# Patient Record
Sex: MALE | Race: WHITE | NOT HISPANIC OR LATINO
[De-identification: names, ages, dates, MRNs, and addresses within clinical notes are randomized per-mention and may not be internally consistent; named-entity substitution may affect disease eponyms.]

---

## 2017-02-07 ENCOUNTER — MEDICATION RENEWAL (OUTPATIENT)
Age: 52
End: 2017-02-07

## 2017-02-23 ENCOUNTER — APPOINTMENT (OUTPATIENT)
Dept: CARDIOLOGY | Facility: CLINIC | Age: 52
End: 2017-02-23

## 2017-02-23 ENCOUNTER — NON-APPOINTMENT (OUTPATIENT)
Age: 52
End: 2017-02-23

## 2017-02-23 VITALS
TEMPERATURE: 97.4 F | OXYGEN SATURATION: 98 % | HEART RATE: 85 BPM | SYSTOLIC BLOOD PRESSURE: 130 MMHG | HEIGHT: 73 IN | BODY MASS INDEX: 31.94 KG/M2 | DIASTOLIC BLOOD PRESSURE: 80 MMHG | WEIGHT: 241 LBS

## 2017-02-24 LAB
ALBUMIN SERPL ELPH-MCNC: 4.2 G/DL
ALP BLD-CCNC: 74 U/L
ALT SERPL-CCNC: 20 U/L
ANION GAP SERPL CALC-SCNC: 17 MMOL/L
AST SERPL-CCNC: 21 U/L
BASOPHILS # BLD AUTO: 0.02 K/UL
BASOPHILS NFR BLD AUTO: 0.3 %
BILIRUB SERPL-MCNC: 0.5 MG/DL
BUN SERPL-MCNC: 15 MG/DL
CALCIUM SERPL-MCNC: 9.3 MG/DL
CHLORIDE SERPL-SCNC: 101 MMOL/L
CHOLEST SERPL-MCNC: 224 MG/DL
CHOLEST/HDLC SERPL: 5.9 RATIO
CO2 SERPL-SCNC: 23 MMOL/L
CREAT SERPL-MCNC: 1.17 MG/DL
EOSINOPHIL # BLD AUTO: 0.24 K/UL
EOSINOPHIL NFR BLD AUTO: 3.4 %
GLUCOSE SERPL-MCNC: 96 MG/DL
HCT VFR BLD CALC: 46.1 %
HDLC SERPL-MCNC: 38 MG/DL
HGB BLD-MCNC: 16.1 G/DL
IMM GRANULOCYTES NFR BLD AUTO: 0.3 %
LDLC SERPL CALC-MCNC: 140 MG/DL
LYMPHOCYTES # BLD AUTO: 2.3 K/UL
LYMPHOCYTES NFR BLD AUTO: 32.9 %
MAN DIFF?: NORMAL
MCHC RBC-ENTMCNC: 29.4 PG
MCHC RBC-ENTMCNC: 34.9 GM/DL
MCV RBC AUTO: 84.1 FL
MONOCYTES # BLD AUTO: 0.39 K/UL
MONOCYTES NFR BLD AUTO: 5.6 %
NEUTROPHILS # BLD AUTO: 4.03 K/UL
NEUTROPHILS NFR BLD AUTO: 57.5 %
NT-PROBNP SERPL-MCNC: 539 PG/ML
PLATELET # BLD AUTO: 239 K/UL
POTASSIUM SERPL-SCNC: 4.3 MMOL/L
PROT SERPL-MCNC: 7.2 G/DL
RBC # BLD: 5.48 M/UL
RBC # FLD: 13.8 %
SODIUM SERPL-SCNC: 141 MMOL/L
TRIGL SERPL-MCNC: 229 MG/DL
TSH SERPL-ACNC: 2.55 UIU/ML
WBC # FLD AUTO: 7 K/UL

## 2017-03-12 ENCOUNTER — RESULT REVIEW (OUTPATIENT)
Age: 52
End: 2017-03-12

## 2017-03-23 ENCOUNTER — OUTPATIENT (OUTPATIENT)
Dept: OUTPATIENT SERVICES | Facility: HOSPITAL | Age: 52
LOS: 1 days | Discharge: HOME | End: 2017-03-23

## 2017-03-24 DIAGNOSIS — F12.21 CANNABIS DEPENDENCE, IN REMISSION: ICD-10-CM

## 2017-04-04 ENCOUNTER — RX RENEWAL (OUTPATIENT)
Age: 52
End: 2017-04-04

## 2017-04-10 ENCOUNTER — MEDICATION RENEWAL (OUTPATIENT)
Age: 52
End: 2017-04-10

## 2017-04-13 ENCOUNTER — NON-APPOINTMENT (OUTPATIENT)
Age: 52
End: 2017-04-13

## 2017-04-13 ENCOUNTER — APPOINTMENT (OUTPATIENT)
Dept: CARDIOLOGY | Facility: CLINIC | Age: 52
End: 2017-04-13

## 2017-04-13 VITALS
HEART RATE: 64 BPM | TEMPERATURE: 97.1 F | WEIGHT: 243 LBS | BODY MASS INDEX: 32.2 KG/M2 | OXYGEN SATURATION: 98 % | DIASTOLIC BLOOD PRESSURE: 74 MMHG | SYSTOLIC BLOOD PRESSURE: 131 MMHG | HEIGHT: 73 IN

## 2017-04-14 LAB
ALBUMIN SERPL ELPH-MCNC: 4.3 G/DL
ALP BLD-CCNC: 70 U/L
ALT SERPL-CCNC: 16 U/L
ANION GAP SERPL CALC-SCNC: 17 MMOL/L
AST SERPL-CCNC: 17 U/L
BASOPHILS # BLD AUTO: 0.02 K/UL
BASOPHILS NFR BLD AUTO: 0.3 %
BILIRUB SERPL-MCNC: 0.5 MG/DL
BUN SERPL-MCNC: 15 MG/DL
CALCIUM SERPL-MCNC: 9.3 MG/DL
CHLORIDE SERPL-SCNC: 100 MMOL/L
CO2 SERPL-SCNC: 22 MMOL/L
CREAT SERPL-MCNC: 0.96 MG/DL
EOSINOPHIL # BLD AUTO: 0.16 K/UL
EOSINOPHIL NFR BLD AUTO: 2.5 %
GLUCOSE SERPL-MCNC: 112 MG/DL
HCT VFR BLD CALC: 45.3 %
HGB BLD-MCNC: 15.1 G/DL
IMM GRANULOCYTES NFR BLD AUTO: 0.2 %
LYMPHOCYTES # BLD AUTO: 1.95 K/UL
LYMPHOCYTES NFR BLD AUTO: 30.8 %
MAN DIFF?: NORMAL
MCHC RBC-ENTMCNC: 28 PG
MCHC RBC-ENTMCNC: 33.3 GM/DL
MCV RBC AUTO: 84 FL
MONOCYTES # BLD AUTO: 0.24 K/UL
MONOCYTES NFR BLD AUTO: 3.8 %
NEUTROPHILS # BLD AUTO: 3.96 K/UL
NEUTROPHILS NFR BLD AUTO: 62.4 %
NT-PROBNP SERPL-MCNC: 53 PG/ML
PLATELET # BLD AUTO: 261 K/UL
POTASSIUM SERPL-SCNC: 4.2 MMOL/L
PROT SERPL-MCNC: 6.7 G/DL
RBC # BLD: 5.39 M/UL
RBC # FLD: 13.9 %
SODIUM SERPL-SCNC: 139 MMOL/L
WBC # FLD AUTO: 6.34 K/UL

## 2017-05-03 ENCOUNTER — MEDICATION RENEWAL (OUTPATIENT)
Age: 52
End: 2017-05-03

## 2017-05-10 ENCOUNTER — APPOINTMENT (OUTPATIENT)
Dept: CARDIOLOGY | Facility: CLINIC | Age: 52
End: 2017-05-10

## 2017-05-10 ENCOUNTER — NON-APPOINTMENT (OUTPATIENT)
Age: 52
End: 2017-05-10

## 2017-05-10 VITALS
DIASTOLIC BLOOD PRESSURE: 78 MMHG | OXYGEN SATURATION: 98 % | BODY MASS INDEX: 31.41 KG/M2 | WEIGHT: 237 LBS | SYSTOLIC BLOOD PRESSURE: 120 MMHG | HEIGHT: 73 IN | HEART RATE: 68 BPM | TEMPERATURE: 97.7 F

## 2017-05-11 ENCOUNTER — NON-APPOINTMENT (OUTPATIENT)
Age: 52
End: 2017-05-11

## 2017-05-16 ENCOUNTER — MEDICATION RENEWAL (OUTPATIENT)
Age: 52
End: 2017-05-16

## 2017-05-24 ENCOUNTER — MEDICATION RENEWAL (OUTPATIENT)
Age: 52
End: 2017-05-24

## 2017-06-16 ENCOUNTER — NON-APPOINTMENT (OUTPATIENT)
Age: 52
End: 2017-06-16

## 2017-06-16 ENCOUNTER — APPOINTMENT (OUTPATIENT)
Dept: ELECTROPHYSIOLOGY | Facility: CLINIC | Age: 52
End: 2017-06-16

## 2017-06-16 VITALS
RESPIRATION RATE: 14 BRPM | DIASTOLIC BLOOD PRESSURE: 84 MMHG | OXYGEN SATURATION: 98 % | SYSTOLIC BLOOD PRESSURE: 128 MMHG | HEART RATE: 68 BPM

## 2017-06-16 RX ORDER — AMOXICILLIN AND CLAVULANATE POTASSIUM 875; 125 MG/1; MG/1
875-125 TABLET, COATED ORAL
Qty: 14 | Refills: 0 | Status: DISCONTINUED | COMMUNITY
Start: 2017-05-10 | End: 2017-06-16

## 2017-06-26 ENCOUNTER — MEDICATION RENEWAL (OUTPATIENT)
Age: 52
End: 2017-06-26

## 2017-08-04 ENCOUNTER — MEDICATION RENEWAL (OUTPATIENT)
Age: 52
End: 2017-08-04

## 2017-08-25 ENCOUNTER — APPOINTMENT (OUTPATIENT)
Dept: ELECTROPHYSIOLOGY | Facility: CLINIC | Age: 52
End: 2017-08-25
Payer: COMMERCIAL

## 2017-08-25 LAB
ALBUMIN SERPL ELPH-MCNC: 4 G/DL
ALP BLD-CCNC: 66 U/L
ALT SERPL-CCNC: 19 U/L
ANION GAP SERPL CALC-SCNC: 15 MMOL/L
AST SERPL-CCNC: 16 U/L
BASOPHILS # BLD AUTO: 0.01 K/UL
BASOPHILS NFR BLD AUTO: 0.2 %
BILIRUB SERPL-MCNC: 0.6 MG/DL
BUN SERPL-MCNC: 14 MG/DL
CALCIUM SERPL-MCNC: 9.4 MG/DL
CHLORIDE SERPL-SCNC: 106 MMOL/L
CO2 SERPL-SCNC: 22 MMOL/L
CREAT SERPL-MCNC: 1.1 MG/DL
EOSINOPHIL # BLD AUTO: 0.13 K/UL
EOSINOPHIL NFR BLD AUTO: 2.1 %
HCT VFR BLD CALC: 42.5 %
HGB BLD-MCNC: 14.1 G/DL
IMM GRANULOCYTES NFR BLD AUTO: 0.3 %
LYMPHOCYTES # BLD AUTO: 1.83 K/UL
LYMPHOCYTES NFR BLD AUTO: 29 %
MAN DIFF?: NORMAL
MCHC RBC-ENTMCNC: 28 PG
MCHC RBC-ENTMCNC: 33.2 GM/DL
MCV RBC AUTO: 84.5 FL
MONOCYTES # BLD AUTO: 0.19 K/UL
MONOCYTES NFR BLD AUTO: 3 %
NEUTROPHILS # BLD AUTO: 4.13 K/UL
NEUTROPHILS NFR BLD AUTO: 65.4 %
PLATELET # BLD AUTO: 234 K/UL
POTASSIUM SERPL-SCNC: 3.9 MMOL/L
PROT SERPL-MCNC: 6.9 G/DL
RBC # BLD: 5.03 M/UL
RBC # FLD: 13.8 %
SODIUM SERPL-SCNC: 143 MMOL/L
WBC # FLD AUTO: 6.31 K/UL

## 2017-08-25 PROCEDURE — 36415 COLL VENOUS BLD VENIPUNCTURE: CPT

## 2017-09-06 ENCOUNTER — TRANSCRIPTION ENCOUNTER (OUTPATIENT)
Age: 52
End: 2017-09-06

## 2017-09-06 ENCOUNTER — INPATIENT (INPATIENT)
Facility: HOSPITAL | Age: 52
LOS: 0 days | Discharge: HOME | DRG: 274 | End: 2017-09-07
Attending: INTERNAL MEDICINE | Admitting: INTERNAL MEDICINE
Payer: COMMERCIAL

## 2017-09-06 VITALS
HEIGHT: 73 IN | RESPIRATION RATE: 16 BRPM | DIASTOLIC BLOOD PRESSURE: 62 MMHG | TEMPERATURE: 98 F | HEART RATE: 59 BPM | WEIGHT: 235.89 LBS | OXYGEN SATURATION: 98 % | SYSTOLIC BLOOD PRESSURE: 130 MMHG

## 2017-09-06 DIAGNOSIS — I48.91 UNSPECIFIED ATRIAL FIBRILLATION: ICD-10-CM

## 2017-09-06 LAB
ALBUMIN SERPL ELPH-MCNC: 4.1 G/DL — SIGNIFICANT CHANGE UP (ref 3.3–5)
ALP SERPL-CCNC: 67 U/L — SIGNIFICANT CHANGE UP (ref 40–120)
ALT FLD-CCNC: 24 U/L RC — SIGNIFICANT CHANGE UP (ref 10–45)
ANION GAP SERPL CALC-SCNC: 13 MMOL/L — SIGNIFICANT CHANGE UP (ref 5–17)
ANION GAP SERPL CALC-SCNC: 16 MMOL/L — SIGNIFICANT CHANGE UP (ref 5–17)
APTT BLD: 29 SEC — SIGNIFICANT CHANGE UP (ref 27.5–37.4)
AST SERPL-CCNC: 35 U/L — SIGNIFICANT CHANGE UP (ref 10–40)
BASOPHILS # BLD AUTO: 0 K/UL — SIGNIFICANT CHANGE UP (ref 0–0.2)
BASOPHILS NFR BLD AUTO: 0.3 % — SIGNIFICANT CHANGE UP (ref 0–2)
BILIRUB SERPL-MCNC: 0.8 MG/DL — SIGNIFICANT CHANGE UP (ref 0.2–1.2)
BLD GP AB SCN SERPL QL: NEGATIVE — SIGNIFICANT CHANGE UP
BUN SERPL-MCNC: 15 MG/DL — SIGNIFICANT CHANGE UP (ref 7–23)
BUN SERPL-MCNC: 17 MG/DL — SIGNIFICANT CHANGE UP (ref 7–23)
CALCIUM SERPL-MCNC: 8.4 MG/DL — SIGNIFICANT CHANGE UP (ref 8.4–10.5)
CALCIUM SERPL-MCNC: 8.7 MG/DL — SIGNIFICANT CHANGE UP (ref 8.4–10.5)
CHLORIDE SERPL-SCNC: 107 MMOL/L — SIGNIFICANT CHANGE UP (ref 96–108)
CHLORIDE SERPL-SCNC: 109 MMOL/L — HIGH (ref 96–108)
CO2 SERPL-SCNC: 20 MMOL/L — LOW (ref 22–31)
CO2 SERPL-SCNC: 21 MMOL/L — LOW (ref 22–31)
CREAT SERPL-MCNC: 0.94 MG/DL — SIGNIFICANT CHANGE UP (ref 0.5–1.3)
CREAT SERPL-MCNC: 1.08 MG/DL — SIGNIFICANT CHANGE UP (ref 0.5–1.3)
EOSINOPHIL # BLD AUTO: 0.1 K/UL — SIGNIFICANT CHANGE UP (ref 0–0.5)
EOSINOPHIL NFR BLD AUTO: 0.7 % — SIGNIFICANT CHANGE UP (ref 0–6)
GLUCOSE SERPL-MCNC: 151 MG/DL — HIGH (ref 70–99)
GLUCOSE SERPL-MCNC: 87 MG/DL — SIGNIFICANT CHANGE UP (ref 70–99)
HCT VFR BLD CALC: 43.6 % — SIGNIFICANT CHANGE UP (ref 39–50)
HCT VFR BLD CALC: 43.8 % — SIGNIFICANT CHANGE UP (ref 39–50)
HGB BLD-MCNC: 14.9 G/DL — SIGNIFICANT CHANGE UP (ref 13–17)
HGB BLD-MCNC: 15 G/DL — SIGNIFICANT CHANGE UP (ref 13–17)
INR BLD: 1.1 RATIO — SIGNIFICANT CHANGE UP (ref 0.88–1.16)
LYMPHOCYTES # BLD AUTO: 17 % — SIGNIFICANT CHANGE UP (ref 13–44)
LYMPHOCYTES # BLD AUTO: 2.1 K/UL — SIGNIFICANT CHANGE UP (ref 1–3.3)
MAGNESIUM SERPL-MCNC: 1.9 MG/DL — SIGNIFICANT CHANGE UP (ref 1.6–2.6)
MCHC RBC-ENTMCNC: 29.7 PG — SIGNIFICANT CHANGE UP (ref 27–34)
MCHC RBC-ENTMCNC: 30.1 PG — SIGNIFICANT CHANGE UP (ref 27–34)
MCHC RBC-ENTMCNC: 34.1 GM/DL — SIGNIFICANT CHANGE UP (ref 32–36)
MCHC RBC-ENTMCNC: 34.2 GM/DL — SIGNIFICANT CHANGE UP (ref 32–36)
MCV RBC AUTO: 86.8 FL — SIGNIFICANT CHANGE UP (ref 80–100)
MCV RBC AUTO: 88.1 FL — SIGNIFICANT CHANGE UP (ref 80–100)
MONOCYTES # BLD AUTO: 0.4 K/UL — SIGNIFICANT CHANGE UP (ref 0–0.9)
MONOCYTES NFR BLD AUTO: 3 % — SIGNIFICANT CHANGE UP (ref 2–14)
NEUTROPHILS # BLD AUTO: 9.5 K/UL — HIGH (ref 1.8–7.4)
NEUTROPHILS NFR BLD AUTO: 78.9 % — HIGH (ref 43–77)
PHOSPHATE SERPL-MCNC: 3.9 MG/DL — SIGNIFICANT CHANGE UP (ref 2.5–4.5)
PLATELET # BLD AUTO: 239 K/UL — SIGNIFICANT CHANGE UP (ref 150–400)
PLATELET # BLD AUTO: 266 K/UL — SIGNIFICANT CHANGE UP (ref 150–400)
POTASSIUM SERPL-MCNC: 4.3 MMOL/L — SIGNIFICANT CHANGE UP (ref 3.5–5.3)
POTASSIUM SERPL-MCNC: 4.3 MMOL/L — SIGNIFICANT CHANGE UP (ref 3.5–5.3)
POTASSIUM SERPL-SCNC: 4.3 MMOL/L — SIGNIFICANT CHANGE UP (ref 3.5–5.3)
POTASSIUM SERPL-SCNC: 4.3 MMOL/L — SIGNIFICANT CHANGE UP (ref 3.5–5.3)
PROT SERPL-MCNC: 7.1 G/DL — SIGNIFICANT CHANGE UP (ref 6–8.3)
PROTHROM AB SERPL-ACNC: 11.9 SEC — SIGNIFICANT CHANGE UP (ref 9.8–12.7)
RBC # BLD: 4.95 M/UL — SIGNIFICANT CHANGE UP (ref 4.2–5.8)
RBC # BLD: 5.05 M/UL — SIGNIFICANT CHANGE UP (ref 4.2–5.8)
RBC # FLD: 12.5 % — SIGNIFICANT CHANGE UP (ref 10.3–14.5)
RBC # FLD: 12.5 % — SIGNIFICANT CHANGE UP (ref 10.3–14.5)
RH IG SCN BLD-IMP: POSITIVE — SIGNIFICANT CHANGE UP
SODIUM SERPL-SCNC: 141 MMOL/L — SIGNIFICANT CHANGE UP (ref 135–145)
SODIUM SERPL-SCNC: 145 MMOL/L — SIGNIFICANT CHANGE UP (ref 135–145)
WBC # BLD: 12.1 K/UL — HIGH (ref 3.8–10.5)
WBC # BLD: 6.7 K/UL — SIGNIFICANT CHANGE UP (ref 3.8–10.5)
WBC # FLD AUTO: 12.1 K/UL — HIGH (ref 3.8–10.5)
WBC # FLD AUTO: 6.7 K/UL — SIGNIFICANT CHANGE UP (ref 3.8–10.5)

## 2017-09-06 PROCEDURE — 93662 INTRACARDIAC ECG (ICE): CPT | Mod: 26

## 2017-09-06 PROCEDURE — 93010 ELECTROCARDIOGRAM REPORT: CPT

## 2017-09-06 PROCEDURE — 93656 COMPRE EP EVAL ABLTJ ATR FIB: CPT

## 2017-09-06 PROCEDURE — 93010 ELECTROCARDIOGRAM REPORT: CPT | Mod: 77

## 2017-09-06 PROCEDURE — 93613 INTRACARDIAC EPHYS 3D MAPG: CPT

## 2017-09-06 RX ORDER — HEPARIN SODIUM 5000 [USP'U]/ML
6000 INJECTION INTRAVENOUS; SUBCUTANEOUS EVERY 6 HOURS
Qty: 0 | Refills: 0 | Status: DISCONTINUED | OUTPATIENT
Start: 2017-09-06 | End: 2017-09-07

## 2017-09-06 RX ORDER — MAGNESIUM SULFATE 500 MG/ML
1 VIAL (ML) INJECTION ONCE
Qty: 0 | Refills: 0 | Status: COMPLETED | OUTPATIENT
Start: 2017-09-06 | End: 2017-09-06

## 2017-09-06 RX ORDER — PANTOPRAZOLE SODIUM 20 MG/1
40 TABLET, DELAYED RELEASE ORAL
Qty: 0 | Refills: 0 | Status: DISCONTINUED | OUTPATIENT
Start: 2017-09-06 | End: 2017-09-07

## 2017-09-06 RX ORDER — ONDANSETRON 8 MG/1
4 TABLET, FILM COATED ORAL ONCE
Qty: 0 | Refills: 0 | Status: COMPLETED | OUTPATIENT
Start: 2017-09-06 | End: 2017-09-06

## 2017-09-06 RX ORDER — SUCRALFATE 1 G
1 TABLET ORAL EVERY 6 HOURS
Qty: 0 | Refills: 0 | Status: DISCONTINUED | OUTPATIENT
Start: 2017-09-06 | End: 2017-09-07

## 2017-09-06 RX ORDER — PROPAFENONE HCL 150 MG
425 TABLET ORAL EVERY 12 HOURS
Qty: 0 | Refills: 0 | Status: DISCONTINUED | OUTPATIENT
Start: 2017-09-06 | End: 2017-09-07

## 2017-09-06 RX ORDER — HEPARIN SODIUM 5000 [USP'U]/ML
INJECTION INTRAVENOUS; SUBCUTANEOUS
Qty: 25000 | Refills: 0 | Status: DISCONTINUED | OUTPATIENT
Start: 2017-09-06 | End: 2017-09-07

## 2017-09-06 RX ADMIN — PANTOPRAZOLE SODIUM 40 MILLIGRAM(S): 20 TABLET, DELAYED RELEASE ORAL at 18:46

## 2017-09-06 RX ADMIN — Medication 100 GRAM(S): at 18:46

## 2017-09-06 RX ADMIN — Medication 1 GRAM(S): at 18:46

## 2017-09-06 RX ADMIN — ONDANSETRON 4 MILLIGRAM(S): 8 TABLET, FILM COATED ORAL at 15:00

## 2017-09-06 RX ADMIN — ONDANSETRON 4 MILLIGRAM(S): 8 TABLET, FILM COATED ORAL at 18:46

## 2017-09-06 RX ADMIN — HEPARIN SODIUM 1000 UNIT(S)/HR: 5000 INJECTION INTRAVENOUS; SUBCUTANEOUS at 20:07

## 2017-09-06 RX ADMIN — Medication 425 MILLIGRAM(S): at 18:45

## 2017-09-06 NOTE — CHART NOTE - NSCHARTNOTEFT_GEN_A_CORE
====================  NEW EVENTS:  ====================  s/p afib ablation    ====================  SUMMARY:  ====================  52 year old male h/o paroxysmal afib/flutter (on ASA only), HLD,  PUD, GERD, anxiety, obesity  who c/o palpitations, dizziness, near syncope, weakness, fatigue presents for Afib ablation    ====================  VITALS:  ====================    ICU Vital Signs Last 24 Hrs  T(C): 36.6 (06 Sep 2017 20:00), Max: 36.6 (06 Sep 2017 07:39)  T(F): 97.8 (06 Sep 2017 20:00), Max: 97.9 (06 Sep 2017 07:39)  HR: 83 (06 Sep 2017 22:00) (59 - 84)  BP: 108/60 (06 Sep 2017 22:00) (106/57 - 130/62)  BP(mean): 73 (06 Sep 2017 22:00) (71 - 84)  ABP: --  ABP(mean): --  RR: 18 (06 Sep 2017 22:00) (14 - 28)  SpO2: 97% (06 Sep 2017 22:00) (95% - 99%)      I&O's Summary    06 Sep 2017 07:01  -  06 Sep 2017 23:17  --------------------------------------------------------  IN: 490 mL / OUT: 2475 mL / NET: -1985 mL      ====================  LABS:  ====================                          14.9   12.1  )-----------( 266      ( 06 Sep 2017 15:23 )             43.6     09-06    145  |  109<H>  |  15  ----------------------------<  151<H>  4.3   |  20<L>  |  1.08    Ca    8.4      06 Sep 2017 15:23  Phos  3.9     09-06  Mg     1.9     09-06    TPro  7.1  /  Alb  4.1  /  TBili  0.8  /  DBili  x   /  AST  35  /  ALT  24  /  AlkPhos  67  09-06    PT/INR - ( 06 Sep 2017 08:07 )   PT: 11.9 sec;   INR: 1.10 ratio         PTT - ( 06 Sep 2017 08:07 )  PTT:29.0 sec        ====================  PLAN:  ====================  - patient on protonix/carfate  - TTE in AM. Will start Xarelto if no effusion\

## 2017-09-06 NOTE — H&P CARDIOLOGY - FAMILY HISTORY
Mother  Still living? Unknown  Family history of diabetes mellitus in mother, Age at diagnosis: Age Unknown     Father  Still living? Unknown  Family history of prostate cancer in father, Age at diagnosis: Age Unknown

## 2017-09-06 NOTE — H&P CARDIOLOGY - HISTORY OF PRESENT ILLNESS
52 year old male h/o paroxysmal afib/flutter (on ASA only), HLD,  PUD, GERD, anxiety, obesity  who c/o palpitations, dizziness, near syncope, weakness, fatigue. He also experiences symptoms with caffeine intake, overeating and lack of sleep. Pt admits to missing doses of propafenone.

## 2017-09-06 NOTE — H&P CARDIOLOGY - PMH
Anxiety    Atrial fibrillation, unspecified type    GERD (gastroesophageal reflux disease)    Peptic ulcer disease

## 2017-09-07 VITALS — DIASTOLIC BLOOD PRESSURE: 52 MMHG | HEART RATE: 76 BPM | SYSTOLIC BLOOD PRESSURE: 118 MMHG

## 2017-09-07 DIAGNOSIS — I48.91 UNSPECIFIED ATRIAL FIBRILLATION: ICD-10-CM

## 2017-09-07 LAB
ANION GAP SERPL CALC-SCNC: 13 MMOL/L — SIGNIFICANT CHANGE UP (ref 5–17)
APTT BLD: 41.7 SEC — HIGH (ref 27.5–37.4)
BASOPHILS # BLD AUTO: 0 K/UL — SIGNIFICANT CHANGE UP (ref 0–0.2)
BASOPHILS NFR BLD AUTO: 0.3 % — SIGNIFICANT CHANGE UP (ref 0–2)
BUN SERPL-MCNC: 14 MG/DL — SIGNIFICANT CHANGE UP (ref 7–23)
CALCIUM SERPL-MCNC: 8.4 MG/DL — SIGNIFICANT CHANGE UP (ref 8.4–10.5)
CHLORIDE SERPL-SCNC: 107 MMOL/L — SIGNIFICANT CHANGE UP (ref 96–108)
CO2 SERPL-SCNC: 20 MMOL/L — LOW (ref 22–31)
CREAT SERPL-MCNC: 1.07 MG/DL — SIGNIFICANT CHANGE UP (ref 0.5–1.3)
EOSINOPHIL # BLD AUTO: 0.1 K/UL — SIGNIFICANT CHANGE UP (ref 0–0.5)
EOSINOPHIL NFR BLD AUTO: 0.9 % — SIGNIFICANT CHANGE UP (ref 0–6)
GLUCOSE SERPL-MCNC: 111 MG/DL — HIGH (ref 70–99)
HCT VFR BLD CALC: 40.6 % — SIGNIFICANT CHANGE UP (ref 39–50)
HGB BLD-MCNC: 13.9 G/DL — SIGNIFICANT CHANGE UP (ref 13–17)
LYMPHOCYTES # BLD AUTO: 19.9 % — SIGNIFICANT CHANGE UP (ref 13–44)
LYMPHOCYTES # BLD AUTO: 2 K/UL — SIGNIFICANT CHANGE UP (ref 1–3.3)
MAGNESIUM SERPL-MCNC: 2.4 MG/DL — SIGNIFICANT CHANGE UP (ref 1.6–2.6)
MCHC RBC-ENTMCNC: 30.1 PG — SIGNIFICANT CHANGE UP (ref 27–34)
MCHC RBC-ENTMCNC: 34.3 GM/DL — SIGNIFICANT CHANGE UP (ref 32–36)
MCV RBC AUTO: 87.7 FL — SIGNIFICANT CHANGE UP (ref 80–100)
MONOCYTES # BLD AUTO: 0.7 K/UL — SIGNIFICANT CHANGE UP (ref 0–0.9)
MONOCYTES NFR BLD AUTO: 7.2 % — SIGNIFICANT CHANGE UP (ref 2–14)
NEUTROPHILS # BLD AUTO: 7.3 K/UL — SIGNIFICANT CHANGE UP (ref 1.8–7.4)
NEUTROPHILS NFR BLD AUTO: 71.6 % — SIGNIFICANT CHANGE UP (ref 43–77)
PHOSPHATE SERPL-MCNC: 4.1 MG/DL — SIGNIFICANT CHANGE UP (ref 2.5–4.5)
PLATELET # BLD AUTO: 235 K/UL — SIGNIFICANT CHANGE UP (ref 150–400)
POTASSIUM SERPL-MCNC: 4.1 MMOL/L — SIGNIFICANT CHANGE UP (ref 3.5–5.3)
POTASSIUM SERPL-SCNC: 4.1 MMOL/L — SIGNIFICANT CHANGE UP (ref 3.5–5.3)
RBC # BLD: 4.63 M/UL — SIGNIFICANT CHANGE UP (ref 4.2–5.8)
RBC # FLD: 12.4 % — SIGNIFICANT CHANGE UP (ref 10.3–14.5)
SODIUM SERPL-SCNC: 140 MMOL/L — SIGNIFICANT CHANGE UP (ref 135–145)
WBC # BLD: 10.2 K/UL — SIGNIFICANT CHANGE UP (ref 3.8–10.5)
WBC # FLD AUTO: 10.2 K/UL — SIGNIFICANT CHANGE UP (ref 3.8–10.5)

## 2017-09-07 PROCEDURE — C1769: CPT

## 2017-09-07 PROCEDURE — 86900 BLOOD TYPING SEROLOGIC ABO: CPT

## 2017-09-07 PROCEDURE — 93613 INTRACARDIAC EPHYS 3D MAPG: CPT

## 2017-09-07 PROCEDURE — C1732: CPT

## 2017-09-07 PROCEDURE — 93306 TTE W/DOPPLER COMPLETE: CPT | Mod: 26

## 2017-09-07 PROCEDURE — 80053 COMPREHEN METABOLIC PANEL: CPT

## 2017-09-07 PROCEDURE — 83735 ASSAY OF MAGNESIUM: CPT

## 2017-09-07 PROCEDURE — 93005 ELECTROCARDIOGRAM TRACING: CPT

## 2017-09-07 PROCEDURE — 86901 BLOOD TYPING SEROLOGIC RH(D): CPT

## 2017-09-07 PROCEDURE — 84100 ASSAY OF PHOSPHORUS: CPT

## 2017-09-07 PROCEDURE — 85730 THROMBOPLASTIN TIME PARTIAL: CPT

## 2017-09-07 PROCEDURE — 93010 ELECTROCARDIOGRAM REPORT: CPT

## 2017-09-07 PROCEDURE — 99233 SBSQ HOSP IP/OBS HIGH 50: CPT

## 2017-09-07 PROCEDURE — 86850 RBC ANTIBODY SCREEN: CPT

## 2017-09-07 PROCEDURE — 93306 TTE W/DOPPLER COMPLETE: CPT

## 2017-09-07 PROCEDURE — 85610 PROTHROMBIN TIME: CPT

## 2017-09-07 PROCEDURE — C1759: CPT

## 2017-09-07 PROCEDURE — 93662 INTRACARDIAC ECG (ICE): CPT

## 2017-09-07 PROCEDURE — C1730: CPT

## 2017-09-07 PROCEDURE — 93656 COMPRE EP EVAL ABLTJ ATR FIB: CPT

## 2017-09-07 PROCEDURE — 80048 BASIC METABOLIC PNL TOTAL CA: CPT

## 2017-09-07 PROCEDURE — 85027 COMPLETE CBC AUTOMATED: CPT

## 2017-09-07 PROCEDURE — C1766: CPT

## 2017-09-07 PROCEDURE — C1894: CPT

## 2017-09-07 RX ORDER — RIVAROXABAN 15 MG-20MG
1 KIT ORAL
Qty: 30 | Refills: 3 | OUTPATIENT
Start: 2017-09-07 | End: 2018-01-04

## 2017-09-07 RX ORDER — SUCRALFATE 1 G
1 TABLET ORAL
Qty: 120 | Refills: 1 | OUTPATIENT
Start: 2017-09-07 | End: 2017-11-05

## 2017-09-07 RX ORDER — RIVAROXABAN 15 MG-20MG
20 KIT ORAL EVERY 24 HOURS
Qty: 0 | Refills: 0 | Status: DISCONTINUED | OUTPATIENT
Start: 2017-09-07 | End: 2017-09-07

## 2017-09-07 RX ORDER — PANTOPRAZOLE SODIUM 20 MG/1
1 TABLET, DELAYED RELEASE ORAL
Qty: 30 | Refills: 1 | OUTPATIENT
Start: 2017-09-07 | End: 2017-11-05

## 2017-09-07 RX ORDER — RIVAROXABAN 15 MG-20MG
1 KIT ORAL
Qty: 0 | Refills: 0 | COMMUNITY
Start: 2017-09-07

## 2017-09-07 RX ADMIN — RIVAROXABAN 20 MILLIGRAM(S): KIT at 09:40

## 2017-09-07 RX ADMIN — PANTOPRAZOLE SODIUM 40 MILLIGRAM(S): 20 TABLET, DELAYED RELEASE ORAL at 05:52

## 2017-09-07 RX ADMIN — HEPARIN SODIUM 1200 UNIT(S)/HR: 5000 INJECTION INTRAVENOUS; SUBCUTANEOUS at 02:33

## 2017-09-07 RX ADMIN — Medication 425 MILLIGRAM(S): at 06:43

## 2017-09-07 RX ADMIN — Medication 1 GRAM(S): at 00:23

## 2017-09-07 RX ADMIN — Medication 1 GRAM(S): at 05:52

## 2017-09-07 NOTE — PROGRESS NOTE ADULT - SUBJECTIVE AND OBJECTIVE BOX
Subjective:    Medications:  sucralfate 1 Gram(s) Oral every 6 hours  pantoprazole    Tablet 40 milliGRAM(s) Oral before breakfast  propafenone  milliGRAM(s) Oral every 12 hours  rivaroxaban 20 milliGRAM(s) Oral every 24 hours      PHYSICAL EXAM:  Vital Signs Last 24 Hrs  T(C): 37.2 (07 Sep 2017 08:00), Max: 37.2 (07 Sep 2017 08:00)  T(F): 98.9 (07 Sep 2017 08:00), Max: 98.9 (07 Sep 2017 08:00)  HR: 76 (07 Sep 2017 10:00) (66 - 84)  BP: 118/52 (07 Sep 2017 10:00) (94/53 - 132/71)  BP(mean): 71 (07 Sep 2017 10:00) (65 - 88)  RR: 15 (07 Sep 2017 08:00) (12 - 28)  SpO2: 97% (07 Sep 2017 08:00) (91% - 99%)  Daily     Daily Weight in k.5 (07 Sep 2017 00:00)  I&O's Detail    06 Sep 2017 07:01  -  07 Sep 2017 07:00  --------------------------------------------------------  IN:    heparin  Infusion.: 130 mL    IV PiggyBack: 100 mL    Oral Fluid: 720 mL  Total IN: 950 mL    OUT:    Indwelling Catheter - Urethral: 3150 mL  Total OUT: 3150 mL    Total NET: -2200 mL      07 Sep 2017 07:01  -  07 Sep 2017 13:40  --------------------------------------------------------  IN:    heparin  Infusion.: 24 mL    Oral Fluid: 200 mL  Total IN: 224 mL    OUT:  Total OUT: 0 mL    Total NET: 224 mL          General: A/ox 3, No acute Distress  Neck: Supple, NO JVD  Cardiac: S1 S2, No M/R/G  Pulmonary: CTAB, Breathing unlabored, No Rhonchi/Rales/Wheezing  Abdomen: Soft, Non -tender, +BS   No evidence no  bleeding in r groin   Extremities: No Rashes, No edema  Neuro: A/o x 3, No focal deficits  Psch: normal mood , normal affect    LABS:  cret                        13.9   10.2  )-----------( 235      ( 07 Sep 2017 02:04 )             40.6     09-07    140  |  107  |  14  ----------------------------<  111<H>  4.1   |  20<L>  |  1.07    Ca    8.4      07 Sep 2017 02:04  Phos  4.1     09-  Mg     2.4     -    TPro  7.1  /  Alb  4.1  /  TBili  0.8  /  DBili  x   /  AST  35  /  ALT  24  /  AlkPhos  67  09-06    PT/INR - ( 06 Sep 2017 08:07 )   PT: 11.9 sec;   INR: 1.10 ratio         PTT - ( 07 Sep 2017 02:04 )  PTT:41.7 sec Subjective: Ambulating without symptoms. Patient without chest pain, shortness of breath or palpitations.    Medications:  sucralfate 1 Gram(s) Oral every 6 hours  pantoprazole    Tablet 40 milliGRAM(s) Oral before breakfast  propafenone  milliGRAM(s) Oral every 12 hours  rivaroxaban 20 milliGRAM(s) Oral every 24 hours      PHYSICAL EXAM:  Vital Signs Last 24 Hrs  T(C): 37.2 (07 Sep 2017 08:00), Max: 37.2 (07 Sep 2017 08:00)  T(F): 98.9 (07 Sep 2017 08:00), Max: 98.9 (07 Sep 2017 08:00)  HR: 76 (07 Sep 2017 10:00) (66 - 84)  BP: 118/52 (07 Sep 2017 10:00) (94/53 - 132/71)  BP(mean): 71 (07 Sep 2017 10:00) (65 - 88)  RR: 15 (07 Sep 2017 08:00) (12 - 28)  SpO2: 97% (07 Sep 2017 08:00) (91% - 99%)  Daily     Daily Weight in k.5 (07 Sep 2017 00:00)  I&O's Detail    06 Sep 2017 07:01  -  07 Sep 2017 07:00  --------------------------------------------------------  IN:    heparin  Infusion.: 130 mL    IV PiggyBack: 100 mL    Oral Fluid: 720 mL  Total IN: 950 mL    OUT:    Indwelling Catheter - Urethral: 3150 mL  Total OUT: 3150 mL    Total NET: -2200 mL      07 Sep 2017 07:01  -  07 Sep 2017 13:40  --------------------------------------------------------  IN:    heparin  Infusion.: 24 mL    Oral Fluid: 200 mL  Total IN: 224 mL    OUT:  Total OUT: 0 mL    Total NET: 224 mL          General: A/ox 3, No acute Distress  Neck: Supple, NO JVD  Cardiac: S1 S2, No M/R/G  Pulmonary: CTAB, Breathing unlabored, No Rhonchi/Rales/Wheezing  Abdomen: Soft, Non -tender, +BS   No evidence no  bleeding in r groin   Extremities: No Rashes, No edema  Neuro: A/o x 3, No focal deficits  Psch: normal mood , normal affect    LABS:  cret                        13.9   10.2  )-----------( 235      ( 07 Sep 2017 02:04 )             40.6     -    140  |  107  |  14  ----------------------------<  111<H>  4.1   |  20<L>  |  1.07    Ca    8.4      07 Sep 2017 02:04  Phos  4.1     -  Mg     2.4     -    TPro  7.1  /  Alb  4.1  /  TBili  0.8  /  DBili  x   /  AST  35  /  ALT  24  /  AlkPhos  67  09-06    PT/INR - ( 06 Sep 2017 08:07 )   PT: 11.9 sec;   INR: 1.10 ratio         PTT - ( 07 Sep 2017 02:04 )  PTT:41.7 sec

## 2017-09-07 NOTE — DISCHARGE NOTE ADULT - CARE PLAN
Principal Discharge DX:	Atrial fibrillation, unspecified type  Goal:	Your heart rate and rhythm will be controlled.  Instructions for follow-up, activity and diet:	You have undergone an atrial fibulation ablation. Please follow up with Dr. Hardin within 2 weeks. Please continue all your medications. Do not lift heavy objects for 1 week. Do not exercise for 2 weeks. Occasional skipped beats or palpations which last a few hours a common after the procedure, but generally resolve after 3 months. If this occurs, do not be alarmed, just let your cardiologist know. Please call the office or go to the ER if you have chest pain, discharge, redness, swelling at the insertion site, dizziness or fainting.

## 2017-09-07 NOTE — PROGRESS NOTE ADULT - ASSESSMENT
52 year old male h/o paroxysmal afib/flutter (on ASA only), HLD,  PUD, GERD, anxiety, obesity  who c/o palpitations, dizziness, near syncope, weakness, fatigue.  Pt referred for afib ablation  9/6 Presenting today feeling well  NAD  denies sob chest pain or groin pain

## 2017-09-07 NOTE — DISCHARGE NOTE ADULT - CARE PROVIDER_API CALL
Get Hardin (MD), Cardiac Electrophysiology; Cardiovascular Disease; Internal Medicine  [id number] [place] [address]  Phone: [phone]  Fax: [phone]

## 2017-09-07 NOTE — PROGRESS NOTE ADULT - SUBJECTIVE AND OBJECTIVE BOX
HPI: no over night events  MEDICATIONS  (STANDING):  sucralfate 1 Gram(s) Oral every 6 hours  pantoprazole    Tablet 40 milliGRAM(s) Oral before breakfast  propafenone  milliGRAM(s) Oral every 12 hours  rivaroxaban 20 milliGRAM(s) Oral every 24 hours    MEDICATIONS  (PRN):    Allergies    No Known Allergies    Intolerances      ROS:  Constitutional: Pt denies fever/chills  Neuro: denies dizziness, HA   CV: denies CP, palpitation  Pulm: denies SOB  GI: denies abd pain/N/V/D  : states voiding today no dysuria  Musculoskeletal:   has ambulated today Skin: denies ulcers, bleeding, rash    Vital Signs Last 24 Hrs  T(C): 37.2 (07 Sep 2017 08:00), Max: 37.2 (07 Sep 2017 08:00)  T(F): 98.9 (07 Sep 2017 08:00), Max: 98.9 (07 Sep 2017 08:00)  HR: 66 (07 Sep 2017 08:00) (66 - 84)  BP: 124/64 (07 Sep 2017 08:00) (106/57 - 132/71)  BP(mean): 80 (07 Sep 2017 08:00) (71 - 88)  RR: 15 (07 Sep 2017 08:00) (12 - 28)  SpO2: 97% (07 Sep 2017 08:00) (91% - 99%)    Physical Exam:  general : well developed, well nourished,  and no acute distress  Neurological: Alert & Oriented x 3,  no focal deficits  Respiratory: CTA B/L, No wheezing/crackles/rhonchi/ dimished throughout   Cardiovascular: (+) S1 & S2, RRR, no murmur  Gastrointestinal: soft, obese   Extremities: No pedal edema, DP +2 LE   Skin:  right groin intact no bleeding, hematoma or ecchymosis    LABS:                        13.9   10.2  )-----------( 235      ( 07 Sep 2017 02:04 )             40.6     09-07    140  |  107  |  14  ----------------------------<  111<H>  4.1   |  20<L>  |  1.07    Ca    8.4      07 Sep 2017 02:04  Phos  4.1     09-07  Mg     2.4     09-07    TPro  7.1  /  Alb  4.1  /  TBili  0.8  /  DBili  x   /  AST  35  /  ALT  24  /  AlkPhos  67  09-06    PT/INR - ( 06 Sep 2017 08:07 )   PT: 11.9 sec;   INR: 1.10 ratio         PTT - ( 07 Sep 2017 02:04 )  PTT:41.7 sec    < from: Transthoracic Echocardiogram (09.07.17 @ 07:46) >    Patient name: LESLEY SWANSON  YOB: 1965   Age: 52 (M)   MR#: [id number]  Study Date: 9/7/2017  [id number]: Abby Owens RDCS  Study quality: Technically fair  Referring Physician: Get Hardin MD  Blood Pressure: 120/54 mmHg  Height: 185 cm  Weight: 107 kg  BSA: 2.3 m2  ------------------------------------------------------------------------  PROCEDURE: Transthoracic echocardiogram with 2-D, M-Mode  and complete spectral and color flow Doppler.  INDICATION:Unspecified atrial fibrillation (I48.91)  ------------------------------------------------------------------------  Dimensions:    Normal Values:  LA:     5.4    2.0 - 4.0 cm  Ao:     3.4    2.0 - 3.8 cm  SEPTUM: 1.1    0.6 - 1.2 cm  PWT:    1.1    0.6 - 1.1 cm  LVIDd:  4.4    3.0 - 5.6 cm  LVIDs:  2.7    1.8 - 4.0 cm  Derived variables:  LVMI: 73 g/m2  RWT: 0.50  Fractional short: 39 %  ------------------------------------------------------------------------  Observations:  Mitral Valve: Normal mitral valve. Mild mitral  regurgitation.  Aortic Valve/Aorta: Normal trileaflet aortic valve. No  aortic valve regurgitation seen.  Normal aortic root (Ao: 3.4 cm at the sinuses of Valsalva).  Left Atrium: Moderately dilated left atrium.  LA volume  index = 42 cc/m2.  Left Ventricle: Endocardium not well visualized; grossly  normal left ventricular systolic function. Increased  relative wall thickness with normal left ventricular mass  index, consistent with concentric left ventricular  remodeling. Normal diastolic function  Right Heart: Normal right atrium. The right ventricle is  not well visualized; grossly normal right ventricular  systolic function. Normal tricuspid valve. Minimal  tricuspid regurgitation. Normal pulmonic valve. Minimal  pulmonic regurgitation.  Pericardium/Pleura: No pericardial effusion seen.  Hemodynamic: Estimated right atrial pressure is 8 mm Hg.  Estimated right ventricular systolic pressure equals 39 mm  Hg, assuming right atrial pressure equals 8 mm Hg,  consistent with borderline pulmonary hypertension.  ------------------------------------------------------------------------  Conclusions:  1. Endocardium not well visualized; grossly normal left  ventricular systolic function.  2. Normal diastolic function  3. The right ventricle is not well visualized; grossly  normal right ventricular systolic function.  4. No pericardial effusion seen.  *** No previous Echo exam.  ------------------------------------------------------------------------  Confirmed on  9/7/2017 - 08:53:02 by Hugh Barrios M.D.  ------------------------------------------------------------------------    < end of copied text >    RADIOLOGY & ADDITIONAL TESTS:  TELE: SR 70's  ekg SR 75 , Qtc stable at 448 ms

## 2017-09-07 NOTE — DISCHARGE NOTE ADULT - PLAN OF CARE
Your heart rate and rhythm will be controlled. You have undergone an atrial fibulation ablation. Please follow up with Dr. Hardin within 2 weeks. Please continue all your medications. Do not lift heavy objects for 1 week. Do not exercise for 2 weeks. Occasional skipped beats or palpations which last a few hours a common after the procedure, but generally resolve after 3 months. If this occurs, do not be alarmed, just let your cardiologist know. Please call the office or go to the ER if you have chest pain, discharge, redness, swelling at the insertion site, dizziness or fainting.

## 2017-09-07 NOTE — DISCHARGE NOTE ADULT - FINDINGS/TREATMENT
tolerated well in NSR  d/c home on xarelto   no Asa for now   PPI protection   d/w Dr Hardin   continue propafenone   reviewed with pt post procedure and followup

## 2017-09-07 NOTE — DISCHARGE NOTE ADULT - MEDICATION SUMMARY - MEDICATIONS TO TAKE
I will START or STAY ON the medications listed below when I get home from the hospital:    propafenone 425 mg oral capsule, extended release  -- 1 cap(s) by mouth every 12 hours  -- Indication: For Atrial fibrillation     rivaroxaban 20 mg oral tablet  -- 1 tab(s) by mouth every 24 hours  -- Indication: For Atrial fibrillation blood thinner     sucralfate 1 g oral tablet  -- 1 tab(s) by mouth every 6 hours  -- Indication: For stomach protection    pantoprazole 40 mg oral delayed release tablet  -- 1 tab(s) by mouth once a day (before a meal)  -- Indication: For Peptic ulcer disease

## 2017-09-07 NOTE — DISCHARGE NOTE ADULT - HOSPITAL COURSE
53 y/o M H/O P A Fib, GERD and anxiety, near syncope presented for a fib ablation 53 y/o M H/O P A Fib, GERD and anxiety, near syncope presented for a fib ablation  s/p Afib ablation tolerated well  no chest pain , sob groin pain VSS   ambulating   Physical Exam:     General: No distress. Comfortable.  HEENT: EOM intact.  Neck: Neck supple. JVP not elevated. No masses  Chest: Clear to auscultation bilaterally  CV: S1S2. No distal pulses  Abdomen: Soft, non-distended, non-tender  Driveline exit site: Clean, dry, and intact  Skin: No rashes or skin breakdown  Neurology: Alert and oriented times three. Sensation intact  Psych: Affect normal

## 2017-09-07 NOTE — DISCHARGE NOTE ADULT - PATIENT PORTAL LINK FT
“You can access the [employer] Patient Portal, offered by [facility], by registering with the following website: [email or web address]”

## 2017-09-07 NOTE — PROGRESS NOTE ADULT - ASSESSMENT
52 year old male h/o  HLD,  PUD, GERD, anxiety, obesity   paroxysmal afib/flutter (on ASA only),with a  c/o palpitations, dizziness, near syncope, weakness, fatigue. now s/p afib ablation 9/6 , echocardiogram post procedure with normal LVF and no pericardial effusion noted.

## 2017-09-07 NOTE — DISCHARGE NOTE ADULT - INSTRUCTIONS
Please eat a mechanically soft diet for 2 weeks. Please eat a heart heathy diet low in fats and sodium

## 2017-09-12 ENCOUNTER — APPOINTMENT (OUTPATIENT)
Dept: ELECTROPHYSIOLOGY | Facility: CLINIC | Age: 52
End: 2017-09-12
Payer: COMMERCIAL

## 2017-09-12 VITALS
BODY MASS INDEX: 30.88 KG/M2 | HEART RATE: 67 BPM | DIASTOLIC BLOOD PRESSURE: 78 MMHG | OXYGEN SATURATION: 97 % | HEIGHT: 73 IN | WEIGHT: 233 LBS | SYSTOLIC BLOOD PRESSURE: 115 MMHG

## 2017-09-12 PROCEDURE — 99213 OFFICE O/P EST LOW 20 MIN: CPT

## 2017-09-12 PROCEDURE — 93000 ELECTROCARDIOGRAM COMPLETE: CPT

## 2017-09-12 RX ORDER — AMOXICILLIN 500 MG/1
500 CAPSULE ORAL
Qty: 27 | Refills: 0 | Status: DISCONTINUED | COMMUNITY
Start: 2017-07-25

## 2017-09-12 RX ORDER — AMOXICILLIN AND CLAVULANATE POTASSIUM 500; 125 MG/1; MG/1
500-125 TABLET, FILM COATED ORAL
Qty: 14 | Refills: 0 | Status: DISCONTINUED | COMMUNITY
Start: 2017-08-20

## 2017-09-12 RX ORDER — METRONIDAZOLE 500 MG/1
500 TABLET ORAL
Qty: 21 | Refills: 0 | Status: DISCONTINUED | COMMUNITY
Start: 2017-08-20

## 2017-09-12 RX ORDER — IBUPROFEN 800 MG/1
800 TABLET, FILM COATED ORAL
Qty: 20 | Refills: 0 | Status: DISCONTINUED | COMMUNITY
Start: 2017-07-25

## 2017-09-17 ENCOUNTER — NON-APPOINTMENT (OUTPATIENT)
Age: 52
End: 2017-09-17

## 2017-09-19 ENCOUNTER — MEDICATION RENEWAL (OUTPATIENT)
Age: 52
End: 2017-09-19

## 2017-10-10 ENCOUNTER — APPOINTMENT (OUTPATIENT)
Dept: ELECTROPHYSIOLOGY | Facility: CLINIC | Age: 52
End: 2017-10-10

## 2017-10-12 NOTE — PATIENT PROFILE ADULT. - MEDICATIONS BROUGHT TO HOSPITAL, PROFILE
Phone Number Called: [phone] (home) [phone] (work)    Message: Patient left msg stating she lost lab slip and would like lab orders to be faxed to [phone]. I faxed orders over and it is complete.    Left Message for patient to call back: yes         no

## 2019-01-26 ENCOUNTER — INPATIENT (INPATIENT)
Facility: HOSPITAL | Age: 54
LOS: 4 days | Discharge: HOME | End: 2019-01-31
Attending: HOSPITALIST | Admitting: HOSPITALIST
Payer: COMMERCIAL

## 2019-01-26 VITALS
HEART RATE: 64 BPM | RESPIRATION RATE: 18 BRPM | DIASTOLIC BLOOD PRESSURE: 73 MMHG | SYSTOLIC BLOOD PRESSURE: 146 MMHG | OXYGEN SATURATION: 100 % | TEMPERATURE: 98 F

## 2019-01-26 DIAGNOSIS — R63.8 OTHER SYMPTOMS AND SIGNS CONCERNING FOOD AND FLUID INTAKE: ICD-10-CM

## 2019-01-26 DIAGNOSIS — R65.10 SYSTEMIC INFLAMMATORY RESPONSE SYNDROME (SIRS) OF NON-INFECTIOUS ORIGIN WITHOUT ACUTE ORGAN DYSFUNCTION: ICD-10-CM

## 2019-01-26 DIAGNOSIS — F10.10 ALCOHOL ABUSE, UNCOMPLICATED: ICD-10-CM

## 2019-01-26 DIAGNOSIS — F10.239 ALCOHOL DEPENDENCE WITH WITHDRAWAL, UNSPECIFIED: ICD-10-CM

## 2019-01-26 DIAGNOSIS — R00.1 BRADYCARDIA, UNSPECIFIED: ICD-10-CM

## 2019-01-26 DIAGNOSIS — I48.91 UNSPECIFIED ATRIAL FIBRILLATION: ICD-10-CM

## 2019-01-26 DIAGNOSIS — Z29.9 ENCOUNTER FOR PROPHYLACTIC MEASURES, UNSPECIFIED: ICD-10-CM

## 2019-01-26 DIAGNOSIS — Z98.890 OTHER SPECIFIED POSTPROCEDURAL STATES: Chronic | ICD-10-CM

## 2019-01-26 DIAGNOSIS — R09.89 OTHER SPECIFIED SYMPTOMS AND SIGNS INVOLVING THE CIRCULATORY AND RESPIRATORY SYSTEMS: ICD-10-CM

## 2019-01-26 DIAGNOSIS — R07.9 CHEST PAIN, UNSPECIFIED: ICD-10-CM

## 2019-01-26 DIAGNOSIS — R11.10 VOMITING, UNSPECIFIED: ICD-10-CM

## 2019-01-26 DIAGNOSIS — F19.10 OTHER PSYCHOACTIVE SUBSTANCE ABUSE, UNCOMPLICATED: ICD-10-CM

## 2019-01-26 LAB
ALBUMIN SERPL ELPH-MCNC: 4.6 G/DL — SIGNIFICANT CHANGE UP (ref 3.3–5)
ALP SERPL-CCNC: 80 U/L — SIGNIFICANT CHANGE UP (ref 40–120)
ALT FLD-CCNC: 25 U/L — SIGNIFICANT CHANGE UP (ref 4–41)
ANION GAP SERPL CALC-SCNC: 15 MMO/L — HIGH (ref 7–14)
APAP SERPL-MCNC: < 15 UG/ML — LOW (ref 15–25)
APPEARANCE UR: CLEAR — SIGNIFICANT CHANGE UP
APTT BLD: 29.9 SEC — SIGNIFICANT CHANGE UP (ref 27.5–36.3)
AST SERPL-CCNC: 19 U/L — SIGNIFICANT CHANGE UP (ref 4–40)
B PERT DNA SPEC QL NAA+PROBE: NOT DETECTED — SIGNIFICANT CHANGE UP
BASE EXCESS BLDV CALC-SCNC: -0.6 MMOL/L — SIGNIFICANT CHANGE UP
BASE EXCESS BLDV CALC-SCNC: -4.1 MMOL/L — SIGNIFICANT CHANGE UP
BASOPHILS # BLD AUTO: 0.03 K/UL — SIGNIFICANT CHANGE UP (ref 0–0.2)
BASOPHILS NFR BLD AUTO: 0.2 % — SIGNIFICANT CHANGE UP (ref 0–2)
BILIRUB SERPL-MCNC: 0.5 MG/DL — SIGNIFICANT CHANGE UP (ref 0.2–1.2)
BILIRUB UR-MCNC: NEGATIVE — SIGNIFICANT CHANGE UP
BLOOD GAS VENOUS - CREATININE: 0.77 MG/DL — SIGNIFICANT CHANGE UP (ref 0.5–1.3)
BLOOD GAS VENOUS - CREATININE: 0.93 MG/DL — SIGNIFICANT CHANGE UP (ref 0.5–1.3)
BLOOD UR QL VISUAL: NEGATIVE — SIGNIFICANT CHANGE UP
BUN SERPL-MCNC: 16 MG/DL — SIGNIFICANT CHANGE UP (ref 7–23)
C PNEUM DNA SPEC QL NAA+PROBE: NOT DETECTED — SIGNIFICANT CHANGE UP
CALCIUM SERPL-MCNC: 9.5 MG/DL — SIGNIFICANT CHANGE UP (ref 8.4–10.5)
CHLORIDE BLDV-SCNC: 112 MMOL/L — HIGH (ref 96–108)
CHLORIDE BLDV-SCNC: 116 MMOL/L — HIGH (ref 96–108)
CHLORIDE SERPL-SCNC: 105 MMOL/L — SIGNIFICANT CHANGE UP (ref 98–107)
CK MB BLD-MCNC: 5.67 NG/ML — SIGNIFICANT CHANGE UP (ref 1–6.6)
CK SERPL-CCNC: 661 U/L — HIGH (ref 30–200)
CO2 SERPL-SCNC: 21 MMOL/L — LOW (ref 22–31)
COLOR SPEC: SIGNIFICANT CHANGE UP
CREAT SERPL-MCNC: 1.06 MG/DL — SIGNIFICANT CHANGE UP (ref 0.5–1.3)
EOSINOPHIL # BLD AUTO: 0.05 K/UL — SIGNIFICANT CHANGE UP (ref 0–0.5)
EOSINOPHIL NFR BLD AUTO: 0.3 % — SIGNIFICANT CHANGE UP (ref 0–6)
ETHANOL BLD-MCNC: < 10 MG/DL — SIGNIFICANT CHANGE UP
FLUAV H1 2009 PAND RNA SPEC QL NAA+PROBE: NOT DETECTED — SIGNIFICANT CHANGE UP
FLUAV H1 RNA SPEC QL NAA+PROBE: NOT DETECTED — SIGNIFICANT CHANGE UP
FLUAV H3 RNA SPEC QL NAA+PROBE: NOT DETECTED — SIGNIFICANT CHANGE UP
FLUAV SUBTYP SPEC NAA+PROBE: NOT DETECTED — SIGNIFICANT CHANGE UP
FLUBV RNA SPEC QL NAA+PROBE: NOT DETECTED — SIGNIFICANT CHANGE UP
GAS PNL BLDV: 137 MMOL/L — SIGNIFICANT CHANGE UP (ref 136–146)
GAS PNL BLDV: 139 MMOL/L — SIGNIFICANT CHANGE UP (ref 136–146)
GLUCOSE BLDV-MCNC: 139 — HIGH (ref 70–99)
GLUCOSE BLDV-MCNC: 159 — HIGH (ref 70–99)
GLUCOSE SERPL-MCNC: 160 MG/DL — HIGH (ref 70–99)
GLUCOSE UR-MCNC: NEGATIVE — SIGNIFICANT CHANGE UP
HADV DNA SPEC QL NAA+PROBE: NOT DETECTED — SIGNIFICANT CHANGE UP
HCO3 BLDV-SCNC: 20 MMOL/L — SIGNIFICANT CHANGE UP (ref 20–27)
HCO3 BLDV-SCNC: 23 MMOL/L — SIGNIFICANT CHANGE UP (ref 20–27)
HCOV PNL SPEC NAA+PROBE: SIGNIFICANT CHANGE UP
HCT VFR BLD CALC: 44.5 % — SIGNIFICANT CHANGE UP (ref 39–50)
HCT VFR BLDV CALC: 43.9 % — SIGNIFICANT CHANGE UP (ref 39–51)
HCT VFR BLDV CALC: 47.7 % — SIGNIFICANT CHANGE UP (ref 39–51)
HGB BLD-MCNC: 15 G/DL — SIGNIFICANT CHANGE UP (ref 13–17)
HGB BLDV-MCNC: 14.3 G/DL — SIGNIFICANT CHANGE UP (ref 13–17)
HGB BLDV-MCNC: 15.6 G/DL — SIGNIFICANT CHANGE UP (ref 13–17)
HMPV RNA SPEC QL NAA+PROBE: NOT DETECTED — SIGNIFICANT CHANGE UP
HPIV1 RNA SPEC QL NAA+PROBE: NOT DETECTED — SIGNIFICANT CHANGE UP
HPIV2 RNA SPEC QL NAA+PROBE: NOT DETECTED — SIGNIFICANT CHANGE UP
HPIV3 RNA SPEC QL NAA+PROBE: NOT DETECTED — SIGNIFICANT CHANGE UP
HPIV4 RNA SPEC QL NAA+PROBE: NOT DETECTED — SIGNIFICANT CHANGE UP
IMM GRANULOCYTES NFR BLD AUTO: 0.5 % — SIGNIFICANT CHANGE UP (ref 0–1.5)
INR BLD: 1.04 — SIGNIFICANT CHANGE UP (ref 0.88–1.17)
KETONES UR-MCNC: SIGNIFICANT CHANGE UP
LACTATE BLDV-MCNC: 4.8 MMOL/L — CRITICAL HIGH (ref 0.5–2)
LACTATE BLDV-MCNC: 5 MMOL/L — CRITICAL HIGH (ref 0.5–2)
LEUKOCYTE ESTERASE UR-ACNC: NEGATIVE — SIGNIFICANT CHANGE UP
LIDOCAIN IGE QN: 30.9 U/L — SIGNIFICANT CHANGE UP (ref 7–60)
LYMPHOCYTES # BLD AUTO: 1.94 K/UL — SIGNIFICANT CHANGE UP (ref 1–3.3)
LYMPHOCYTES # BLD AUTO: 12.8 % — LOW (ref 13–44)
MCHC RBC-ENTMCNC: 28.6 PG — SIGNIFICANT CHANGE UP (ref 27–34)
MCHC RBC-ENTMCNC: 33.7 % — SIGNIFICANT CHANGE UP (ref 32–36)
MCV RBC AUTO: 84.9 FL — SIGNIFICANT CHANGE UP (ref 80–100)
MONOCYTES # BLD AUTO: 0.32 K/UL — SIGNIFICANT CHANGE UP (ref 0–0.9)
MONOCYTES NFR BLD AUTO: 2.1 % — SIGNIFICANT CHANGE UP (ref 2–14)
NEUTROPHILS # BLD AUTO: 12.73 K/UL — HIGH (ref 1.8–7.4)
NEUTROPHILS NFR BLD AUTO: 84.1 % — HIGH (ref 43–77)
NITRITE UR-MCNC: NEGATIVE — SIGNIFICANT CHANGE UP
NRBC # FLD: 0 K/UL — LOW (ref 25–125)
OSMOLALITY SERPL: 302 MOSMO/KG — HIGH (ref 275–295)
PCO2 BLDV: 35 MMHG — LOW (ref 41–51)
PCO2 BLDV: 37 MMHG — LOW (ref 41–51)
PH BLDV: 7.36 PH — SIGNIFICANT CHANGE UP (ref 7.32–7.43)
PH BLDV: 7.44 PH — HIGH (ref 7.32–7.43)
PH UR: 6 — SIGNIFICANT CHANGE UP (ref 5–8)
PLATELET # BLD AUTO: 269 K/UL — SIGNIFICANT CHANGE UP (ref 150–400)
PMV BLD: 10.3 FL — SIGNIFICANT CHANGE UP (ref 7–13)
PO2 BLDV: 34 MMHG — LOW (ref 35–40)
PO2 BLDV: < 24 MMHG — LOW (ref 35–40)
POTASSIUM BLDV-SCNC: 4.1 MMOL/L — SIGNIFICANT CHANGE UP (ref 3.4–4.5)
POTASSIUM BLDV-SCNC: 4.2 MMOL/L — SIGNIFICANT CHANGE UP (ref 3.4–4.5)
POTASSIUM SERPL-MCNC: 4.4 MMOL/L — SIGNIFICANT CHANGE UP (ref 3.5–5.3)
POTASSIUM SERPL-SCNC: 4.4 MMOL/L — SIGNIFICANT CHANGE UP (ref 3.5–5.3)
PROT SERPL-MCNC: 7.4 G/DL — SIGNIFICANT CHANGE UP (ref 6–8.3)
PROT UR-MCNC: 10 — SIGNIFICANT CHANGE UP
PROTHROM AB SERPL-ACNC: 11.6 SEC — SIGNIFICANT CHANGE UP (ref 9.8–13.1)
RBC # BLD: 5.24 M/UL — SIGNIFICANT CHANGE UP (ref 4.2–5.8)
RBC # FLD: 13.4 % — SIGNIFICANT CHANGE UP (ref 10.3–14.5)
RSV RNA SPEC QL NAA+PROBE: NOT DETECTED — SIGNIFICANT CHANGE UP
RV+EV RNA SPEC QL NAA+PROBE: NOT DETECTED — SIGNIFICANT CHANGE UP
SALICYLATES SERPL-MCNC: < 5 MG/DL — LOW (ref 15–30)
SAO2 % BLDV: 29.6 % — LOW (ref 60–85)
SAO2 % BLDV: 59.8 % — LOW (ref 60–85)
SODIUM SERPL-SCNC: 141 MMOL/L — SIGNIFICANT CHANGE UP (ref 135–145)
SP GR SPEC: 1.02 — SIGNIFICANT CHANGE UP (ref 1–1.04)
TROPONIN T, HIGH SENSITIVITY: 18 NG/L — SIGNIFICANT CHANGE UP (ref ?–14)
TROPONIN T, HIGH SENSITIVITY: < 6 NG/L — SIGNIFICANT CHANGE UP (ref ?–14)
UROBILINOGEN FLD QL: NORMAL — SIGNIFICANT CHANGE UP
WBC # BLD: 15.14 K/UL — HIGH (ref 3.8–10.5)
WBC # FLD AUTO: 15.14 K/UL — HIGH (ref 3.8–10.5)

## 2019-01-26 PROCEDURE — 71045 X-RAY EXAM CHEST 1 VIEW: CPT | Mod: 26

## 2019-01-26 PROCEDURE — 76705 ECHO EXAM OF ABDOMEN: CPT | Mod: 26

## 2019-01-26 PROCEDURE — 99223 1ST HOSP IP/OBS HIGH 75: CPT | Mod: GC

## 2019-01-26 RX ORDER — ONDANSETRON 8 MG/1
4 TABLET, FILM COATED ORAL EVERY 6 HOURS
Qty: 0 | Refills: 0 | Status: DISCONTINUED | OUTPATIENT
Start: 2019-01-26 | End: 2019-01-31

## 2019-01-26 RX ORDER — PROPAFENONE HCL 150 MG
1 TABLET ORAL
Qty: 0 | Refills: 0 | COMMUNITY

## 2019-01-26 RX ORDER — METOCLOPRAMIDE HCL 10 MG
10 TABLET ORAL ONCE
Qty: 0 | Refills: 0 | Status: COMPLETED | OUTPATIENT
Start: 2019-01-26 | End: 2019-01-26

## 2019-01-26 RX ORDER — SODIUM CHLORIDE 9 MG/ML
1000 INJECTION INTRAMUSCULAR; INTRAVENOUS; SUBCUTANEOUS
Qty: 0 | Refills: 0 | Status: DISCONTINUED | OUTPATIENT
Start: 2019-01-26 | End: 2019-01-29

## 2019-01-26 RX ORDER — ASPIRIN/CALCIUM CARB/MAGNESIUM 324 MG
162 TABLET ORAL DAILY
Qty: 0 | Refills: 0 | Status: DISCONTINUED | OUTPATIENT
Start: 2019-01-26 | End: 2019-01-26

## 2019-01-26 RX ORDER — ONDANSETRON 8 MG/1
4 TABLET, FILM COATED ORAL ONCE
Qty: 0 | Refills: 0 | Status: COMPLETED | OUTPATIENT
Start: 2019-01-26 | End: 2019-01-26

## 2019-01-26 RX ORDER — SODIUM CHLORIDE 9 MG/ML
1000 INJECTION INTRAMUSCULAR; INTRAVENOUS; SUBCUTANEOUS ONCE
Qty: 0 | Refills: 0 | Status: COMPLETED | OUTPATIENT
Start: 2019-01-26 | End: 2019-01-26

## 2019-01-26 RX ORDER — THIAMINE MONONITRATE (VIT B1) 100 MG
100 TABLET ORAL ONCE
Qty: 0 | Refills: 0 | Status: COMPLETED | OUTPATIENT
Start: 2019-01-26 | End: 2019-01-26

## 2019-01-26 RX ORDER — FAMOTIDINE 10 MG/ML
20 INJECTION INTRAVENOUS ONCE
Qty: 0 | Refills: 0 | Status: COMPLETED | OUTPATIENT
Start: 2019-01-26 | End: 2019-01-26

## 2019-01-26 RX ORDER — ASPIRIN/CALCIUM CARB/MAGNESIUM 324 MG
162 TABLET ORAL ONCE
Qty: 0 | Refills: 0 | Status: COMPLETED | OUTPATIENT
Start: 2019-01-26 | End: 2019-01-26

## 2019-01-26 RX ORDER — FOLIC ACID 0.8 MG
1 TABLET ORAL DAILY
Qty: 0 | Refills: 0 | Status: DISCONTINUED | OUTPATIENT
Start: 2019-01-26 | End: 2019-01-26

## 2019-01-26 RX ORDER — HEPARIN SODIUM 5000 [USP'U]/ML
5000 INJECTION INTRAVENOUS; SUBCUTANEOUS EVERY 8 HOURS
Qty: 0 | Refills: 0 | Status: DISCONTINUED | OUTPATIENT
Start: 2019-01-26 | End: 2019-01-31

## 2019-01-26 RX ADMIN — SODIUM CHLORIDE 1000 MILLILITER(S): 9 INJECTION INTRAMUSCULAR; INTRAVENOUS; SUBCUTANEOUS at 06:11

## 2019-01-26 RX ADMIN — Medication 100 MILLIGRAM(S): at 16:37

## 2019-01-26 RX ADMIN — Medication 1 TABLET(S): at 16:37

## 2019-01-26 RX ADMIN — Medication 10 MILLIGRAM(S): at 06:11

## 2019-01-26 RX ADMIN — SODIUM CHLORIDE 2000 MILLILITER(S): 9 INJECTION INTRAMUSCULAR; INTRAVENOUS; SUBCUTANEOUS at 15:22

## 2019-01-26 RX ADMIN — Medication 2 MILLIGRAM(S): at 22:19

## 2019-01-26 RX ADMIN — Medication 162 MILLIGRAM(S): at 04:30

## 2019-01-26 RX ADMIN — ONDANSETRON 4 MILLIGRAM(S): 8 TABLET, FILM COATED ORAL at 22:19

## 2019-01-26 RX ADMIN — Medication 1 MILLIGRAM(S): at 16:37

## 2019-01-26 RX ADMIN — ONDANSETRON 4 MILLIGRAM(S): 8 TABLET, FILM COATED ORAL at 16:47

## 2019-01-26 RX ADMIN — Medication 30 MILLILITER(S): at 04:30

## 2019-01-26 RX ADMIN — ONDANSETRON 4 MILLIGRAM(S): 8 TABLET, FILM COATED ORAL at 04:30

## 2019-01-26 RX ADMIN — SODIUM CHLORIDE 100 MILLILITER(S): 9 INJECTION INTRAMUSCULAR; INTRAVENOUS; SUBCUTANEOUS at 18:38

## 2019-01-26 RX ADMIN — FAMOTIDINE 20 MILLIGRAM(S): 10 INJECTION INTRAVENOUS at 04:30

## 2019-01-26 RX ADMIN — HEPARIN SODIUM 5000 UNIT(S): 5000 INJECTION INTRAVENOUS; SUBCUTANEOUS at 21:19

## 2019-01-26 RX ADMIN — Medication 10 MILLIGRAM(S): at 19:46

## 2019-01-26 RX ADMIN — SODIUM CHLORIDE 1000 MILLILITER(S): 9 INJECTION INTRAMUSCULAR; INTRAVENOUS; SUBCUTANEOUS at 06:05

## 2019-01-26 NOTE — H&P ADULT - PROBLEM SELECTOR PLAN 8
- On ??    Dispo: pending medical stability.    Mita Gomez MD  PGY-3 | Internal Medicine  [phone] / [phone] - HSQ for now.  Consider full AC.    Dispo: pending medical stability.    Mita Gomez MD  PGY-3 | Internal Medicine  [phone] / [phone] - HSQ.  [id number] 0.    Dispo: pending medical stability.    Mita Gomez MD  PGY-3 | Internal Medicine  [phone] / [phone] - Regular diet. - Smokes marijuana.  Also reports h/o K2 but pt denies and family unaware.  - No osmolar gap.  - Check urine drug screen

## 2019-01-26 NOTE — H&P ADULT - PMH
Alcohol use    Anxiety    Atrial fibrillation, unspecified type    GERD (gastroesophageal reflux disease)    Marijuana use    Peptic ulcer disease Alcohol use    Anxiety    Atrial fibrillation, unspecified type    GERD (gastroesophageal reflux disease)    Hyperlipidemia    Marijuana use    Peptic ulcer disease

## 2019-01-26 NOTE — H&P ADULT - NSHPREVIEWOFSYSTEMS_GEN_ALL_CORE
limited 2/2 pt agitation  denies fever, SOB, cough, chest pain, diarrhea, constipation, dysuria, hematuria, changes in vision, changes in hearing, AVHs Review of Systems:   CONSTITUTIONAL: No fever, weight loss, or fatigue  EYES: No eye pain, visual disturbances, or discharge  ENMT:  No difficulty hearing, tinnitus, vertigo; No sinus or throat pain  NECK: No pain or stiffness  BREASTS: No pain, masses, or nipple discharge  RESPIRATORY: No cough, wheezing, chills or hemoptysis; No shortness of breath  CARDIOVASCULAR: No chest pain, palpitations, dizziness, or leg swelling  GASTROINTESTINAL: +abdominal pain. +nausea, +vomiting, no hematemesis; No diarrhea or constipation. No melena or hematochezia.  GENITOURINARY: No dysuria, frequency, hematuria, or incontinence  NEUROLOGICAL: No headaches, memory loss, loss of strength, numbness, or tremors  SKIN: No itching, burning, rashes, or lesions   LYMPH NODES: No enlarged glands  ENDOCRINE: No heat or cold intolerance; No hair loss  MUSCULOSKELETAL: No joint pain or swelling; No muscle, back, or extremity pain  PSYCHIATRIC: No depression, anxiety, mood swings, or difficulty sleeping  HEME/LYMPH: No easy bruising, or bleeding gums  ALLERGY AND IMMUNOLOGIC: No hives or eczema

## 2019-01-26 NOTE — H&P ADULT - PROBLEM SELECTOR PLAN 3
- CIWA initiated, symptom-triggered with PRNs.  - Continue to monitor.  - A-fib with RVR likely in the setting of EtOH withdrawal. - With RVR. - With RVR.  - Resolved with IVF, now intermittently bradycardic.  - Continue to monitor on tele.  - Would avoid BB / CCB in the setting of bradycardia.  - Would need to consider AC and amiodarone if RVR returns and is persistent. - With RVR.  RVR resolved with IVF, now intermittently bradycardic.  - Continue to monitor on tele.  - Would avoid BB / CCB if bradycardia persists.  - Would need to consider AC and amiodarone if RVR returns and is persistent.  - [id number] 0, defer AC at this time. - Likely 2/2 EtOH withdrawal.  Also consider atypical presentation of MI.  - Pt has not vomited since arriving to ED.  - Continue to monitor.  - Ondansetron PRN. - Likely 2/2 EtOH withdrawal.  Also consider atypical presentation of MI.  Lipase WNL, likely not pancreatitis.  Consider gallstones.  Timing not in line with viral gastroenteritis.  - Pt has not vomited since arriving to ED.  - Continue to monitor.  - Ondansetron PRN.  - Check RUQ U/S. - Unclear etiology.  Consider cyclic vomiting syndrome.  Poss EtOH withdrawal, although history is not clear.  Also consider atypical presentation of MI.  Lipase WNL, likely not pancreatitis.  Consider gallstones.  Also consider viral gastroenteritis.  - Pt has not vomited since arriving to ED.  - Continue to monitor.  - Ondansetron PRN.  - Check RUQ U/S. - Unclear etiology.  Consider cyclic vomiting syndrome.  Poss EtOH withdrawal, although history is not clear.  Also consider atypical presentation of MI.  Lipase WNL, likely not pancreatitis.  Consider gallstones.  Also consider viral gastroenteritis.  - Pt has not vomited since arriving to ED.  - Continue to monitor.  - Ondansetron PRN.  - Check RUQ U/S.  - Check abd x-ray. Leukocytosis and tachycardia noted. Patient without fever. He presents with retching. Leukocytosis likely reactive in setting of retching vs possible gastritis. Suspect tachycardia 2/2 stress of vomiting vs volume depletion as a result of vomiting.    No indication for abx at this time.    -IV NS at 100cc/hr x20 hours  -antiemetics as above

## 2019-01-26 NOTE — H&P ADULT - NSHPPHYSICALEXAM_GEN_ALL_CORE
T(C): 36.7 (01-26-19 @ 13:37), Max: 36.9 (01-26-19 @ 08:08)  HR: 156 (01-26-19 @ 13:37) (64 - 156)  BP: 90/54 (01-26-19 @ 15:21) (90/54 - 151/65)  RR: 18 (01-26-19 @ 13:37) (17 - 27)  SpO2: 99% (01-26-19 @ 13:37) (99% - 100%)    Gen: awake, alert, agitated  HENT: neck soft / supple; MM dry  Lymph: no LAD noted in neck  Eye: PERRL, sclerae anicteric  CV: tachycardic, irregular rhythm  Pulm: CTAB posteriorly but limited 2/2 pt effort, no w/r/r auscultated  Abd: +BS, soft, NT, ND  Skin: warm, dry  Ext: no LE edema  Neuro: answering questions mostly appropriately, following commands appropriately, oriented to person/place/year/month/day of week  Psych: agitated mood / normal affect T(C): 36.7 (01-26-19 @ 13:37), Max: 36.9 (01-26-19 @ 08:08)  HR: 156 (01-26-19 @ 13:37) (64 - 156)  BP: 90/54 (01-26-19 @ 15:21) (90/54 - 151/65)  RR: 18 (01-26-19 @ 13:37) (17 - 27)  SpO2: 99% (01-26-19 @ 13:37) (99% - 100%)    Gen: overweight man in hospital, retching mucus and clear fluid  HENT: neck soft / supple; MM dry  Lymph: no LAD noted in neck  Eye: PERRL, sclerae anicteric  CV: tachycardic, regular rhythm  Pulm: CTAB posteriorly but limited 2/2 pt effort, no w/r/r auscultated  Abd: +BS, soft, NT, ND  Skin: warm, dry  Ext: no LE edema  Neuro: answering questions mostly appropriately, following commands appropriately, oriented to person/place/year/month/day of week  Psych: agitated mood / normal affect

## 2019-01-26 NOTE — ED ADULT NURSE REASSESSMENT NOTE - NS ED NURSE REASSESS COMMENT FT1
Received pt from [facility], pt admitted to tele for chest pain, polysubstance abuse. pt reports feeling much better. chest pain is improving. Denies any dizziness, nausea, vomiting, shortness of breath, palpitations, diarrhea, fever, constipation, or chills. pt with elevating lactate. on droplet precautions pending rvp results. call bell in reach, side rails up, bed in locked position, md evaluation in progress, pt on telemetry-NSR @ 72 noted, pending bed assignment, will continue to monitor.

## 2019-01-26 NOTE — H&P ADULT - NSHPSOCIALHISTORY_GEN_ALL_CORE
former smoker (ages [age], 0.5 ppd)  reports EtOH use as noted above  +marijuana use (last used 1/25/19), denies K2 use  lives with wife and three children  works in medical billing former smoker (ages [age], 0.5 ppd)  reports EtOH use as noted above  +marijuana use (last used 1/25/19), denies K2 use  lives with wife and three children  works in medical billing  originally from [facility]

## 2019-01-26 NOTE — H&P ADULT - PROBLEM SELECTOR PROBLEM 5
Substance abuse Alcohol abuse Alcohol withdrawal R/O Alcohol withdrawal Atrial fibrillation, unspecified type

## 2019-01-26 NOTE — H&P ADULT - FAMILY HISTORY
Mother  Still living? Unknown  Family history of diabetes mellitus in mother, Age at diagnosis: Age Unknown  Family history of hypertension in mother, Age at diagnosis: Age Unknown     Father  Still living? Unknown  Family history of prostate cancer in father, Age at diagnosis: Age Unknown

## 2019-01-26 NOTE — H&P ADULT - PROBLEM SELECTOR PLAN 7
- On ??    Dispo: pending medical stability.    Mita Gomez MD  PGY-3 | Internal Medicine  [phone] / [phone] - NPO for now pending medical stability. - Regular diet. - No osmolar gap.  - Check UDS. - Smokes marijuana.  Also report h/o K2 but pt denies and family unaware.  - No osmolar gap.  - Check UDS. - Plan as noted above.  - SW consult.

## 2019-01-26 NOTE — H&P ADULT - PROBLEM SELECTOR PLAN 10
- HSQ.  [id number] 0.    Dispo: pending medical stability.    Mita Gomez MD  PGY-3 | Internal Medicine  [phone] / [phone]

## 2019-01-26 NOTE — ED PROVIDER NOTE - OBJECTIVE STATEMENT
53M h/o MI, alcohol abuse, Marijuana and K2 use, presents with acute onset CP today while lying down approximately 1hr prior to ED arrival.  +SOB and diaphoresis associated.  Still with some pain in the ED but states improved compared to before.  denies fever/chills, cough, n/v/d.

## 2019-01-26 NOTE — H&P ADULT - NSHPLABSRESULTS_GEN_ALL_CORE
.  Labs reviewed personally.                          15.0   15.14 )-----------( 269      ( 2019 04:58 )             44.5     Hgb Trend: 15.0<--      141  |  105  |  16  ----------------------------<  160<H>  4.4   |  21<L>  |  1.06    Ca    9.5      2019 04:58    TPro  7.4  /  Alb  4.6  /  TBili  0.5  /  DBili  x   /  AST  19  /  ALT  25  /  AlkPhos  80      Creatinine Trend: 1.06<--  PT/INR - ( 2019 04:58 )   PT: 11.6 SEC;   INR: 1.04          PTT - ( 2019 04:58 )  PTT:29.9 SEC  Urinalysis Basic - ( 2019 07:50 )    Color: LIGHT YELLOW / Appearance: CLEAR / S.024 / pH: 6.0  Gluc: NEGATIVE / Ketone: SMALL  / Bili: NEGATIVE / Urobili: NORMAL   Blood: NEGATIVE / Protein: 10 / Nitrite: NEGATIVE   Leuk Esterase: NEGATIVE / RBC: x / WBC x   Sq Epi: x / Non Sq Epi: x / Bacteria: x        Imaging reviewed personally.  CXR clear lung.      EKG reviewed personally.  Initial EKG NSR, HR 64, QTc 445, no YAMILETH/D noted.  Later EKG A-fib with RVR, , QTc 423, no significant YAMILETH/D noted. .  Labs reviewed personally.                          15.0   15.14 )-----------( 269      ( 2019 04:58 )             44.5     Hgb Trend: 15.0<--      141  |  105  |  16  ----------------------------<  160<H>  4.4   |  21<L>  |  1.06    Ca    9.5      2019 04:58    TPro  7.4  /  Alb  4.6  /  TBili  0.5  /  DBili  x   /  AST  19  /  ALT  25  /  AlkPhos  80      Creatinine Trend: 1.06<--  PT/INR - ( 2019 04:58 )   PT: 11.6 SEC;   INR: 1.04          PTT - ( 2019 04:58 )  PTT:29.9 SEC  Urinalysis Basic - ( 2019 07:50 )    Color: LIGHT YELLOW / Appearance: CLEAR / S.024 / pH: 6.0  Gluc: NEGATIVE / Ketone: SMALL  / Bili: NEGATIVE / Urobili: NORMAL   Blood: NEGATIVE / Protein: 10 / Nitrite: NEGATIVE   Leuk Esterase: NEGATIVE / RBC: x / WBC x   Sq Epi: x / Non Sq Epi: x / Bacteria: x        Imaging reviewed personally.  CXR clear lungs.      EKG reviewed personally.  Initial EKG NSR, HR 64, QTc 445, no YAMILETH/D noted.  Later EKG A-fib with RVR, , QTc 423, no significant YAMILETH/D noted. Labs reviewed personally.                          15.0   15.14 )-----------( 269      ( 2019 04:58 )             44.5     Hgb Trend: 15.0<--      141  |  105  |  16  ----------------------------<  160<H>  4.4   |  21<L>  |  1.06    Ca    9.5      2019 04:58    TPro  7.4  /  Alb  4.6  /  TBili  0.5  /  DBili  x   /  AST  19  /  ALT  25  /  AlkPhos  80      Creatinine Trend: 1.06<--  PT/INR - ( 2019 04:58 )   PT: 11.6 SEC;   INR: 1.04          PTT - ( 2019 04:58 )  PTT:29.9 SEC  Urinalysis Basic - ( 2019 07:50 )    Color: LIGHT YELLOW / Appearance: CLEAR / S.024 / pH: 6.0  Gluc: NEGATIVE / Ketone: SMALL  / Bili: NEGATIVE / Urobili: NORMAL   Blood: NEGATIVE / Protein: 10 / Nitrite: NEGATIVE   Leuk Esterase: NEGATIVE / RBC: x / WBC x   Sq Epi: x / Non Sq Epi: x / Bacteria: x        Imaging reviewed personally.  CXR clear lungs.      EKG reviewed personally.  Initial EKG NSR, HR 64, QTc 445, no YAMILETH/D noted.  Later EKG A-fib with RVR, , QTc 423, no significant YAMILETH/D noted.

## 2019-01-26 NOTE — ED ADULT NURSE NOTE - NSIMPLEMENTINTERV_GEN_ALL_ED
Implemented All Fall Risk Interventions:  [place] to call system. Call bell, personal items and telephone within reach. Instruct patient to call for assistance. Room bathroom lighting operational. Non-slip footwear when patient is off stretcher. Physically safe environment: no spills, clutter or unnecessary equipment. Stretcher in lowest position, wheels locked, appropriate side rails in place. Provide visual cue, wrist band, yellow gown, etc. Monitor gait and stability. Monitor for mental status changes and reorient to person, place, and time. Review medications for side effects contributing to fall risk. Reinforce activity limits and safety measures with patient and family.

## 2019-01-26 NOTE — H&P ADULT - PROBLEM SELECTOR PROBLEM 3
Alcohol abuse Alcohol withdrawal Atrial fibrillation, unspecified type Vomiting SIRS (systemic inflammatory response syndrome)

## 2019-01-26 NOTE — PATIENT PROFILE ADULT - LIVES WITH
adult child(gopal) spouse/adult child(gopal)/(children x3), private home, 2 steps-to-enter/exit home, (+) [id number] in the home (both with Unilateral Handrail)

## 2019-01-26 NOTE — CHART NOTE - NSCHARTNOTEFT_GEN_A_CORE
Called to see 53 year old male who presents with junctional rhythm in 40's and nausea. Pt has been in the junctional rhythm 40's up to 60's most of the day as seen in tele history. Pt only complains of nausea at this time. Denies chest pain, no SOB, no dizziness, no headache. Pt did have episode where BP dropped slightly and recovered. Pt states he smokes a lot of marijuana and believes he is withdrawing. Pt last had marijuana on Friday. Complains of tremors and appears agitated with nausea and one episode of vomiting. No abdominal pain.   Vital Signs Last 24 Hrs  T(C): 36.7 (26 Jan 2019 21:48), Max: 36.9 (26 Jan 2019 08:08)  T(F): 98 (26 Jan 2019 21:48), Max: 98.4 (26 Jan 2019 08:08)  HR: 80 (26 Jan 2019 21:48) (62 - 156)  BP: 138/62 (26 Jan 2019 21:48) (90/54 - 151/65)  BP(mean): --  RR: 19 (26 Jan 2019 21:48) (17 - 27)  SpO2: 98% (26 Jan 2019 21:48) (97% - 100%)    PE:  AOx3; denies hallucinations, complains of feeling agitated  EENT: pupils PERRLA  Lungs: clear to auscultation bilaterally  Cardiac: S1 S2; junctional on tele  Abdomen: Soft non tender, bowel sounds normoactive; no diarrhea  Neuro: CN intact, sensation present and equal bilaterally    A/P: Nausea/ Vomiting, Junctional, Agitated  Give Zofran 4mg IVP once  Give Ativan 2mg po for agitation  EP consult in am     Will continue to monitor

## 2019-01-26 NOTE — H&P ADULT - PROBLEM SELECTOR PLAN 4
- Plan as noted above.  - SW consult. - CIWA initiated, symptom-triggered with PRNs.  - Continue to monitor.  - A-fib with RVR likely in the setting of EtOH withdrawal. - With RVR.  RVR resolved with IVF, now intermittently bradycardic.  - Continue to monitor on tele.  - Would avoid BB / CCB if bradycardia persists.  - Would need to consider AC and amiodarone if RVR returns and is persistent.  - [id number] 0, defer AC at this time. - With RVR.  RVR resolved with IVF, now intermittently bradycardic in sinus.  - Continue to monitor on tele.  - Would avoid BB / CCB if bradycardia persists.  - Would need to consider amiodarone if RVR returns and is persistent.  Would also consult EP if A-fib with RVR recurs.  - [id number] 0, defer AC at this time. - Pt noted to be tachycardic in A-fib, then bradycardic as low as 40 briefly, now currently with HR 70s in sinus rhythm.  - Unclear etiology.  Consider tachybrady syndrome.  - Continue to trend Sho.  - Monitor on tele.  - EP consult if arrhythmia recurs

## 2019-01-26 NOTE — H&P ADULT - PROBLEM SELECTOR PLAN 1
- Unclear etiology.  - Evaluate for ACS.  - Monitor on tele.  - Troponin negative x1.  - S/p aspirin load. - Unclear etiology.  - Evaluate for ACS.  - Monitor on tele.  - Troponin negative x1.  Continue to trend Sho.  - S/p aspirin load. - Unclear etiology.  However, EKG without significant findings and troponin negative x1.  Evaluate for ACS, but low suspicion at this time.  Chest pain is now resolved.  Suspect EtOH withdrawal despite pt's reported EtOH history.  - Monitor on tele.  - Troponin negative x1.  Continue to trend Sho.  - S/p aspirin load.  - If Sho begin to uptrend / EKG changes noted, will consult Cardiology. - Unclear etiology.  However, EKG without significant findings and troponin negative x1.  Evaluate for ACS, but low suspicion at this time.  Chest pain is now resolved.  - Monitor on tele.  - Troponin negative x1.  Continue to trend Sho.  - S/p aspirin load.  - If Sho begin to uptrend / EKG changes noted, will consult Cardiology. -Pt presents with vomiting since this morning. Marijuana use yesterday. Abdomen is soft, non-distended; last BM earlier today. DDx includes hyperemesis related to marijuana use, cyclic vomiting syndrome.  Poss EtOH withdrawal, although history is inconsistent with this. Also consider atypical presentation of MI.  Lipase WNL, likely not pancreatitis.  Consider gallstones.  Also consider viral gastroenteritis.  - Pt seen retching in ED  - f/u abdominal ultrasound  - f/u abdominal x-ray  - Ondansetron PRN.  - Trial of Reglan 10mg IV x1 dose  - R/O ACS

## 2019-01-26 NOTE — H&P ADULT - PROBLEM SELECTOR PROBLEM 7
Need for prophylactic measure Nutrition, metabolism, and development symptoms Substance abuse R/O Alcohol abuse

## 2019-01-26 NOTE — H&P ADULT - PROBLEM SELECTOR PLAN 9
- HSQ.  [id number] 0.    Dispo: pending medical stability.    Mita Gomez MD  PGY-3 | Internal Medicine  [phone] / [phone] - Regular diet.

## 2019-01-26 NOTE — CHART NOTE - NSCHARTNOTEFT_GEN_A_CORE
R3 MEDICINE UPDATE NOTE    Patient signed out to Tele PA Joey at 1658.  Tele PA / team to assume care.    Mita Gomez MD  PGY-3 | Internal Medicine  [phone] / [phone]

## 2019-01-26 NOTE — H&P ADULT - PROBLEM SELECTOR PLAN 2
- With RVR. - Pt noted to be tachycardic in A-fib, then bradycardic as low as 40 briefly, now currently with HR 70s.  - Unclear etiology.  Consider tachybrady syndrome, consider ACS.  - Continue to trend Sho. - Pt noted to be tachycardic in A-fib, then bradycardic as low as 40 briefly, now currently with HR 70s.  - Unclear etiology.  Consider tachybrady syndrome, consider ACS.  - Continue to trend Sho.  - Monitor on tele. - Pt noted to be tachycardic in A-fib, then bradycardic as low as 40 briefly, now currently with HR 70s in sinus rhythm.  - Unclear etiology.  Consider tachybrady syndrome, consider ACS.  - Continue to trend Sho.  - Monitor on tele. ED charting notes chest pain but patient denies ever having chest pain. ECG without ST/T wave changes and troponin negative x1.  Evaluate for ACS, but low suspicion at this time.    - Monitor on tele.  - Troponin negative x1.  Continue to trend Sho.  - S/p aspirin load.  - If Sho begin to uptrend / EKG changes noted, will consult Cardiology.

## 2019-01-26 NOTE — ED ADULT TRIAGE NOTE - CHIEF COMPLAINT QUOTE
Pt. w/ Hx. Afib,  MI , alcohol abuse , Drug abuse arrives c/o chest pain as per family . As per family pt. woke up w/ N/V , diaphoretic , c/o chest pain . Pt. actively nauseous , throwing up in triage  not answering any questions. Family member states they are not aware of any drug intake today. EMS states pt. had 3x shots earlier today. EKG in progress.

## 2019-01-26 NOTE — H&P ADULT - PROBLEM SELECTOR PROBLEM 6
Nutrition, metabolism, and development symptoms Substance abuse Alcohol abuse R/O Alcohol abuse R/O Alcohol withdrawal

## 2019-01-26 NOTE — H&P ADULT - PROBLEM SELECTOR PLAN 6
- NPO for now pending medical stability. - No osmolar gap.  - Check UDS. - Plan as noted above.  - SW consult. - Pt's history is not c/w EtOH withdrawal, low suspicion at this time.  - Will discontinue CIWA and restart if pt becomes tachycardic or diaphoretic.

## 2019-01-26 NOTE — H&P ADULT - ASSESSMENT
53M h/o A-fib s/p ablation (09/2017), EtOH abuse, marijuana use, K2 use adm with chest pain of unclear etiology, evaluating for MI, also with Afib with RVR and EtOH withdrawal. 53M h/o A-fib s/p ablation (09/2017), EtOH abuse, marijuana use, K2 use adm with chest pain of unclear etiology, evaluating for ACS, also with Afib with RVR and EtOH withdrawal. 53M h/o A-fib s/p ablation (09/2017), poss EtOH abuse, marijuana use, poss K2 use adm with chest pain of unclear etiology, evaluating for ACS, also with Afib with RVR and EtOH withdrawal. 53M h/o A-fib s/p ablation (09/2017), poss EtOH abuse, marijuana use, poss K2 use adm with chest pain of unclear etiology, evaluating for ACS, also with Afib with RVR, N/V poss 2/2 cyclic vomiting syndrome vs viral gastroenteritis vs EtOH withdrawal. 53M h/o A-fib s/p ablation (09/2017), marijuana use, HLD, anxiety poss EtOH abuse, poss K2 use adm with chest pain of unclear etiology, evaluating for ACS, also with Afib with RVR, N/V poss 2/2 cyclic vomiting syndrome vs viral gastroenteritis vs EtOH withdrawal. 53M h/o A-fib s/p ablation (09/2017), marijuana use, HLD, anxiety, poss EtOH abuse adm with chest pain of unclear etiology that has resolved, evaluating for ACS, also with Afib with RVR, N/V poss 2/2 cyclic vomiting syndrome vs viral gastroenteritis vs EtOH withdrawal.

## 2019-01-26 NOTE — H&P ADULT - HISTORY OF PRESENT ILLNESS
53M h/o A-fib s/p ablation, EtOH abuse, marijuana use, K2 use who presents with chest pain.    The patient reports ___    In the ED, Tmax 98.4F, Tc 98.0F, -151/48-72, SpO2 99% RA.  The patient received aspirin 324mg, NS 1L x2, maalox x1, famotidine x1, metoclopramide 10mg IV x1, ondansetron 4mg IV x1.  Pt then had episode of A-fib with RVR. 53M h/o A-fib s/p ablation (09/2017), EtOH abuse, marijuana use, K2 use who presents with chest pain.    The patient reports ___    In the ED, Tmax 98.4F, Tc 98.0F, -151/48-72, SpO2 99% RA.  The patient received aspirin 324mg, NS 1L x2, maalox x1, famotidine x1, metoclopramide 10mg IV x1, ondansetron 4mg IV x1.  Pt then had episode of A-fib with RVR. 53M h/o A-fib s/p ablation (09/2017), EtOH abuse, marijuana use, K2 use who presents with chest pain.    The patient reports ___    In the ED, Tmax 98.4F, Tc 98.0F, -151/48-72, SpO2 99% RA.  The patient received aspirin 324mg, NS 1L x2, maalox x1, famotidine x1, metoclopramide 10mg IV x1, ondansetron 4mg IV x1.  Pt then had episode of A-fib with RVR but was too hypotensive for rate-control agents and was given NS 1L x1.  Lactate 5.0.  CXR unremarkable.  EtOH serum negative.  UA unremarkable.  No osmolar gap. 53M h/o A-fib s/p ablation (09/2017), EtOH abuse, marijuana use, K2 use who presents with chest pain.    Patient very agitated, unable to provide good history.  Attempted to call pt's son Ehsan kimbrough without response.    Patient reports that he started to have abdominal pain yesterday at night.  He describes the pain as diffuse, no radiation, associated with vomiting.  He states he vomited x1 and that it was purple and yellow.  He does not know if there was food in his emesis.  He is unsure if he ate anything purple.  He denies fever, dysuria, hematuria, diarrhea, constipation, chest pain, SOB.  He is unable to recall if he had chest pain.  He reports tremulousness.    In the ED, Tmax 98.4F, Tc 98.0F, -151/48-72, SpO2 99% RA.  The patient received aspirin 324mg, NS 1L x2, maalox x1, famotidine x1, metoclopramide 10mg IV x1, ondansetron 4mg IV x1.  Pt then had episode of A-fib with RVR but was too hypotensive for rate-control agents and was given NS 1L x1.  Lactate 5.0.  CXR unremarkable.  EtOH serum negative.  UA unremarkable.  No osmolar gap.    Patient denies a history of EtOH abuse and states he only drinks once a month.  He states he drank on Friday night and had two glasses of whiskey.  He denies a history of withdrawals, hospitalization for EtOH, intubation, seizures previously.  Unable to corroborate pt's history with family, as there was no answer. 53M h/o A-fib s/p ablation (09/2017), EtOH abuse, marijuana use, K2 use who presents with chest pain.    Patient very agitated, unable to provide good history.  Attempted to call pt's son Ehsan kimbrough without response.    Patient reports that he started to have abdominal pain yesterday at night.  He describes the pain as diffuse, no radiation, associated with vomiting.  He states he vomited x1 and that it was purple and yellow.  He does not know if there was food in his emesis.  He is unsure if he ate anything purple.  He denies fever, dysuria, hematuria, diarrhea, constipation, chest pain, SOB.  He is unable to recall if he had chest pain.  He reports tremulousness.    In the ED, Tmax 98.4F, Tc 98.0F, -151/48-72, SpO2 99% RA.  The patient received aspirin 324mg, NS 1L x2, maalox x1, famotidine x1, metoclopramide 10mg IV x1, ondansetron 4mg IV x1.  Pt then had episode of A-fib with RVR but was too hypotensive for rate-control agents and was given NS 1L x1.  Lactate 5.0.  CXR unremarkable.  EtOH serum negative.  UA unremarkable.  No osmolar gap.    Patient denies a history of EtOH abuse and states he only drinks once a month.  He states he drank on Friday night and had two glasses of whiskey.  He denies a history of withdrawals, hospitalization for EtOH, intubation, seizures previously.  Unable to corroborate pt's history with family, as there was no answer.    Pt's son, Primo, presented to the hospital.  He states that the patient drinks once per week, typically during Sabbath dinner.  He drinks whiskey typically, around 2 drinks per session.  He states that the patient started vomiting around 2am.  They finished dinner at 6:30pm.  He vomited at least 4 times.  He does not believe there was blood in the emesis.  Prior to this, he was in his usual state of health.  He is unsure if the patient was having chest pain.  He states that the patient has not been confused at all. 53M h/o A-fib s/p ablation (09/2017), poss EtOH abuse, marijuana use, poss K2 use who presents with chest pain.    Patient very agitated, unable to provide good history.  Attempted to call pt's son Ehsan kimbrough without response.    Patient reports that he started to have abdominal pain yesterday at night.  He describes the pain as diffuse, no radiation, associated with vomiting.  He states he vomited x1 and that it was purple and yellow.  He does not know if there was food in his emesis.  He is unsure if he ate anything purple.  He denies fever, dysuria, hematuria, diarrhea, constipation, chest pain, SOB.  He is unable to recall if he had chest pain.  He reports tremulousness.    In the ED, Tmax 98.4F, Tc 98.0F, -151/48-72, SpO2 99% RA.  The patient received aspirin 324mg, NS 1L x2, maalox x1, famotidine x1, metoclopramide 10mg IV x1, ondansetron 4mg IV x1.  Pt then had episode of A-fib with RVR but was too hypotensive for rate-control agents and was given NS 1L x1.  Lactate 5.0.  CXR unremarkable.  EtOH serum negative.  UA unremarkable.  No osmolar gap.    Patient denies a history of EtOH abuse and states he only drinks once a month.  He states he drank on Friday night and had two glasses of whiskey.  He denies a history of withdrawals, hospitalization for EtOH, intubation, seizures previously.  Unable to corroborate pt's history with family, as there was no answer.    Pt's son, Primo, presented to the hospital.  He states that the patient drinks once per week, typically during Sabbath dinner.  He drinks whiskey typically, around 2 drinks per session.  He states that the patient started vomiting around 2am.  They finished dinner at 6:30pm.  He vomited at least 4 times.  He does not believe there was blood in the emesis.  Prior to this, he was in his usual state of health.  He is unsure if the patient was having chest pain.  He states that the patient has not been confused at all. 53M h/o A-fib s/p ablation (09/2017), marijuana use, HLD, anxiety poss EtOH abuse, poss K2 use who presents with chest pain.    Patient very agitated, unable to provide good history.  Attempted to call pt's son Ehsan kimbrough without response.    Patient reports that he started to have abdominal pain yesterday at night.  He describes the pain as diffuse, no radiation, associated with vomiting.  He states he vomited x1 and that it was purple and yellow.  He does not know if there was food in his emesis.  He is unsure if he ate anything purple.  He denies fever, dysuria, hematuria, diarrhea, constipation, chest pain, SOB.  He is unable to recall if he had chest pain.  He reports tremulousness.    In the ED, Tmax 98.4F, Tc 98.0F, -151/48-72, SpO2 99% RA.  The patient received aspirin 324mg, NS 1L x2, maalox x1, famotidine x1, metoclopramide 10mg IV x1, ondansetron 4mg IV x1.  Pt then had episode of A-fib with RVR but was too hypotensive for rate-control agents and was given NS 1L x1.  Lactate 5.0.  CXR unremarkable.  EtOH serum negative.  UA unremarkable.  No osmolar gap.    Patient denies a history of EtOH abuse and states he only drinks once a month.  He states he drank on Friday night and had two glasses of whiskey.  He denies a history of withdrawals, hospitalization for EtOH, intubation, seizures previously.  Unable to corroborate pt's history with family, as there was no answer.    Pt's son, Primo, presented to the hospital.  He states that the patient drinks once per week, typically during Sabbath dinner.  He drinks whiskey typically, around 2 drinks per session.  He states that the patient started vomiting around 2am.  They finished dinner at 6:30pm.  He vomited at least 4 times.  He does not believe there was blood in the emesis.  Prior to this, he was in his usual state of health.  He is unsure if the patient was having chest pain.  He states that the patient has not been confused at all. 53M h/o A-fib s/p ablation (09/2017), marijuana use, HLD, anxiety, poss EtOH abuse who presents with chest pain.    Patient very agitated, unable to provide good history.  Attempted to call pt's son Ehsan kimbrough without response.    Patient reports that he started to have abdominal pain yesterday at night.  He describes the pain as diffuse, no radiation, associated with vomiting.  He states he vomited x1 and that it was purple and yellow.  He does not know if there was food in his emesis.  He is unsure if he ate anything purple.  He denies fever, dysuria, hematuria, diarrhea, constipation, chest pain, SOB.  He is unable to recall if he had chest pain.  He reports tremulousness.    In the ED, Tmax 98.4F, Tc 98.0F, -151/48-72, SpO2 99% RA.  The patient received aspirin 324mg, NS 1L x2, maalox x1, famotidine x1, metoclopramide 10mg IV x1, ondansetron 4mg IV x1.  Pt then had episode of A-fib with RVR but was too hypotensive for rate-control agents and was given NS 1L x1.  Lactate 5.0.  CXR unremarkable.  EtOH serum negative.  UA unremarkable.  No osmolar gap.    Patient denies a history of EtOH abuse and states he only drinks once a month.  He states he drank on Friday night and had two glasses of whiskey.  He denies a history of withdrawals, hospitalization for EtOH, intubation, seizures previously.  Unable to corroborate pt's history with family, as there was no answer.    Pt's son, Primo, presented to the hospital.  He states that the patient drinks once per week, typically during Sabbath dinner.  He drinks whiskey typically, around 2 drinks per session.  He states that the patient started vomiting around 2am.  They finished dinner at 6:30pm.  He vomited at least 4 times.  He does not believe there was blood in the emesis.  Prior to this, he was in his usual state of health.  He is unsure if the patient was having chest pain.  He states that the patient has not been confused at all. 53M h/o A-fib s/p ablation (09/2017), marijuana use, HLD, anxiety, poss EtOH abuse who presents with c/o vomiting.    Patient very agitated, unable to provide good history.  Attempted to call pt's son Ehsan kimbrough without response.    Patient reports that he started to have abdominal pain yesterday at night.  He describes the pain as diffuse, no radiation, associated with vomiting.  He states he vomited x1 and that it was purple and yellow.  He does not know if there was food in his emesis.  He is unsure if he ate anything purple.  He denies fever, dysuria, hematuria, diarrhea, constipation, chest pain, SOB.  He is unable to recall if he had chest pain.  He reports tremulousness.    ED provider reported chest pain but patient reports no chest pain.    Patient denies a history of EtOH abuse and states he only drinks once a month.  He states he drank on Friday night and had two glasses of whiskey.  He denies a history of withdrawals, hospitalization for EtOH, intubation, seizures previously.  Unable to corroborate pt's history with family, as there was no answer.    Pt's son, Primo, presented to the hospital.  He states that the patient drinks once per week, typically during Sabbath dinner.  He drinks whiskey typically, around 2 drinks per session.  He states that the patient started vomiting around 2am.  They finished dinner at 6:30pm.  He vomited at least 4 times.  He does not believe there was blood in the emesis.  Prior to this, he was in his usual state of health.  He is unsure if the patient was having chest pain.  He states that the patient has not been confused at all.    In the ED, Tmax 98.4F, Tc 98.0F, -151/48-72, SpO2 99% RA.  The patient received aspirin 324mg, NS 1L x2, maalox x1, famotidine x1, metoclopramide 10mg IV x1, ondansetron 4mg IV x1.  Pt then had episode of A-fib with RVR but was too hypotensive for rate-control agents and was given NS 1L x1.  Lactate 5.0.  CXR unremarkable.  EtOH serum negative.  UA unremarkable.  No osmolar gap.

## 2019-01-26 NOTE — ED PROVIDER NOTE - CARE PLAN
Principal Discharge DX:	Chest pain  Secondary Diagnosis:	Vomiting  Secondary Diagnosis:	Substance abuse

## 2019-01-27 DIAGNOSIS — E87.6 HYPOKALEMIA: ICD-10-CM

## 2019-01-27 DIAGNOSIS — E83.42 HYPOMAGNESEMIA: ICD-10-CM

## 2019-01-27 LAB
ALBUMIN SERPL ELPH-MCNC: 3.9 G/DL — SIGNIFICANT CHANGE UP (ref 3.3–5)
ALP SERPL-CCNC: 64 U/L — SIGNIFICANT CHANGE UP (ref 40–120)
ALT FLD-CCNC: 30 U/L — SIGNIFICANT CHANGE UP (ref 4–41)
AMPHET UR-MCNC: NEGATIVE — SIGNIFICANT CHANGE UP
ANION GAP SERPL CALC-SCNC: 11 MMO/L — SIGNIFICANT CHANGE UP (ref 7–14)
ANION GAP SERPL CALC-SCNC: 15 MMO/L — HIGH (ref 7–14)
AST SERPL-CCNC: 51 U/L — HIGH (ref 4–40)
BARBITURATES UR SCN-MCNC: NEGATIVE — SIGNIFICANT CHANGE UP
BASE EXCESS BLDA CALC-SCNC: -1.2 MMOL/L — SIGNIFICANT CHANGE UP
BASOPHILS # BLD AUTO: 0.02 K/UL — SIGNIFICANT CHANGE UP (ref 0–0.2)
BASOPHILS NFR BLD AUTO: 0.1 % — SIGNIFICANT CHANGE UP (ref 0–2)
BENZODIAZ UR-MCNC: NEGATIVE — SIGNIFICANT CHANGE UP
BILIRUB SERPL-MCNC: 0.8 MG/DL — SIGNIFICANT CHANGE UP (ref 0.2–1.2)
BUN SERPL-MCNC: 15 MG/DL — SIGNIFICANT CHANGE UP (ref 7–23)
BUN SERPL-MCNC: 17 MG/DL — SIGNIFICANT CHANGE UP (ref 7–23)
CALCIUM SERPL-MCNC: 6.6 MG/DL — LOW (ref 8.4–10.5)
CALCIUM SERPL-MCNC: 8.1 MG/DL — LOW (ref 8.4–10.5)
CANNABINOIDS UR-MCNC: POSITIVE — SIGNIFICANT CHANGE UP
CHLORIDE BLDA-SCNC: 113 MMOL/L — HIGH (ref 96–108)
CHLORIDE SERPL-SCNC: 107 MMOL/L — SIGNIFICANT CHANGE UP (ref 98–107)
CHLORIDE SERPL-SCNC: 116 MMOL/L — HIGH (ref 98–107)
CO2 SERPL-SCNC: 17 MMOL/L — LOW (ref 22–31)
CO2 SERPL-SCNC: 19 MMOL/L — LOW (ref 22–31)
COCAINE METAB.OTHER UR-MCNC: NEGATIVE — SIGNIFICANT CHANGE UP
CREAT SERPL-MCNC: 0.78 MG/DL — SIGNIFICANT CHANGE UP (ref 0.5–1.3)
CREAT SERPL-MCNC: 0.92 MG/DL — SIGNIFICANT CHANGE UP (ref 0.5–1.3)
EOSINOPHIL # BLD AUTO: 0.02 K/UL — SIGNIFICANT CHANGE UP (ref 0–0.5)
EOSINOPHIL NFR BLD AUTO: 0.1 % — SIGNIFICANT CHANGE UP (ref 0–6)
GLUCOSE BLDA-MCNC: 110 MG/DL — HIGH (ref 70–99)
GLUCOSE SERPL-MCNC: 107 MG/DL — HIGH (ref 70–99)
GLUCOSE SERPL-MCNC: 96 MG/DL — SIGNIFICANT CHANGE UP (ref 70–99)
HCO3 BLDA-SCNC: 24 MMOL/L — SIGNIFICANT CHANGE UP (ref 22–26)
HCT VFR BLD CALC: 36.6 % — LOW (ref 39–50)
HCT VFR BLD CALC: 40.3 % — SIGNIFICANT CHANGE UP (ref 39–50)
HCT VFR BLDA CALC: 39.8 % — SIGNIFICANT CHANGE UP (ref 39–51)
HGB BLD-MCNC: 12 G/DL — LOW (ref 13–17)
HGB BLD-MCNC: 13.1 G/DL — SIGNIFICANT CHANGE UP (ref 13–17)
HGB BLDA-MCNC: 12.9 G/DL — LOW (ref 13–17)
IMM GRANULOCYTES NFR BLD AUTO: 0.6 % — SIGNIFICANT CHANGE UP (ref 0–1.5)
LACTATE BLDA-SCNC: 1.4 MMOL/L — SIGNIFICANT CHANGE UP (ref 0.5–2)
LACTATE SERPL-SCNC: 1.3 MMOL/L — SIGNIFICANT CHANGE UP (ref 0.5–2)
LYMPHOCYTES # BLD AUTO: 13 % — SIGNIFICANT CHANGE UP (ref 13–44)
LYMPHOCYTES # BLD AUTO: 2.05 K/UL — SIGNIFICANT CHANGE UP (ref 1–3.3)
MAGNESIUM SERPL-MCNC: 1.5 MG/DL — LOW (ref 1.6–2.6)
MAGNESIUM SERPL-MCNC: 2.2 MG/DL — SIGNIFICANT CHANGE UP (ref 1.6–2.6)
MCHC RBC-ENTMCNC: 28.1 PG — SIGNIFICANT CHANGE UP (ref 27–34)
MCHC RBC-ENTMCNC: 28.7 PG — SIGNIFICANT CHANGE UP (ref 27–34)
MCHC RBC-ENTMCNC: 32.5 % — SIGNIFICANT CHANGE UP (ref 32–36)
MCHC RBC-ENTMCNC: 32.8 % — SIGNIFICANT CHANGE UP (ref 32–36)
MCV RBC AUTO: 86.5 FL — SIGNIFICANT CHANGE UP (ref 80–100)
MCV RBC AUTO: 87.6 FL — SIGNIFICANT CHANGE UP (ref 80–100)
METHADONE UR-MCNC: NEGATIVE — SIGNIFICANT CHANGE UP
MONOCYTES # BLD AUTO: 0.76 K/UL — SIGNIFICANT CHANGE UP (ref 0–0.9)
MONOCYTES NFR BLD AUTO: 4.8 % — SIGNIFICANT CHANGE UP (ref 2–14)
NEUTROPHILS # BLD AUTO: 12.83 K/UL — HIGH (ref 1.8–7.4)
NEUTROPHILS NFR BLD AUTO: 81.4 % — HIGH (ref 43–77)
NRBC # FLD: 0 K/UL — LOW (ref 25–125)
NRBC # FLD: 0 K/UL — LOW (ref 25–125)
OPIATES UR-MCNC: NEGATIVE — SIGNIFICANT CHANGE UP
OXYCODONE UR-MCNC: NEGATIVE — SIGNIFICANT CHANGE UP
PCO2 BLDA: 30 MMHG — LOW (ref 35–48)
PCP UR-MCNC: NEGATIVE — SIGNIFICANT CHANGE UP
PH BLDA: 7.48 PH — HIGH (ref 7.35–7.45)
PHOSPHATE SERPL-MCNC: 1.9 MG/DL — LOW (ref 2.5–4.5)
PHOSPHATE SERPL-MCNC: 2.1 MG/DL — LOW (ref 2.5–4.5)
PLATELET # BLD AUTO: 202 K/UL — SIGNIFICANT CHANGE UP (ref 150–400)
PLATELET # BLD AUTO: 219 K/UL — SIGNIFICANT CHANGE UP (ref 150–400)
PMV BLD: 10.5 FL — SIGNIFICANT CHANGE UP (ref 7–13)
PMV BLD: 10.9 FL — SIGNIFICANT CHANGE UP (ref 7–13)
PO2 BLDA: 72 MMHG — LOW (ref 83–108)
POTASSIUM BLDA-SCNC: 3.3 MMOL/L — LOW (ref 3.4–4.5)
POTASSIUM SERPL-MCNC: 3 MMOL/L — LOW (ref 3.5–5.3)
POTASSIUM SERPL-MCNC: 3.6 MMOL/L — SIGNIFICANT CHANGE UP (ref 3.5–5.3)
POTASSIUM SERPL-SCNC: 3 MMOL/L — LOW (ref 3.5–5.3)
POTASSIUM SERPL-SCNC: 3.6 MMOL/L — SIGNIFICANT CHANGE UP (ref 3.5–5.3)
PROT SERPL-MCNC: 6.3 G/DL — SIGNIFICANT CHANGE UP (ref 6–8.3)
RBC # BLD: 4.18 M/UL — LOW (ref 4.2–5.8)
RBC # BLD: 4.66 M/UL — SIGNIFICANT CHANGE UP (ref 4.2–5.8)
RBC # FLD: 13.6 % — SIGNIFICANT CHANGE UP (ref 10.3–14.5)
RBC # FLD: 13.6 % — SIGNIFICANT CHANGE UP (ref 10.3–14.5)
SAO2 % BLDA: 95.7 % — SIGNIFICANT CHANGE UP (ref 95–99)
SODIUM BLDA-SCNC: 134 MMOL/L — LOW (ref 136–146)
SODIUM SERPL-SCNC: 141 MMOL/L — SIGNIFICANT CHANGE UP (ref 135–145)
SODIUM SERPL-SCNC: 144 MMOL/L — SIGNIFICANT CHANGE UP (ref 135–145)
SPECIMEN SOURCE: SIGNIFICANT CHANGE UP
WBC # BLD: 13.28 K/UL — HIGH (ref 3.8–10.5)
WBC # BLD: 15.78 K/UL — HIGH (ref 3.8–10.5)
WBC # FLD AUTO: 13.28 K/UL — HIGH (ref 3.8–10.5)
WBC # FLD AUTO: 15.78 K/UL — HIGH (ref 3.8–10.5)

## 2019-01-27 PROCEDURE — 99223 1ST HOSP IP/OBS HIGH 75: CPT | Mod: GC

## 2019-01-27 PROCEDURE — 99222 1ST HOSP IP/OBS MODERATE 55: CPT

## 2019-01-27 PROCEDURE — 36600 WITHDRAWAL OF ARTERIAL BLOOD: CPT

## 2019-01-27 PROCEDURE — 99233 SBSQ HOSP IP/OBS HIGH 50: CPT

## 2019-01-27 RX ORDER — SODIUM CHLORIDE 9 MG/ML
500 INJECTION INTRAMUSCULAR; INTRAVENOUS; SUBCUTANEOUS ONCE
Qty: 0 | Refills: 0 | Status: COMPLETED | OUTPATIENT
Start: 2019-01-27 | End: 2019-01-27

## 2019-01-27 RX ORDER — SODIUM CHLORIDE 9 MG/ML
1000 INJECTION INTRAMUSCULAR; INTRAVENOUS; SUBCUTANEOUS
Qty: 0 | Refills: 0 | Status: COMPLETED | OUTPATIENT
Start: 2019-01-27 | End: 2019-01-28

## 2019-01-27 RX ORDER — SODIUM,POTASSIUM PHOSPHATES 278-250MG
1 POWDER IN PACKET (EA) ORAL EVERY 4 HOURS
Qty: 0 | Refills: 0 | Status: COMPLETED | OUTPATIENT
Start: 2019-01-27 | End: 2019-01-27

## 2019-01-27 RX ORDER — MAGNESIUM SULFATE 500 MG/ML
1 VIAL (ML) INJECTION ONCE
Qty: 0 | Refills: 0 | Status: COMPLETED | OUTPATIENT
Start: 2019-01-27 | End: 2019-01-27

## 2019-01-27 RX ORDER — POTASSIUM CHLORIDE 20 MEQ
40 PACKET (EA) ORAL EVERY 4 HOURS
Qty: 0 | Refills: 0 | Status: DISCONTINUED | OUTPATIENT
Start: 2019-01-27 | End: 2019-01-27

## 2019-01-27 RX ADMIN — Medication 2 MILLIGRAM(S): at 18:36

## 2019-01-27 RX ADMIN — Medication 1 TABLET(S): at 17:17

## 2019-01-27 RX ADMIN — SODIUM CHLORIDE 100 MILLILITER(S): 9 INJECTION INTRAMUSCULAR; INTRAVENOUS; SUBCUTANEOUS at 23:58

## 2019-01-27 RX ADMIN — Medication 1 MILLIGRAM(S): at 11:08

## 2019-01-27 RX ADMIN — Medication 2 MILLIGRAM(S): at 17:36

## 2019-01-27 RX ADMIN — SODIUM CHLORIDE 1000 MILLILITER(S): 9 INJECTION INTRAMUSCULAR; INTRAVENOUS; SUBCUTANEOUS at 16:23

## 2019-01-27 RX ADMIN — ONDANSETRON 4 MILLIGRAM(S): 8 TABLET, FILM COATED ORAL at 20:30

## 2019-01-27 RX ADMIN — Medication 1 TABLET(S): at 10:30

## 2019-01-27 RX ADMIN — HEPARIN SODIUM 5000 UNIT(S): 5000 INJECTION INTRAVENOUS; SUBCUTANEOUS at 22:41

## 2019-01-27 RX ADMIN — SODIUM CHLORIDE 1000 MILLILITER(S): 9 INJECTION INTRAMUSCULAR; INTRAVENOUS; SUBCUTANEOUS at 20:15

## 2019-01-27 RX ADMIN — HEPARIN SODIUM 5000 UNIT(S): 5000 INJECTION INTRAVENOUS; SUBCUTANEOUS at 05:51

## 2019-01-27 RX ADMIN — Medication 2 MILLIGRAM(S): at 22:41

## 2019-01-27 RX ADMIN — Medication 1 TABLET(S): at 14:34

## 2019-01-27 RX ADMIN — Medication 1 MILLIGRAM(S): at 15:54

## 2019-01-27 RX ADMIN — Medication 100 GRAM(S): at 10:30

## 2019-01-27 RX ADMIN — ONDANSETRON 4 MILLIGRAM(S): 8 TABLET, FILM COATED ORAL at 10:29

## 2019-01-27 RX ADMIN — HEPARIN SODIUM 5000 UNIT(S): 5000 INJECTION INTRAVENOUS; SUBCUTANEOUS at 14:34

## 2019-01-27 RX ADMIN — Medication 2 MILLIGRAM(S): at 20:52

## 2019-01-27 NOTE — CONSULT NOTE ADULT - ASSESSMENT
Patient is a 53 year old male with PMH of A-fib s/p ablation (09/2017), marijuana use, HLD, anxiety, poss EtOH abuse who presents with c/o vomiting. Patient very agitated, unable to provide good history. Patient admits to K2 drug use, and recent marijuana use. He has not felt well since smoking marijuana.  Cardiology called today for heart rate of 170 in the setting of patient reporting he is seeing things in the room, he "feels anxious." When aksing the patient if he is withdrawing from drugs he responded "yes."    RECOMMENDATIONS: Patient is with a heart rate of 170. Blood pressure is maintaining at 114/55, respirations are labored, 24-28 with good pulse ox of 96%. Normal LV function as per ECHO in 2017, very mild CAD in 1 vessel on cath in 2106. Would recommend treating with calcium channel blocker to help control heart rate. Would recommend benzo for withdrawal and possible MICU consultation. Patient appears to be having active withdrawal. An ABG with lactate was repeated. INitiat lactate elevated to 5.5. Plan discussed with Dr. Blackwood. EP will continue to follow. Patient is a 53 year old male with PMH of A-fib s/p ablation (09/2017), marijuana use, HLD, anxiety, poss EtOH abuse who presents with c/o vomiting. Patient very agitated, unable to provide good history. Patient admits to K2 drug use, and recent marijuana use. He has not felt well since smoking marijuana.  Cardiology called today for heart rate of 170 in the setting of patient reporting he is seeing things in the room, he "feels anxious." When asking the patient if he is withdrawing from drugs he responded "yes."    RECOMMENDATIONS: Patient with Atrial fibrillation with RVR, HR as high as 170, alternating with episodes of bradycardia, tachycardia and sinus rhythm on heart monitor.  Blood pressure is maintaining at 114/55, respirations are labored, 24-28 with good pulse ox of 96%. He had  Normal LV function as per ECHO in 2017, very mild CAD  (30%) in 1 vessel on cath in 2016. Would recommend treating with calcium channel blocker to help control heart rate. Start with cardizem 30mg po q6 hours. Can give IV cardizem if rate is not controlled. Would recommend benzo for withdrawal and possible MICU consultation. CIWA protocol has been initiated. Patient appears to be having active withdrawal. At this time, his CHADSVASC score is 0. Would not  recommend anticoagulation in this setting. If patient remains in afib once he is stable, can consider anticoagulation for DCCV, or further study, but at this time the heart rate may be driven by the underlying withdrawal. Plan discussed with Dr. Blackwood. EP will continue to follow.

## 2019-01-27 NOTE — CONSULT NOTE ADULT - ATTENDING COMMENTS
54 yo with h/o Afib s/p ablation 2017, polysubstance abuse presenting with sxs of EtOH withdrawal, c/o vomiting, admitting to use of K2 . Last drink according to pt 48 hours ago.  Noted in ED to be in A fib with RVR.  Pt seen by cardiology who suggested rate driven by withdrawal.  Pt received IVF, cardizem, ativan total 6mg today; most recent CIWA 7  Pt seen and examined  Agree with above PE, assessment and recommendations   in afib (on reexamination at 10 pm - pt in NSR at 74)  BP 98/54    Polysubstance abuse -K2/marijuana  EtOH withdrawal responding to ativan  a fib with RVR    vomiting - resolved 54 yo with h/o Afib s/p ablation 2017, polysubstance abuse presenting with sxs of EtOH withdrawal, c/o vomiting, admitting to use of K2 . Last drink according to pt 48 hours ago.  Noted in ED to be in A fib with RVR.  Pt seen by cardiology who suggested rate driven by withdrawal.  Pt received IVF, cardizem, ativan total 6mg today; most recent CIWA 7  Pt seen and examined  Agree with above PE, assessment and recommendations   in afib (on reexamination at 10 pm - pt in NSR at 74)  BP 98/54    - Polysubstance abuse -K2/marijuana  - EtOH withdrawal responding to ativan, suggest CIWA protocol as above  - a fib with RVR  treatment with cardizem as per cardiology, treatment of withdrawal, A/C not indicated at this time; s/p conversion to NSR  - vomiting - resolved    Pt lethargic but protecting airway; vomiting resolved; VSS now in NSR  Does not require ICU admission at this time but please reconsult if condition should change 52 yo with h/o Afib s/p ablation 2017, polysubstance abuse presenting with sxs of EtOH withdrawal, c/o vomiting, admitting to use of K2 . Last drink according to pt 48 hours ago.  Noted in ED to be in A fib with RVR.  Pt seen by cardiology who suggested rate driven by withdrawal.  Pt received IVF, cardizem, ativan total 6mg today; most recent CIWA 7  Pt seen and examined  Agree with above PE, assessment and recommendations   in afib (on reexamination at 10 pm - pt in NSR at 74)  BP 98/54    - Polysubstance abuse -K2/marijuana  - EtOH withdrawal responding to ativan, suggest CIWA protocol as above  - a fib with RVR  treatment with cardizem as per cardiology, treatment of withdrawal, A/C not indicated at this time; s/p conversion to NSR  - vomiting - resolved    Pt lethargic s/p ativan but protecting airway; vomiting resolved; VSS now in NSR  Does not require ICU admission at this time but please reconsult if condition should change

## 2019-01-27 NOTE — CONSULT NOTE ADULT - SUBJECTIVE AND OBJECTIVE BOX
CHIEF COMPLAINT: Polysubstance abuse     HPI: 53M h/o A-fib s/p ablation (2017), marijuana use, HLD, anxiety, poss EtOH abuse who presents with c/o vomiting. Pt unable to give coherent story however does endorse history of Etoh and marijuana abuse, that is corroborated by family members who are at bedside. He states he drank on Friday night and had two glasses of Cognac.  He denies a history of withdrawals, hospitalization for EtOH, intubation, seizures previously.     Pt was treated w GI cocktail in ED and was found to have Afib w RVR that was fluid responsive. Labs at that time significant for: Lactate 5.0.  CXR unremarkable.  EtOH serum negative.  UA unremarkable.  No osmolar gap. He was started on CIWA protocol that was discontinued shortly after. While on the medicine floors, Afib to 170'S- EP consulted [facility] for rate control. Pt was started back on symptom triggered CIWA for agitation, requiring a total of 6 mg IV Ativan today. MICU consulted for CIWA 21- most recent CIWA 7.    Pt seen and examined at bedside. Lethargic however arousable. Pt is able to follow simple commands and he is protecting his airway.  Denies HA, dizziness, NV, chest pain. States he is hungry.     SOCIAL HISTORY:  ETOH abuse  Marijuana     Allergies: No Known Allergies      REVIEW OF SYSTEMS:  [X ] All other systems negative    OBJECTIVE:  ICU Vital Signs Last 24 Hrs  T(C): 37.1 (2019 18:26), Max: 37.2 (2019 05:00)  T(F): 98.8 (2019 18:26), Max: 98.9 (2019 05:00)  HR: 115 (2019 18:26) (52 - 145)  RR: 18 (2019 18:26) (18 - 20)  SpO2: 98% (2019 18:26) (97% - 99%)        CAPILLARY BLOOD GLUCOSE:  POCT Blood Glucose.: 108 mg/dL (2019 17:07)      PHYSICAL EXAM:  General:  Middle aged male lying flat, resting comfortably, NAD  HEENT: MMM, PERRLA. No tongue fasciculation   Lymph Nodes: No lymphadenopathy  Neck: No JVD  Respiratory: Good inspiratory effort. Mildly tachypneic. No wheeze or stridor. Clear lung sounds b/l  Cardiovascular: Tachycardic. Irregular rate and rhythm   Abdomen: Soft, nondistended, nontender. No hepatomegaly  Extremities: No deformity   Skin: no rash   Neurological: AAO x 2-3. Able to follow commands. No tremors on exam.   Psychiatry: No hallucinations    HOSPITAL MEDICATIONS:  MEDICATIONS  (STANDING):  heparin  Injectable 5000 Unit(s) SubCutaneous every 8 hours  multivitamin 1 Tablet(s) Oral daily  sodium chloride 0.9%. 1000 milliLiter(s) (100 mL/Hr) IV Continuous <Continuous>    MEDICATIONS  (PRN):  LORazepam     Tablet 2 milliGRAM(s) Oral once PRN Agitation  LORazepam     Tablet 2 milliGRAM(s) Oral every 2 hours PRN Symptom-triggered 2 point increase in CIWA-Ar  LORazepam   Injectable 2 milliGRAM(s) IV Push every 1 hour PRN Symptom-triggered: each CIWA -Ar score 8 or GREATER  ondansetron Injectable 4 milliGRAM(s) IV Push every 6 hours PRN Nausea and/or Vomiting      LABS:                        13.1   15.78 )-----------( 219      ( 2019 17:30 )             40.3         141  |  107  |  17  ----------------------------<  107<H>  3.6   |  19<L>  |  0.92    Ca    8.1<L>      2019 17:30  Phos  2.1       Mg     2.2         TPro  6.3  /  Alb  3.9  /  TBili  0.8  /  DBili  x   /  AST  51<H>  /  ALT  30  /  AlkPhos  64      PT/INR - ( 2019 04:58 )   PT: 11.6 SEC;   INR: 1.04          PTT - ( 2019 04:58 )  PTT:29.9 SEC  Urinalysis Basic - ( 2019 07:50 )    Color: LIGHT YELLOW / Appearance: CLEAR / S.024 / pH: 6.0  Gluc: NEGATIVE / Ketone: SMALL  / Bili: NEGATIVE / Urobili: NORMAL   Blood: NEGATIVE / Protein: 10 / Nitrite: NEGATIVE   Leuk Esterase: NEGATIVE / RBC: x / WBC x   Sq Epi: x / Non Sq Epi: x / Bacteria: x      Arterial Blood Gas:   @ 17:30  7.48/30/72/24/95.7/-1.2  ABG lactate: 1.4    Venous Blood Gas:   @ 06:40  7.36/37/34/20/59.8  VBG Lactate: 5.0  Venous Blood Gas:   @ 05:03  7.44/35/< 24/23/29.6  VBG Lactate: 4.8      MICROBIOLOGY: NGTD    RADIOLOGY:  [X ] Reviewed and interpreted by me    EKG:

## 2019-01-27 NOTE — PROGRESS NOTE ADULT - PROBLEM SELECTOR PLAN 2
ED charting notes chest pain but patient denies ever having chest pain. ECG without ST/T wave changes and troponin negative x1.  Evaluate for ACS, but low suspicion at this time.    - Monitor on tele.  - Troponin negative x1.  Continue to trend Sho.  - S/p aspirin load.  - If Sho begin to uptrend / EKG changes noted, will consult Cardiology.

## 2019-01-27 NOTE — CHART NOTE - NSCHARTNOTEFT_GEN_A_CORE
Pt seen and evaluated at bedside. Pt admits to feeling weak and having some auditory hallucinations. Pt placed on CIWA earlier for suspicion of withdrawal as discussed with Dr. Mijares. Cardizem given for rapid atrial fibrillation. Pt appears lethargic. HR 140s atrial fibrillation. Otherwise vitals stable. CIWA score 21. EP NP at bedside requesting labs, ABG and MICU consult. Will call.

## 2019-01-27 NOTE — PROGRESS NOTE ADULT - SUBJECTIVE AND OBJECTIVE BOX
53M h/o A-fib s/p ablation (09/2017), marijuana use, HLD, anxiety, poss EtOH abuse who presents with c/o vomiting.    patient seen and examine at bed side  still vomiting and have heaves     MEDICATIONS  (STANDING):  heparin  Injectable 5000 Unit(s) SubCutaneous every 8 hours  multivitamin 1 Tablet(s) Oral daily  potassium acid phosphate/sodium acid phosphate tablet (K-PHOS No. 2) 1 Tablet(s) Oral every 4 hours  sodium chloride 0.9%. 1000 milliLiter(s) (100 mL/Hr) IV Continuous <Continuous>    MEDICATIONS  (PRN):  LORazepam     Tablet 2 milliGRAM(s) Oral once PRN Agitation  ondansetron Injectable 4 milliGRAM(s) IV Push every 6 hours PRN Nausea and/or Vomiting      Vital Signs Last 24 Hrs  T(C): 36.9 (27 Jan 2019 13:26), Max: 37.2 (27 Jan 2019 05:00)  T(F): 98.5 (27 Jan 2019 13:26), Max: 98.9 (27 Jan 2019 05:00)  HR: 61 (27 Jan 2019 13:26) (52 - 80)  BP: 121/66 (27 Jan 2019 13:26) (90/54 - 146/60)  BP(mean): --  RR: 18 (27 Jan 2019 13:26) (17 - 20)  SpO2: 98% (27 Jan 2019 13:26) (97% - 99%)      Constitutional: not in distress  Neck: supple  Respiratory: b/l air entry present   Cardiovascular:S1 s2 ++  Gastrointestinal: soft , NT , BS++  Extremities: no edema  Neurological: AAox 3                           12.0   13.28 )-----------( 202      ( 27 Jan 2019 06:24 )             36.6       01-27    144  |  116<H>  |  15  ----------------------------<  96  3.0<L>   |  17<L>  |  0.78    Ca    6.6<L>      27 Jan 2019 06:24  Phos  1.9     01-27  Mg     1.5     01-27    TPro  7.4  /  Alb  4.6  /  TBili  0.5  /  DBili  x   /  AST  19  /  ALT  25  /  AlkPhos  80  01-26

## 2019-01-27 NOTE — PROGRESS NOTE ADULT - PROBLEM SELECTOR PLAN 8
- Pt's history is not c/w EtOH withdrawal, low suspicion at this time.  - Will discontinue CIWA and restart if pt becomes tachycardic or diaphoretic.

## 2019-01-27 NOTE — PROGRESS NOTE ADULT - PROBLEM SELECTOR PLAN 7
- With RVR.  RVR resolved with IVF, now intermittently bradycardic in sinus.  - Continue to monitor on tele.  - Would avoid BB / CCB if bradycardia persists.  - Would need to consider amiodarone if RVR returns and is persistent.  Would also consult EP if A-fib with RVR recurs.  - [id number], defer AC at this time.

## 2019-01-27 NOTE — PROGRESS NOTE ADULT - PROBLEM SELECTOR PLAN 9
- Smokes marijuana.  Also reports h/o K2 but pt denies and family unaware.  - No osmolar gap.  - Check urine drug screen

## 2019-01-27 NOTE — PROGRESS NOTE ADULT - PROBLEM SELECTOR PLAN 5
Leukocytosis and tachycardia noted. Patient without fever. He presents with retching. Leukocytosis likely reactive in setting of retching vs possible gastritis. Suspect tachycardia 2/2 stress of vomiting vs volume depletion as a result of vomiting.    No indication for abx at this time.    -IV NS at 100cc/hr x20 hours  -antiemetics as above

## 2019-01-27 NOTE — CONSULT NOTE ADULT - SUBJECTIVE AND OBJECTIVE BOX
Date of Admission:  1/27/19  CHIEF COMPLAINT:  weakness, agitated, shakiness  HISTORY OF PRESENT ILLNESS:  Patient is a 53 year old male with PMH of A-fib s/p ablation (09/2017), marijuana use, HLD, anxiety, poss EtOH abuse who presents with c/o vomiting. Patient very agitated, unable to provide good history.     Patient reports that he started to have abdominal pain yesterday at night.  He describes the pain as diffuse, no radiation, associated with vomiting.  He states he vomited x1 and that it was purple and yellow.  He does not know if there was food in his emesis.  He is unsure if he ate anything purple.  He denies fever, dysuria, hematuria, diarrhea, constipation, chest pain, SOB.  He is unable to recall if he had chest pain.  He reports tremulousness.    ED provider reported chest pain but patient reports no chest pain.    Patient denies a history of EtOH abuse and states he only drinks once a month.  He states he drank on Friday night and had two glasses of whiskey.  He denies a history of withdrawals, hospitalization for EtOH, intubation, seizures previously.  Unable to corroborate pt's history with family, as there was no answer.    Pt's son, Primo, presented to the hospital.  He states that the patient drinks once per week, typically during Sabbath dinner.  He drinks whiskey typically, around 2 drinks per session.  He states that the patient started vomiting around 2am.  They finished dinner at 6:30pm.  He vomited at least 4 times.  He does not believe there was blood in the emesis.  Prior to this, he was in his usual state of health.  He is unsure if the patient was having chest pain.  He states that the patient has not been confused at all.    In the ED, Tmax 98.4F, Tc 98.0F, -151/48-72, SpO2 99% RA.  The patient received aspirin 324mg, NS 1L x2, maalox x1, famotidine x1, metoclopramide 10mg IV x1, ondansetron 4mg IV x1.  Pt then had episode of A-fib with RVR but was too hypotensive for rate-control agents and was given NS 1L x1.  Lactate 5.0.  CXR unremarkable.  EtOH serum negative.  UA unremarkable.  No osmolar gap.    Allergies    No Known Allergies    Intolerances    	    MEDICATIONS:  heparin  Injectable 5000 Unit(s) SubCutaneous every 8 hours        LORazepam     Tablet 2 milliGRAM(s) Oral once PRN  LORazepam     Tablet 2 milliGRAM(s) Oral every 2 hours PRN  LORazepam   Injectable 2 milliGRAM(s) IV Push every 1 hour PRN  ondansetron Injectable 4 milliGRAM(s) IV Push every 6 hours PRN        multivitamin 1 Tablet(s) Oral daily  sodium chloride 0.9%. 1000 milliLiter(s) IV Continuous <Continuous>      PAST MEDICAL & SURGICAL HISTORY:  Hyperlipidemia  Marijuana use  Alcohol use  Peptic ulcer disease  Anxiety  Atrial fibrillation, unspecified type  GERD (gastroesophageal reflux disease)  H/O prior ablation treatment: 10/2017, for A-fib      FAMILY HISTORY:  Family history of hypertension in mother (Mother)  Family history of prostate cancer in father (Father)  Family history of diabetes mellitus in mother (Mother)      SOCIAL HISTORY:    [ ] Non-smoker  [ ] Smoker  [ ] Alcohol      REVIEW OF SYSTEMS:  See HPI. Otherwise, 10 point ROS done and otherwise negative.      T(C): 36.7 (01-27-19 @ 17:32), Max: 37.2 (01-27-19 @ 05:00)  HR: 145 (01-27-19 @ 17:32) (52 - 145)  BP: 114/55 (01-27-19 @ 17:32) (95/52 - 146/60)  RR: 18 (01-27-19 @ 17:32) (18 - 20)  SpO2: 98% (01-27-19 @ 17:32) (97% - 99%)  Wt(kg): --  I&O's Summary      Physical Exam:  General: NAD  Cardiovascular: Normal S1 S2, No JVD, No murmurs, No edema  Respiratory: Lungs clear to auscultation	  Gastrointestinal:  Soft, Non-tender, + BS	  Skin: warm and dry, No rashes, No ecchymoses, No cyanosis	  Extremities:  No clubbing, cyanosis or edema  Vascular: Peripheral pulses palpable 2+ bilaterally    LABS:	   	    CBC Full  -  ( 27 Jan 2019 06:24 )  WBC Count : 13.28 K/uL  Hemoglobin : 12.0 g/dL  Hematocrit : 36.6 %  Platelet Count - Automated : 202 K/uL  Mean Cell Volume : 87.6 fL  Mean Cell Hemoglobin : 28.7 pg  Mean Cell Hemoglobin Concentration : 32.8 %  Auto Neutrophil # : x  Auto Lymphocyte # : x  Auto Monocyte # : x  Auto Eosinophil # : x  Auto Basophil # : x  Auto Neutrophil % : x  Auto Lymphocyte % : x  Auto Monocyte % : x  Auto Eosinophil % : x  Auto Basophil % : x    01-27    144  |  116<H>  |  15  ----------------------------<  96  3.0<L>   |  17<L>  |  0.78  01-26    141  |  105  |  16  ----------------------------<  160<H>  4.4   |  21<L>  |  1.06    Ca    6.6<L>      27 Jan 2019 06:24  Ca    9.5      26 Jan 2019 04:58  Phos  1.9     01-27  Mg     1.5     01-27    TPro  7.4  /  Alb  4.6  /  TBili  0.5  /  DBili  x   /  AST  19  /  ALT  25  /  AlkPhos  80  01-26      proBNP:   Lipid Profile:   HgA1c:   TSH:       CARDIAC MARKERS:      CKMB: 5.67 ng/mL (01-26 @ 18:11)        TELEMETRY: 	    ECG:  	  RADIOLOGY:  OTHER: 	    PREVIOUS DIAGNOSTIC TESTING:    [ ] Echocardiogram:  [ ]  Catheterization:  [ ] Stress Test:  	  	  ASSESSMENT/PLAN: 	    Denia Leung NP [id number] Date of Admission:  1/27/19  CHIEF COMPLAINT:  weakness, agitated, shakiness  HISTORY OF PRESENT ILLNESS:  Patient is a 53 year old male with PMH of A-fib s/p ablation (09/2017), marijuana use, HLD, anxiety, poss EtOH abuse who presents with c/o vomiting. Patient very agitated, unable to provide good history. Patient admits to K2 drug use, and recent marijuana use. He has not felt well since smoking marijuana.  Cardiology called today for heart rate of 170 in the setting of patient reporting he is seeing things in the room, he "feels anxious." When aksing the patient if he is withdrawing from drugs he responded "yes."   Allergies    No Known Allergies    Intolerances    	    MEDICATIONS:  heparin  Injectable 5000 Unit(s) SubCutaneous every 8 hours        LORazepam     Tablet 2 milliGRAM(s) Oral once PRN  LORazepam     Tablet 2 milliGRAM(s) Oral every 2 hours PRN  LORazepam   Injectable 2 milliGRAM(s) IV Push every 1 hour PRN  ondansetron Injectable 4 milliGRAM(s) IV Push every 6 hours PRN        multivitamin 1 Tablet(s) Oral daily  sodium chloride 0.9%. 1000 milliLiter(s) IV Continuous <Continuous>      PAST MEDICAL & SURGICAL HISTORY:  Hyperlipidemia  Marijuana use  Alcohol use  Peptic ulcer disease  Anxiety  Atrial fibrillation, unspecified type  GERD (gastroesophageal reflux disease)  H/O prior ablation treatment: 10/2017, for A-fib      FAMILY HISTORY:  Family history of hypertension in mother (Mother)  Family history of prostate cancer in father (Father)  Family history of diabetes mellitus in mother (Mother)    REVIEW OF SYSTEMS:    CONSTITUTIONAL: +weakness,+ shakes   EYES/ENT: No visual changes;  No vertigo or throat pain   NECK: No pain or stiffness  RESPIRATORY: +sob  CARDIOVASCULAR: No chest pain or palpitations  GASTROINTESTINAL: +nausea, +vomitting  GENITOURINARY: No dysuria, frequency or hematuria  NEUROLOGICAL: No numbness or weakness  SKIN: No itching, rashes      SOCIAL HISTORY:    +ETOH +marijuana, admits to cocaine and heroine in the past    T(C): 36.7 (01-27-19 @ 17:32), Max: 37.2 (01-27-19 @ 05:00)  HR: 145 (01-27-19 @ 17:32) (52 - 145)  BP: 114/55 (01-27-19 @ 17:32) (95/52 - 146/60)  RR: 18 (01-27-19 @ 17:32) (18 - 20)  SpO2: 98% (01-27-19 @ 17:32) (97% - 99%)  Wt(kg): --  I&O's Summary      Physical Exam:  General: NAD  Cardiovascular:irregular rate and rhythm no JVD, No murmurs, No edema  Respiratory: Lungs clear to auscultation	  Gastrointestinal:  Soft, Non-tender, + BS	  Skin: warm and dry, No rashes, No ecchymoses, No cyanosis	  Extremities:  No clubbing, cyanosis or edema  Vascular: Peripheral pulses palpable 2+ bilaterally    LABS:	   	    CBC Full  -  ( 27 Jan 2019 06:24 )  WBC Count : 13.28 K/uL  Hemoglobin : 12.0 g/dL  Hematocrit : 36.6 %  Platelet Count - Automated : 202 K/uL  Mean Cell Volume : 87.6 fL  Mean Cell Hemoglobin : 28.7 pg  Mean Cell Hemoglobin Concentration : 32.8 %  Auto Neutrophil # : x  Auto Lymphocyte # : x  Auto Monocyte # : x  Auto Eosinophil # : x  Auto Basophil # : x  Auto Neutrophil % : x  Auto Lymphocyte % : x  Auto Monocyte % : x  Auto Eosinophil % : x  Auto Basophil % : x    01-27    144  |  116<H>  |  15  ----------------------------<  96  3.0<L>   |  17<L>  |  0.78  01-26    141  |  105  |  16  ----------------------------<  160<H>  4.4   |  21<L>  |  1.06    Ca    6.6<L>      27 Jan 2019 06:24  Ca    9.5      26 Jan 2019 04:58  Phos  1.9     01-27  Mg     1.5     01-27    TPro  7.4  /  Alb  4.6  /  TBili  0.5  /  DBili  x   /  AST  19  /  ALT  25  /  AlkPhos  80  01-26          TELEMETRY: 	  atrial fibrillation/sinus tachycardia with episodes of bradycardia on tele  ECG:  	sinus rhythm    Transthoracic Echocardiogram (09.07.17 @ 07:46) >  onclusions:  1. Endocardium not well visualized; grossly normal left  ventricular systolic function.  2. Normal diastolic function  3. The right ventricle is not well visualized; grossly  normal right ventricular systolic function.  4. No pericardial effusion seen.  *** No previous Echo exam.      < from: Cardiac Cath Lab - Adult (12.08.16 @ 14:50) >  VENTRICLES: Global left ventricular function was hypercontractile. EF  estimated was 70 %.  CORONARY VESSELS: The coronary circulation is co-dominant.  LM:   --  LM: Normal.  LAD:   --  LAD: Normal.  CX:   --  Proximal circumflex: There was a 30 % stenosis.  RCA:   --  RCA: Normal.  COMPLICATIONS: There were no complications.  DIAGNOSTIC RECOMMENDATIONS: The patient should continue with the present  medications.    < end of copied text >

## 2019-01-27 NOTE — PROGRESS NOTE ADULT - PROBLEM SELECTOR PLAN 6
- Pt noted to be tachycardic in A-fib, then bradycardic as low as 40 briefly, now currently with HR 70s in sinus rhythm.  - Unclear etiology could be vasovagal    Consider tachybrady syndrome.  - Continue to trend Sho.  - Monitor on tele.  - EP consult if arrhythmia recurs

## 2019-01-27 NOTE — CONSULT NOTE ADULT - ASSESSMENT
53M h/o A-fib s/p ablation (09/2017), marijuana use, HLD, anxiety, poss EtOH abuse who presents with c/o vomiting, admitted to medicine for possible ETOH withdraw vs cyclic vomiting syndrome vs gastroenteritis. MICU consulted for elevated CIWA.     Etoh Withdrawal   No red flag hx of previous withdrawal, seizures, hospitalizations or intubations   Able to follow commands and protect airway. Does not appear to be in acute withdrawal on exam however.  Would recc CIWA protocol w Ativan taper if pt not responding to symptom triggered protocol  Cont continuous pulse oxy- monitor for respiratory depression     Afib w RVR  Rate currently uncontrolled, Cont CCB if pressures reassuring   Care as per EP  Cont telemetry monitoring     Polysubstance abuse, concern for cyclic vomiting syndrome   -Currently not retching- cont supportive tx     Viral gastroenteritis  Supportive care  IVF as tolerated   Recc NPO given high aspiration risk     Currently not requiring MICU level care. Please reconsult as needed.\    Nhan Hall MD-[id number]  Department of Internal Medicine  Pager [phone]

## 2019-01-27 NOTE — CONSULT NOTE ADULT - SUBJECTIVE AND OBJECTIVE BOX
Date of Admission:  1/26/19  CHIEF COMPLAINT:  agitated, weak, not feeling right after smoking marijuana. Admits to using K2  HISTORY OF PRESENT ILLNESS:    53M h/o A-fib s/p ablation (09/2017), marijuana use, HLD, anxiety, poss EtOH abuse who presents with c/o vomiting.    Patient very agitated, unable to provide good history.  Attempted to call pt's son Ehsan kimbrough without response.    Patient reports that he started to have abdominal pain yesterday at night.  He describes the pain as diffuse, no radiation, associated with vomiting.  He states he vomited x1 and that it was purple and yellow.  He does not know if there was food in his emesis.  He is unsure if he ate anything purple.  He denies fever, dysuria, hematuria, diarrhea, constipation, chest pain, SOB.  He is unable to recall if he had chest pain.  He reports tremulousness.    ED provider reported chest pain but patient reports no chest pain.    Patient denies a history of EtOH abuse and states he only drinks once a month.  He states he drank on Friday night and had two glasses of whiskey.  He denies a history of withdrawals, hospitalization for EtOH, intubation, seizures previously.  Unable to corroborate pt's history with family, as there was no answer.    Pt's son, Primo, presented to the hospital.  He states that the patient drinks once per week, typically during Sabbath dinner.  He drinks whiskey typically, around 2 drinks per session.  He states that the patient started vomiting around 2am.  They finished dinner at 6:30pm.  He vomited at least 4 times.  He does not believe there was blood in the emesis.  Prior to this, he was in his usual state of health.  He is unsure if the patient was having chest pain.  He states that the patient has not been confused at all.    In the ED, Tmax 98.4F, Tc 98.0F, -151/48-72, SpO2 99% RA.  The patient received aspirin 324mg, NS 1L x2, maalox x1, famotidine x1, metoclopramide 10mg IV x1, ondansetron 4mg IV x1.  Pt then had episode of A-fib with RVR but was too hypotensive for rate-control agents and was given NS 1L x1.  Lactate 5.0.  CXR unremarkable.  EtOH serum negative.  UA unremarkable.  No osmolar gap.  Allergies    No Known Allergies    Intolerances    	    MEDICATIONS:  heparin  Injectable 5000 Unit(s) SubCutaneous every 8 hours        LORazepam     Tablet 2 milliGRAM(s) Oral once PRN  LORazepam     Tablet 2 milliGRAM(s) Oral every 2 hours PRN  LORazepam   Injectable 2 milliGRAM(s) IV Push every 1 hour PRN  ondansetron Injectable 4 milliGRAM(s) IV Push every 6 hours PRN        multivitamin 1 Tablet(s) Oral daily  sodium chloride 0.9%. 1000 milliLiter(s) IV Continuous <Continuous>      PAST MEDICAL & SURGICAL HISTORY:  Hyperlipidemia  Marijuana use  Alcohol use  Peptic ulcer disease  Anxiety  Atrial fibrillation, unspecified type  GERD (gastroesophageal reflux disease)  H/O prior ablation treatment: 10/2017, for A-fib      FAMILY HISTORY:  Family history of hypertension in mother (Mother)  Family history of prostate cancer in father (Father)  Family history of diabetes mellitus in mother (Mother)      SOCIAL HISTORY:    [ ] Non-smoker  [ ] Smoker  [ ] Alcohol      REVIEW OF SYSTEMS:  See HPI. Otherwise, 10 point ROS done and otherwise negative.      T(C): 36.7 (01-27-19 @ 17:32), Max: 37.2 (01-27-19 @ 05:00)  HR: 145 (01-27-19 @ 17:32) (52 - 145)  BP: 114/55 (01-27-19 @ 17:32) (95/52 - 146/60)  RR: 18 (01-27-19 @ 17:32) (18 - 20)  SpO2: 98% (01-27-19 @ 17:32) (97% - 99%)  Wt(kg): --  I&O's Summary      Physical Exam:  General: NAD  Cardiovascular: Normal S1 S2, No JVD, No murmurs, No edema  Respiratory: Lungs clear to auscultation	  Gastrointestinal:  Soft, Non-tender, + BS	  Skin: warm and dry, No rashes, No ecchymoses, No cyanosis	  Extremities:  No clubbing, cyanosis or edema  Vascular: Peripheral pulses palpable 2+ bilaterally    LABS:	   	    CBC Full  -  ( 27 Jan 2019 06:24 )  WBC Count : 13.28 K/uL  Hemoglobin : 12.0 g/dL  Hematocrit : 36.6 %  Platelet Count - Automated : 202 K/uL  Mean Cell Volume : 87.6 fL  Mean Cell Hemoglobin : 28.7 pg  Mean Cell Hemoglobin Concentration : 32.8 %  Auto Neutrophil # : x  Auto Lymphocyte # : x  Auto Monocyte # : x  Auto Eosinophil # : x  Auto Basophil # : x  Auto Neutrophil % : x  Auto Lymphocyte % : x  Auto Monocyte % : x  Auto Eosinophil % : x  Auto Basophil % : x    01-27    144  |  116<H>  |  15  ----------------------------<  96  3.0<L>   |  17<L>  |  0.78  01-26    141  |  105  |  16  ----------------------------<  160<H>  4.4   |  21<L>  |  1.06    Ca    6.6<L>      27 Jan 2019 06:24  Ca    9.5      26 Jan 2019 04:58  Phos  1.9     01-27  Mg     1.5     01-27    TPro  7.4  /  Alb  4.6  /  TBili  0.5  /  DBili  x   /  AST  19  /  ALT  25  /  AlkPhos  80  01-26      proBNP:   Lipid Profile:   HgA1c:   TSH:       CARDIAC MARKERS:      CKMB: 5.67 ng/mL (01-26 @ 18:11)        TELEMETRY: 	    ECG:  	  RADIOLOGY:  OTHER: 	    PREVIOUS DIAGNOSTIC TESTING:    [ ] Echocardiogram:  [ ]  Catheterization:  [ ] Stress Test:  	  	  ASSESSMENT/PLAN: 	    Denia Leung NP [id number]

## 2019-01-27 NOTE — PROGRESS NOTE ADULT - PROBLEM SELECTOR PLAN 1
-Pt presents with vomiting since this morning. Marijuana use yesterday.   could be viral gastroenteritis.  npo  iv fluids  zofran as needed   - f/u abdominal ultrasound  - f/u abdominal x-ray  - Ondansetron PRN.  - Trial of Reglan 10mg IV x1 dose  - R/O ACS

## 2019-01-27 NOTE — PROGRESS NOTE ADULT - ASSESSMENT
53M h/o A-fib s/p ablation (09/2017), marijuana use, HLD, anxiety, poss EtOH abuse adm with chest pain of unclear etiology that has resolved, evaluating for ACS, also with Afib with RVR, N/V poss 2/2 cyclic vomiting syndrome vs viral gastroenteritis vs EtOH withdrawal.

## 2019-01-28 DIAGNOSIS — F12.10 CANNABIS ABUSE, UNCOMPLICATED: ICD-10-CM

## 2019-01-28 DIAGNOSIS — G93.40 ENCEPHALOPATHY, UNSPECIFIED: ICD-10-CM

## 2019-01-28 DIAGNOSIS — F05 DELIRIUM DUE TO KNOWN PHYSIOLOGICAL CONDITION: ICD-10-CM

## 2019-01-28 DIAGNOSIS — M62.82 RHABDOMYOLYSIS: ICD-10-CM

## 2019-01-28 LAB
ANION GAP SERPL CALC-SCNC: 14 MMO/L — SIGNIFICANT CHANGE UP (ref 7–14)
ANION GAP SERPL CALC-SCNC: 9 MMO/L — SIGNIFICANT CHANGE UP (ref 7–14)
BACTERIA UR CULT: SIGNIFICANT CHANGE UP
BASE EXCESS BLDV CALC-SCNC: 0.1 MMOL/L — SIGNIFICANT CHANGE UP
BLD GP AB SCN SERPL QL: NEGATIVE — SIGNIFICANT CHANGE UP
BUN SERPL-MCNC: 17 MG/DL — SIGNIFICANT CHANGE UP (ref 7–23)
BUN SERPL-MCNC: 19 MG/DL — SIGNIFICANT CHANGE UP (ref 7–23)
CALCIUM SERPL-MCNC: 8 MG/DL — LOW (ref 8.4–10.5)
CALCIUM SERPL-MCNC: 8.5 MG/DL — SIGNIFICANT CHANGE UP (ref 8.4–10.5)
CHLORIDE SERPL-SCNC: 109 MMOL/L — HIGH (ref 98–107)
CHLORIDE SERPL-SCNC: 109 MMOL/L — HIGH (ref 98–107)
CK SERPL-CCNC: 1265 U/L — HIGH (ref 30–200)
CO2 SERPL-SCNC: 21 MMOL/L — LOW (ref 22–31)
CO2 SERPL-SCNC: 23 MMOL/L — SIGNIFICANT CHANGE UP (ref 22–31)
CREAT SERPL-MCNC: 0.9 MG/DL — SIGNIFICANT CHANGE UP (ref 0.5–1.3)
CREAT SERPL-MCNC: 0.96 MG/DL — SIGNIFICANT CHANGE UP (ref 0.5–1.3)
GAS PNL BLDV: 140 MMOL/L — SIGNIFICANT CHANGE UP (ref 136–146)
GLUCOSE BLDV-MCNC: 101 — HIGH (ref 70–99)
GLUCOSE SERPL-MCNC: 107 MG/DL — HIGH (ref 70–99)
GLUCOSE SERPL-MCNC: 108 MG/DL — HIGH (ref 70–99)
HCO3 BLDV-SCNC: 24 MMOL/L — SIGNIFICANT CHANGE UP (ref 20–27)
HCT VFR BLD CALC: 38.8 % — LOW (ref 39–50)
HCT VFR BLD CALC: 39.9 % — SIGNIFICANT CHANGE UP (ref 39–50)
HCT VFR BLDV CALC: 41.1 % — SIGNIFICANT CHANGE UP (ref 39–51)
HGB BLD-MCNC: 12.7 G/DL — LOW (ref 13–17)
HGB BLD-MCNC: 13.1 G/DL — SIGNIFICANT CHANGE UP (ref 13–17)
HGB BLDV-MCNC: 13.4 G/DL — SIGNIFICANT CHANGE UP (ref 13–17)
LACTATE SERPL-SCNC: 1.2 MMOL/L — SIGNIFICANT CHANGE UP (ref 0.5–2)
MAGNESIUM SERPL-MCNC: 2.2 MG/DL — SIGNIFICANT CHANGE UP (ref 1.6–2.6)
MAGNESIUM SERPL-MCNC: 2.2 MG/DL — SIGNIFICANT CHANGE UP (ref 1.6–2.6)
MCHC RBC-ENTMCNC: 28.2 PG — SIGNIFICANT CHANGE UP (ref 27–34)
MCHC RBC-ENTMCNC: 28.3 PG — SIGNIFICANT CHANGE UP (ref 27–34)
MCHC RBC-ENTMCNC: 32.7 % — SIGNIFICANT CHANGE UP (ref 32–36)
MCHC RBC-ENTMCNC: 32.8 % — SIGNIFICANT CHANGE UP (ref 32–36)
MCV RBC AUTO: 85.8 FL — SIGNIFICANT CHANGE UP (ref 80–100)
MCV RBC AUTO: 86.6 FL — SIGNIFICANT CHANGE UP (ref 80–100)
NRBC # FLD: 0 K/UL — LOW (ref 25–125)
NRBC # FLD: 0 K/UL — LOW (ref 25–125)
PCO2 BLDV: 37 MMHG — LOW (ref 41–51)
PH BLDV: 7.42 PH — SIGNIFICANT CHANGE UP (ref 7.32–7.43)
PLATELET # BLD AUTO: 210 K/UL — SIGNIFICANT CHANGE UP (ref 150–400)
PLATELET # BLD AUTO: 217 K/UL — SIGNIFICANT CHANGE UP (ref 150–400)
PMV BLD: 10.4 FL — SIGNIFICANT CHANGE UP (ref 7–13)
PMV BLD: 10.6 FL — SIGNIFICANT CHANGE UP (ref 7–13)
PO2 BLDV: 42 MMHG — HIGH (ref 35–40)
POTASSIUM BLDV-SCNC: 3.5 MMOL/L — SIGNIFICANT CHANGE UP (ref 3.4–4.5)
POTASSIUM SERPL-MCNC: 3.5 MMOL/L — SIGNIFICANT CHANGE UP (ref 3.5–5.3)
POTASSIUM SERPL-MCNC: 3.7 MMOL/L — SIGNIFICANT CHANGE UP (ref 3.5–5.3)
POTASSIUM SERPL-SCNC: 3.5 MMOL/L — SIGNIFICANT CHANGE UP (ref 3.5–5.3)
POTASSIUM SERPL-SCNC: 3.7 MMOL/L — SIGNIFICANT CHANGE UP (ref 3.5–5.3)
RBC # BLD: 4.48 M/UL — SIGNIFICANT CHANGE UP (ref 4.2–5.8)
RBC # BLD: 4.65 M/UL — SIGNIFICANT CHANGE UP (ref 4.2–5.8)
RBC # FLD: 13.3 % — SIGNIFICANT CHANGE UP (ref 10.3–14.5)
RBC # FLD: 13.5 % — SIGNIFICANT CHANGE UP (ref 10.3–14.5)
RH IG SCN BLD-IMP: POSITIVE — SIGNIFICANT CHANGE UP
SAO2 % BLDV: 74 % — SIGNIFICANT CHANGE UP (ref 60–85)
SODIUM SERPL-SCNC: 141 MMOL/L — SIGNIFICANT CHANGE UP (ref 135–145)
SODIUM SERPL-SCNC: 144 MMOL/L — SIGNIFICANT CHANGE UP (ref 135–145)
WBC # BLD: 11.18 K/UL — HIGH (ref 3.8–10.5)
WBC # BLD: 13.67 K/UL — HIGH (ref 3.8–10.5)
WBC # FLD AUTO: 11.18 K/UL — HIGH (ref 3.8–10.5)
WBC # FLD AUTO: 13.67 K/UL — HIGH (ref 3.8–10.5)

## 2019-01-28 PROCEDURE — 93306 TTE W/DOPPLER COMPLETE: CPT | Mod: 26

## 2019-01-28 PROCEDURE — 99232 SBSQ HOSP IP/OBS MODERATE 35: CPT

## 2019-01-28 PROCEDURE — 90792 PSYCH DIAG EVAL W/MED SRVCS: CPT

## 2019-01-28 PROCEDURE — 99233 SBSQ HOSP IP/OBS HIGH 50: CPT

## 2019-01-28 RX ORDER — SODIUM CHLORIDE 9 MG/ML
1000 INJECTION, SOLUTION INTRAVENOUS
Qty: 0 | Refills: 0 | Status: DISCONTINUED | OUTPATIENT
Start: 2019-01-28 | End: 2019-01-28

## 2019-01-28 RX ORDER — METOCLOPRAMIDE HCL 10 MG
10 TABLET ORAL ONCE
Qty: 0 | Refills: 0 | Status: COMPLETED | OUTPATIENT
Start: 2019-01-28 | End: 2019-01-28

## 2019-01-28 RX ORDER — SODIUM CHLORIDE 9 MG/ML
1000 INJECTION INTRAMUSCULAR; INTRAVENOUS; SUBCUTANEOUS
Qty: 0 | Refills: 0 | Status: DISCONTINUED | OUTPATIENT
Start: 2019-01-28 | End: 2019-01-29

## 2019-01-28 RX ORDER — DILTIAZEM HCL 120 MG
4 CAPSULE, EXT RELEASE 24 HR ORAL
Qty: 125 | Refills: 0 | Status: DISCONTINUED | OUTPATIENT
Start: 2019-01-28 | End: 2019-01-29

## 2019-01-28 RX ORDER — DILTIAZEM HCL 120 MG
7.5 CAPSULE, EXT RELEASE 24 HR ORAL
Qty: 125 | Refills: 0 | Status: DISCONTINUED | OUTPATIENT
Start: 2019-01-28 | End: 2019-01-28

## 2019-01-28 RX ORDER — DILTIAZEM HCL 120 MG
5 CAPSULE, EXT RELEASE 24 HR ORAL
Qty: 125 | Refills: 0 | Status: DISCONTINUED | OUTPATIENT
Start: 2019-01-28 | End: 2019-01-28

## 2019-01-28 RX ADMIN — SODIUM CHLORIDE 100 MILLILITER(S): 9 INJECTION INTRAMUSCULAR; INTRAVENOUS; SUBCUTANEOUS at 09:51

## 2019-01-28 RX ADMIN — Medication 4 MG/HR: at 20:07

## 2019-01-28 RX ADMIN — Medication 1 MILLIGRAM(S): at 17:45

## 2019-01-28 RX ADMIN — Medication 1 MILLIGRAM(S): at 13:10

## 2019-01-28 RX ADMIN — Medication 10 MILLIGRAM(S): at 00:26

## 2019-01-28 RX ADMIN — Medication 5 MG/HR: at 11:56

## 2019-01-28 RX ADMIN — Medication 4 MG/HR: at 22:23

## 2019-01-28 RX ADMIN — Medication 7.5 MG/HR: at 13:06

## 2019-01-28 RX ADMIN — SODIUM CHLORIDE 100 MILLILITER(S): 9 INJECTION INTRAMUSCULAR; INTRAVENOUS; SUBCUTANEOUS at 05:19

## 2019-01-28 RX ADMIN — Medication 2 MILLIGRAM(S): at 09:47

## 2019-01-28 RX ADMIN — Medication 7.5 MG/HR: at 13:51

## 2019-01-28 RX ADMIN — ONDANSETRON 4 MILLIGRAM(S): 8 TABLET, FILM COATED ORAL at 02:31

## 2019-01-28 RX ADMIN — SODIUM CHLORIDE 100 MILLILITER(S): 9 INJECTION INTRAMUSCULAR; INTRAVENOUS; SUBCUTANEOUS at 22:23

## 2019-01-28 RX ADMIN — HEPARIN SODIUM 5000 UNIT(S): 5000 INJECTION INTRAVENOUS; SUBCUTANEOUS at 13:08

## 2019-01-28 RX ADMIN — SODIUM CHLORIDE 100 MILLILITER(S): 9 INJECTION INTRAMUSCULAR; INTRAVENOUS; SUBCUTANEOUS at 13:51

## 2019-01-28 RX ADMIN — HEPARIN SODIUM 5000 UNIT(S): 5000 INJECTION INTRAVENOUS; SUBCUTANEOUS at 22:22

## 2019-01-28 RX ADMIN — Medication 2 MILLIGRAM(S): at 05:19

## 2019-01-28 RX ADMIN — HEPARIN SODIUM 5000 UNIT(S): 5000 INJECTION INTRAVENOUS; SUBCUTANEOUS at 05:19

## 2019-01-28 RX ADMIN — Medication 1 MILLIGRAM(S): at 22:22

## 2019-01-28 RX ADMIN — Medication 2 MILLIGRAM(S): at 02:05

## 2019-01-28 NOTE — PROGRESS NOTE ADULT - SUBJECTIVE AND OBJECTIVE BOX
Patient is a 53y old  Male who presents with a chief complaint of chest pain, N/V (28 Jan 2019 14:22)      SUBJECTIVE / OVERNIGHT EVENTS: pt seen and examined at 1:45pm, overnight/early am events noted ( tele with afib rate 65/mt now controlled, prior to that pt had rapid afib and was given cardizem push and increased the dose of drip, pt is lethargic/sedated when woken up states that he is thirsty, no other issues reported.    MEDICATIONS  (STANDING):  diltiazem    Tablet 30 milliGRAM(s) Oral every 6 hours  diltiazem Infusion 7.5 mG/Hr (7.5 mL/Hr) IV Continuous <Continuous>  heparin  Injectable 5000 Unit(s) SubCutaneous every 8 hours  LORazepam   Injectable 1 milliGRAM(s) IV Push every 4 hours  LORazepam   Injectable 2  IV Push   multivitamin 1 Tablet(s) Oral daily  sodium chloride 0.9%. 1000 milliLiter(s) (100 mL/Hr) IV Continuous <Continuous>  sodium chloride 0.9%. 1000 milliLiter(s) (100 mL/Hr) IV Continuous <Continuous>    MEDICATIONS  (PRN):  LORazepam     Tablet 2 milliGRAM(s) Oral every 2 hours PRN Symptom-triggered 2 point increase in CIWA-Ar  LORazepam   Injectable 2 milliGRAM(s) IV Push every 1 hour PRN Symptom-triggered: each CIWA -Ar score 8 or GREATER  ondansetron Injectable 4 milliGRAM(s) IV Push every 6 hours PRN Nausea and/or Vomiting      Vital Signs Last 24 Hrs  T(C): 37.2 (28 Jan 2019 17:40), Max: 37.4 (28 Jan 2019 06:40)  T(F): 99 (28 Jan 2019 17:40), Max: 99.4 (28 Jan 2019 06:40)  HR: 53 (28 Jan 2019 17:40) (53 - 165)  BP: 129/62 (28 Jan 2019 17:40) (90/50 - 133/74)  BP(mean): --  RR: 16 (28 Jan 2019 17:40) (16 - 24)  SpO2: 92% (28 Jan 2019 17:40) (92% - 99%)  CAPILLARY BLOOD GLUCOSE      POCT Blood Glucose.: 235 mg/dL (28 Jan 2019 17:36)  POCT Blood Glucose.: 90 mg/dL (28 Jan 2019 12:42)  POCT Blood Glucose.: 119 mg/dL (28 Jan 2019 06:00)  POCT Blood Glucose.: 107 mg/dL (27 Jan 2019 21:50)    I&O's Summary    27 Jan 2019 07:01  -  28 Jan 2019 07:00  --------------------------------------------------------  IN: 0 mL / OUT: 250 mL / NET: -250 mL        PHYSICAL EXAM:  GENERAL: NAD, well-developed  CHEST/LUNG: Clear to auscultation bilaterally; No wheeze  HEART: Irregularly irregular  ABDOMEN: Soft, Nontender, Nondistended  EXTREMITIES: no LE edema, no hand tremors  PSYCH: Sedated  NEUROLOGY: lethargic but arousable oriented x3, reports that he is thirsty  SKIN: No rashes or lesions    LABS:                        12.7   11.18 )-----------( 217      ( 28 Jan 2019 06:06 )             38.8     01-28    144  |  109<H>  |  19  ----------------------------<  108<H>  3.5   |  21<L>  |  0.96    Ca    8.0<L>      28 Jan 2019 06:05  Phos  2.1     01-27  Mg     2.2     01-28    TPro  6.3  /  Alb  3.9  /  TBili  0.8  /  DBili  x   /  AST  51<H>  /  ALT  30  /  AlkPhos  64  01-27      CARDIAC MARKERS ( 28 Jan 2019 06:05 )  x     / x     / 1265 u/L / x     / x      CARDIAC MARKERS ( 26 Jan 2019 18:11 )  x     / x     / 661 u/L / 5.67 ng/mL / x              RADIOLOGY & ADDITIONAL TESTS:    Imaging Personally Reviewed:    Consultant(s) Notes Reviewed:      Care Discussed with Consultants/Other Providers:

## 2019-01-28 NOTE — PROGRESS NOTE ADULT - ASSESSMENT
53M h/o A-fib s/p ablation (09/2017), marijuana use, HLD, anxiety, poss EtOH abuse adm with chest pain of unclear etiology that has resolved, evaluating for ACS, also with Afib with RVR, N/V poss 2/2 cyclic vomiting syndrome vs viral gastroenteritis vs EtOH withdrawal vs ?K2 use.

## 2019-01-28 NOTE — BEHAVIORAL HEALTH ASSESSMENT NOTE - OTHER
somewhat superficially cooperative no UE tremor, no nystagmus, no tongue fasciculation tangential talks about wanting water does not appear to be grossly responding to internal stimuli

## 2019-01-28 NOTE — PROGRESS NOTE ADULT - PROBLEM SELECTOR PLAN 6
Leukocytosis and tachycardia noted. Patient without fever. He presents with retching. Leukocytosis likely reactive in setting of retching vs possible gastritis. Suspect tachycardia 2/2 stress of vomiting vs volume depletion as a result of vomiting.  No indication for abx at this time.  cont ivf  -antiemetics as above - With RVR.  cont cardizem drip, cont to monitor  - Continue to monitor on tele.  - Would avoid BB / CCB if bradycardia persists.  - f/u EP recs  - [id number], defer AC at this time.

## 2019-01-28 NOTE — PROGRESS NOTE ADULT - PROBLEM SELECTOR PLAN 9
- Smokes marijuana.  Also reports h/o K2 but pt denies and family unaware.  - No osmolar gap. - HSQ.  [id number] 0.

## 2019-01-28 NOTE — BEHAVIORAL HEALTH ASSESSMENT NOTE - NSBHCHARTREVIEWIMAGING_PSY_A_CORE FT
CXR 1/26 clear lungs  US Abd: IMPRESSION:     Mild hepatic steatosis.    Unremarkable ultrasound evaluation of the gallbladder.

## 2019-01-28 NOTE — BEHAVIORAL HEALTH ASSESSMENT NOTE - CASE SUMMARY
53 year old male, domiciled, , employed with no PPH significant for gambling addiction (in remission per wife) and MJ use disorder and anxiety as well as PMH significant for A-fib s/p ablation (9/2017), HLD, initially presented with c/o vomiting.  Hospital course includes being initially starting on CIWA protocol, only to be stopped shortly thereafter and found to be in Afib. MICU and EP Cards called. Patient restarted on CIWA protocol and also on rate control medication for AFib.  Psych CL consulted today to help with alcohol withdrawal. Pt appeared oversedated and did not have any symptoms of alcohol withdrawal.  His elevated heart rate is likely secondary cardiac problems (rapid a fib) and did improve with the Cardizem infusion.  I agree with the dementia work up.

## 2019-01-28 NOTE — BEHAVIORAL HEALTH ASSESSMENT NOTE - HPI (INCLUDE ILLNESS QUALITY, SEVERITY, DURATION, TIMING, CONTEXT, MODIFYING FACTORS, ASSOCIATED SIGNS AND SYMPTOMS)
53 year old male, domiciled, , employed with no PPH significant for gambling addiction (in remission per wife) and MJ use disorder and anxiety as well as PMH significant for A-fib s/p ablation (9/2017), HLD, initially presented with c/o vomiting.  Hospital course includes being initially starting on CIWA protocol, only to be stopped shortly thereafter and found to be in Afib. MICU and EP Cards called. Patient restarted on CIWA protocol and also on rate control medication for AFib.  Psych CL consulted today to help with alcohol withdrawal.   At time of consultation, patient almost completed his 2mg ativan taper.    He was found to be lethargic, though did awake to voice. His big concern is that he is thirsty. He denies alcohol abuse. Admits to MJ use- states that he smokes about 2 blunts a day. Denies K2 use. Otherwise, no major psychiatric complaints- denies SI/HI. Denies AH/VH. Of note- he shows poor attention.

## 2019-01-28 NOTE — PROGRESS NOTE ADULT - PROBLEM SELECTOR PLAN 1
unclear if due to ativan/alcohol use, psych consulted, decreased dose of ativan, will get head ct, monitor mental status closely, cont tele.

## 2019-01-28 NOTE — BEHAVIORAL HEALTH ASSESSMENT NOTE - DESCRIPTION (FIRST USE, LAST USE, QUANTITY, FREQUENCY, DURATION)
per wife- 2-3 shots on weekend; per wife, she does not suspect dependence, she denies history of withdrawal 2 blunts a day?

## 2019-01-28 NOTE — BEHAVIORAL HEALTH ASSESSMENT NOTE - RISK ASSESSMENT
Patient currently is delirious. He does abuse MJ. Wife does not provide any history suggesting concern for imminent harm to self or others. Will continue to assess.

## 2019-01-28 NOTE — PROGRESS NOTE ADULT - PROBLEM SELECTOR PLAN 7
- With RVR.  cont cardizem drip, cont to monitor  - Continue to monitor on tele.  - Would avoid BB / CCB if bradycardia persists.  - f/u EP recs  - [id number], defer AC at this time. - Pt's history is not c/w EtOH withdrawal, low suspicion at this time.  - Psych consulted, decreased dose of ativan, discussed with pt's wife unknown if he used K2, cont ciwa

## 2019-01-28 NOTE — BEHAVIORAL HEALTH ASSESSMENT NOTE - NSBHCHARTREVIEWLAB_PSY_A_CORE FT
12.7   11.18 )-----------( 217      ( 28 Jan 2019 06:06 )             38.8     01-28    144  |  109<H>  |  19  ----------------------------<  108<H>  3.5   |  21<L>  |  0.96    Ca    8.0<L>      28 Jan 2019 06:05  Phos  2.1     01-27  Mg     2.2     01-28    TPro  6.3  /  Alb  3.9  /  TBili  0.8  /  DBili  x   /  AST  51<H>  /  ALT  30  /  AlkPhos  64  01-27    Ethanol, Whole Blood: < 10:   LEVELS OF IMPAIRMENT:  FLUSHING, SLOWING OF REFLEXES  IMPAIRED VISUAL ACUITY:             DEPRESSION OF CNS:                 > 100  FATALITIES REPORTED:               > 400  <10 mg/dL = Negative mg/dL (01.26.19 @ 04:58)    Toxicology Screen, Drugs of Abuse, Urine (01.27.19 @ 02:23)    Phencyclidine Level, Urine: NEGATIVE    Amphetamine, Urine: NEGATIVE    Barbiturates Screen, Urine: NEGATIVE    Benzodiazepine, Urine: NEGATIVE    Cannabinoids, Urine: POSITIVE    Cocaine Metabolite, Urine: NEGATIVE    Methadone, Urine: NEGATIVE    Opiate, Urine: NEGATIVE    Oxycodone, Urine: NEGATIVE:

## 2019-01-28 NOTE — BEHAVIORAL HEALTH ASSESSMENT NOTE - NSBHCHARTREVIEWVS_PSY_A_CORE FT
Vital Signs Last 24 Hrs  T(C): 36.3 (28 Jan 2019 13:40), Max: 37.4 (28 Jan 2019 06:40)  T(F): 97.3 (28 Jan 2019 13:40), Max: 99.4 (28 Jan 2019 06:40)  HR: 75 (28 Jan 2019 13:40) (62 - 165)  BP: 119/62 (28 Jan 2019 13:40) (90/50 - 133/74)  BP(mean): --  RR: 17 (28 Jan 2019 13:40) (16 - 24)  SpO2: 92% (28 Jan 2019 13:40) (92% - 99%)

## 2019-01-28 NOTE — PROGRESS NOTE ADULT - PROBLEM SELECTOR PLAN 5
due to vomiting and dehydration   replaced  monitor labs Leukocytosis and tachycardia noted. Patient without fever. He presents with retching. Leukocytosis likely reactive in setting of retching vs possible gastritis. Suspect tachycardia 2/2 stress of vomiting vs volume depletion as a result of vomiting.  No indication for abx at this time.  cont ivf  -antiemetics as above

## 2019-01-28 NOTE — PROGRESS NOTE ADULT - ASSESSMENT
Patient is a 53 year old male with PMH of A-fib s/p ablation (09/2017), marijuana use, HLD, anxiety, poss EtOH abuse who presents with c/o vomiting.  Patient admitted to K2 drug use, and recent marijuana use. He has not felt well since smoking marijuana.  Patient is with Atrial fibrillation with RVR, HR as high as 170, alternating with episodes of bradycardia, tachycardia and sinus rhythm on heart monitor.  Lethargic as he is on CIWA protocol and is receiving Ativan. His VR 130s-140s on PO Cardizem and is started on IV Cardizem drip   CHADSVASC score is 0. Would not  recommend anticoagulation in this setting. If patient remains in afib once he is stable, can consider anticoagulation for DCCV,   - Continue IV Cardizem drip and increase PO dose as BP tolerates  - Monitor on telemetry  - Continue other management as per primary team  - Will d/w Dr. Hardin

## 2019-01-28 NOTE — PROGRESS NOTE ADULT - PROBLEM SELECTOR PLAN 4
ED charting notes chest pain but patient denies ever having chest pain. ECG without ST/T wave changes and troponin negative x1.  Evaluate for ACS, but low suspicion at this time.    - Monitor on tele.  - Troponin negative x1.  Continue to trend Sho.  - S/p aspirin load.  - If Sho begin to uptrend / EKG changes noted, will consult Cardiology. ED charting notes chest pain but patient denies ever having chest pain. ECG without ST/T wave changes and troponin negative x1.  Evaluate for ACS, but low suspicion at this time.  trop on 26th was 18, not repeated after, pt with no chest pain, will monitor if pt develops chest pain will check trop otherwise will rpt in am  - Monitor on tele.  - Troponin negative x1.   - S/p aspirin load.  - If Sho begin to uptrend  will rpt in am will consult Cardiology.

## 2019-01-28 NOTE — PROGRESS NOTE ADULT - SUBJECTIVE AND OBJECTIVE BOX
Patient is seen and examined. Lethargic, arousable. Denies chest pain, SOB, palpitations or dizziness    PAST MEDICAL & SURGICAL HISTORY:  Hyperlipidemia  Marijuana use  Alcohol use  Peptic ulcer disease  Anxiety  Atrial fibrillation, unspecified type  Marijuana abuse  GERD (gastroesophageal reflux disease)  H/O prior ablation treatment: 10/2017, for A-fib  No significant past surgical history      MEDICATIONS  (STANDING):  diltiazem    Tablet 30 milliGRAM(s) Oral every 6 hours  diltiazem Infusion 7.5 mG/Hr (7.5 mL/Hr) IV Continuous <Continuous>  heparin  Injectable 5000 Unit(s) SubCutaneous every 8 hours  LORazepam   Injectable 1 milliGRAM(s) IV Push every 4 hours  LORazepam   Injectable 2  IV Push   multivitamin 1 Tablet(s) Oral daily  sodium chloride 0.9%. 1000 milliLiter(s) (100 mL/Hr) IV Continuous <Continuous>  sodium chloride 0.9%. 1000 milliLiter(s) (100 mL/Hr) IV Continuous <Continuous>    MEDICATIONS  (PRN):  LORazepam     Tablet 2 milliGRAM(s) Oral every 2 hours PRN Symptom-triggered 2 point increase in CIWA-Ar  LORazepam   Injectable 2 milliGRAM(s) IV Push every 1 hour PRN Symptom-triggered: each CIWA -Ar score 8 or GREATER  ondansetron Injectable 4 milliGRAM(s) IV Push every 6 hours PRN Nausea and/or Vomiting      Vital Signs Last 24 Hrs  T(C): 36.3 (28 Jan 2019 13:40), Max: 37.4 (28 Jan 2019 06:40)  T(F): 97.3 (28 Jan 2019 13:40), Max: 99.4 (28 Jan 2019 06:40)  HR: 75 (28 Jan 2019 13:40) (62 - 165)  BP: 119/62 (28 Jan 2019 13:40) (90/50 - 133/74)  BP(mean): --  RR: 17 (28 Jan 2019 13:40) (16 - 24)  SpO2: 92% (28 Jan 2019 13:40) (92% - 99%)    INTERPRETATION OF TELEMETRY: Sinus rhythm with HR 60s converted to atrial fibrillation with VR 130s-140s    LABS:                        12.7   11.18 )-----------( 217      ( 28 Jan 2019 06:06 )             38.8     01-28    144  |  109<H>  |  19  ----------------------------<  108<H>  3.5   |  21<L>  |  0.96    Ca    8.0<L>      28 Jan 2019 06:05  Phos  2.1     01-27  Mg     2.2     01-28    TPro  6.3  /  Alb  3.9  /  TBili  0.8  /  DBili  x   /  AST  51<H>  /  ALT  30  /  AlkPhos  64  01-27    CARDIAC MARKERS ( 28 Jan 2019 06:05 )  x     / x     / 1265 u/L / x     / x      CARDIAC MARKERS ( 26 Jan 2019 18:11 )  x     / x     / 661 u/L / 5.67 ng/mL / x        I&O's Summary    27 Jan 2019 07:01  -  28 Jan 2019 07:00  --------------------------------------------------------  IN: 0 mL / OUT: 250 mL / NET: -250 mL      PHYSICAL EXAM:    GENERAL: In no apparent distress, lethargic, arousable  HEAD:  Atraumatic, Normocephalic  HEART: Irregular rate and rhythm; No murmurs, rubs, or gallops.  PULMONARY: Clear to auscultation and percussion.  No rales, wheezing, or rhonchi bilaterally.  ABDOMEN: Soft, Nontender, Nondistended; Bowel sounds present  EXTREMITIES:  2+ Peripheral Pulses, No clubbing, cyanosis, or edema

## 2019-01-28 NOTE — BEHAVIORAL HEALTH ASSESSMENT NOTE - SUMMARY
53 year old male, domiciled, , employed with no PPH significant for gambling addiction (in remission per wife) and MJ use disorder and anxiety as well as PMH significant for A-fib s/p ablation (9/2017), HLD, initially presented with c/o vomiting.  Hospital course includes being initially starting on CIWA protocol, only to be stopped shortly thereafter and found to be in Afib. MICU and EP Cards called. Patient restarted on CIWA protocol and also on rate control medication for AFib.  Psych CL consulted today to help with alcohol withdrawal.     Patient appears delirious- does not necessarily appear to be in alcohol withdrawal. Only symptom would be afib-RVR, however he is not tremulous and seems more sedated/hypoactive than a hyperactive withdrawal. Would look for other sources of delirium.  It is possible that he is suffering from a K2 intoxication? (ativan could theoretically help with some of the agitation with a K2 intoxication)  - would decrease standing ativan taper to 1mg q8h and observe if his mental status/ over-sedation improves      - continue CIWA protocol and CIWA symptom triggered ativan PRN with close monitoring of vital signs  - continue to monitor CK      - defer to primary team regarding how aggressive to be with fluids  - treat Afib per primary team  [] check TSH, FT4, B12, Folate, RPR (delirium workup)  - consider head CT

## 2019-01-28 NOTE — PROGRESS NOTE ADULT - PROBLEM SELECTOR PLAN 8
- Pt's history is not c/w EtOH withdrawal, low suspicion at this time.  - Psych consulted, decreased dose of ativan, discussed with pt's wife unknown if he used K2, cont ciwa - Smokes marijuana.  Also reports h/o K2 but pt denies and family unaware.  - No osmolar gap.

## 2019-01-29 LAB
ANION GAP SERPL CALC-SCNC: 14 MMO/L — SIGNIFICANT CHANGE UP (ref 7–14)
BUN SERPL-MCNC: 20 MG/DL — SIGNIFICANT CHANGE UP (ref 7–23)
CALCIUM SERPL-MCNC: 8.5 MG/DL — SIGNIFICANT CHANGE UP (ref 8.4–10.5)
CHLORIDE SERPL-SCNC: 109 MMOL/L — HIGH (ref 98–107)
CK SERPL-CCNC: 388 U/L — HIGH (ref 30–200)
CO2 SERPL-SCNC: 21 MMOL/L — LOW (ref 22–31)
CREAT SERPL-MCNC: 0.88 MG/DL — SIGNIFICANT CHANGE UP (ref 0.5–1.3)
FOLATE SERPL-MCNC: 15.4 NG/ML — SIGNIFICANT CHANGE UP (ref 4.7–20)
GLUCOSE SERPL-MCNC: 82 MG/DL — SIGNIFICANT CHANGE UP (ref 70–99)
HCT VFR BLD CALC: 41.3 % — SIGNIFICANT CHANGE UP (ref 39–50)
HGB BLD-MCNC: 13.5 G/DL — SIGNIFICANT CHANGE UP (ref 13–17)
MCHC RBC-ENTMCNC: 28.2 PG — SIGNIFICANT CHANGE UP (ref 27–34)
MCHC RBC-ENTMCNC: 32.7 % — SIGNIFICANT CHANGE UP (ref 32–36)
MCV RBC AUTO: 86.4 FL — SIGNIFICANT CHANGE UP (ref 80–100)
NRBC # FLD: 0 K/UL — LOW (ref 25–125)
PLATELET # BLD AUTO: 225 K/UL — SIGNIFICANT CHANGE UP (ref 150–400)
PMV BLD: 10.5 FL — SIGNIFICANT CHANGE UP (ref 7–13)
POTASSIUM SERPL-MCNC: 3.7 MMOL/L — SIGNIFICANT CHANGE UP (ref 3.5–5.3)
POTASSIUM SERPL-SCNC: 3.7 MMOL/L — SIGNIFICANT CHANGE UP (ref 3.5–5.3)
RBC # BLD: 4.78 M/UL — SIGNIFICANT CHANGE UP (ref 4.2–5.8)
RBC # FLD: 13.2 % — SIGNIFICANT CHANGE UP (ref 10.3–14.5)
SODIUM SERPL-SCNC: 144 MMOL/L — SIGNIFICANT CHANGE UP (ref 135–145)
T PALLIDUM AB TITR SER: NEGATIVE — SIGNIFICANT CHANGE UP
T4 FREE SERPL-MCNC: 1.25 NG/DL — SIGNIFICANT CHANGE UP (ref 0.9–1.8)
TROPONIN T, HIGH SENSITIVITY: 10 NG/L — SIGNIFICANT CHANGE UP (ref ?–14)
TSH SERPL-MCNC: 0.92 UIU/ML — SIGNIFICANT CHANGE UP (ref 0.27–4.2)
VIT B12 SERPL-MCNC: 350 PG/ML — SIGNIFICANT CHANGE UP (ref 200–900)
WBC # BLD: 9.66 K/UL — SIGNIFICANT CHANGE UP (ref 3.8–10.5)
WBC # FLD AUTO: 9.66 K/UL — SIGNIFICANT CHANGE UP (ref 3.8–10.5)

## 2019-01-29 PROCEDURE — 70450 CT HEAD/BRAIN W/O DYE: CPT | Mod: 26

## 2019-01-29 PROCEDURE — 99233 SBSQ HOSP IP/OBS HIGH 50: CPT

## 2019-01-29 PROCEDURE — 93010 ELECTROCARDIOGRAM REPORT: CPT

## 2019-01-29 PROCEDURE — 99232 SBSQ HOSP IP/OBS MODERATE 35: CPT

## 2019-01-29 RX ORDER — SODIUM CHLORIDE 9 MG/ML
1000 INJECTION, SOLUTION INTRAVENOUS
Qty: 0 | Refills: 0 | Status: DISCONTINUED | OUTPATIENT
Start: 2019-01-29 | End: 2019-01-30

## 2019-01-29 RX ADMIN — HEPARIN SODIUM 5000 UNIT(S): 5000 INJECTION INTRAVENOUS; SUBCUTANEOUS at 13:41

## 2019-01-29 RX ADMIN — SODIUM CHLORIDE 75 MILLILITER(S): 9 INJECTION, SOLUTION INTRAVENOUS at 21:28

## 2019-01-29 RX ADMIN — SODIUM CHLORIDE 100 MILLILITER(S): 9 INJECTION INTRAMUSCULAR; INTRAVENOUS; SUBCUTANEOUS at 13:14

## 2019-01-29 RX ADMIN — Medication 1 MILLIGRAM(S): at 02:32

## 2019-01-29 RX ADMIN — HEPARIN SODIUM 5000 UNIT(S): 5000 INJECTION INTRAVENOUS; SUBCUTANEOUS at 21:27

## 2019-01-29 RX ADMIN — Medication 1 MILLIGRAM(S): at 10:13

## 2019-01-29 RX ADMIN — SODIUM CHLORIDE 100 MILLILITER(S): 9 INJECTION INTRAMUSCULAR; INTRAVENOUS; SUBCUTANEOUS at 06:21

## 2019-01-29 RX ADMIN — Medication 1 MILLIGRAM(S): at 06:21

## 2019-01-29 RX ADMIN — Medication 4 MG/HR: at 06:20

## 2019-01-29 RX ADMIN — HEPARIN SODIUM 5000 UNIT(S): 5000 INJECTION INTRAVENOUS; SUBCUTANEOUS at 06:21

## 2019-01-29 RX ADMIN — SODIUM CHLORIDE 75 MILLILITER(S): 9 INJECTION, SOLUTION INTRAVENOUS at 17:39

## 2019-01-29 RX ADMIN — Medication 4 MG/HR: at 10:14

## 2019-01-29 RX ADMIN — SODIUM CHLORIDE 100 MILLILITER(S): 9 INJECTION INTRAMUSCULAR; INTRAVENOUS; SUBCUTANEOUS at 10:14

## 2019-01-29 NOTE — PROGRESS NOTE BEHAVIORAL HEALTH - ORIENTATION OTHER
knows it is january, initially thought 1919 then 2019 knows it is January, initially thought 1919 then 2019

## 2019-01-29 NOTE — PROGRESS NOTE ADULT - PROBLEM SELECTOR PLAN 7
- Pt's history is not c/w EtOH withdrawal, low suspicion at this time.  - Psych consulted, decreased dose of ativan, per wife pt has a h/o anxiety and was on Buspar, Discussed with psych attending Dr. Grajeda

## 2019-01-29 NOTE — PROGRESS NOTE BEHAVIORAL HEALTH - NSBHFUPINTERVALHXFT_PSY_A_CORE
Chart reviewed. Patient has not received PRNs. Received Echo yesterday. Going for CT this AM. Ativan taper as of this AM was 1mg q4h.  This AM he is slightly more alert. He states that he was feeling nauseated before coming here. He knows he is in a hospital. He denies alcohol abuse. He admits to MJ use but denies K2 use. He knows it is January 2019. He knows that 1.50 minus 3 quarters is 75.  No tremor. No asterixis.

## 2019-01-29 NOTE — PROGRESS NOTE ADULT - SUBJECTIVE AND OBJECTIVE BOX
Patient is a 53y old  Male who presents with a chief complaint of chest pain, N/V (29 Jan 2019 13:06)      SUBJECTIVE / OVERNIGHT EVENTS: Pt seen and examined at 12:35pm, overnight events noted ( Pt self converted to sinus) now tele with sinus in the 60s to 70s, pt is alert, awake reports that he feels tired and is thirsty, no other issues reported.        MEDICATIONS  (STANDING):  dextrose 5% + sodium chloride 0.45%. 1000 milliLiter(s) (75 mL/Hr) IV Continuous <Continuous>  diltiazem    Tablet 30 milliGRAM(s) Oral every 6 hours  heparin  Injectable 5000 Unit(s) SubCutaneous every 8 hours  LORazepam     Tablet 0.5 milliGRAM(s) Oral two times a day  multivitamin 1 Tablet(s) Oral daily    MEDICATIONS  (PRN):  LORazepam   Injectable 1 milliGRAM(s) IV Push every 1 hour PRN Symptom-triggered: each CIWA -Ar score 8 or GREATER or Agitation  ondansetron Injectable 4 milliGRAM(s) IV Push every 6 hours PRN Nausea and/or Vomiting      Vital Signs Last 24 Hrs  T(C): 36.7 (29 Jan 2019 14:16), Max: 37.2 (28 Jan 2019 17:40)  T(F): 98.1 (29 Jan 2019 14:16), Max: 99 (28 Jan 2019 17:40)  HR: 63 (29 Jan 2019 14:16) (50 - 67)  BP: 141/80 (29 Jan 2019 14:16) (112/70 - 147/85)  BP(mean): --  RR: 17 (29 Jan 2019 14:16) (16 - 18)  SpO2: 97% (29 Jan 2019 14:16) (92% - 97%)  CAPILLARY BLOOD GLUCOSE      POCT Blood Glucose.: 91 mg/dL (29 Jan 2019 12:32)  POCT Blood Glucose.: 91 mg/dL (29 Jan 2019 06:18)  POCT Blood Glucose.: 112 mg/dL (29 Jan 2019 01:00)  POCT Blood Glucose.: 235 mg/dL (28 Jan 2019 17:36)    I&O's Summary      PHYSICAL EXAM:  GENERAL: NAD, well-developed  CHEST/LUNG: Clear to auscultation bilaterally; No wheeze  HEART: Regular s1,s2  ABDOMEN: Soft, Nontender, Nondistended  EXTREMITIES: no LE edema  PSYCH: calm  NEUROLOGY: alert, awake o x3  SKIN: No rashes or lesions      LABS:                        13.5   9.66  )-----------( 225      ( 29 Jan 2019 06:10 )             41.3     01-29    144  |  109<H>  |  20  ----------------------------<  82  3.7   |  21<L>  |  0.88    Ca    8.5      29 Jan 2019 06:10  Phos  2.1     01-27  Mg     2.2     01-28    TPro  6.3  /  Alb  3.9  /  TBili  0.8  /  DBili  x   /  AST  51<H>  /  ALT  30  /  AlkPhos  64  01-27      CARDIAC MARKERS ( 29 Jan 2019 06:10 )  x     / x     / 388 u/L / x     / x      CARDIAC MARKERS ( 28 Jan 2019 06:05 )  x     / x     / 1265 u/L / x     / x              RADIOLOGY & ADDITIONAL TESTS:    Imaging Personally Reviewed:    Consultant(s) Notes Reviewed:      Care Discussed with Consultants/Other Providers: Patient is a 53y old  Male who presents with a chief complaint of chest pain, N/V (29 Jan 2019 13:06)      SUBJECTIVE / OVERNIGHT EVENTS: Pt seen and examined at 12:35pm, no overnight events ( Pt self converted to sinus yesterday evening) now tele with sinus in the 60s to 70s, pt is alert, awake reports that he feels tired and is thirsty, no other issues reported.        MEDICATIONS  (STANDING):  dextrose 5% + sodium chloride 0.45%. 1000 milliLiter(s) (75 mL/Hr) IV Continuous <Continuous>  diltiazem    Tablet 30 milliGRAM(s) Oral every 6 hours  heparin  Injectable 5000 Unit(s) SubCutaneous every 8 hours  LORazepam     Tablet 0.5 milliGRAM(s) Oral two times a day  multivitamin 1 Tablet(s) Oral daily    MEDICATIONS  (PRN):  LORazepam   Injectable 1 milliGRAM(s) IV Push every 1 hour PRN Symptom-triggered: each CIWA -Ar score 8 or GREATER or Agitation  ondansetron Injectable 4 milliGRAM(s) IV Push every 6 hours PRN Nausea and/or Vomiting      Vital Signs Last 24 Hrs  T(C): 36.7 (29 Jan 2019 14:16), Max: 37.2 (28 Jan 2019 17:40)  T(F): 98.1 (29 Jan 2019 14:16), Max: 99 (28 Jan 2019 17:40)  HR: 63 (29 Jan 2019 14:16) (50 - 67)  BP: 141/80 (29 Jan 2019 14:16) (112/70 - 147/85)  BP(mean): --  RR: 17 (29 Jan 2019 14:16) (16 - 18)  SpO2: 97% (29 Jan 2019 14:16) (92% - 97%)  CAPILLARY BLOOD GLUCOSE      POCT Blood Glucose.: 91 mg/dL (29 Jan 2019 12:32)  POCT Blood Glucose.: 91 mg/dL (29 Jan 2019 06:18)  POCT Blood Glucose.: 112 mg/dL (29 Jan 2019 01:00)  POCT Blood Glucose.: 235 mg/dL (28 Jan 2019 17:36)    I&O's Summary      PHYSICAL EXAM:  GENERAL: NAD, well-developed  CHEST/LUNG: Clear to auscultation bilaterally; No wheeze  HEART: Regular s1,s2  ABDOMEN: Soft, Nontender, Nondistended  EXTREMITIES: no LE edema  PSYCH: calm  NEUROLOGY: alert, awake o x3  SKIN: No rashes or lesions      LABS:                        13.5   9.66  )-----------( 225      ( 29 Jan 2019 06:10 )             41.3     01-29    144  |  109<H>  |  20  ----------------------------<  82  3.7   |  21<L>  |  0.88    Ca    8.5      29 Jan 2019 06:10  Phos  2.1     01-27  Mg     2.2     01-28    TPro  6.3  /  Alb  3.9  /  TBili  0.8  /  DBili  x   /  AST  51<H>  /  ALT  30  /  AlkPhos  64  01-27      CARDIAC MARKERS ( 29 Jan 2019 06:10 )  x     / x     / 388 u/L / x     / x      CARDIAC MARKERS ( 28 Jan 2019 06:05 )  x     / x     / 1265 u/L / x     / x              RADIOLOGY & ADDITIONAL TESTS:    Imaging Personally Reviewed:    Consultant(s) Notes Reviewed:      Care Discussed with Consultants/Other Providers:

## 2019-01-29 NOTE — PROGRESS NOTE ADULT - PROBLEM SELECTOR PLAN 1
Likely due to ativan/alcohol use, psych consult appreciated decreased dose of ativan, head ct neg, mental status improving, cont to monitor closely

## 2019-01-29 NOTE — PROGRESS NOTE ADULT - PROBLEM SELECTOR PLAN 5
Leukocytosis and tachycardia noted. Patient without fever. He presents with retching. Leukocytosis likely reactive in setting of retching vs possible gastritis. Suspect tachycardia 2/2 stress of vomiting vs volume depletion as a result of vomiting.  No indication for abx at this time.  cont ivf  -antiemetics as above

## 2019-01-29 NOTE — PROGRESS NOTE BEHAVIORAL HEALTH - CASE SUMMARY
53 year old male, domiciled, , employed with no PPH significant for gambling addiction (in remission per wife) and MJ use disorder and anxiety as well as PMH significant for A-fib s/p ablation (9/2017), HLD, initially presented with c/o vomiting.  Hospital course includes being initially starting on CIWA protocol, only to be stopped shortly thereafter and found to be in Afib. MICU and EP Cards called. Patient restarted on CIWA protocol and also on rate control medication for AFib.  Psych CL consulted today to help with alcohol withdrawal. Pt appeared oversedated and did not have any symptoms of alcohol withdrawal.  His elevated heart rate is likely secondary cardiac problems (rapid a fib) and did improve with the Cardizem infusion.  I agree with the dementia work up. Pt failed his speech and swallow test likely secondary to sedation.  His Ativan was decreased to 0.5mg BID and may be discontinued tomorrow.

## 2019-01-29 NOTE — PROGRESS NOTE ADULT - PROBLEM SELECTOR PLAN 4
ED charting notes chest pain but patient denies ever having chest pain. ECG without ST/T wave changes and troponin negative x1.  Evaluate for ACS, but low suspicion at this time.  trop on 26th was 18, rpt today 10, pt with no chest pain  - Monitor on tele.  - S/p aspirin load.  - Trop trending down

## 2019-01-29 NOTE — PROGRESS NOTE ADULT - PROBLEM SELECTOR PLAN 6
- With RVR.  pt self converted to sinus, d/c cardizem drip cont po cardizem  f/u EP recs  - Continue to monitor on tele. check ekg  - Would avoid BB / CCB if bradycardia persists.  - f/u EP recs  - [id number] 0, defer AC at this time.

## 2019-01-29 NOTE — PROGRESS NOTE ADULT - PROBLEM SELECTOR PLAN 2
ck trending down, since pt is npo, failed bedside swallow, will get official swallow eval in am if unable to get today, and start on d51/2 ns for now

## 2019-01-29 NOTE — PROGRESS NOTE ADULT - ASSESSMENT
Patient is a 53 year old male with PMH of A-fib s/p ablation (09/2017), marijuana use, HLD, anxiety, poss EtOH abuse who presents with c/o vomiting.  Patient admitted to K2 drug use, and recent marijuana use. He has not felt well since smoking marijuana.  Patient is with Atrial fibrillation with RVR, HR as high as 170, alternating with episodes of bradycardia, tachycardia and sinus rhythm on heart monitor.  Lethargic as he is on CIWA protocol and is receiving Ativan. His VR 130s-140s on PO Cardizem and is started on IV Cardizem drip   CHADSVASC score is 0. Would not  recommend anticoagulation in this setting. Patient self converted from PAF to SR overnight on Diltiazem gtt.    - D/cd  IV Cardizem drip, continue oral  Cardizem for rate control, change to CD upon discharge  - Continue other management as per primary team  -Continue telemetry monitoring  -Will follow up

## 2019-01-29 NOTE — PROGRESS NOTE ADULT - SUBJECTIVE AND OBJECTIVE BOX
Patient is a 53y old  Male who presents with a chief complaint of chest pain, N/V (28 Jan 2019 18:06)  c/o thirst, NPO for swallowing test today.  Denies CP/SOB or palpitations.      PAST MEDICAL & SURGICAL HISTORY:  Hyperlipidemia  Marijuana use  Alcohol use  Peptic ulcer disease  Anxiety  Atrial fibrillation, unspecified type  Marijuana abuse  GERD (gastroesophageal reflux disease)  H/O prior ablation treatment: 10/2017, for A-fib  No significant past surgical history      MEDICATIONS  (STANDING):  diltiazem    Tablet 30 milliGRAM(s) Oral every 6 hours  heparin  Injectable 5000 Unit(s) SubCutaneous every 8 hours  LORazepam   Injectable 0.5 milliGRAM(s) IV Push every 4 hours  LORazepam   Injectable 2  IV Push   multivitamin 1 Tablet(s) Oral daily  sodium chloride 0.9%. 1000 milliLiter(s) (100 mL/Hr) IV Continuous <Continuous>  sodium chloride 0.9%. 1000 milliLiter(s) (100 mL/Hr) IV Continuous <Continuous>    MEDICATIONS  (PRN):  LORazepam     Tablet 2 milliGRAM(s) Oral every 2 hours PRN Symptom-triggered 2 point increase in CIWA-Ar  LORazepam   Injectable 2 milliGRAM(s) IV Push every 1 hour PRN Symptom-triggered: each CIWA -Ar score 8 or GREATER  ondansetron Injectable 4 milliGRAM(s) IV Push every 6 hours PRN Nausea and/or Vomiting            Vital Signs Last 24 Hrs  T(C): 37.1 (29 Jan 2019 10:12), Max: 37.3 (28 Jan 2019 15:40)  T(F): 98.7 (29 Jan 2019 10:12), Max: 99.1 (28 Jan 2019 15:40)  HR: 60 (29 Jan 2019 10:12) (50 - 105)  BP: 147/85 (29 Jan 2019 10:12) (112/70 - 147/85)  BP(mean): --  RR: 17 (29 Jan 2019 10:12) (16 - 18)  SpO2: 95% (29 Jan 2019 10:12) (92% - 96%)            INTERPRETATION OF TELEMETRY:  PAF-->self converted to SR 60 last night    ECG:        LABS:                        13.5   9.66  )-----------( 225      ( 29 Jan 2019 06:10 )             41.3     01-29    144  |  109<H>  |  20  ----------------------------<  82  3.7   |  21<L>  |  0.88    Ca    8.5      29 Jan 2019 06:10  Phos  2.1     01-27  Mg     2.2     01-28    TPro  6.3  /  Alb  3.9  /  TBili  0.8  /  DBili  x   /  AST  51<H>  /  ALT  30  /  AlkPhos  64  01-27    CARDIAC MARKERS ( 29 Jan 2019 06:10 )  x     / x     / 388 u/L / x     / x      CARDIAC MARKERS ( 28 Jan 2019 06:05 )  x     / x     / 1265 u/L / x     / x                BNP  RADIOLOGY & ADDITIONAL STUDIES:      PHYSICAL EXAM:    GENERAL: In no apparent distress, well nourished, and hydrated.  NECK: Supple and normal thyroid.  No JVD or carotid bruit.  Carotid pulse is 2+ bilaterally.  HEART: Regular rate and rhythm; No murmurs, rubs, or gallops.  PULMONARY: Clear to auscultation and perfusion.  No rales, wheezing, or rhonchi bilaterally.  ABDOMEN: Soft, Nontender, Nondistended; Bowel sounds present  EXTREMITIES:  2+ Peripheral Pulses, No clubbing, cyanosis, or edema  NEUROLOGICAL: Grossly nonfocal

## 2019-01-29 NOTE — PROGRESS NOTE BEHAVIORAL HEALTH - SUMMARY
53 year old male, domiciled, , employed with no PPH significant for gambling addiction (in remission per wife) and MJ use disorder and anxiety as well as PMH significant for A-fib s/p ablation (9/2017), HLD, initially presented with c/o vomiting.  Hospital course includes being initially starting on CIWA protocol, only to be stopped shortly thereafter and found to be in Afib. MICU and EP Cards called. Patient restarted on CIWA protocol and also on rate control medication for AFib.  Psych CL consulted today to help with alcohol withdrawal. However pt does not have a history of drinking much alcohol as verified by his family and he has not demonstrated any symptoms of withdrawal.  PT is more alert after having his Ativan dose reduced.  However remains sedated and failed his speech and swallow test.  Plan is to further decrease and stop Ativan and discontinue.     - treat Afib per primary team  [] check TSH, FT4, B12, Folate, RPR (delirium workup)  - consider head CT

## 2019-01-29 NOTE — PROGRESS NOTE BEHAVIORAL HEALTH - NSBHCHARTREVIEWVS_PSY_A_CORE FT
ICU Vital Signs Last 24 Hrs  T(C): 36.7 (29 Jan 2019 05:24), Max: 37.3 (28 Jan 2019 15:40)  T(F): 98.1 (29 Jan 2019 05:24), Max: 99.1 (28 Jan 2019 15:40)  HR: 67 (29 Jan 2019 05:24) (50 - 146)  BP: 112/70 (29 Jan 2019 05:24) (112/70 - 129/95)  BP(mean): --  ABP: --  ABP(mean): --  RR: 18 (29 Jan 2019 05:24) (16 - 18)  SpO2: 95% (29 Jan 2019 05:24) (92% - 96%) Vital Signs Last 24 Hrs  T(C): 36.7 (29 Jan 2019 14:16), Max: 37.2 (28 Jan 2019 17:40)  T(F): 98.1 (29 Jan 2019 14:16), Max: 99 (28 Jan 2019 17:40)  HR: 63 (29 Jan 2019 14:16) (50 - 67)  BP: 141/80 (29 Jan 2019 14:16) (112/70 - 147/85)  BP(mean): --  RR: 17 (29 Jan 2019 14:16) (16 - 18)  SpO2: 97% (29 Jan 2019 14:16) (92% - 97%)

## 2019-01-29 NOTE — CHART NOTE - NSCHARTNOTEFT_GEN_A_CORE
called by RN, patient failed bedside dysphagia screen by RN.  Speech and swallow ordered, was previously unable to be performed on 1/28/2019 due to lethargic mental status but now is improved per Dr. Fernández.  Attempted to call speech and swallow to expedite test but unable reach team. Will continue to attempt to reach out for further evaluation. NPO until able to pass dysphagia screening.

## 2019-01-30 ENCOUNTER — TRANSCRIPTION ENCOUNTER (OUTPATIENT)
Age: 54
End: 2019-01-30

## 2019-01-30 LAB
ANION GAP SERPL CALC-SCNC: 14 MMO/L — SIGNIFICANT CHANGE UP (ref 7–14)
BUN SERPL-MCNC: 17 MG/DL — SIGNIFICANT CHANGE UP (ref 7–23)
CALCIUM SERPL-MCNC: 8.3 MG/DL — LOW (ref 8.4–10.5)
CHLORIDE SERPL-SCNC: 107 MMOL/L — SIGNIFICANT CHANGE UP (ref 98–107)
CO2 SERPL-SCNC: 21 MMOL/L — LOW (ref 22–31)
CREAT SERPL-MCNC: 0.88 MG/DL — SIGNIFICANT CHANGE UP (ref 0.5–1.3)
GLUCOSE SERPL-MCNC: 85 MG/DL — SIGNIFICANT CHANGE UP (ref 70–99)
HCT VFR BLD CALC: 41 % — SIGNIFICANT CHANGE UP (ref 39–50)
HGB BLD-MCNC: 14 G/DL — SIGNIFICANT CHANGE UP (ref 13–17)
MAGNESIUM SERPL-MCNC: 2.1 MG/DL — SIGNIFICANT CHANGE UP (ref 1.6–2.6)
MCHC RBC-ENTMCNC: 28.6 PG — SIGNIFICANT CHANGE UP (ref 27–34)
MCHC RBC-ENTMCNC: 34.1 % — SIGNIFICANT CHANGE UP (ref 32–36)
MCV RBC AUTO: 83.8 FL — SIGNIFICANT CHANGE UP (ref 80–100)
NRBC # FLD: 0 K/UL — LOW (ref 25–125)
PLATELET # BLD AUTO: 221 K/UL — SIGNIFICANT CHANGE UP (ref 150–400)
PMV BLD: 10.4 FL — SIGNIFICANT CHANGE UP (ref 7–13)
POTASSIUM SERPL-MCNC: 3.5 MMOL/L — SIGNIFICANT CHANGE UP (ref 3.5–5.3)
POTASSIUM SERPL-SCNC: 3.5 MMOL/L — SIGNIFICANT CHANGE UP (ref 3.5–5.3)
RBC # BLD: 4.89 M/UL — SIGNIFICANT CHANGE UP (ref 4.2–5.8)
RBC # FLD: 12.9 % — SIGNIFICANT CHANGE UP (ref 10.3–14.5)
SODIUM SERPL-SCNC: 142 MMOL/L — SIGNIFICANT CHANGE UP (ref 135–145)
WBC # BLD: 9.74 K/UL — SIGNIFICANT CHANGE UP (ref 3.8–10.5)
WBC # FLD AUTO: 9.74 K/UL — SIGNIFICANT CHANGE UP (ref 3.8–10.5)

## 2019-01-30 PROCEDURE — 99232 SBSQ HOSP IP/OBS MODERATE 35: CPT

## 2019-01-30 PROCEDURE — 99233 SBSQ HOSP IP/OBS HIGH 50: CPT

## 2019-01-30 RX ADMIN — HEPARIN SODIUM 5000 UNIT(S): 5000 INJECTION INTRAVENOUS; SUBCUTANEOUS at 21:25

## 2019-01-30 RX ADMIN — SODIUM CHLORIDE 75 MILLILITER(S): 9 INJECTION, SOLUTION INTRAVENOUS at 06:32

## 2019-01-30 RX ADMIN — HEPARIN SODIUM 5000 UNIT(S): 5000 INJECTION INTRAVENOUS; SUBCUTANEOUS at 13:23

## 2019-01-30 RX ADMIN — HEPARIN SODIUM 5000 UNIT(S): 5000 INJECTION INTRAVENOUS; SUBCUTANEOUS at 06:32

## 2019-01-30 RX ADMIN — Medication 1 TABLET(S): at 13:23

## 2019-01-30 NOTE — DISCHARGE NOTE ADULT - PATIENT PORTAL LINK FT
You can access the [facility] Patient Portal, offered by [facility], by registering with the following website: [email or web address]

## 2019-01-30 NOTE — PROGRESS NOTE ADULT - PROBLEM SELECTOR PLAN 1
Likely due to ativan/alcohol use, psych consult appreciated completed ativan, head ct neg, mental status improved. cont to monitor closely.  D/c planning for tomorrow Likely due to ativan/alcohol use, psych consult appreciated, d/c ativan, head ct neg, mental status improved. cont to monitor closely.  D/c planning for tomorrow

## 2019-01-30 NOTE — PROGRESS NOTE ADULT - PROBLEM SELECTOR PLAN 7
- Pt's history is not c/w EtOH withdrawal, low suspicion at this time.  [facility]   d/c tomorrow - Pt's history is not c/w EtOH withdrawal, low suspicion at this time.  - Psych consulted, d/c ativan, left message for wife   d/c tomorrow

## 2019-01-30 NOTE — PROGRESS NOTE BEHAVIORAL HEALTH - NSBHFUPINTERVALHXFT_PSY_A_CORE
Patient has not received PRNs. This AM he is alert and oriented. Speaking clearly. Pt is hungry and would like to eat, had just finished eating applesauce, states he had no issues with swallowing. Pt states he is ready to go home. Pt recalls having a meal which had potentially "some animal brain", states it tasted "good", but then recalls getting nauseous and vomiting and this is why he was brought to the hospital. He denies alcohol abuse. He admits to MJ use but denies K2 use. No tremor.     Pt denies SI/HI or depression, but states he is having marital conflicts which has been ongoing for many years. Pt states "my wife says she does not love me anymore". Pt states they have been to therapy in the past and he is interested in being involved in marital therapy again. Pt states he currently attends gambling anonymous and has not had issues gambling in 8 years. Pt states MJ use is limited to 1 joint a day, in his car (not in the home) as his wife "does not agree with it". Pt is not interested in discontinuing MJ use as it calms him and helps him be more agreeable with his wife. Pt states it also increases his appetite and helps him with sleep. Patient has not received PRNs. This AM he is alert and oriented. Speaking clearly. Pt is hungry and would like to eat, had just finished eating applesauce, states he had no issues with swallowing. Pt states he is ready to go home. Pt recalls having a meal which had potentially "some animal brain", states it tasted "good", but then recalls getting nauseous and vomiting and this is why he was brought to the hospital. He denies alcohol abuse. He admits to MJ use but denies K2 use. No tremor.   Pt denies SI/HI or depression, but states he is having marital conflicts which has been ongoing for many years. Pt states "my wife says she does not love me anymore". Pt states they have been to therapy in the past and he is interested in being involved in marital therapy again. Pt states he currently attends gambling anonymous and has not had issues gambling in 8 years. Pt states marijuana use is limited to 1 joint a day, in his car (not in the home) as his wife "does not agree with it". Pt is not interested in discontinuing marijuana use as it calms him and helps him be more agreeable with his wife. Pt states it also increases his appetite and helps him with sleep.

## 2019-01-30 NOTE — PROGRESS NOTE ADULT - PROBLEM SELECTOR PLAN 4
ED charting notes chest pain but patient denies ever having chest pain. ECG without ST/T wave changes and troponin negative x1.  Evaluate for ACS, but low suspicion at this time.  trop on 26th was 18, rpt on 1/29 was 10, pt with no chest pain  - Monitor on tele.  - S/p aspirin load.  - Trop trending down

## 2019-01-30 NOTE — PROGRESS NOTE ADULT - SUBJECTIVE AND OBJECTIVE BOX
Patient is seen and examined. Denies chest pain, SOB, palpitations or dizziness    PAST MEDICAL & SURGICAL HISTORY:  Hyperlipidemia  Marijuana use  Alcohol use  Peptic ulcer disease  Anxiety  Atrial fibrillation, unspecified type  Marijuana abuse  GERD (gastroesophageal reflux disease)  H/O prior ablation treatment: 10/2017, for A-fib  No significant past surgical history      MEDICATIONS  (STANDING):  dextrose 5% + sodium chloride 0.45%. 1000 milliLiter(s) (75 mL/Hr) IV Continuous <Continuous>  diltiazem    Tablet 30 milliGRAM(s) Oral every 6 hours  heparin  Injectable 5000 Unit(s) SubCutaneous every 8 hours  multivitamin 1 Tablet(s) Oral daily    MEDICATIONS  (PRN):  ondansetron Injectable 4 milliGRAM(s) IV Push every 6 hours PRN Nausea and/or Vomiting      Vital Signs Last 24 Hrs  T(C): 36.6 (30 Jan 2019 08:02), Max: 37 (29 Jan 2019 18:25)  T(F): 97.8 (30 Jan 2019 08:02), Max: 98.6 (29 Jan 2019 18:25)  HR: 51 (30 Jan 2019 08:02) (51 - 64)  BP: 144/60 (30 Jan 2019 08:02) (128/58 - 145/73)  BP(mean): --  RR: 17 (30 Jan 2019 08:02) (17 - 18)  SpO2: 97% (30 Jan 2019 08:02) (97% - 99%)      INTERPRETATION OF TELEMETRY: inus rhythm with HR 50s-60s, 30s-40s during sleep, 2.4 sec. pause      LABS:                        14.0   9.74  )-----------( 221      ( 30 Jan 2019 07:30 )             41.0     01-30    142  |  107  |  17  ----------------------------<  85  3.5   |  21<L>  |  0.88    Ca    8.3<L>      30 Jan 2019 07:30  Mg     2.1     01-30      CARDIAC MARKERS ( 29 Jan 2019 06:10 )  x     / x     / 388 u/L / x     / x              PHYSICAL EXAM:    GENERAL: In no apparent distress, well nourished, and hydrated.  HEAD:  Atraumatic, Normocephalic  HEART: Regular rate and rhythm; No murmurs, rubs, or gallops.  PULMONARY: Clear to auscultation and percussion.  No rales, wheezing, or rhonchi bilaterally.  ABDOMEN: Soft, Nontender, Nondistended; Bowel sounds present  EXTREMITIES:  2+ Peripheral Pulses, No clubbing, cyanosis, or edema

## 2019-01-30 NOTE — DISCHARGE NOTE ADULT - PLAN OF CARE
To restore or maintain a normal heart rate and rhythm, to prevent blood clots, and decrease the risks of stroke CVA/TIA. Please take your medications as prescribed.  Continue to take your blood thinner as prescribed and follow with your physician to monitor your levels.  Low fat diet, reduce caffeine intake, and exercise at least 30 minutes daily. resolved Follow up with Your PMD or Cardiologist in 1-2 week REsolved Please take your medications as prescribed.  Low fat diet, reduce caffeine intake, and exercise at least 30 minutes daily.  Follow up with your cardiologist as scheduled tomorrow Dr. Gianni Ansari. Follow up with Your cardiologist as scheduled Dr Gianni Ansari tomorrow abstain fronm substance abuse Outpt marital therapy as requested by therapy.  Pt to continue attending gambling anonymous meetings.  Abstain from substance abuse and look into outpatient rehab programs

## 2019-01-30 NOTE — SWALLOW BEDSIDE ASSESSMENT ADULT - ASR SWALLOW ASPIRATION MONITOR
cough/oral hygiene/change of breathing pattern/position upright (90Y)/fever/throat clearing/gurgly voice/pneumonia/upper respiratory infection

## 2019-01-30 NOTE — CHART NOTE - NSCHARTNOTEFT_GEN_A_CORE
Spoke to EP Regarding the pause of 2.46 noted this am, as per their recommendation, No need to adjust meds, Continue with Current Cardizem dose.

## 2019-01-30 NOTE — SWALLOW BEDSIDE ASSESSMENT ADULT - COMMENTS
Pt was alert and cooperative for a clinical assessment of swallow function this am. Per charting, pt is a 54 y/o male with PMHx significant for A-fib s/p ablation (09/2017), marijuana use, HLD, anxiety, poss EtOH abuse adm with chest pain of unclear etiology that has resolved, evaluating for ACS, also with Afib with RVR, N/V poss 2/2 cyclic vomiting syndrome vs viral gastroenteritis vs EtOH withdrawal. Recent CXR revealed "clear lungs".

## 2019-01-30 NOTE — PROGRESS NOTE ADULT - PROBLEM SELECTOR PLAN 2
ck trending down, since pt is npo, failed bedside swallow, will get official swallow eval in am if unable to get today, and start on d51/2 ns for now ck trended down, d/c ivf

## 2019-01-30 NOTE — PHYSICAL THERAPY INITIAL EVALUATION ADULT - LIVES WITH, PROFILE
(x3), private home, 2 steps-to-enter/exit home, (+) [id number] in the home (both with Unilateral Handrail)/children/spouse

## 2019-01-30 NOTE — DISCHARGE NOTE ADULT - OTHER SIGNIFICANT FINDINGS
EKG: NSR at 64 bpm  EKG (repeat): Atrial fibrillation at 123 bpm with RVR, QTc 423  CE x2: Trop < 6-->18,   WBC: 15.14  Glucose: 160  Lactate: 5.0  UA: Negative  Urine tox: Cannabinoids  Serum tox: Negative  RVP: Negative    1/26 CXR: Clear lungs.  1/26 US abdomen: Mild hepatic steatosis. Unremarkable ultrasound evaluation of the gallbladder.  Urine cultures negative to date  01/28 Echo- 65-70 1. Normal mitral valve. Mild-moderate mitral regurgitation.  Eccentric Jet.  Severely dilated left atrium.  LA volume index = 49 cc/m2.  Increased relative wall thickness with normal left ventricular mass index, consistent with concentric left  ventricular remodeling.  Hyperdynamic left ventricle.Endocardial visualization enhanced with intravenous injection of echo contrast (Definity).  Normal right ventricular size and function.  Estimated right ventricular systolic pressure equals 42 mm Hg, assuming right atrial pressure equals 10 mm Hg, consistent with mild pulmonary hypertension.  Normal tricuspid valve.  Mild tricuspid regurgitation.   CT Head No CT evidence of acute intracranial hemorrhage, brain edema, or mass effect.

## 2019-01-30 NOTE — PHYSICAL THERAPY INITIAL EVALUATION ADULT - PERTINENT HX OF CURRENT PROBLEM, REHAB EVAL
Pt. is a 52 y/o male admitted to [facility] on 01/26/19 with a dx of chest pain, nausea, and vomiting.  PT consult request in order to evaluate.  H/O marijuana use and possible ETOH abuse.  (-) head CT.  (-) CXR.

## 2019-01-30 NOTE — PROGRESS NOTE ADULT - ASSESSMENT
Patient is a 53 year old male with PMH of A-fib s/p ablation (09/2017), marijuana use, HLD, anxiety, poss ETOH abuse who presents with c/o vomiting.  Patient admitted to K2 drug use, and recent marijuana use. He has not felt well since smoking marijuana.  Patient was with Atrial fibrillation with RVR, HR as high as 170, alternating with episodes of bradycardia, tachycardia and sinus rhythm on heart monitor and was on IV Cardizem drip which is D/C'd now. ALert and awake today, answering appropriately.  -  CHADSVASC score is 0. Would not  recommend anticoagulation in this setting. Patient self converted from PAF to SR on Diltiazem gtt.    -  continue oral  Cardizem for rate control, change to Cardizem CD upon discharge  - Continue other management as per primary team  -Continue telemetry monitoring  -Will follow up

## 2019-01-30 NOTE — PROGRESS NOTE ADULT - SUBJECTIVE AND OBJECTIVE BOX
Patient is a 53y old  Male who presents with a chief complaint of chest pain, N/V (30 Jan 2019 14:40)      SUBJECTIVE / OVERNIGHT EVENTS: Pt seen and examined at 1:30pm, no overnight events, tele with sinus manjinder early am at 40, and pause of 2.45 sec, pt with no sob, chest pain or any other complaints. No other new issues reported.          MEDICATIONS  (STANDING):  diltiazem    Tablet 30 milliGRAM(s) Oral every 6 hours  heparin  Injectable 5000 Unit(s) SubCutaneous every 8 hours  multivitamin 1 Tablet(s) Oral daily    MEDICATIONS  (PRN):  ondansetron Injectable 4 milliGRAM(s) IV Push every 6 hours PRN Nausea and/or Vomiting      Vital Signs Last 24 Hrs  T(C): 37 (30 Jan 2019 15:08), Max: 37 (29 Jan 2019 18:25)  T(F): 98.6 (30 Jan 2019 15:08), Max: 98.6 (29 Jan 2019 18:25)  HR: 64 (30 Jan 2019 15:33) (51 - 64)  BP: 131/84 (30 Jan 2019 15:08) (128/58 - 145/73)  BP(mean): --  RR: 18 (30 Jan 2019 15:08) (17 - 18)  SpO2: 99% (30 Jan 2019 15:08) (97% - 99%)  CAPILLARY BLOOD GLUCOSE      POCT Blood Glucose.: 118 mg/dL (30 Jan 2019 12:39)  POCT Blood Glucose.: 122 mg/dL (30 Jan 2019 07:24)  POCT Blood Glucose.: 93 mg/dL (29 Jan 2019 21:32)  POCT Blood Glucose.: 81 mg/dL (29 Jan 2019 17:39)    I&O's Summary      PHYSICAL EXAM:  GENERAL: NAD, well-developed  CHEST/LUNG: Clear to auscultation bilaterally; No wheeze  HEART: Regular s1,s2  ABDOMEN: Soft, Nontender, Nondistended  EXTREMITIES: no LE edema  PSYCH: calm  NEUROLOGY: alert, awake o x3  SKIN: No rashes or lesions        LABS:                        14.0   9.74  )-----------( 221      ( 30 Jan 2019 07:30 )             41.0     01-30    142  |  107  |  17  ----------------------------<  85  3.5   |  21<L>  |  0.88    Ca    8.3<L>      30 Jan 2019 07:30  Mg     2.1     01-30        CARDIAC MARKERS ( 29 Jan 2019 06:10 )  x     / x     / 388 u/L / x     / x              RADIOLOGY & ADDITIONAL TESTS:    Imaging Personally Reviewed:    Consultant(s) Notes Reviewed:      Care Discussed with Consultants/Other Providers:

## 2019-01-30 NOTE — DISCHARGE NOTE ADULT - CARE PLAN
Principal Discharge DX:	Atrial fibrillation, unspecified type  Goal:	To restore or maintain a normal heart rate and rhythm, to prevent blood clots, and decrease the risks of stroke CVA/TIA.  Assessment and plan of treatment:	Please take your medications as prescribed.  Continue to take your blood thinner as prescribed and follow with your physician to monitor your levels.  Low fat diet, reduce caffeine intake, and exercise at least 30 minutes daily.  Secondary Diagnosis:	Chest pain  Goal:	resolved  Assessment and plan of treatment:	Follow up with Your PMD or Cardiologist in 1-2 week  Secondary Diagnosis:	Substance abuse  Secondary Diagnosis:	Hyperemesis  Goal:	REsolved  Secondary Diagnosis:	Encephalopathy  Goal:	resolved Principal Discharge DX:	Atrial fibrillation, unspecified type  Goal:	To restore or maintain a normal heart rate and rhythm, to prevent blood clots, and decrease the risks of stroke CVA/TIA.  Assessment and plan of treatment:	Please take your medications as prescribed.  Low fat diet, reduce caffeine intake, and exercise at least 30 minutes daily.  Follow up with your cardiologist as scheduled tomorrow Dr. Gianni Ansari.  Secondary Diagnosis:	Chest pain  Goal:	resolved  Assessment and plan of treatment:	Follow up with Your cardiologist as scheduled Dr Gianni Ansari tomorrow  Secondary Diagnosis:	Substance abuse  Goal:	abstain fronm substance abuse  Assessment and plan of treatment:	Outpt marital therapy as requested by therapy.  Pt to continue attending gambling anonymous meetings.  Abstain from substance abuse and look into outpatient rehab programs  Secondary Diagnosis:	Hyperemesis  Goal:	REsolved  Secondary Diagnosis:	Encephalopathy  Goal:	resolved

## 2019-01-30 NOTE — PROGRESS NOTE BEHAVIORAL HEALTH - NSBHCHARTREVIEWVS_PSY_A_CORE FT
Vital Signs Last 24 Hrs  T(C): 36.2 (30 Jan 2019 12:11), Max: 37 (29 Jan 2019 18:25)  T(F): 97.2 (30 Jan 2019 12:11), Max: 98.6 (29 Jan 2019 18:25)  HR: 57 (30 Jan 2019 12:11) (51 - 64)  BP: 141/80 (30 Jan 2019 12:11) (128/58 - 145/73)  BP(mean): --  RR: 17 (30 Jan 2019 12:11) (17 - 18)  SpO2: 99% (30 Jan 2019 12:11) (97% - 99%)

## 2019-01-30 NOTE — PHYSICAL THERAPY INITIAL EVALUATION ADULT - DISCHARGE DISPOSITION, PT EVAL
Pt. will benefit from skilled PT while inpatient at [facility] for 1-2 more sessions/home/no skilled PT needs

## 2019-01-30 NOTE — SWALLOW BEDSIDE ASSESSMENT ADULT - SWALLOW EVAL: RECOMMENDED FEEDING/EATING TECHNIQUES
allow for swallow between intakes/small sips/bites/position upright (90 degrees)/maintain upright posture during/after eating for 30 mins/alternate food with liquid

## 2019-01-30 NOTE — PROGRESS NOTE BEHAVIORAL HEALTH - SUMMARY
53 year old male, domiciled, , employed with no PPH significant for gambling addiction (in remission per wife) and MJ use disorder and anxiety as well as PMH significant for A-fib s/p ablation (9/2017), HLD, initially presented with c/o vomiting.    Hospital course includes being initially starting on CIWA protocol, only to be stopped shortly thereafter and found to be in Afib. MICU and EP Cards called. Patient restarted on CIWA protocol and also on rate control medication for AFib.    Psych CL consulted to help with alcohol withdrawal. However pt does not have a history of drinking much alcohol as verified by his family and he has not demonstrated any symptoms of withdrawal. PT is more alert after having his Ativan d/c. Pt interested in attending outpt marital therapy with his wife for marital conflicts.     Plan:  D/C Ativan  Recommend outpt marital therapy as requested by pt  No PRNs 53 year old male, domiciled, , employed with no PPH significant for gambling addiction (in remission per wife) and MJ use disorder and anxiety as well as PMH significant for A-fib s/p ablation (9/2017), HLD, initially presented with c/o vomiting.    Hospital course includes being initially starting on CIWA protocol, only to be stopped shortly thereafter and found to be in Afib. MICU and EP Cards called. Patient restarted on CIWA protocol and also on rate control medication for AFib.  Psych CL consulted to help with alcohol withdrawal. However pt does not have a history of drinking much alcohol as verified by his wife an RN and he has not demonstrated any symptoms of withdrawal. Pt became very lethargic on a low dose Ativan taper and even failed a speech and swallow test.  He had a very low tolerance to Ativan which would confirm that he is not a regular alcohol drinker. Pt is more alert after having his Ativan d/c. Pt interested in attending outpt marital therapy with his wife for marital conflicts.     Plan:  D/C Ativan  Recommend outpt marital therapy as requested by pt  No PRNs

## 2019-01-30 NOTE — DISCHARGE NOTE ADULT - CARE PROVIDER_API CALL
Gianni Ansari (MD)  Cardiovascular Disease; Internal Medicine  [address], [facility] [facility] [address]  [place] [address]  Phone: [phone]  Fax: [phone]

## 2019-01-30 NOTE — SWALLOW BEDSIDE ASSESSMENT ADULT - SWALLOW EVAL: DIAGNOSIS
pt presents with functional oral-pharyngeal management given solid, puree, honey thick liquid, nectar thick liquid and thin liquid textures marked by adequate bolus collection, transfer and transport with subsequently timely and complete pharyngeal swallow trigger and hyolaryngeal excursion. no overt s/s of penetration/aspiration viewed.

## 2019-01-30 NOTE — PROGRESS NOTE ADULT - PROBLEM SELECTOR PLAN 6
- With RVR.  pt self converted to sinus, d/c cardizem drip cont po cardizem  f/u EP recs  - Continue to monitor on tele.   - Would avoid BB / CCB if bradycardia persists.  - f/u EP recs  - [id number], defer AC at this time.

## 2019-01-30 NOTE — DISCHARGE NOTE ADULT - MEDICATION SUMMARY - MEDICATIONS TO TAKE
I will START or STAY ON the medications listed below when I get home from the hospital:    Cardizem  mg/24 hours oral capsule, extended release  -- 1 cap(s) by mouth once a day   -- It is very important that you take or use this exactly as directed.  Do not skip doses or discontinue unless directed by your doctor.  Some non-prescription drugs may aggravate your condition.  Read all labels carefully.  If a warning appears, check with your doctor before taking.  Swallow whole.  Do not crush.    -- Indication: For Atrial fibrillation, unspecified type    Multiple Vitamins oral tablet  -- 1 tab(s) by mouth once a day  -- Indication: For Supplement

## 2019-01-30 NOTE — PROGRESS NOTE ADULT - PROBLEM SELECTOR PLAN 5
Leukocytosis and tachycardia noted. Patient without fever. He presents with retching. Leukocytosis likely reactive in setting of retching vs possible gastritis. Suspect tachycardia 2/2 stress of vomiting vs volume depletion as a result of vomiting.  No indication for abx at this time. improved  d/c ivf  -antiemetics as above

## 2019-01-30 NOTE — PHYSICAL THERAPY INITIAL EVALUATION ADULT - CRITERIA FOR SKILLED THERAPEUTIC INTERVENTIONS
Home --> no skilled PT needs for discharge and possibly a Straight cane/therapy frequency/impairments found/predicted duration of therapy intervention/anticipated discharge recommendation

## 2019-01-30 NOTE — DISCHARGE NOTE ADULT - HOSPITAL COURSE
53M h/o A-fib s/p ablation (09/2017), marijuana use, HLD, anxiety, poss EtOH abuse adm with chest pain of unclear etiology that has resolved, evaluating for ACS, also with Afib with RVR, N/V poss 2/2 cyclic vomiting syndrome vs viral gastroenteritis vs EtOH withdrawal vs ?K2 use.     Problem/Plan - 1:  ·  Problem: Encephalopathy.  Plan: Likely due to ativan/alcohol use, psych consult appreciated decreased dose of ativan, head ct neg, mental status improving, cont to monitor closely.      Problem/Plan - 2:  ·  Problem: Rhabdomyolysis.  Plan: ck trending down, since pt is npo, failed bedside swallow, will get official swallow eval in am if unable to get today, and start on d51/2 ns for now.      Problem/Plan - 3:  ·  Problem: Vomiting.  Plan: -Pt with no vomiting,    - abdominal ultrasound with hepatic steatosis  -- Ondansetron PRN.      Problem/Plan - 4:  ·  Problem: Chest pain.  Plan: ED charting notes chest pain but patient denies ever having chest pain. ECG without ST/T wave changes and troponin negative x1.  Evaluate for ACS, but low suspicion at this time.  trop on 26th was 18, rpt today 10, pt with no chest pain  - Monitor on tele.  - S/p aspirin load.  - Trop trending down.      Problem/Plan - 5:  ·  Problem: SIRS (systemic inflammatory response syndrome).  Plan: Leukocytosis and tachycardia noted. Patient without fever. He presents with retching. Leukocytosis likely reactive in setting of retching vs possible gastritis. Suspect tachycardia 2/2 stress of vomiting vs volume depletion as a result of vomiting.  No indication for abx at this time.  cont ivf, antiemetics as above.      Problem/Plan - 6:  Problem: Atrial fibrillation, unspecified type. Plan: - With RVR.  pt self converted to sinus, d/c cardizem drip cont po cardizem  f/u EP recs  - Continue to monitor on tele. - f/u EP recs  - [id number] 0, defer AC at this time. 53M h/o A-fib s/p ablation (09/2017), marijuana use, HLD, anxiety, poss EtOH abuse adm with chest pain of unclear etiology that has resolved, evaluating for ACS, also with Afib with RVR, N/V poss 2/2 cyclic vomiting syndrome vs viral gastroenteritis vs EtOH withdrawal vs ?K2 use.     Problem/Plan - 1:  ·  Problem: Encephalopathy.  Plan: Likely due to ativan/alcohol use, psych consult appreciated decreased dose of ativan, head ct neg, mental status back to baseline.     Problem/Plan - 2:  ·  Problem: Rhabdomyolysis.  Plan: ck trending down.  s/p fluids.     Problem/Plan - 3:  ·  Problem: Vomiting.  Plan: -Resolved   - abdominal ultrasound with hepatic steatosis       Problem/Plan - 4:  ·  Problem: Chest pain.  Plan: ED charting notes chest pain but patient denies ever having chest pain. ECG without ST/T wave changes and troponin negative x1.  Evaluate for ACS, but low suspicion at this time.  trop on 26th was 18, rpt today 10, pt with no chest pain  - S/p aspirin load.  - Trop trending down.      Problem/Plan - 5:  ·  Problem: SIRS (systemic inflammatory response syndrome).  Plan: Leukocytosis and tachycardia noted. Patient without fever. He presents with retching. Leukocytosis likely reactive in setting of retching vs possible gastritis. Suspect tachycardia 2/2 stress of vomiting vs volume depletion as a result of vomiting.  No indication for abx at this time.       Problem/Plan - 6:  Problem: Atrial fibrillation, unspecified type. Plan: - With RVR.  pt self converted to sinus, d/c cardizem drip cont po cardizem  f/u EP recs  - Cardizem 30mg PO q 8 today then change to CD on DC as per EP.  No contraindications to DC  - [id number] 0, defer AC at this time.    D/W attending and EP: OK to DC patient after afternoon cardizem dose.  Pt. will follow up with cardiologist Dr. Gianni Ansari tomorrow. 53M h/o A-fib s/p ablation (09/2017), marijuana use, HLD, anxiety, poss EtOH abuse adm with chest pain of unclear etiology that has resolved, evaluating for ACS, also with Afib with RVR, N/V poss 2/2 cyclic vomiting syndrome vs viral gastroenteritis vs EtOH withdrawal       Encephalopathy.  Plan: Likely due to ativan/alcohol use, psych consult appreciated decreased dose of ativan, head ct neg, mental status back to baseline.     Rhabdomyolysis.  Plan: ck trending down.  s/p fluids.   Vomiting.  Plan: -Resolved   - abdominal ultrasound with hepatic steatosis        ·Chest pain.  low suspicion at this time.  trop on 26th was 18, rpt today 10, pt with no chest pain  - S/p aspirin load.  - Trop trending down.        SIRS (systemic inflammatory response syndrome).  resolved     Atrial fibrillation, unspecified type. Plan: - With RVR.  pt self converted to sinus, d/c cardizem drip cont po cardizem  Per EP  - Cardizem 30mg PO q 8 today then change to CD on DC as per EP.  No contraindications to DC  - [id number] 0, defer AC at this time.    D/W attending and EP: OK to DC patient after afternoon cardizem dose.  Pt. will follow up with cardiologist Dr. Gianni Ansari tomorrow.  Stable for d/c home.

## 2019-01-31 VITALS — HEART RATE: 85 BPM

## 2019-01-31 LAB
ANION GAP SERPL CALC-SCNC: 12 MMO/L — SIGNIFICANT CHANGE UP (ref 7–14)
BASOPHILS # BLD AUTO: 0.04 K/UL — SIGNIFICANT CHANGE UP (ref 0–0.2)
BASOPHILS NFR BLD AUTO: 0.4 % — SIGNIFICANT CHANGE UP (ref 0–2)
BUN SERPL-MCNC: 12 MG/DL — SIGNIFICANT CHANGE UP (ref 7–23)
CALCIUM SERPL-MCNC: 8.6 MG/DL — SIGNIFICANT CHANGE UP (ref 8.4–10.5)
CHLORIDE SERPL-SCNC: 106 MMOL/L — SIGNIFICANT CHANGE UP (ref 98–107)
CO2 SERPL-SCNC: 21 MMOL/L — LOW (ref 22–31)
CREAT SERPL-MCNC: 0.9 MG/DL — SIGNIFICANT CHANGE UP (ref 0.5–1.3)
EOSINOPHIL # BLD AUTO: 0.19 K/UL — SIGNIFICANT CHANGE UP (ref 0–0.5)
EOSINOPHIL NFR BLD AUTO: 2 % — SIGNIFICANT CHANGE UP (ref 0–6)
GLUCOSE SERPL-MCNC: 89 MG/DL — SIGNIFICANT CHANGE UP (ref 70–99)
HBA1C BLD-MCNC: 5.1 % — SIGNIFICANT CHANGE UP (ref 4–5.6)
HCT VFR BLD CALC: 42.8 % — SIGNIFICANT CHANGE UP (ref 39–50)
HGB BLD-MCNC: 14.9 G/DL — SIGNIFICANT CHANGE UP (ref 13–17)
IMM GRANULOCYTES NFR BLD AUTO: 0.7 % — SIGNIFICANT CHANGE UP (ref 0–1.5)
LYMPHOCYTES # BLD AUTO: 2.21 K/UL — SIGNIFICANT CHANGE UP (ref 1–3.3)
LYMPHOCYTES # BLD AUTO: 23.5 % — SIGNIFICANT CHANGE UP (ref 13–44)
MAGNESIUM SERPL-MCNC: 2 MG/DL — SIGNIFICANT CHANGE UP (ref 1.6–2.6)
MCHC RBC-ENTMCNC: 28.4 PG — SIGNIFICANT CHANGE UP (ref 27–34)
MCHC RBC-ENTMCNC: 34.8 % — SIGNIFICANT CHANGE UP (ref 32–36)
MCV RBC AUTO: 81.7 FL — SIGNIFICANT CHANGE UP (ref 80–100)
MONOCYTES # BLD AUTO: 0.54 K/UL — SIGNIFICANT CHANGE UP (ref 0–0.9)
MONOCYTES NFR BLD AUTO: 5.7 % — SIGNIFICANT CHANGE UP (ref 2–14)
NEUTROPHILS # BLD AUTO: 6.36 K/UL — SIGNIFICANT CHANGE UP (ref 1.8–7.4)
NEUTROPHILS NFR BLD AUTO: 67.7 % — SIGNIFICANT CHANGE UP (ref 43–77)
NRBC # FLD: 0 K/UL — LOW (ref 25–125)
PLATELET # BLD AUTO: 233 K/UL — SIGNIFICANT CHANGE UP (ref 150–400)
PMV BLD: 10.4 FL — SIGNIFICANT CHANGE UP (ref 7–13)
POTASSIUM SERPL-MCNC: 3.6 MMOL/L — SIGNIFICANT CHANGE UP (ref 3.5–5.3)
POTASSIUM SERPL-SCNC: 3.6 MMOL/L — SIGNIFICANT CHANGE UP (ref 3.5–5.3)
RBC # BLD: 5.24 M/UL — SIGNIFICANT CHANGE UP (ref 4.2–5.8)
RBC # FLD: 13.2 % — SIGNIFICANT CHANGE UP (ref 10.3–14.5)
SODIUM SERPL-SCNC: 139 MMOL/L — SIGNIFICANT CHANGE UP (ref 135–145)
WBC # BLD: 9.41 K/UL — SIGNIFICANT CHANGE UP (ref 3.8–10.5)
WBC # FLD AUTO: 9.41 K/UL — SIGNIFICANT CHANGE UP (ref 3.8–10.5)

## 2019-01-31 PROCEDURE — 99232 SBSQ HOSP IP/OBS MODERATE 35: CPT

## 2019-01-31 PROCEDURE — 99239 HOSP IP/OBS DSCHRG MGMT >30: CPT

## 2019-01-31 RX ADMIN — HEPARIN SODIUM 5000 UNIT(S): 5000 INJECTION INTRAVENOUS; SUBCUTANEOUS at 13:23

## 2019-01-31 RX ADMIN — HEPARIN SODIUM 5000 UNIT(S): 5000 INJECTION INTRAVENOUS; SUBCUTANEOUS at 05:32

## 2019-01-31 RX ADMIN — Medication 1 TABLET(S): at 12:22

## 2019-01-31 NOTE — PROGRESS NOTE ADULT - PROBLEM SELECTOR PLAN 5
- Pt's history is not c/w EtOH withdrawal, low suspicion at this time.  - Psych consulted, pt to f/u as outpt

## 2019-01-31 NOTE — PROGRESS NOTE BEHAVIORAL HEALTH - NSBHCHARTREVIEWVS_PSY_A_CORE FT
Vital Signs Last 24 Hrs  T(C): 36.6 (31 Jan 2019 08:50), Max: 37.1 (30 Jan 2019 17:11)  T(F): 97.9 (31 Jan 2019 08:50), Max: 98.7 (30 Jan 2019 17:11)  HR: 61 (31 Jan 2019 08:50) (51 - 132)  BP: 156/84 (31 Jan 2019 12:21) (120/72 - 156/84)  BP(mean): --  RR: 18 (31 Jan 2019 08:50) (16 - 18)  SpO2: 98% (31 Jan 2019 08:50) (97% - 100%)

## 2019-01-31 NOTE — PROGRESS NOTE BEHAVIORAL HEALTH - NSBHCHARTREVIEWINVESTIGATE_PSY_A_CORE FT
< from: 12 Lead ECG (01.26.19 @ 03:39) >      Ventricular Rate 64 BPM  Atrial Rate 64 BPM  P-R Interval 158 ms  QRS Duration 78 ms  Q-T Interval 432 ms  QTC Calculation(Bezet) 445 ms  P Axis 45 degrees  R Axis 51 degrees  T Axis 22 degrees    Diagnosis Line Normal sinus rhythm  Nonspecific ST and T wave abnormality  Abnormal ECG    < end of copied text >
< from: 12 Lead ECG (01.26.19 @ 03:39) >      Ventricular Rate 64 BPM  Atrial Rate 64 BPM  P-R Interval 158 ms  QRS Duration 78 ms  Q-T Interval 432 ms  QTC Calculation(Bezet) 445 ms  P Axis 45 degrees  R Axis 51 degrees  T Axis 22 degrees    Diagnosis Line Normal sinus rhythm  Nonspecific ST and T wave abnormality  Abnormal ECG    < end of copied text >

## 2019-01-31 NOTE — PROGRESS NOTE BEHAVIORAL HEALTH - NSBHCONSULTPRIMARYDISCUSSYES_PSY_A_CORE FT
d/c Ativan, pt did not demonstrate symptoms of withdrawal and as per family and pt, pt only uses MJ and is attending gambling anonymous
d/c Ativan, pt did not demonstrate symptoms of withdrawal and as per family and pt, pt only uses MJ and is attending gambling anonymous

## 2019-01-31 NOTE — PROGRESS NOTE ADULT - PROBLEM SELECTOR PLAN 1
Likely due to ativan/alcohol use, psych consult appreciated, d/c ativan, head ct neg, mental status improved.   D/c home today, d/c planning time spent in coordination 55 mts ( discussion with EP, PA, pt and pt's wife, SW, preparing d/c summary and plan)

## 2019-01-31 NOTE — PROGRESS NOTE ADULT - SUBJECTIVE AND OBJECTIVE BOX
Patient is seen and examined. Denies any chest pain, SOB, palpitations or dizziness.     PAST MEDICAL & SURGICAL HISTORY:  Hyperlipidemia  Marijuana use  Alcohol use  Peptic ulcer disease  Anxiety  Atrial fibrillation, unspecified type  Marijuana abuse  GERD (gastroesophageal reflux disease)  H/O prior ablation treatment: 10/2017, for A-fib  No significant past surgical history      MEDICATIONS  (STANDING):  diltiazem    Tablet 30 milliGRAM(s) Oral every 6 hours  heparin  Injectable 5000 Unit(s) SubCutaneous every 8 hours  multivitamin 1 Tablet(s) Oral daily    MEDICATIONS  (PRN):  ondansetron Injectable 4 milliGRAM(s) IV Push every 6 hours PRN Nausea and/or Vomiting      Vital Signs Last 24 Hrs  T(C): 36.6 (31 Jan 2019 08:50), Max: 37.1 (30 Jan 2019 17:11)  T(F): 97.9 (31 Jan 2019 08:50), Max: 98.7 (30 Jan 2019 17:11)  HR: 61 (31 Jan 2019 08:50) (51 - 132)  BP: 132/80 (31 Jan 2019 08:50) (120/72 - 147/91)  BP(mean): --  RR: 18 (31 Jan 2019 08:50) (16 - 18)  SpO2: 98% (31 Jan 2019 08:50) (97% - 100%)    INTERPRETATION OF TELEMETRY: Sinus rhythm with HR 50s-60s, short runs of PAT    LABS:                        14.9   9.41  )-----------( 233      ( 31 Jan 2019 07:09 )             42.8     01-31    139  |  106  |  12  ----------------------------<  89  3.6   |  21<L>  |  0.90    Ca    8.6      31 Jan 2019 07:09  Mg     2.0     01-31        PHYSICAL EXAM:    GENERAL: In no apparent distress, well nourished, and hydrated.  HEAD:  Atraumatic, Normocephalic  HEART: Regular rate and rhythm; No murmurs, rubs, or gallops.  PULMONARY: Clear to auscultation and percussion.  No rales, wheezing, or rhonchi bilaterally.  ABDOMEN: Soft, Nontender, Nondistended; Bowel sounds present  EXTREMITIES:  2+ Peripheral Pulses, No clubbing, cyanosis, or edema

## 2019-01-31 NOTE — PROGRESS NOTE ADULT - ASSESSMENT
53M h/o A-fib s/p ablation (09/2017), marijuana use, HLD, anxiety, poss EtOH abuse adm with chest pain of unclear etiology that has resolved, evaluating for ACS, also with Afib with RVR, N/V poss secondary to alcohol use improved.

## 2019-01-31 NOTE — PROGRESS NOTE BEHAVIORAL HEALTH - NSBHCHARTREVIEWIMAGING_PSY_A_CORE FT
CONCLUSIONS:  1. Normal mitral valve. Mild-moderate mitral regurgitation.  Eccentric Jet.  2. Severely dilated left atrium.  LA volume index = 49  cc/m2.  3. Increased relative wall thickness with normal left  ventricular mass index, consistent with concentric left  ventricular remodeling.  4. Hyperdynamic left ventricle.Endocardial visualization  enhanced with intravenous injection of echo contrast  (Definity).  5. Normal right ventricular size and function.  6. Estimated right ventricular systolic pressure equals 42  mm Hg, assuming right atrial pressure equals 10 mm Hg,  consistent with mild pulmonary hypertension.  7. Normal tricuspid valve.  Mild tricuspid regurgitation.  *** No previous Echo exam.
CONCLUSIONS:  1. Normal mitral valve. Mild-moderate mitral regurgitation.  Eccentric Jet.  2. Severely dilated left atrium.  LA volume index = 49  cc/m2.  3. Increased relative wall thickness with normal left  ventricular mass index, consistent with concentric left  ventricular remodeling.  4. Hyperdynamic left ventricle.Endocardial visualization  enhanced with intravenous injection of echo contrast  (Definity).  5. Normal right ventricular size and function.  6. Estimated right ventricular systolic pressure equals 42  mm Hg, assuming right atrial pressure equals 10 mm Hg,  consistent with mild pulmonary hypertension.  7. Normal tricuspid valve.  Mild tricuspid regurgitation.  *** No previous Echo exam.    < from: CT Head No Cont (01.29.19 @ 09:36) >  INTERPRETATION:  CLINICAL INFORMATION: Patient with alcohol use   presenting with lethargy.    TECHNIQUE: Noncontrast axial CT images were acquired through the head.   Two-dimensional sagittal and coronal reformats were generated.  COMPARISON STUDY: CT of the head 6/4/2016  FINDINGS:   There is no CT evidence of acute intracranial hemorrhage, extra-axial   collection, mass effect, or midline shift. The basal cisterns are patent.   No evidence of central herniation.     Very small area of extra axial low-attenuation is seen involving the   anterior aspect of the left middle cranial fossa. This finding measures   approximately 2 x 0.7 cm and does demonstrate scalloping adjacent bone.   This is compatible with a small arachnoid cyst and was present (and   unchanged) on prior study.    Mild cerebral volume loss with proportional prominence of the sulci and   ventricles.    The visualized paranasal sinuses are clear. The mastoidair cells and   middle ear cavities are clear.    The soft tissues of the scalp are unremarkable. The calvarium is intact.    IMPRESSION:   No CT evidence of acute intracranial hemorrhage, brain edema, or mass   effect.     < end of copied text >
CONCLUSIONS:  1. Normal mitral valve. Mild-moderate mitral regurgitation.  Eccentric Jet.  2. Severely dilated left atrium.  LA volume index = 49  cc/m2.  3. Increased relative wall thickness with normal left  ventricular mass index, consistent with concentric left  ventricular remodeling.  4. Hyperdynamic left ventricle.Endocardial visualization  enhanced with intravenous injection of echo contrast  (Definity).  5. Normal right ventricular size and function.  6. Estimated right ventricular systolic pressure equals 42  mm Hg, assuming right atrial pressure equals 10 mm Hg,  consistent with mild pulmonary hypertension.  7. Normal tricuspid valve.  Mild tricuspid regurgitation.  *** No previous Echo exam.    < from: CT Head No Cont (01.29.19 @ 09:36) >  INTERPRETATION:  CLINICAL INFORMATION: Patient with alcohol use   presenting with lethargy.    TECHNIQUE: Noncontrast axial CT images were acquired through the head.   Two-dimensional sagittal and coronal reformats were generated.  COMPARISON STUDY: CT of the head 6/4/2016  FINDINGS:   There is no CT evidence of acute intracranial hemorrhage, extra-axial   collection, mass effect, or midline shift. The basal cisterns are patent.   No evidence of central herniation.     Very small area of extra axial low-attenuation is seen involving the   anterior aspect of the left middle cranial fossa. This finding measures   approximately 2 x 0.7 cm and does demonstrate scalloping adjacent bone.   This is compatible with a small arachnoid cyst and was present (and   unchanged) on prior study.    Mild cerebral volume loss with proportional prominence of the sulci and   ventricles.    The visualized paranasal sinuses are clear. The mastoidair cells and   middle ear cavities are clear.    The soft tissues of the scalp are unremarkable. The calvarium is intact.    IMPRESSION:   No CT evidence of acute intracranial hemorrhage, brain edema, or mass   effect.     < end of copied text >

## 2019-01-31 NOTE — PROGRESS NOTE ADULT - PROVIDER SPECIALTY LIST ADULT
Electrophysiology
Electrophysiology
Hospitalist
Electrophysiology
Electrophysiology
Hospitalist

## 2019-01-31 NOTE — PROGRESS NOTE BEHAVIORAL HEALTH - NSBHCONSULTFOLLOWAFTERCARE_PSY_A_CORE FT
Outpt marital therapy as requested by therapy.  Pt to continue attending gambling anonymous meetings.
Outpt marital therapy as requested by therapy.  Pt to continue attending gambling anonymous meetings.

## 2019-01-31 NOTE — PROGRESS NOTE BEHAVIORAL HEALTH - NSBHCONSULTFOLLOWDETAILS_PSY_A_CORE FT
Psych CL consulted to help with alcohol withdrawal. However pt does not have a history of drinking much alcohol as verified by his wife, an RN and he has not demonstrated any symptoms of withdrawal. Pt does have a history of smoking marijuana but is not aware that he ever used K2.  Pt became very lethargic on a low dose Ativan taper and even failed a speech and swallow test.  He had a very low tolerance to Ativan which would confirm that he is not a regular alcohol drinker. Pt is more alert after having his Ativan d/c.
Psych CL consulted to help with alcohol withdrawal. However pt does not have a history of drinking much alcohol as verified by his wife, an RN and he has not demonstrated any symptoms of withdrawal. Pt does have a history of smoking marijuana but is not aware that he ever used K2.  Pt became very lethargic on a low dose Ativan taper and even failed a speech and swallow test.  He had a very low tolerance to Ativan which would confirm that he is not a regular alcohol drinker. Pt is more alert after having his Ativan d/c.

## 2019-01-31 NOTE — PROGRESS NOTE ADULT - ASSESSMENT
Patient is a 53 year old male with PMH of A-fib s/p ablation (09/2017), marijuana use, HLD, anxiety, poss ETOH abuse who presents with c/o vomiting.  Patient admitted to K2 drug use, and recent marijuana use. He has not felt well since smoking marijuana.  Patient was with Atrial fibrillation with RVR, HR as high as 170, alternating with episodes of bradycardia, tachycardia and sinus rhythm on heart monitor and was on IV Cardizem drip which is D/C'd now. ALert and awake today, answering appropriately. Had episodes of short runs of PAT while Cardizem was held. Patient with no symptomatic bradycardia  -  CHADSVASC score is 0. Would not  recommend anticoagulation in this setting. Patient self converted from PAF to SR on Diltiazem gtt.    -  continue oral  Cardizem for rate control, decrease dose to Cardizem 30mg TID and then change to Cardizem CD upon discharge  - Continue other management as per primary team  -Continue telemetry monitoring while in patient  - Patient to follow up with his outpatient cardiologist/PMD Patient is a 53 year old male with PMH of A-fib s/p ablation (09/2017), marijuana use, HLD, anxiety, poss ETOH abuse who presents with c/o vomiting.  Patient admitted to K2 drug use, and recent marijuana use. He has not felt well since smoking marijuana.  Patient was with Atrial fibrillation with RVR, HR as high as 170, alternating with episodes of bradycardia, tachycardia and sinus rhythm on heart monitor and was on IV Cardizem drip which is D/C'd now. ALert and awake today, answering appropriately. Had episodes of short runs of PAT while Cardizem was held. Patient with no symptomatic bradycardia  -  CHADSVASC score is 0. Would not  recommend anticoagulation in this setting. Patient self converted from PAF to SR on Diltiazem gtt.    -  continue oral  Cardizem for rate control, decrease dose to Cardizem 30mg 4x/day and then change to Cardizem CD upon discharge  - Discussed with Dr. Hardin  - Continue other management as per primary team  -Continue telemetry monitoring while in patient  - Patient to follow up with his outpatient cardiologist/PMD

## 2019-01-31 NOTE — PROGRESS NOTE BEHAVIORAL HEALTH - NSBHFUPINTERVALHXFT_PSY_A_CORE
Patient says that he is feeling better but he is concerned about his a-fib and this hospitalization. His mother was at his bedside and supportive.  Pt is planning to have couples therapy once he is discharged home

## 2019-01-31 NOTE — PROGRESS NOTE ADULT - PROBLEM SELECTOR PLAN 3
-Pt presents with vomiting since this morning. Marijuana use yesterday.   could be viral gastroenteritis, no vomiting reported today, will cont to keep him npo given lethargy, swallow eval in am  iv fluids  zofran as needed   - abdominal ultrasound with hepatic steatosis  - f/u abdominal x-ray  - Ondansetron PRN.
-Pt with no vomiting,    swallow eval done pt now on regular diet  d/c iv fluids  zofran as needed   - abdominal ultrasound with hepatic steatosis  - f/u abdominal x-ray- refused no abdominal pain  - Ondansetron PRN.
-Pt with no vomiting,    swallow eval in am  iv fluids  zofran as needed   - abdominal ultrasound with hepatic steatosis  - f/u abdominal x-ray- refused no abdominal pain  - Ondansetron PRN.
due to vomiting and dehydration   replaced  monitor labs
Improved

## 2019-01-31 NOTE — PROVIDER CONTACT NOTE (OTHER) - ASSESSMENT
Patient asymptomatic. All other VSS. PO Cardizem was held X3 due to HR sustaining sinus manjinder in 50's.

## 2019-01-31 NOTE — PROGRESS NOTE ADULT - PROBLEM SELECTOR PLAN 4
- With RVR.  pt self converted to sinus, cont po cardizem  - Continue to monitor on tele.   - Would avoid BB / CCB if bradycardia persists.  - Per EP cleared for d/c on po cardizem  - [id number] 0, defer AC at this time.

## 2019-01-31 NOTE — PROGRESS NOTE BEHAVIORAL HEALTH - NSBHCHARTREVIEWLAB_PSY_A_CORE FT
13.5   9.66  )-----------( 225      ( 29 Jan 2019 06:10 )             41.3     01-29    144  |  109<H>  |  20  ----------------------------<  82  3.7   |  21<L>  |  0.88    Ca    8.5      29 Jan 2019 06:10  Phos  2.1     01-27  Mg     2.2     01-28    TPro  6.3  /  Alb  3.9  /  TBili  0.8  /  DBili  x   /  AST  51<H>  /  ALT  30  /  AlkPhos  64  01-27    Thyroid Stimulating Hormone, Serum in AM (01.29.19 @ 06:10)    Thyroid Stimulating Hormone, Serum: 0.92 uIU/mL    Vitamin B12, Serum in AM (01.29.19 @ 06:10)    Vitamin B12, Serum: 350 pg/mL    Folate, Serum in AM (01.29.19 @ 06:10)    Folate, Serum: 15.4:   PLEASE NOTE NEW REFERENCE RANGE ng/mL
14.0   9.74  )-----------( 221      ( 30 Jan 2019 07:30 )             41.0   01-30    142  |  107  |  17  ----------------------------<  85  3.5   |  21<L>  |  0.88    Ca    8.3<L>      30 Jan 2019 07:30  Mg     2.1     01-30      Thyroid Stimulating Hormone, Serum in AM (01.29.19 @ 06:10)    Thyroid Stimulating Hormone, Serum: 0.92 uIU/mL    Vitamin B12, Serum in AM (01.29.19 @ 06:10)    Vitamin B12, Serum: 350 pg/mL    Folate, Serum in AM (01.29.19 @ 06:10)    Folate, Serum: 15.4:   PLEASE NOTE NEW REFERENCE RANGE ng/mL
14.9   9.41  )-----------( 233      ( 31 Jan 2019 07:09 )             42.8   01-31    139  |  106  |  12  ----------------------------<  89  3.6   |  21<L>  |  0.90    Ca    8.6      31 Jan 2019 07:09  Mg     2.0     01-31

## 2019-01-31 NOTE — PROGRESS NOTE BEHAVIORAL HEALTH - RISK ASSESSMENT
Pt is not an acute risk to himself or others.

## 2019-01-31 NOTE — PROGRESS NOTE ADULT - REASON FOR ADMISSION
chest pain, N/V

## 2019-01-31 NOTE — PROGRESS NOTE BEHAVIORAL HEALTH - SUMMARY
53 year old male, domiciled, , employed with no PPH significant for gambling addiction (in remission per wife) and MJ use disorder and anxiety as well as PMH significant for A-fib s/p ablation (9/2017), HLD, initially presented with c/o vomiting.    Hospital course includes being initially starting on CIWA protocol, only to be stopped shortly thereafter and found to be in Afib. MICU and EP Cards called. Patient restarted on CIWA protocol and also on rate control medication for AFib.  Psych CL consulted to help with alcohol withdrawal. However pt does not have a history of drinking much alcohol as verified by his wife an RN and he has not demonstrated any symptoms of withdrawal. Pt became very lethargic on a low dose Ativan taper and even failed a speech and swallow test.  He had a very low tolerance to Ativan which would confirm that he is not a regular alcohol drinker. Pt is more alert after having his Ativan d/c. Pt interested in attending outpt marital therapy with his wife for marital conflicts.     Plan:  D/C Ativan  Recommend outpt marital therapy as requested by pt  No PRNs

## 2019-02-01 ENCOUNTER — APPOINTMENT (OUTPATIENT)
Dept: CARDIOLOGY | Facility: CLINIC | Age: 54
End: 2019-02-01

## 2019-02-01 ENCOUNTER — INBOUND DOCUMENT (OUTPATIENT)
Age: 54
End: 2019-02-01

## 2019-02-01 PROBLEM — F12.90 CANNABIS USE, UNSPECIFIED, UNCOMPLICATED: Chronic | Status: ACTIVE | Noted: 2019-01-26

## 2019-02-01 RX ORDER — DILTIAZEM HCL 120 MG
1 CAPSULE, EXT RELEASE 24 HR ORAL
Qty: 30 | Refills: 0
Start: 2019-02-01 | End: 2019-03-02

## 2019-05-14 NOTE — ED ADULT TRIAGE NOTE - BP NONINVASIVE DIASTOLIC (MM HG)
TRANSFER - OUT REPORT: 
 
Verbal report given to Uri Persaud RN on Debo Nazario being transferred to [facility]  for ordered procedure Report consisted of patient's Situation, Background, Assessment and  
Recommendations(SBAR). Information from the following report(s) Kardex, Intake/Output, MAR and Recent Results, NSR on cardiac monitor was reviewed with the receiving nurse. Opportunity for questions and clarification was provided. Patient transported with: 
 [id number]

## 2019-07-24 ENCOUNTER — APPOINTMENT (OUTPATIENT)
Dept: CARDIOLOGY | Facility: CLINIC | Age: 54
End: 2019-07-24
Payer: COMMERCIAL

## 2019-07-24 ENCOUNTER — NON-APPOINTMENT (OUTPATIENT)
Age: 54
End: 2019-07-24

## 2019-07-24 VITALS
SYSTOLIC BLOOD PRESSURE: 110 MMHG | RESPIRATION RATE: 14 BRPM | HEART RATE: 82 BPM | BODY MASS INDEX: 29.03 KG/M2 | DIASTOLIC BLOOD PRESSURE: 82 MMHG | WEIGHT: 219 LBS | HEIGHT: 73 IN | OXYGEN SATURATION: 97 %

## 2019-07-24 DIAGNOSIS — R73.09 OTHER ABNORMAL GLUCOSE: ICD-10-CM

## 2019-07-24 DIAGNOSIS — F41.1 GENERALIZED ANXIETY DISORDER: ICD-10-CM

## 2019-07-24 PROCEDURE — 36415 COLL VENOUS BLD VENIPUNCTURE: CPT

## 2019-07-24 PROCEDURE — 93000 ELECTROCARDIOGRAM COMPLETE: CPT

## 2019-07-24 PROCEDURE — 99215 OFFICE O/P EST HI 40 MIN: CPT

## 2019-07-24 NOTE — REVIEW OF SYSTEMS
[Palpitations] : palpitations [Cough] : cough [see HPI] : see HPI [Negative] : Heme/Lymph [Feeling Fatigued] : not feeling fatigued [Recent Weight Gain (___ Lbs)] : no recent weight gain [Recent Weight Loss (___ Lbs)] : no recent weight loss [Shortness Of Breath] : no shortness of breath [Dyspnea on exertion] : dyspnea during exertion [Chest  Pressure] : chest pressure [Chest Pain] : no chest pain [Wheezing] : no wheezing [Lower Ext Edema] : no extremity edema [Leg Claudication] : no intermittent leg claudication [Abdominal Pain] : no abdominal pain [Coughing Up Blood] : no hemoptysis [Vomiting] : vomiting [Nausea] : nausea [Heartburn] : no heartburn [Dysphagia] : no dysphagia [Change in Appetite] : no change in appetite [Dizziness] : no dizziness [Confusion] : no confusion was observed [Depression] : no depression [Anxiety] : no anxiety [Under Stress] : not under stress [Suicidal] : not suicidal

## 2019-07-24 NOTE — REASON FOR VISIT
[FreeTextEntry1] : 54-year-old man with atrial fibrillation and abnormal stress test, s/p atrial flutter ablation 2017, now back in rapid AF

## 2019-07-24 NOTE — HISTORY OF PRESENT ILLNESS
[FreeTextEntry1] : August 30, 2016. Patient is a 51-year-old man, who was finally diagnosed with rapid atrial fibrillation about one month ago. On July 24 related to drinking and smoking synthetic weed, etc. he was hospitalized at [facility]. He had severe palpitations and "his whole system went into shock". He had some vomitting and nauseousness, diaphoresis, and an uncomfortable feeling, but no chest pain. He was in rapid atrial fibrillation. He did make positive troponin enzymes, but was not recommended to have cardiac catheterization or even a stress test. He was hospitalized at [facility] for 6 days and echocardiogram supposedly showed no scar. He was discharged on cholesterol medications, but then he followed up with a cardiologist in [place] after being hospitalized there for 48 hours with a recurrence of his A. fib. He was placed on sotalol and did well for a while. On 26 August on 120 mg of sotalol, he broke through with an episode of A.fib. The cardiologist wanted to increase Sotalol to 160 mg, but the patient stayed at 120 and then came back to [place] and is here to see me. He has not had a stress test. He has no risk factors for coronary artery disease or a family history of coronary disease or arrhythmias. He claims he's been having symptoms for 20 years, and the episodes have lasted as long as an hour, and it is only with this episode in July, that he was finally diagnosed with atrial fibrillation. He has some shortness of breath going up stairs, but he says he has had this for years, and it has not progressed. Otherwise, with normal activity, and swimming, he does not get chest pain. While he smokes a lot of weed, he does not smoke cigarettes. He thinks he was told of a murmur recently and once as a kid, although I did not hear a murmur on exam. Has not had any syncope recently. He did have a duodenal ulcer about 10 years ago, not related to medications or NSAIDs, and not bleeding. It has not been an issue since. He is currently on sotalol 120 b.i.d., BuSpar, 5 mg b.i.d., and aspirin 81 mg q.d. However, in [facility] Electronic medical record, there are no other notes, but there is a list of medicines to be verified that includes diltiazem, calculus, Eliquis, as well as atorvastatin, and Crestor.\par September 9, 2016. The patient came for stress test and had to stop for shortness of breath at 6 minutes with a low heart rate on carvedilol. It looked like ST s were starting to go down, but did not meet criteria. APCs noted, but no atrial fibrillation. Recommended nuclear stress test and use that to help guide medicine versus statin for his hyperlipidemia.\par November 2, 2016. Patient here in followup. He did not have nuclear stress test. He is here in followup because he is running out of sotalol, but also he had 2 episodes of atrial fibrillation, which were severe and accompanied with chest pain. He admits he has not been taking it twice a day and occasionally will skip. He has not worked on his diet and in fact has gained weight. He does complain of some fatigue and shortness of breath. No exertional chest pain, however; he does not get the same chest pain with exertion that he gets during the atrial fibrillation. His EKG is sinus rhythm with nonspecific ST changes. After a long discussion with the patient and his sister, he promises to be rigid with his sotalol every 12 hours, and he is scheduling a nuclear stress test, and we will review his labs.\par November 18, 2016. Yesterday, the patient came for his nuclear stress test. He was in atrial flutter or fibrillation. His heart rate went as high as 199, and there were definite abnormal ST changes, but no chest pain. Blood pressure response was normal. The nuclear scan showed medium sized, mild defects, apical, inferior, mid to distal inferolateral that were reversible, and there was also transient ischemic dilatation of the LV with a ratio of 1.45. LVEF was 78% with normal wall motion post stress however. Patient returns today to review the findings and for reevaluation. I recommended coronary angiography and the patient and his sister are thinking about it. He is back in sinus rhythm.\par December 8, 2016. Patient had coronary angiography, which only showed a 30% lesion in the proximal circumflex. The other arteries had no obstruction and LVEF was normal at 70%. I had the patient switch over to flecainide 50 mg q.12 h.\par December 30, 2016. The patient is in sinus rhythm, however, he claims he has had a lot of breakthroughs and does not feel well on the flecainide. He could not be more specific and wanted to go back to the sotalol, although he has broken through 120 mg many times. After a long extensive discussion we agreed to try Rythmol  mg q.12 h.\par February 7, 2017. Patient called. He had never started the Rythmol. After much discussion he agreed to start the Rythmol and come in in 2 weeks.\par February 23, 2017. The patient is here in followup and is in rapid atrial fibrillation/flutter. He is tolerating the Rythmol well and thinks that most of the time he is doing okay. He was not aware that he was in atrial fibrillation today, although he did think he was yesterday. After long discussion about ablation, changing medications, increasing the dose, or using home telemetry to determine how often he truly is in atrial fibrillation he elected to go up on the Rythmol first. He is still not eager for invasive procedures.\par April 13, 2017. Patient finally returns as I would not renew his medication. He is in sinus rhythm. He says he had one or two bad days, but otherwise thinks he was in a normal rhythm most of the time. He is not  the most compliant when it comes to sticking to his q.12 h. regimen. For the last 3 days has been taking 325 mg twice a day instead of 425 as he ran out of pills.\par May 10, 2017. Patient is here because of cough and sputum. However, he admitted that after a while he decided once again to stop his Rythmol to "see what would happen" and sure enough on Saturday he could not walk to [employer] because he was out of breath and his heart was racing fast. He went back on the Rythmol and had another episode Sunday, but since then has been back in normal rhythm. He's been coughing with dark sputum for 2 days now, but no fever or chills. His EKG shows sinus rhythm with a normal QRS and QT. He is here with his wife so we had an extensive discussion about considering an ablation and about compliance with medication and he will be calling Dr. Get Hardin of [facility].\par \par July 24, 2019. First visit in over 2 years. He had an ablation with Dr. Hardin in 2017 but never continued the medications, even for just a few months and never followed up with Dr. Hardin. In January of this year was hospitalized at [facility] and it sounds like he again had substance abuse problems, mostly marijuana, but probably Ativan, and possibly other medications. He initially presented with lactic acidosis. At some point he did seem to be in sinus rhythm, but with a very bizarre EKG, and prolonged QT, which I believe, was thought to be from toxins. Eventually, was back in rapid A. Flutter and was discharged on Cardizem CD. His CHADS2-VASC score was 0 so no anticoag. No followup after that and he claims now that he had no insurance, but now that he does he came back. He has been having severe GI symptoms whenever he eats fatty, greasy foods, mostly, vomiting, and chest pain, and rapid heartbeat. Reportedly, he has an appointment with gastroenterology tomorrow. He is on absolutely no medications and is here in rapid atrial flutter with a heart rate of around 140. Seems to be tolerating it well, with no ischemic changes, no heart failure on exam, and blood pressure acceptable. I reviewed his notes from the hospitalization in January and reached out to Dr. Get Hardin for further information. In the meantime, I am placing him on bisoprolol 10 mg for additional rate control while he has his GI workup. Long discussion about compliance and self-destructive behavior

## 2019-07-25 LAB
ALBUMIN SERPL ELPH-MCNC: 4 G/DL
ALP BLD-CCNC: 73 U/L
ALT SERPL-CCNC: 17 U/L
ANION GAP SERPL CALC-SCNC: 12 MMOL/L
AST SERPL-CCNC: 16 U/L
BASOPHILS # BLD AUTO: 0.03 K/UL
BASOPHILS NFR BLD AUTO: 0.3 %
BILIRUB SERPL-MCNC: 0.3 MG/DL
BUN SERPL-MCNC: 19 MG/DL
CALCIUM SERPL-MCNC: 9.2 MG/DL
CHLORIDE SERPL-SCNC: 112 MMOL/L
CHOLEST SERPL-MCNC: 191 MG/DL
CHOLEST/HDLC SERPL: 6 RATIO
CO2 SERPL-SCNC: 24 MMOL/L
CREAT SERPL-MCNC: 1.29 MG/DL
EOSINOPHIL # BLD AUTO: 0.17 K/UL
EOSINOPHIL NFR BLD AUTO: 1.8 %
ESTIMATED AVERAGE GLUCOSE: 100 MG/DL
GLUCOSE SERPL-MCNC: 97 MG/DL
HBA1C MFR BLD HPLC: 5.1 %
HCT VFR BLD CALC: 46.9 %
HDLC SERPL-MCNC: 32 MG/DL
HGB BLD-MCNC: 15.2 G/DL
IMM GRANULOCYTES NFR BLD AUTO: 0.3 %
LDLC SERPL CALC-MCNC: 111 MG/DL
LYMPHOCYTES # BLD AUTO: 2.87 K/UL
LYMPHOCYTES NFR BLD AUTO: 31.1 %
MAN DIFF?: NORMAL
MCHC RBC-ENTMCNC: 28.7 PG
MCHC RBC-ENTMCNC: 32.4 GM/DL
MCV RBC AUTO: 88.7 FL
MONOCYTES # BLD AUTO: 0.41 K/UL
MONOCYTES NFR BLD AUTO: 4.4 %
NEUTROPHILS # BLD AUTO: 5.73 K/UL
NEUTROPHILS NFR BLD AUTO: 62.1 %
NT-PROBNP SERPL-MCNC: 1030 PG/ML
PLATELET # BLD AUTO: 277 K/UL
POTASSIUM SERPL-SCNC: 4.6 MMOL/L
PROT SERPL-MCNC: 6.2 G/DL
RBC # BLD: 5.29 M/UL
RBC # FLD: 13.6 %
SODIUM SERPL-SCNC: 148 MMOL/L
TRIGL SERPL-MCNC: 242 MG/DL
TSH SERPL-ACNC: 1.38 UIU/ML
WBC # FLD AUTO: 9.24 K/UL

## 2019-07-31 ENCOUNTER — APPOINTMENT (OUTPATIENT)
Dept: CARDIOLOGY | Facility: CLINIC | Age: 54
End: 2019-07-31
Payer: COMMERCIAL

## 2019-07-31 ENCOUNTER — NON-APPOINTMENT (OUTPATIENT)
Age: 54
End: 2019-07-31

## 2019-07-31 VITALS
BODY MASS INDEX: 28.76 KG/M2 | OXYGEN SATURATION: 98 % | SYSTOLIC BLOOD PRESSURE: 100 MMHG | DIASTOLIC BLOOD PRESSURE: 63 MMHG | WEIGHT: 217 LBS | TEMPERATURE: 97.9 F | HEIGHT: 73 IN | HEART RATE: 49 BPM

## 2019-07-31 PROCEDURE — 99213 OFFICE O/P EST LOW 20 MIN: CPT

## 2019-07-31 PROCEDURE — 93000 ELECTROCARDIOGRAM COMPLETE: CPT

## 2019-07-31 NOTE — REVIEW OF SYSTEMS
[see HPI] : see HPI [Negative] : Heme/Lymph [Recent Weight Gain (___ Lbs)] : no recent weight gain [Feeling Fatigued] : not feeling fatigued [Recent Weight Loss (___ Lbs)] : no recent weight loss [Shortness Of Breath] : no shortness of breath [Dyspnea on exertion] : not dyspnea during exertion [Chest  Pressure] : no chest pressure [Chest Pain] : no chest pain [Lower Ext Edema] : no extremity edema [Leg Claudication] : no intermittent leg claudication [Palpitations] : no palpitations [Cough] : no cough [Wheezing] : no wheezing [Coughing Up Blood] : no hemoptysis [Abdominal Pain] : no abdominal pain [Nausea] : no nausea [Vomiting] : no vomiting [Heartburn] : no heartburn [Change in Appetite] : no change in appetite [Dysphagia] : no dysphagia [Confusion] : no confusion was observed [Dizziness] : no dizziness [Depression] : no depression [Anxiety] : no anxiety [Under Stress] : not under stress [Suicidal] : not suicidal

## 2019-07-31 NOTE — HISTORY OF PRESENT ILLNESS
[FreeTextEntry1] : August 30, 2016. Patient is a 51-year-old man, who was finally diagnosed with rapid atrial fibrillation about one month ago. On July 24 related to drinking and smoking synthetic weed, etc. he was hospitalized at [facility]. He had severe palpitations and "his whole system went into shock". He had some vomitting and nauseousness, diaphoresis, and an uncomfortable feeling, but no chest pain. He was in rapid atrial fibrillation. He did make positive troponin enzymes, but was not recommended to have cardiac catheterization or even a stress test. He was hospitalized at [facility] for 6 days and echocardiogram supposedly showed no scar. He was discharged on cholesterol medications, but then he followed up with a cardiologist in [place] after being hospitalized there for 48 hours with a recurrence of his A. fib. He was placed on sotalol and did well for a while. On 26 August on 120 mg of sotalol, he broke through with an episode of A.fib. The cardiologist wanted to increase Sotalol to 160 mg, but the patient stayed at 120 and then came back to [place] and is here to see me. He has not had a stress test. He has no risk factors for coronary artery disease or a family history of coronary disease or arrhythmias. He claims he's been having symptoms for 20 years, and the episodes have lasted as long as an hour, and it is only with this episode in July, that he was finally diagnosed with atrial fibrillation. He has some shortness of breath going up stairs, but he says he has had this for years, and it has not progressed. Otherwise, with normal activity, and swimming, he does not get chest pain. While he smokes a lot of weed, he does not smoke cigarettes. He thinks he was told of a murmur recently and once as a kid, although I did not hear a murmur on exam. Has not had any syncope recently. He did have a duodenal ulcer about 10 years ago, not related to medications or NSAIDs, and not bleeding. It has not been an issue since. He is currently on sotalol 120 b.i.d., BuSpar, 5 mg b.i.d., and aspirin 81 mg q.d. However, in [facility] Electronic medical record, there are no other notes, but there is a list of medicines to be verified that includes diltiazem, calculus, Eliquis, as well as atorvastatin, and Crestor.\par September 9, 2016. The patient came for stress test and had to stop for shortness of breath at 6 minutes with a low heart rate on carvedilol. It looked like ST s were starting to go down, but did not meet criteria. APCs noted, but no atrial fibrillation. Recommended nuclear stress test and use that to help guide medicine versus statin for his hyperlipidemia.\par November 2, 2016. Patient here in followup. He did not have nuclear stress test. He is here in followup because he is running out of sotalol, but also he had 2 episodes of atrial fibrillation, which were severe and accompanied with chest pain. He admits he has not been taking it twice a day and occasionally will skip. He has not worked on his diet and in fact has gained weight. He does complain of some fatigue and shortness of breath. No exertional chest pain, however; he does not get the same chest pain with exertion that he gets during the atrial fibrillation. His EKG is sinus rhythm with nonspecific ST changes. After a long discussion with the patient and his sister, he promises to be rigid with his sotalol every 12 hours, and he is scheduling a nuclear stress test, and we will review his labs.\par November 18, 2016. Yesterday, the patient came for his nuclear stress test. He was in atrial flutter or fibrillation. His heart rate went as high as 199, and there were definite abnormal ST changes, but no chest pain. Blood pressure response was normal. The nuclear scan showed medium sized, mild defects, apical, inferior, mid to distal inferolateral that were reversible, and there was also transient ischemic dilatation of the LV with a ratio of 1.45. LVEF was 78% with normal wall motion post stress however. Patient returns today to review the findings and for reevaluation. I recommended coronary angiography and the patient and his sister are thinking about it. He is back in sinus rhythm.\par December 8, 2016. Patient had coronary angiography, which only showed a 30% lesion in the proximal circumflex. The other arteries had no obstruction and LVEF was normal at 70%. I had the patient switch over to flecainide 50 mg q.12 h.\par December 30, 2016. The patient is in sinus rhythm, however, he claims he has had a lot of breakthroughs and does not feel well on the flecainide. He could not be more specific and wanted to go back to the sotalol, although he has broken through 120 mg many times. After a long extensive discussion we agreed to try Rythmol  mg q.12 h.\par February 7, 2017. Patient called. He had never started the Rythmol. After much discussion he agreed to start the Rythmol and come in in 2 weeks.\par February 23, 2017. The patient is here in followup and is in rapid atrial fibrillation/flutter. He is tolerating the Rythmol well and thinks that most of the time he is doing okay. He was not aware that he was in atrial fibrillation today, although he did think he was yesterday. After long discussion about ablation, changing medications, increasing the dose, or using home telemetry to determine how often he truly is in atrial fibrillation he elected to go up on the Rythmol first. He is still not eager for invasive procedures.\par April 13, 2017. Patient finally returns as I would not renew his medication. He is in sinus rhythm. He says he had one or two bad days, but otherwise thinks he was in a normal rhythm most of the time. He is not  the most compliant when it comes to sticking to his q.12 h. regimen. For the last 3 days has been taking 325 mg twice a day instead of 425 as he ran out of pills.\par May 10, 2017. Patient is here because of cough and sputum. However, he admitted that after a while he decided once again to stop his Rythmol to "see what would happen" and sure enough on Saturday he could not walk to [employer] because he was out of breath and his heart was racing fast. He went back on the Rythmol and had another episode Sunday, but since then has been back in normal rhythm. He's been coughing with dark sputum for 2 days now, but no fever or chills. His EKG shows sinus rhythm with a normal QRS and QT. He is here with his wife so we had an extensive discussion about considering an ablation and about compliance with medication and he will be calling Dr. Get Hardin of [facility].\par \par July 24, 2019. First visit in over 2 years. He had an ablation with Dr. Hardin in 2017 but never continued the medications, even for just a few months and never followed up with Dr. Hardin. In January of this year was hospitalized at [facility] and it sounds like he again had substance abuse problems, mostly marijuana, but probably Ativan, and possibly other medications. He initially presented with lactic acidosis. At some point he did seem to be in sinus rhythm, but with a very bizarre EKG, and prolonged QT, which I believe, was thought to be from toxins. Eventually, was back in rapid A. Flutter and was discharged on Cardizem CD. His CHADS2-VASC score was 0 so no anticoag. No followup after that and he claims now that he had no insurance, but now that he does he came back. He has been having severe GI symptoms whenever he eats fatty, greasy foods, mostly, vomiting, and chest pain, and rapid heartbeat. Reportedly, he has an appointment with gastroenterology tomorrow. He is on absolutely no medications and is here in rapid atrial flutter with a heart rate of around 140. Seems to be tolerating it well, with no ischemic changes, no heart failure on exam, and blood pressure acceptable. I reviewed his notes from the hospitalization in January and reached out to Dr. Get Hardin for further information. In the meantime, I am placing him on bisoprolol 10 mg for additional rate control while he has his GI workup. Long discussion about compliance and self-destructive behavior.\par July 31, 2019. Patient returns in followup on bisoprolol. I spoke with Dr. Hardin after his last visit, who said that even though his CHADS2-VASC score was zero, normally he likes to anticoagulate these people after ablation, but because the patient was so erratic in his behavior and there was concern about drugs or drinking, he did not. He would be willing to do another ablation if necessary. Today he is back in sinus rhythm at 51.Labs from last week were within normal limits, except for his lipid profile, which we reviewed today.

## 2019-07-31 NOTE — PHYSICAL EXAM
[General Appearance - Well Developed] : well developed [Normal Appearance] : normal appearance [Well Groomed] : well groomed [General Appearance - Well Nourished] : well nourished [No Deformities] : no deformities [General Appearance - In No Acute Distress] : no acute distress [Normal Conjunctiva] : the conjunctiva exhibited no abnormalities [Eyelids - No Xanthelasma] : the eyelids demonstrated no xanthelasmas [Normal Oral Mucosa] : normal oral mucosa [No Oral Pallor] : no oral pallor [No Oral Cyanosis] : no oral cyanosis [Normal Jugular Venous A Waves Present] : normal jugular venous A waves present [Normal Jugular Venous V Waves Present] : normal jugular venous V waves present [No Jugular Venous Ortega A Waves] : no jugular venous ortega A waves [Exaggerated Use Of Accessory Muscles For Inspiration] : no accessory muscle use [Respiration, Rhythm And Depth] : normal respiratory rhythm and effort [Auscultation Breath Sounds / Voice Sounds] : lungs were clear to auscultation bilaterally [Heart Rate And Rhythm] : heart rate and rhythm were normal [Heart Sounds] : normal S1 and S2 [Murmurs] : no murmurs present [Abdomen Soft] : soft [Abdomen Tenderness] : non-tender [Abdomen Mass (___ Cm)] : no abdominal mass palpated [Abnormal Walk] : normal gait [Gait - Sufficient For Exercise Testing] : the gait was sufficient for exercise testing [Nail Clubbing] : no clubbing of the fingernails [Cyanosis, Localized] : no localized cyanosis [Petechial Hemorrhages (___cm)] : no petechial hemorrhages [Skin Color & Pigmentation] : normal skin color and pigmentation [] : no rash [No Venous Stasis] : no venous stasis [Skin Lesions] : no skin lesions [No Skin Ulcers] : no skin ulcer [No Xanthoma] : no  xanthoma was observed [Oriented To Time, Place, And Person] : oriented to person, place, and time [Affect] : the affect was normal [Mood] : the mood was normal [No Anxiety] : not feeling anxious [FreeTextEntry1] : No rashes. No cyanosis

## 2019-08-07 ENCOUNTER — INPATIENT (INPATIENT)
Facility: HOSPITAL | Age: 54
LOS: 1 days | Discharge: HOME | DRG: 897 | End: 2019-08-09
Attending: INTERNAL MEDICINE | Admitting: STUDENT IN AN ORGANIZED HEALTH CARE EDUCATION/TRAINING PROGRAM
Payer: COMMERCIAL

## 2019-08-07 VITALS
WEIGHT: 229.94 LBS | HEART RATE: 69 BPM | TEMPERATURE: 98 F | HEIGHT: 73 IN | OXYGEN SATURATION: 98 % | DIASTOLIC BLOOD PRESSURE: 95 MMHG | SYSTOLIC BLOOD PRESSURE: 154 MMHG | RESPIRATION RATE: 16 BRPM

## 2019-08-07 DIAGNOSIS — Z29.9 ENCOUNTER FOR PROPHYLACTIC MEASURES, UNSPECIFIED: ICD-10-CM

## 2019-08-07 DIAGNOSIS — F63.0 PATHOLOGICAL GAMBLING: ICD-10-CM

## 2019-08-07 DIAGNOSIS — Z98.890 OTHER SPECIFIED POSTPROCEDURAL STATES: Chronic | ICD-10-CM

## 2019-08-07 DIAGNOSIS — G43.A1 CYCLICAL VOMITING, IN MIGRAINE, INTRACTABLE: ICD-10-CM

## 2019-08-07 DIAGNOSIS — F12.20 CANNABIS DEPENDENCE, UNCOMPLICATED: ICD-10-CM

## 2019-08-07 DIAGNOSIS — F12.90 CANNABIS USE, UNSPECIFIED, UNCOMPLICATED: ICD-10-CM

## 2019-08-07 DIAGNOSIS — I48.91 UNSPECIFIED ATRIAL FIBRILLATION: ICD-10-CM

## 2019-08-07 LAB
ALBUMIN SERPL ELPH-MCNC: 4.5 G/DL — SIGNIFICANT CHANGE UP (ref 3.3–5)
ALP SERPL-CCNC: 77 U/L — SIGNIFICANT CHANGE UP (ref 40–120)
ALT FLD-CCNC: 19 U/L — SIGNIFICANT CHANGE UP (ref 10–45)
ANION GAP SERPL CALC-SCNC: 17 MMOL/L — SIGNIFICANT CHANGE UP (ref 5–17)
APPEARANCE UR: CLEAR — SIGNIFICANT CHANGE UP
AST SERPL-CCNC: 14 U/L — SIGNIFICANT CHANGE UP (ref 10–40)
BACTERIA # UR AUTO: NEGATIVE — SIGNIFICANT CHANGE UP
BASE EXCESS BLDV CALC-SCNC: -0.9 MMOL/L — SIGNIFICANT CHANGE UP (ref -2–2)
BASOPHILS # BLD AUTO: 0 K/UL — SIGNIFICANT CHANGE UP (ref 0–0.2)
BASOPHILS NFR BLD AUTO: 0.1 % — SIGNIFICANT CHANGE UP (ref 0–2)
BILIRUB SERPL-MCNC: 0.4 MG/DL — SIGNIFICANT CHANGE UP (ref 0.2–1.2)
BILIRUB UR-MCNC: NEGATIVE — SIGNIFICANT CHANGE UP
BUN SERPL-MCNC: 17 MG/DL — SIGNIFICANT CHANGE UP (ref 7–23)
CA-I SERPL-SCNC: 1.08 MMOL/L — LOW (ref 1.12–1.3)
CALCIUM SERPL-MCNC: 9.8 MG/DL — SIGNIFICANT CHANGE UP (ref 8.4–10.5)
CHLORIDE BLDV-SCNC: 111 MMOL/L — HIGH (ref 96–108)
CHLORIDE SERPL-SCNC: 104 MMOL/L — SIGNIFICANT CHANGE UP (ref 96–108)
CO2 BLDV-SCNC: 24 MMOL/L — SIGNIFICANT CHANGE UP (ref 22–30)
CO2 SERPL-SCNC: 23 MMOL/L — SIGNIFICANT CHANGE UP (ref 22–31)
COLOR SPEC: SIGNIFICANT CHANGE UP
CREAT SERPL-MCNC: 1.08 MG/DL — SIGNIFICANT CHANGE UP (ref 0.5–1.3)
DIFF PNL FLD: NEGATIVE — SIGNIFICANT CHANGE UP
EOSINOPHIL # BLD AUTO: 0.1 K/UL — SIGNIFICANT CHANGE UP (ref 0–0.5)
EOSINOPHIL NFR BLD AUTO: 0.4 % — SIGNIFICANT CHANGE UP (ref 0–6)
EPI CELLS # UR: 0 /HPF — SIGNIFICANT CHANGE UP
ETHANOL SERPL-MCNC: SIGNIFICANT CHANGE UP MG/DL (ref 0–10)
GAS PNL BLDV: 137 MMOL/L — SIGNIFICANT CHANGE UP (ref 135–145)
GAS PNL BLDV: SIGNIFICANT CHANGE UP
GLUCOSE BLDV-MCNC: 131 MG/DL — HIGH (ref 70–99)
GLUCOSE SERPL-MCNC: 145 MG/DL — HIGH (ref 70–99)
GLUCOSE UR QL: NEGATIVE — SIGNIFICANT CHANGE UP
HCO3 BLDV-SCNC: 23 MMOL/L — SIGNIFICANT CHANGE UP (ref 21–29)
HCT VFR BLD CALC: 47.5 % — SIGNIFICANT CHANGE UP (ref 39–50)
HCT VFR BLDA CALC: 42 % — SIGNIFICANT CHANGE UP (ref 39–50)
HGB BLD CALC-MCNC: 13.8 G/DL — SIGNIFICANT CHANGE UP (ref 13–17)
HGB BLD-MCNC: 15.6 G/DL — SIGNIFICANT CHANGE UP (ref 13–17)
HYALINE CASTS # UR AUTO: 0 /LPF — SIGNIFICANT CHANGE UP (ref 0–2)
KETONES UR-MCNC: SIGNIFICANT CHANGE UP
LACTATE BLDV-MCNC: 3.7 MMOL/L — HIGH (ref 0.7–2)
LEUKOCYTE ESTERASE UR-ACNC: NEGATIVE — SIGNIFICANT CHANGE UP
LYMPHOCYTES # BLD AUTO: 14.5 % — SIGNIFICANT CHANGE UP (ref 13–44)
LYMPHOCYTES # BLD AUTO: 2.2 K/UL — SIGNIFICANT CHANGE UP (ref 1–3.3)
MAGNESIUM SERPL-MCNC: 1.8 MG/DL — SIGNIFICANT CHANGE UP (ref 1.6–2.6)
MCHC RBC-ENTMCNC: 28.5 PG — SIGNIFICANT CHANGE UP (ref 27–34)
MCHC RBC-ENTMCNC: 32.9 GM/DL — SIGNIFICANT CHANGE UP (ref 32–36)
MCV RBC AUTO: 86.7 FL — SIGNIFICANT CHANGE UP (ref 80–100)
MONOCYTES # BLD AUTO: 0.3 K/UL — SIGNIFICANT CHANGE UP (ref 0–0.9)
MONOCYTES NFR BLD AUTO: 2 % — SIGNIFICANT CHANGE UP (ref 2–14)
NEUTROPHILS # BLD AUTO: 12.6 K/UL — HIGH (ref 1.8–7.4)
NEUTROPHILS NFR BLD AUTO: 83 % — HIGH (ref 43–77)
NITRITE UR-MCNC: NEGATIVE — SIGNIFICANT CHANGE UP
OTHER CELLS CSF MANUAL: 13 ML/DL — LOW (ref 18–22)
PCO2 BLDV: 37 MMHG — SIGNIFICANT CHANGE UP (ref 35–50)
PH BLDV: 7.41 — SIGNIFICANT CHANGE UP (ref 7.35–7.45)
PH UR: 5.5 — SIGNIFICANT CHANGE UP (ref 5–8)
PHOSPHATE SERPL-MCNC: 2.9 MG/DL — SIGNIFICANT CHANGE UP (ref 2.5–4.5)
PLATELET # BLD AUTO: 267 K/UL — SIGNIFICANT CHANGE UP (ref 150–400)
PO2 BLDV: 41 MMHG — SIGNIFICANT CHANGE UP (ref 25–45)
POTASSIUM BLDV-SCNC: 3.7 MMOL/L — SIGNIFICANT CHANGE UP (ref 3.5–5.3)
POTASSIUM SERPL-MCNC: 4.1 MMOL/L — SIGNIFICANT CHANGE UP (ref 3.5–5.3)
POTASSIUM SERPL-SCNC: 4.1 MMOL/L — SIGNIFICANT CHANGE UP (ref 3.5–5.3)
PROT SERPL-MCNC: 7.2 G/DL — SIGNIFICANT CHANGE UP (ref 6–8.3)
PROT UR-MCNC: SIGNIFICANT CHANGE UP
RBC # BLD: 5.47 M/UL — SIGNIFICANT CHANGE UP (ref 4.2–5.8)
RBC # FLD: 12.5 % — SIGNIFICANT CHANGE UP (ref 10.3–14.5)
RBC CASTS # UR COMP ASSIST: 1 /HPF — SIGNIFICANT CHANGE UP (ref 0–4)
SAO2 % BLDV: 71 % — SIGNIFICANT CHANGE UP (ref 67–88)
SODIUM SERPL-SCNC: 144 MMOL/L — SIGNIFICANT CHANGE UP (ref 135–145)
SP GR SPEC: 1.02 — SIGNIFICANT CHANGE UP (ref 1.01–1.02)
UROBILINOGEN FLD QL: NEGATIVE — SIGNIFICANT CHANGE UP
WBC # BLD: 15.2 K/UL — HIGH (ref 3.8–10.5)
WBC # FLD AUTO: 15.2 K/UL — HIGH (ref 3.8–10.5)
WBC UR QL: 1 /HPF — SIGNIFICANT CHANGE UP (ref 0–5)

## 2019-08-07 PROCEDURE — 99285 EMERGENCY DEPT VISIT HI MDM: CPT

## 2019-08-07 PROCEDURE — 99223 1ST HOSP IP/OBS HIGH 75: CPT | Mod: GC

## 2019-08-07 PROCEDURE — 70450 CT HEAD/BRAIN W/O DYE: CPT | Mod: 26

## 2019-08-07 PROCEDURE — 99223 1ST HOSP IP/OBS HIGH 75: CPT

## 2019-08-07 PROCEDURE — 74177 CT ABD & PELVIS W/CONTRAST: CPT | Mod: 26

## 2019-08-07 PROCEDURE — 99222 1ST HOSP IP/OBS MODERATE 55: CPT

## 2019-08-07 RX ORDER — BISOPROLOL FUMARATE 10 MG/1
10 TABLET, FILM COATED ORAL DAILY
Refills: 0 | Status: DISCONTINUED | OUTPATIENT
Start: 2019-08-07 | End: 2019-08-09

## 2019-08-07 RX ORDER — SODIUM CHLORIDE 9 MG/ML
1000 INJECTION, SOLUTION INTRAVENOUS
Refills: 0 | Status: DISCONTINUED | OUTPATIENT
Start: 2019-08-07 | End: 2019-08-07

## 2019-08-07 RX ORDER — SODIUM CHLORIDE 9 MG/ML
1000 INJECTION INTRAMUSCULAR; INTRAVENOUS; SUBCUTANEOUS ONCE
Refills: 0 | Status: COMPLETED | OUTPATIENT
Start: 2019-08-07 | End: 2019-08-07

## 2019-08-07 RX ORDER — CAPSAICIN 0.025 %
1 CREAM (GRAM) TOPICAL ONCE
Refills: 0 | Status: COMPLETED | OUTPATIENT
Start: 2019-08-07 | End: 2019-08-07

## 2019-08-07 RX ORDER — MAGNESIUM SULFATE 500 MG/ML
1 VIAL (ML) INJECTION ONCE
Refills: 0 | Status: COMPLETED | OUTPATIENT
Start: 2019-08-07 | End: 2019-08-07

## 2019-08-07 RX ORDER — SODIUM CHLORIDE 9 MG/ML
1000 INJECTION, SOLUTION INTRAVENOUS
Refills: 0 | Status: DISCONTINUED | OUTPATIENT
Start: 2019-08-07 | End: 2019-08-09

## 2019-08-07 RX ORDER — DIPHENHYDRAMINE HCL 50 MG
50 CAPSULE ORAL ONCE
Refills: 0 | Status: COMPLETED | OUTPATIENT
Start: 2019-08-07 | End: 2019-08-07

## 2019-08-07 RX ORDER — METOCLOPRAMIDE HCL 10 MG
10 TABLET ORAL ONCE
Refills: 0 | Status: COMPLETED | OUTPATIENT
Start: 2019-08-07 | End: 2019-08-07

## 2019-08-07 RX ORDER — ONDANSETRON 8 MG/1
4 TABLET, FILM COATED ORAL ONCE
Refills: 0 | Status: COMPLETED | OUTPATIENT
Start: 2019-08-07 | End: 2019-08-07

## 2019-08-07 RX ORDER — SODIUM CHLORIDE 9 MG/ML
1000 INJECTION, SOLUTION INTRAVENOUS ONCE
Refills: 0 | Status: COMPLETED | OUTPATIENT
Start: 2019-08-07 | End: 2019-08-07

## 2019-08-07 RX ORDER — METOCLOPRAMIDE HCL 10 MG
10 TABLET ORAL EVERY 8 HOURS
Refills: 0 | Status: DISCONTINUED | OUTPATIENT
Start: 2019-08-07 | End: 2019-08-09

## 2019-08-07 RX ORDER — ONDANSETRON 8 MG/1
8 TABLET, FILM COATED ORAL ONCE
Refills: 0 | Status: COMPLETED | OUTPATIENT
Start: 2019-08-07 | End: 2019-08-07

## 2019-08-07 RX ADMIN — Medication 1 APPLICATION(S): at 07:58

## 2019-08-07 RX ADMIN — Medication 0.5 MILLIGRAM(S): at 05:40

## 2019-08-07 RX ADMIN — ONDANSETRON 4 MILLIGRAM(S): 8 TABLET, FILM COATED ORAL at 13:54

## 2019-08-07 RX ADMIN — ONDANSETRON 8 MILLIGRAM(S): 8 TABLET, FILM COATED ORAL at 04:31

## 2019-08-07 RX ADMIN — Medication 50 MILLIGRAM(S): at 08:19

## 2019-08-07 RX ADMIN — Medication 100 GRAM(S): at 09:20

## 2019-08-07 RX ADMIN — Medication 10 MILLIGRAM(S): at 14:07

## 2019-08-07 RX ADMIN — SODIUM CHLORIDE 1000 MILLILITER(S): 9 INJECTION, SOLUTION INTRAVENOUS at 08:22

## 2019-08-07 RX ADMIN — Medication 1 MILLIGRAM(S): at 07:14

## 2019-08-07 RX ADMIN — SODIUM CHLORIDE 1000 MILLILITER(S): 9 INJECTION INTRAMUSCULAR; INTRAVENOUS; SUBCUTANEOUS at 06:03

## 2019-08-07 RX ADMIN — SODIUM CHLORIDE 1000 MILLILITER(S): 9 INJECTION INTRAMUSCULAR; INTRAVENOUS; SUBCUTANEOUS at 05:12

## 2019-08-07 RX ADMIN — SODIUM CHLORIDE 100 MILLILITER(S): 9 INJECTION, SOLUTION INTRAVENOUS at 09:20

## 2019-08-07 RX ADMIN — SODIUM CHLORIDE 1000 MILLILITER(S): 9 INJECTION INTRAMUSCULAR; INTRAVENOUS; SUBCUTANEOUS at 04:30

## 2019-08-07 RX ADMIN — BISOPROLOL FUMARATE 10 MILLIGRAM(S): 10 TABLET, FILM COATED ORAL at 23:55

## 2019-08-07 RX ADMIN — Medication 0.5 MILLIGRAM(S): at 14:07

## 2019-08-07 RX ADMIN — SODIUM CHLORIDE 1000 MILLILITER(S): 9 INJECTION INTRAMUSCULAR; INTRAVENOUS; SUBCUTANEOUS at 07:14

## 2019-08-07 RX ADMIN — Medication 0.5 MILLIGRAM(S): at 20:09

## 2019-08-07 RX ADMIN — SODIUM CHLORIDE 75 MILLILITER(S): 9 INJECTION, SOLUTION INTRAVENOUS at 23:55

## 2019-08-07 RX ADMIN — Medication 10 MILLIGRAM(S): at 07:14

## 2019-08-07 NOTE — H&P ADULT - ASSESSMENT
Vitor Gamezubov is a 55 yo man with history of substance abuse and afib presenting with nausea, vomiting, chills, and anxiety likely in the setting of acute marijuana intoxication.

## 2019-08-07 NOTE — ED ADULT NURSE NOTE - PMH
Alcohol use    Anxiety    Atrial fibrillation, unspecified type    GERD (gastroesophageal reflux disease)    Hyperlipidemia    Marijuana use    Peptic ulcer disease

## 2019-08-07 NOTE — ED ADULT NURSE NOTE - OBJECTIVE STATEMENT
10 pm tuesday had n/v; has spasms in abd; no fever; has diaphoresis and "shakes"; no sz hx 10 pm Tuesday had n/v; has spasms in abd; denies pain; no fevers; has diaphoresis and "shakes"; no sz hx; per wife pt is chronic user of Marijuana and has hx of cyclic vomiting; pt is moving back and forth on stretcher; vomiting; pt denies any illicit drug use and recent alcohol use; pt able to state name, place and year but wife answers all other questions; skin is cool and moist

## 2019-08-07 NOTE — H&P ADULT - PROBLEM SELECTOR PLAN 4
DVT ppx: SCDs  Diet: NPO for now given continued emesis  Dispo: Pending resolution of acute symptoms

## 2019-08-07 NOTE — ED ADULT NURSE NOTE - NSIMPLEMENTINTERV_GEN_ALL_ED
Implemented All Fall Risk Interventions:  [place] to call system. Call bell, personal items and telephone within reach. Instruct patient to call for assistance. Room bathroom lighting operational. Non-slip footwear when patient is off stretcher. Physically safe environment: no spills, clutter or unnecessary equipment. Stretcher in lowest position, wheels locked, appropriate side rails in place. Provide visual cue, wrist band, yellow gown, etc. Monitor gait and stability. Monitor for mental status changes and reorient to person, place, and time. Review medications for side effects contributing to fall risk. Reinforce activity limits and safety measures with patient and family.

## 2019-08-07 NOTE — ED PROVIDER NOTE - OBJECTIVE STATEMENT
54M pmh A-fib s/p ablation (09/2017), marijuana use, HLD, anxiety, remote EtOH abuse, cyclic vomiting now presenting to the ED for 1 day history of vomiting. Pt reports that for 1 day history of vomiting that has not stopped. Pt reports that he smokes marijuana every day. Pt denies fevers, chills, chest pain, palpitations.

## 2019-08-07 NOTE — ED PROVIDER NOTE - PROGRESS NOTE DETAILS
eYsy IM PGY 3: pt had episode of tachycardia, however, it broke. Pt continues with nausea, now with increased shaking. Lactate 4.1, will give 3L NS and recheck. Pt signed off to day team for close reassessment. received s/o from overnight resident. Pt reassessed, still c/o n/v. Denies any recent etoh use (no use in years), no f/c, recent travel, strange abnl foods, abx use, diarrhea. Pt does note some relief of n/v with a warm shower or compress. Abd soft NT, clear sensorium, pt uncomfortable but in NAD. provided pt with warm compress and called pharmacy to order capsaicin cream. Pt currently receiving IVF and Reglan, will reassess after fluids and medication. -Saad Peterson PA-C Pt experiencing akathisia, ordered benadryl. n/v improved but still present. -Saad Peterson PA-C pt signed out to me by Dr. Zapata. Pt received reglan and developed akathisia which is improved w benadryl. repeat lactate elevated. Pt HD stable, abd soft non tender. denies any other recent infection, declined rectal temp. CT A/P done. Cont IVF hydration and likely admit to hospital. RGUJRAL pt signed out to me by Dr. Zapata. Pt received reglan and developed akathisia which is improved w benadryl. repeat lactate elevated. Pt HD stable, abd soft non tender. denies any other recent infection, declined rectal temp and xray chest.  CT A/P done. Denies anxiety, depression, alcohol abuse. States Cont IVF hydration and likely admit to hospital. RGUJRAL Pt again screaming and asking to be put out of his misery. Denies any pain, pending CT. Will consider psych eval. RGUJRAL psych consulted, awaiting call back. CTAP shows no acute intraabdominal pathology. -Saad Peterson PA-C psych will follow on admission, no recommendations to ED team at this time. -Saad Peterson PA-C

## 2019-08-07 NOTE — ED ADULT NURSE REASSESSMENT NOTE - NS ED NURSE REASSESS COMMENT FT1
Received report from Rosita BARAJAS, patient unable to get comfortable in bed, writhing in stretcher, complaining of continued nausea. Patient remains A&Ox3 but is yelling "mama, mama," during my initial encounter. Denies CP, SOB< diarrhea, fevers, chills, abdominal pain, urinary symptoms, numbness, tingling in upper and lower extremities, HA blurry vision. 2nd VBG to be drawn after 3rd L of NS per RAMONA Wiley. VSS updated on plan of care.

## 2019-08-07 NOTE — H&P ADULT - NSHPPHYSICALEXAM_GEN_ALL_CORE
Vital Signs:     T(C): 37.1   HR: 67   BP: 129/68   RR: 20   SpO2: 98%  Gen:  man who appears drowsy but irritable, intermittently falling asleep during the interview. During the course of the interview, the patient became extremely restless and had incoherent statements. No jaundice or pallor noted.   HEENT: Normocephalic, conjunctiva and sclera clear and non-injected. Pupils equal, round, and reactive to light. Extraocular movements intact. Mucous membranes are dry  Neck: Supply, no thyromegaly, no JVD  Lungs: Clear to auscultation bilaterally, no wheezes, rales, or rhonchi  Heart: Regular rate and rhythm with no rubs or gallops  Abd: Soft, nondistended, nontender, with positive bowel sounds in all 4 quadrants. No hepatosplenomegaly  Back: No spinal or paraspinal tenderness, no CVA tenderness  Extremities: No clubbing, cyanosis, or edema  Skin: No rashes, ecchymoses, or petechiae  Neuro: Alert and oriented to person, place, and time. No focal deficits

## 2019-08-07 NOTE — H&P ADULT - NSHPLABSRESULTS_GEN_ALL_CORE
CBC Full  -  ( 07 Aug 2019 04:52 )  WBC Count : 15.2 K/uL  RBC Count : 5.47 M/uL  Hemoglobin : 15.6 g/dL  Hematocrit : 47.5 %  Platelet Count - Automated : 267 K/uL  Mean Cell Volume : 86.7 fl  Mean Cell Hemoglobin : 28.5 pg  Mean Cell Hemoglobin Concentration : 32.9 gm/dL  Auto Neutrophil # : 12.6 K/uL  Auto Lymphocyte # : 2.2 K/uL  Auto Monocyte # : 0.3 K/uL  Auto Eosinophil # : 0.1 K/uL  Auto Basophil # : 0.0 K/uL  Auto Neutrophil % : 83.0 %  Auto Lymphocyte % : 14.5 %  Auto Monocyte % : 2.0 %  Auto Eosinophil % : 0.4 %  Auto Basophil % : 0.1 %    08    144  |  104  |  17  ----------------------------<  145<H>  4.1   |  23  |  1.08    Ca    9.8      07 Aug 2019 04:52  Phos  2.9     08-  Mg     1.8     -    TPro  7.2  /  Alb  4.5  /  TBili  0.4  /  DBili  x   /  AST  14  /  ALT  19  /  AlkPhos  77  08-07      Urinalysis Basic - ( 07 Aug 2019 08:29 )    Color: Light Yellow / Appearance: Clear / S.021 / pH: x  Gluc: x / Ketone: Trace  / Bili: Negative / Urobili: Negative   Blood: x / Protein: Trace / Nitrite: Negative   Leuk Esterase: Negative / RBC: 1 /hpf / WBC 1 /HPF   Sq Epi: x / Non Sq Epi: 0 /hpf / Bacteria: Negative    Toxicology Screen, Drugs of Abuse, Urine (19 @ 02:23)    Phencyclidine Level, Urine: NEGATIVE    Amphetamine, Urine: NEGATIVE    Barbiturates Screen, Urine: NEGATIVE    Benzodiazepine, Urine: NEGATIVE    Cannabinoids, Urine: POSITIVE    Cocaine Metabolite, Urine: NEGATIVE    Methadone, Urine: NEGATIVE    Opiate, Urine: NEGATIVE    Oxycodone, Urine: NEGATIVE:   TEST                       CUT OFF VALUE  ----                       -------------  AMPHETAMINE CLASS           1000 ng/mL  BARBITURATES                 200 ng/mL  BENZODIAZEPINES              300 ng/mL  CANNABINOIDS                  50 ng/mL  COCAINE/METABOLITE           300 ng/mL  METHADONE                    300 ng/mL  OPIATES                      300 ng/mL  PHENCYCLIDINE                 25 ng/mL  OXYCODONE                    100 ng/mL    Blood Gas Profile - Venous (19 @ 08:08)    pH, Venous: 7.35    pCO2, Venous: 40 mmHg    pO2, Venous: 36 mmHg    HCO3, Venous: 21 mmol/L    Base Excess, Venous: -3.7 mmol/L    Oxygen Saturation, Venous: 60 %    Total CO2, Venous: 22 mmol/L    Blood Gas Source Venous: Venous      CT A/P  IMPRESSION:   No acute intra-abdominal or pelvic findings.    < from: 12 Lead ECG (19 @ 07:27) >  Ventricular Rate 70 BPM  Atrial Rate 70 BPM  P-R Interval 172 ms  QRS Duration 76 ms  Q-T Interval 408 ms  QTC Calculation(Bezet) 440 ms  P Axis 56 degrees  R Axis 64 degrees  T Axis 42 degrees  Diagnosis Line NORMAL SINUS RHYTHM  NONSPECIFIC ST ABNORMALITY  ABNORMAL ECG

## 2019-08-07 NOTE — ED PROVIDER NOTE - CLINICAL SUMMARY MEDICAL DECISION MAKING FREE TEXT BOX
54M now with vomiting. ddx includes gastritis vs cyclic vomiting. Will check labs, give ativan and zofran. 54M now with vomiting. ddx includes gastritis vs cyclic vomiting. Will check labs, give ativan and zofran.  Benny: 54 year old male with n/v history of cyclic vomiting.  abdomen soft nt/nd. will get labs, ivf, ativan, zofran, reassess.

## 2019-08-07 NOTE — PATIENT PROFILE ADULT - LIVES WITH
spouse/(children x3), private home, 2 steps-to-enter/exit home, (+) [id number] in the home (both with Unilateral Handrail)/adult child(gopal)

## 2019-08-07 NOTE — CONSULT NOTE ADULT - ASSESSMENT
From cardiac point of view, no significant CAD, ? MR but normal LV and RV function. Hx AF s/p ablation with poor f/u and compliance, but in sinus on beta-blocker. CHADS2-VASC score 0 so no anticoag.  Main issue is psych/ drug dependence on marijuana.    Plan: Continue bisoprolol 10 qd.  No further cardiac workup planned at this time.  Psych eval, dependence issues, etc.    Gianni Ansari MD, FACC  O) [phone]  (C) [phone]

## 2019-08-07 NOTE — ED ADULT NURSE REASSESSMENT NOTE - NS ED NURSE REASSESS COMMENT FT1
zofran took effect and pt was able to rest, but now pt has nausea and wretching again; HR increased to 160 svt; ED resident Uday Hampton made aware; Lorazapam 0.5 mg given with good effect and HR returned to 70's; pt is on cardiac monitor.

## 2019-08-07 NOTE — ED BEHAVIORAL HEALTH ASSESSMENT NOTE - HPI (INCLUDE ILLNESS QUALITY, SEVERITY, DURATION, TIMING, CONTEXT, MODIFYING FACTORS, ASSOCIATED SIGNS AND SYMPTOMS)
53 year old male, domiciled at home with wife and 3 adult children, , unemployed currently, with PPH significant for gambling addiction, substance abuse hx signficiant for daily marijuana use and 3 prior rehab stays, not currently in outpatient psychiatric treatment, no prior SIB/suicide attempts, PMH significant for A-fib s/p ablation (9/2017), HLD, presented to ED with complaint of nausea and vomiting, psychiatry consulted to evaluate for anxiety.    Pt is seen s/p receiving Ativan 1.5mg PRN and Benadryl 50mg PRN.  Was initially seen by ED team anxious, restless, crying out for help.  On examination, pt is intermittently lethargic, but arousable.  States he was anxious before but "feels better now" after receiving medications.  Denies current/past depression, paranoia, AH/VH.  Denies current/past SI/HI.  Denies significant anxiety issues prior to today.  With regard to substance abuse, endorses daily MJ use, states that he smokes about 2 joints a day.  Denies regular use of alcohol or other illicit drugs.  States he has had vomiting episodes in the past that last 1-2 days, last episode being few months ago, improve with hot showers/baths.  Denies abusing synthetic cannabinoids.

## 2019-08-07 NOTE — H&P ADULT - NSHPSOCIALHISTORY_GEN_ALL_CORE
The patient is  and has been living with his wife Christa. He has been unemployed for several years and was on unemployment benefits until it . 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 The patient denies smoking tobacco. He admits to casual alcohol use, drinking only on weekends. Aside from marijuana, the patient denies any other illicit drug use.

## 2019-08-07 NOTE — ED ADULT NURSE REASSESSMENT NOTE - NS ED NURSE REASSESS COMMENT FT1
RN to pharmacy for zostrix topical lotion,  pharmacy to send to [facility].

## 2019-08-07 NOTE — H&P ADULT - NSICDXFAMILYHX_GEN_ALL_CORE_FT
FAMILY HISTORY:  Family history of diabetes mellitus in mother  Family history of hypertension in mother  Family history of prostate cancer in father

## 2019-08-07 NOTE — ED BEHAVIORAL HEALTH ASSESSMENT NOTE - DESCRIPTION
T(C): 36.5 (08-07-19 @ 09:25), Max: 36.7 (08-07-19 @ 03:58)  HR: 77 (08-07-19 @ 09:25) (65 - 77)  BP: 151/80 (08-07-19 @ 07:22) (150/48 - 163/96)  RR: 26 (08-07-19 @ 09:25) (16 - 26)  SpO2: 100% (08-07-19 @ 09:25) (97% - 100%)  Wt(kg): --    Lethargic but calm, cooperative A-fib s/p ablation (09/2017), HLD unemployed, lives with wife and 3 children

## 2019-08-07 NOTE — ED ADULT NURSE REASSESSMENT NOTE - NS ED NURSE REASSESS COMMENT FT1
BP (!) 103/93   Pulse 66   Temp 98.4 °F (36.9 °C) (Temporal)   Resp 17   Ht 5' 7\" (1.702 m)   Wt 228 lb 11.2 oz (103.7 kg)   SpO2 96%   BMI 35.82 kg/m²     Mr. Gisele Leonardo is doing well. He remains neurologically intact.    The CT scan of the brain from [facility] RN attempted to give report 2x, placed on hold for more than 10 mins, will continue to try to call.

## 2019-08-07 NOTE — ED BEHAVIORAL HEALTH ASSESSMENT NOTE - RISK ASSESSMENT
Risk factors: substance abuse, acute medical issue, anxiety, unemployed    Protective factors: no current SIIP/HIIP, no h/o SA/SIB, no h/o psych admissions, domiciled, intact marriage, engaged in gamblers anonymous, help-seeking behaviors    Pt with chronically elevated risk in the setting of substance abuse mitigated by multiple protective factors; does not meet criteria for psychiatric admission.

## 2019-08-07 NOTE — H&P ADULT - PROBLEM SELECTOR PLAN 3
Afib s/p ablation. Currently following with cardiology Dr. Gianni Ansari.  - Bisoprolol 10mg PO QD Afib s/p ablation. Currently following with cardiology Dr. Gianni Ansari. Consult placed for patient to be evaluated during admission  - Bisoprolol 10mg PO QD

## 2019-08-07 NOTE — H&P ADULT - HISTORY OF PRESENT ILLNESS
Vitor Caraballo is a 54 year-old man with history of marijuana use and afib s/p ablation presenting with multiple episodes of nausea and vomiting. These episodes started yesterday night, where he was vomiting every hour. The emesis is nonbilious and nonbloody. The episodes are associated with shaking chills. Per the patient's wife, whenever he has these shaking chills, the patient assumes an "infantile posture" until the chills go away. The patient reports that hot showers improve the nausea minimally. The patient currently endorses nausea but says he has not vomited for several hours. There is no associated abdominal pain, diarrhea, constipation, or fevers. He has had previous hospital admissions for nausea and vomiting, last hospitalized in April at [facility]. He last presented to [facility] in January with similar complaints. At that time, he was worked up for nausea/vomiting, afib with RVR, and polysubstance abuse of marijuana and K2. The currently denies using K2, but admits to using marijuana. He last smoked marijuana yesterday, and says he spends $10/day on marijuana. Recently, he has been attempting to cut down on the amount of marijuana that he uses, but he still smokes at least 1-2 cigarettes of marijuana a day. In addition to nausea and vomiting, the patient also endorses significant anxiety but is unsure of the reason. He has no psychiatric hospitalizations but says he used to have a gambling "issue" and is an active participant in [employer]. Per the sister, the patient was admitted to rehab in 2016 for a K2 overdose, where he remained for 30 days. Afterwards, he was clean of all drugs for a year and a half before he relapsed back into using marijuana. The patient, however, denies ever using K2.

## 2019-08-07 NOTE — CONSULT NOTE ADULT - SUBJECTIVE AND OBJECTIVE BOX
Patient seen and evaluated @   Chief Complaint: Patient is a 54y old  Male who presents with a chief complaint of Nausea/vomiting (07 Aug 2019 14:36)      HPI:  Vitor Caraballo is a 54 year-old man with history of marijuana use and afib s/p ablation presenting with multiple episodes of nausea and vomiting. These episodes started yesterday night, where he was vomiting every hour. The emesis is nonbilious and nonbloody. The episodes are associated with shaking chills. Per the patient's wife, whenever he has these shaking chills, the patient assumes an "infantile posture" until the chills go away. The patient reports that hot showers improve the nausea minimally. The patient currently endorses nausea but says he has not vomited for several hours. There is no associated abdominal pain, diarrhea, constipation, or fevers. He has had previous hospital admissions for nausea and vomiting, last hospitalized in April at [facility]. He last presented to [facility] in January with similar complaints. At that time, he was worked up for nausea/vomiting, afib with RVR, and polysubstance abuse of marijuana and K2. The currently denies using K2, but admits to using marijuana. He last smoked marijuana yesterday, and says he spends $10/day on marijuana. Recently, he has been attempting to cut down on the amount of marijuana that he uses, but he still smokes at least 1-2 cigarettes of marijuana a day. In addition to nausea and vomiting, the patient also endorses significant anxiety but is unsure of the reason. He has no psychiatric hospitalizations but says he used to have a gambling "issue" and is an active participant in [employer]'s [employer]. Per the sister, the patient was admitted to rehab in 2016 for a K2 overdose, where he remained for 30 days. Afterwards, he was clean of all drugs for a year and a half before he relapsed back into using marijuana. The patient, however, denies ever using K2. (07 Aug 2019 14:36)    PMH:   Hyperlipidemia  Marijuana use  Alcohol use  Peptic ulcer disease  Anxiety  Atrial fibrillation, unspecified type  Marijuana abuse  GERD (gastroesophageal reflux disease)    PSH:   H/O prior ablation treatment  No significant past surgical history    Medications:   bisoprolol   Tablet 10 milliGRAM(s) Oral daily  lactated ringers. 1000 milliLiter(s) IV Continuous <Continuous>  LORazepam   Injectable 0.5 milliGRAM(s) IV Push every 6 hours PRN  metoclopramide Injectable 10 milliGRAM(s) IV Push every 8 hours PRN    Allergies:  No Known Allergies    FAMILY HISTORY:  Family history of hypertension in mother  Family history of prostate cancer in father  Family history of diabetes mellitus in mother    Social History:  Smoking:  Alcohol:  Drugs:    Review of Systems:  Constitutional: no recent weight loss  HEENT: no scleral icterus. Normal mucosa.  Respiratory: no shortness of breath. No history of asthma. No COPD  Cardiovascular: No Chest Pain, no Palpitations, no NAVARRO, PND, or Orthopnea. no Syncope. No history of rheumatic fever.   Gastrointestinal: No Abdominal Pain, no Diarrhea, no Constipation, no Nausea or Vomiting  Genitourinary: No Nocturia,Dysuria, or Incontinence. No hematuria  Extremities: no edema. No cyanosis.  Neurologic: No Focal deficit. No Paresthesias. No Syncope. No seizures  Lymphatic: No Swelling. No Lymphadenopathy   Skin: No Rash,  Ecchymoses, Wounds, or Lesions  Psychiatry: No Depression. No Anxiety.    10 point review of systems is otherwise negative except as mentioned above            [ ]Unable to obtain    Physical Exam:  T(C): 37.2 (08-07-19 @ 15:53), Max: 37.2 (08-07-19 @ 15:53)  HR: 53 (08-07-19 @ 15:53) (53 - 81)  BP: 131/70 (08-07-19 @ 15:53) (129/68 - 163/96)  RR: 20 (08-07-19 @ 15:53) (16 - 26)  SpO2: 90% (08-07-19 @ 15:53) (90% - 100%)  Wt(kg): --    08-06 @ 07:01  -  08-07 @ 07:00  --------------------------------------------------------  IN: 1000 mL / OUT: 0 mL / NET: 1000 mL      Daily Height in cm: 187.96 (07 Aug 2019 15:53)    Daily     Appearance: WD, WN, NAD  Eyes:  No scleral icterus. PERRL, EOMI  HENT: Normal oral mucosa.]NC/AT  Neck: No JVD at 90 degrees. Normal carotid upstrokes without bruits or murmurs.  Cardiovascular: Normal PMI. Normal S1, S2 physiologically split. No S3 or S4. No murmurs.  Respiratory: Clear to auscultation bilaterally. No wheezes. No rales.  Gastrointestinal: Soft.  Non-tender. No hepatosplenomegally.  Extremities: No clubbing. No edema. Pulses 2+ bilaterally symmetric including pedal.  Musculoskeletal:  No joint deformity   Neurologic: Non-focal  Lymphatic:  No lymphadenopathy  Psychiatry: [+ ] AAOx3 [ +] Mood & affect appropriate  Skin: No rashes. No ecchymoses. No cyanosis    Cardiovascular Diagnostic Testing:  ECG:    Echo:    Stress Testing:    Cath:    Interpretation of Telemetry:    Imaging:    Labs:                        15.6   15.2  )-----------( 267      ( 07 Aug 2019 04:52 )             47.5     08-07    144  |  104  |  17  ----------------------------<  145<H>  4.1   |  23  |  1.08    Ca    9.8      07 Aug 2019 04:52  Phos  2.9     08-07  Mg     1.8     08-07    TPro  7.2  /  Alb  4.5  /  TBili  0.4  /  DBili  x   /  AST  14  /  ALT  19  /  AlkPhos  77  08-07 Patient seen and evaluated @ 1930  Chief Complaint: Patient is a 54y old  Male who presents with a chief complaint of Nausea/vomiting (07 Aug 2019 14:36)      HPI:  Lesley Swanson is a 54 year-old man with history of marijuana use and afib s/p ablation presenting with multiple episodes of nausea and vomiting. These episodes started yesterday night, where he was vomiting every hour. The emesis is nonbilious and nonbloody. The episodes are associated with shaking chills. Per the patient's wife, whenever he has these shaking chills, the patient assumes an "infantile posture" until the chills go away. The patient reports that hot showers improve the nausea minimally. The patient currently endorses nausea but says he has not vomited for several hours. There is no associated abdominal pain, diarrhea, constipation, or fevers. He has had previous hospital admissions for nausea and vomiting, last hospitalized in April at [facility]. He last presented to [facility] in January with similar complaints. At that time, he was worked up for nausea/vomiting, afib with RVR, and polysubstance abuse of marijuana and K2. The currently denies using K2, but admits to using marijuana. He last smoked marijuana yesterday, and says he spends $10/day on marijuana. Recently, he has been attempting to cut down on the amount of marijuana that he uses, but he still smokes at least 1-2 cigarettes of marijuana a day. In addition to nausea and vomiting, the patient also endorses significant anxiety but is unsure of the reason. He has no psychiatric hospitalizations but says he used to have a gambling "issue" and is an active participant in [employer]'s [employer]. Per the sister, the patient was admitted to rehab in  for a K2 overdose, where he remained for 30 days. Afterwards, he was clean of all drugs for a year and a half before he relapsed back into using marijuana. The patient, however, denies ever using K2. (07 Aug 2019 14:36)    CARDIAC HISTORY: Paroxysmal AF with ablation  Dr. Get Hardin. Lost to follow up and recurrence of AF 2019 on admission to [facility] with intoxication, acidosis. Seen by me last month in AF, bisoprolol 10 started and converted to sinus. CHADS2-VASC 0 and patient unreliable so no anticoag. Cardiac cath 2016 with only non-obstructive disease 30% proximal cx. LVEF 70%. Echo at [facility] in 2019 with hyperdynamic LV and mild-mod MR.     PMH:   Hyperlipidemia  Marijuana use  Alcohol use  Peptic ulcer disease  Anxiety  Atrial fibrillation, unspecified type  Marijuana abuse  GERD (gastroesophageal reflux disease)    PSH:   H/O prior ablation treatment  No significant past surgical history    Medications:   bisoprolol   Tablet 10 milliGRAM(s) Oral daily  lactated ringers. 1000 milliLiter(s) IV Continuous <Continuous>  LORazepam   Injectable 0.5 milliGRAM(s) IV Push every 6 hours PRN  metoclopramide Injectable 10 milliGRAM(s) IV Push every 8 hours PRN    Allergies:  No Known Allergies    FAMILY HISTORY:  Family history of hypertension in mother  Family history of prostate cancer in father  Family history of diabetes mellitus in mother    Social History: .  Smoking: Denies  Alcohol: Denies, only marijuana  Drugs: As above    Review of Systems:  Constitutional: no recent weight loss  HEENT: no scleral icterus. Normal mucosa.  Respiratory: no shortness of breath. No history of asthma. No COPD  Cardiovascular: No Chest Pain, no Palpitations, no NAVARRO, PND, or Orthopnea. no Syncope. No history of rheumatic fever.   Gastrointestinal: No Abdominal Pain, no Diarrhea, no Constipation, no Nausea or Vomiting  Genitourinary: No Nocturia,Dysuria, or Incontinence. No hematuria  Extremities: no edema. No cyanosis.  Neurologic: No Focal deficit. No Paresthesias. No Syncope. No seizures  Lymphatic: No Swelling. No Lymphadenopathy   Skin: No Rash,  Ecchymoses, Wounds, or Lesions  Psychiatry: ???  10 point review of systems is otherwise negative except as mentioned above            [ ]Unable to obtain    Physical Exam:  T(C): 37.2 (19 @ 15:53), Max: 37.2 (19 @ 15:53)  HR: 53 (19 @ 15:53) (53 - 81)  BP: 131/70 (19 @ 15:53) (129/68 - 163/96)  RR: 20 (19 @ 15:53) (16 - 26)  SpO2: 90% (19 @ 15:53) (90% - 100%)  Wt(kg): --     @ 07: @ 07:00  --------------------------------------------------------  IN: 1000 mL / OUT: 0 mL / NET: 1000 mL      Daily Height in cm: 187.96 (07 Aug 2019 15:53)    Daily     Appearance: WD, WN, NAD  Eyes:  No scleral icterus. PERRL, EOMI  HENT: Normal oral mucosa.]NC/AT  Neck: No JVD at 90 degrees. Normal carotid upstrokes without bruits or murmurs.  Cardiovascular: Normal PMI. Normal S1, S2 physiologically split. No S3 or S4. No murmurs.  Respiratory: Clear to auscultation bilaterally. No wheezes. No rales.  Gastrointestinal: Soft.  Non-tender. No hepatosplenomegally.  Extremities: No clubbing. No edema. Pulses 2+ bilaterally symmetric including pedal.  Musculoskeletal:  No joint deformity   Neurologic: Non-focal  Lymphatic:  No lymphadenopathy  Psychiatry: [+ ] AAOx3 [ +] Mood & affect appropriate  Skin: No rashes. No ecchymoses. No cyanosis    Cardiovascular Diagnostic Testing:  ECG: < from: 12 Lead ECG (19 @ 07:27) >  Ventricular Rate 70 BPM    Atrial Rate 70 BPM    P-R Interval 172 ms    QRS Duration 76 ms    Q-T Interval 408 ms    QTC Calculation(Bezet) 440 ms    P Axis 56 degrees    R Axis 64 degrees    T Axis 42 degrees    Diagnosis Line NORMAL SINUS RHYTHM  NONSPECIFIC ST ABNORMALITY  ABNORMAL ECG    Confirmed by ATTENDING, ED ([id number]),  LINDSAY BENAVIDES ([id number]) on 2019 7:29:36 AM    < end of copied text >      Echo:< from: TTE with Doppler (w/Cont) (19 @ 18:54) >  Patient name: LESLEY SWANSON  YOB: 1965   Age: 53 (M)   MR#: [id number]  Study Date: 2019  Location: [id number]: Kalpana Grajeda  Study quality: Technically Difficult  Referring Physician: Javier Márquez MD  Blood Pressure:142/88 mmHg  Height: 185 cm  Weight: 100 kg  BSA: 2.3 m2  Heart Rate: 121 mmHg  ------------------------------------------------------------------------  PROCEDURE: Transthoracic echocardiogram with 2-D, M-Mode  and complete spectral and color flow Doppler.  Verbal consent was obtained for injection of echo contrast.  Following  intravenous injection of contrast, harmonic  imaging was performed.  LOT#[id number]  INDICATION: Unspecified atrial fibrillation (I48.91), Chest  pain, unspecified (R07.9)  ------------------------------------------------------------------------  DIMENSIONS:  Dimensions:     Normal Values:  LA:     4.4 cm    2.0 - 4.0 cm  Ao:     2.9 cm    2.0 - 3.8 cm  SEPTUM: 1.0 cm    0.6 - 1.2 cm  PWT:    1.4 cm    0.6 - 1.1 cm  LVIDd:3.7 cm    3.0 - 5.6 cm  LVIDs:  2.7 cm    1.8 - 4.0 cm  Derived Variables:  LVMI: 65 g/m2  RWT: 0.75  Fractional short: 27 %  Ejection Fraction (Visual Estimate): 65-70 %  Peak Velocity (m/sec): AoV=1.3  ------------------------------------------------------------------------  OBSERVATIONS:  Mitral Valve: Normal mitral valve. Mild-moderate mitral  regurgitation. Eccentric Jet.  Aortic Root: Normal aortic root.  Aortic Valve: Normal trileaflet aortic valve. Peak  transaortic valve gradient equals 6 mm Hg. Minimal aortic  regurgitation.  Peak left ventricular outflow tract  gradient equals 5.8 mm Hg.  Left Atrium: Severely dilated left atrium.  LA volume index  = 49 cc/m2.  Left Ventricle: Hyperdynamic left ventricle.Endocardial  visualization enhanced with intravenous injection of echo  contrast (Definity). Increased relative wall thickness with  normal left ventricular mass index, consistent with  concentric left ventricular remodeling. Normal left  ventricular diastolic function.  Right Heart: Normal right atrium. Normal right ventricular  size and function. Normal tricuspid valve.  Mild tricuspid  regurgitation. Normal pulmonic valve. Mild pulmonic  regurgitation.  Pericardium/PleuraNormal pericardium with no pericardial  effusion.  Hemodynamic: Estimated right ventricular systolic pressure  equals 42 mm Hg, assuming right atrial pressure equals 10  mm Hg, consistent with mild pulmonary hypertension.  ------------------------------------------------------------------------  CONCLUSIONS:  1. Normal mitral valve. Mild-moderate mitral regurgitation.  Eccentric Jet.  2. Severely dilated left atrium.  LA volume index = 49  cc/m2.  3. Increased relative wall thickness with normal left  ventricular mass index, consistent with concentric left  ventricular remodeling.  4. Hyperdynamic left ventricle.Endocardial visualization  enhanced with intravenous injection of echo contrast  (Definity).  5. Normal right ventricular size and function.  6. Estimated right ventricular systolic pressure equals 42  mm Hg, assuming right atrial pressure equals 10 mm Hg,  consistent with mild pulmonary hypertension.  7. Normal tricuspid valve.  Mild tricuspid regurgitation.  *** No previous Echo exam.  ------------------------------------------------------------------------  Confirmed on  2019 - 21:34:14 by Santi Buckley M.D.  -    < end of copied text >      Stress Testing:    Cath:< from: Cardiac Cath Lab - Adult (16 @ 14:50) >  [facility]  Department of Cardiology  [address] [place] [address]  [phone]  Cath Lab Report -- Comprehensive Report  Patient: LESLEY SWANSON  Study date: 2016  Account number: [id number]  MR number: [id number]  : 1965  Gender: Male  Race: W  Case Physician(s):  Maximus Sprague M.D.  Fellow:  Lester Barone M.D.  Referring Physician:  Gianni Ansari M.D.  INDICATIONS: Unstable angina - CCS4.  HISTORY: There was no prior cardiac history. The patient has hypertension.  PROCEDURE:  --  Left heart catheterization with ventriculography.  --  Left coronary angiography.  --  Right coronary angiography.  TECHNIQUE: The risks and alternatives of the procedures and conscious  sedation were explained to the patient and informed consent was obtained.  Cardiac catheterization performed electively. Coronary intervention  performed electively.  Local anesthetic given. Right radial artery access. A 6FR PRELUDE KIT was  inserted in the vessel. Left heart catheterization. Ventriculography was  performed. A 5FR PIG EXPO catheter was utilized. Left coronary artery  angiography. The vessel was injected utilizing a 5FR FL3.5 EXPO catheter.  Right coronary artery angiography. The vessel was injected utilizing a 5FR  FR4.0 EXPO catheter. RADIATION EXPOSURE: 1.8 min.  CONTRAST GIVEN: Omnipaque 58 ml.  MEDICATIONS GIVEN: Midazolam, 1 mg, IV. Fentanyl, 25 mcg, IV. Verapamil  (Isoptin, Calan, Covera), 2.5 mg, IA. Heparin, 3000 units, IA.  VENTRICLES: Global left ventricular function was hypercontractile. EF  estimated was 70 %.  CORONARY VESSELS: The coronary circulation is co-dominant.  LM:   --  LM: Normal.  LAD:   --  LAD: Normal.  CX:   --  Proximal circumflex: There was a 30 % stenosis.  RCA:   --  RCA: Normal.  COMPLICATIONS: There were no complications.  DIAGNOSTIC RECOMMENDATIONS: The patient should continue with the present  medications.  Prepared and signed by  Maximus Sprague M.D.  Signed 2016 14:23:32  HEMODYNAMIC TABLES  Pressures:  Baseline/ Room Air  Pressures:  - HR: 66  Pressures:  - Rhythm:  Pressures:  -- Aortic Pressure (S/D/M): 124/69/90  Outputs:  Baseline/ Room Air  Outputs:  -- CALCULATIONS[age]  Outputs:  -- CALCULATIONS: Body Surface Area: 2.24  Outputs:  -- CALCULATIONS: Height in cm: 185.00  Outputs:  -- CALCULATIONS: Sex: Male    < end of copied text >      Interpretation of Telemetry:    Imaging:    Labs:                        15.6   15.2  )-----------( 267      ( 07 Aug 2019 04:52 )             47.5     08-    144  |  104  |  17  ----------------------------<  145<H>  4.1   |  23  |  1.08    Ca    9.8      07 Aug 2019 04:52  Phos  2.9     08-07  Mg     1.8     08-    TPro  7.2  /  Alb  4.5  /  TBili  0.4  /  DBili  x   /  AST  14  /  ALT  19  /  AlkPhos  77  08-07

## 2019-08-07 NOTE — H&P ADULT - PROBLEM SELECTOR PLAN 2
Pt is agitated and anxious in the setting of heavy marijuana use.   - Psychiatry following  - Social work consulted   - Lorazepam 0.5 mg IV PRN q6h for anxiety

## 2019-08-07 NOTE — H&P ADULT - NSHPREVIEWOFSYSTEMS_GEN_ALL_CORE
Gen: No fevers or weakness. Chills as per HPI  HEENT: Conjunctiva not injected. No headache, no change in visual acuity, no nasal congestion, no throat pain  Respiratory: No cough, pleuritic chest pain, or hemoptysis  Cardiovascular: No chest pain or palpitations  Gastrointestinal: No abdominal pain, no hematochezia. Last bowel movement yesterday.   Genitourinary: No dysuria or frequency  Musculoskeletal: No joint pain or muscle pain  Neurologic: Difficulty maintaining balance per sister  Endocrine: Cold intolerance. No recent weight change  Skin: No rashes or lesions  Psychiatric: Anxiety as per HPI

## 2019-08-07 NOTE — ED BEHAVIORAL HEALTH ASSESSMENT NOTE - SUMMARY
53 year old male, domiciled at home with wife and 3 adult children, , unemployed currently, with PPH significant for gambling addiction, substance abuse hx signficiant for daily marijuana use and 3 prior rehab stays, not currently in outpatient psychiatric treatment, no prior SIB/suicide attempts, PMH significant for A-fib s/p ablation (9/2017), HLD, presented to ED with complaint of nausea and vomiting, psychiatry consulted to evaluate for anxiety.  Patient lethargic s/p Ativan and Benadryl PRNs given in ER, denies feeling anxious presently, states he has been vomiting for past 1-2 days, has had previous vomiting episodes (last episode of vomiting was a few months ago) which improves with hot showers (?[facility]).  Denies SI/HI, AVH, depression, or paranoia.  Smokes 2 MJ joints per day.

## 2019-08-07 NOTE — H&P ADULT - PROBLEM SELECTOR PLAN 1
Patient has leukocytosis and elevated blood venous gas lactate level of 4.1 on admission. Electrolytes and vitals WNL. Diagnosis is likely cannabinoid hyperemesis syndrome.   - Pt received 4 boluses of fluid. Currently receiving LR 75mL/hr  - Metoclopramide 10mg IV PRN for nausea  - Trend WBC, lactate  - Avoid oral medications for now

## 2019-08-07 NOTE — H&P ADULT - ATTENDING COMMENTS
Pt aw intractable vomiting likely secondary to Marijuana use, lactic acidosis. Continue IV fluids, prn antiemetics. Monitor and advance diet as tolerated.

## 2019-08-08 ENCOUNTER — TRANSCRIPTION ENCOUNTER (OUTPATIENT)
Age: 54
End: 2019-08-08

## 2019-08-08 LAB
ANION GAP SERPL CALC-SCNC: 13 MMOL/L — SIGNIFICANT CHANGE UP (ref 5–17)
BUN SERPL-MCNC: 14 MG/DL — SIGNIFICANT CHANGE UP (ref 7–23)
CALCIUM SERPL-MCNC: 8.4 MG/DL — SIGNIFICANT CHANGE UP (ref 8.4–10.5)
CHLORIDE SERPL-SCNC: 106 MMOL/L — SIGNIFICANT CHANGE UP (ref 96–108)
CO2 SERPL-SCNC: 22 MMOL/L — SIGNIFICANT CHANGE UP (ref 22–31)
CREAT SERPL-MCNC: 0.9 MG/DL — SIGNIFICANT CHANGE UP (ref 0.5–1.3)
CULTURE RESULTS: NO GROWTH — SIGNIFICANT CHANGE UP
GAS PNL BLDV: SIGNIFICANT CHANGE UP
GLUCOSE SERPL-MCNC: 87 MG/DL — SIGNIFICANT CHANGE UP (ref 70–99)
HCT VFR BLD CALC: 37.8 % — LOW (ref 39–50)
HGB BLD-MCNC: 12.3 G/DL — LOW (ref 13–17)
MAGNESIUM SERPL-MCNC: 2 MG/DL — SIGNIFICANT CHANGE UP (ref 1.6–2.6)
MCHC RBC-ENTMCNC: 28.2 PG — SIGNIFICANT CHANGE UP (ref 27–34)
MCHC RBC-ENTMCNC: 32.5 GM/DL — SIGNIFICANT CHANGE UP (ref 32–36)
MCV RBC AUTO: 86.7 FL — SIGNIFICANT CHANGE UP (ref 80–100)
PHOSPHATE SERPL-MCNC: 2.9 MG/DL — SIGNIFICANT CHANGE UP (ref 2.5–4.5)
PLATELET # BLD AUTO: 200 K/UL — SIGNIFICANT CHANGE UP (ref 150–400)
POTASSIUM SERPL-MCNC: 3.7 MMOL/L — SIGNIFICANT CHANGE UP (ref 3.5–5.3)
POTASSIUM SERPL-SCNC: 3.7 MMOL/L — SIGNIFICANT CHANGE UP (ref 3.5–5.3)
RBC # BLD: 4.36 M/UL — SIGNIFICANT CHANGE UP (ref 4.2–5.8)
RBC # FLD: 13.3 % — SIGNIFICANT CHANGE UP (ref 10.3–14.5)
SODIUM SERPL-SCNC: 141 MMOL/L — SIGNIFICANT CHANGE UP (ref 135–145)
SPECIMEN SOURCE: SIGNIFICANT CHANGE UP
WBC # BLD: 10.85 K/UL — HIGH (ref 3.8–10.5)
WBC # FLD AUTO: 10.85 K/UL — HIGH (ref 3.8–10.5)

## 2019-08-08 PROCEDURE — 99232 SBSQ HOSP IP/OBS MODERATE 35: CPT

## 2019-08-08 PROCEDURE — 99233 SBSQ HOSP IP/OBS HIGH 50: CPT | Mod: GC

## 2019-08-08 RX ORDER — ONDANSETRON 8 MG/1
4 TABLET, FILM COATED ORAL EVERY 6 HOURS
Refills: 0 | Status: DISCONTINUED | OUTPATIENT
Start: 2019-08-08 | End: 2019-08-09

## 2019-08-08 RX ADMIN — ONDANSETRON 4 MILLIGRAM(S): 8 TABLET, FILM COATED ORAL at 11:24

## 2019-08-08 RX ADMIN — ONDANSETRON 4 MILLIGRAM(S): 8 TABLET, FILM COATED ORAL at 19:35

## 2019-08-08 RX ADMIN — BISOPROLOL FUMARATE 10 MILLIGRAM(S): 10 TABLET, FILM COATED ORAL at 22:27

## 2019-08-08 RX ADMIN — Medication 0.5 MILLIGRAM(S): at 18:10

## 2019-08-08 RX ADMIN — Medication 10 MILLIGRAM(S): at 17:48

## 2019-08-08 NOTE — DISCHARGE NOTE PROVIDER - NSDCCPCAREPLAN_GEN_ALL_CORE_FT
PRINCIPAL DISCHARGE DIAGNOSIS  Diagnosis: Cannabinoid hyperemesis syndrome  Assessment and Plan of Treatment: You came into the hospital with nausea and vomiting. Your symptoms are consistent with cannabinoid hyperemesis syndrome, a condition that can develop as a result of recreational marijuana use. STOP USING MARIJUANA. If you continue to use marijuana, you will continue to have these same symptoms of severe nausea and vomiting.      SECONDARY DISCHARGE DIAGNOSES  Diagnosis: Atrial fibrillation  Assessment and Plan of Treatment: Continue home dose of bisoprolol.

## 2019-08-08 NOTE — PROGRESS NOTE ADULT - PROBLEM SELECTOR PLAN 2
On admission, agitated and anxious in the setting of heavy marijuana use. Now calm and A&Ox3.  - Psychiatry following  - Social work consulted   - Lorazepam 0.5 mg IV PRN q6h for anxiety

## 2019-08-08 NOTE — PROGRESS NOTE BEHAVIORAL HEALTH - NSBHFUPINTERVALHXFT_PSY_A_CORE
Patient seen today, alert and oriented X2-3 (slight off on date).  Denies feeling anxious currently, denies depressed mood, denies paranoia, denies SI/HI. States he had good sleep last night. Denies nausea/vomiting since yesterday, feels much better physically, inquiring about discharge.  No PRNs required ON.  Reports supportive relationships with family, has 2 grandchildren, attends GA meetings twice per week.

## 2019-08-08 NOTE — DISCHARGE NOTE NURSING/CASE MANAGEMENT/SOCIAL WORK - NSDCDPATPORTLINK_GEN_ALL_CORE
You can access the [facility], offered by [facility], by registering with the following website: [email or web address]

## 2019-08-08 NOTE — PROGRESS NOTE ADULT - SUBJECTIVE AND OBJECTIVE BOX
Patient seen and evaluated @ 90  Chief Complaint: Patient is a 54y old  Male who presents with a chief complaint of Nausea/vomiting (07 Aug 2019 14:36)      HPI:  Lesley Swanson is a 54 year-old man with history of marijuana use and afib s/p ablation presenting with multiple episodes of nausea and vomiting. These episodes started yesterday night, where he was vomiting every hour. The emesis is nonbilious and nonbloody. The episodes are associated with shaking chills. Per the patient's wife, whenever he has these shaking chills, the patient assumes an "infantile posture" until the chills go away. The patient reports that hot showers improve the nausea minimally. The patient currently endorses nausea but says he has not vomited for several hours. There is no associated abdominal pain, diarrhea, constipation, or fevers. He has had previous hospital admissions for nausea and vomiting, last hospitalized in April at [facility]. He last presented to [facility] in January with similar complaints. At that time, he was worked up for nausea/vomiting, afib with RVR, and polysubstance abuse of marijuana and K2. The currently denies using K2, but admits to using marijuana. He last smoked marijuana yesterday, and says he spends $10/day on marijuana. Recently, he has been attempting to cut down on the amount of marijuana that he uses, but he still smokes at least 1-2 cigarettes of marijuana a day. In addition to nausea and vomiting, the patient also endorses significant anxiety but is unsure of the reason. He has no psychiatric hospitalizations but says he used to have a gambling "issue" and is an active participant in [employer]. Per the sister, the patient was admitted to rehab in  for a K2 overdose, where he remained for 30 days. Afterwards, he was clean of all drugs for a year and a half before he relapsed back into using marijuana. The patient, however, denies ever using K2. (07 Aug 2019 14:36)    CARDIAC HISTORY: Paroxysmal AF with ablation  Dr. Get Hardin. Lost to follow up and recurrence of AF 2019 on admission to [facility] with intoxication, acidosis. Seen by me last month in AF, bisoprolol 10 started and converted to sinus. CHADS2-VASC 0 and patient unreliable so no anticoag. Cardiac cath 2016 with only non-obstructive disease 30% proximal cx. LVEF 70%. Echo at [facility] in 2019 with hyperdynamic LV and mild-mod MR.     PMH:   Hyperlipidemia  Marijuana use  Alcohol use  Peptic ulcer disease  Anxiety  Atrial fibrillation, unspecified type  Marijuana abuse  GERD (gastroesophageal reflux disease)    PSH:   H/O prior ablation treatment  No significant past surgical history    Medications:   bisoprolol   Tablet 10 milliGRAM(s) Oral daily  lactated ringers. 1000 milliLiter(s) IV Continuous <Continuous>  LORazepam   Injectable 0.5 milliGRAM(s) IV Push every 6 hours PRN  metoclopramide Injectable 10 milliGRAM(s) IV Push every 8 hours PRN    Allergies:  No Known Allergies    FAMILY HISTORY:  Family history of hypertension in mother  Family history of prostate cancer in father  Family history of diabetes mellitus in mother    Social History: .  Smoking: Denies  Alcohol: Denies, only marijuana  Drugs: As above    Review of Systems:  Constitutional: no recent weight loss  HEENT: no scleral icterus. Normal mucosa.  Respiratory: no shortness of breath. No history of asthma. No COPD  Cardiovascular: No Chest Pain, no Palpitations, no NAVARRO, PND, or Orthopnea. no Syncope. No history of rheumatic fever.   Gastrointestinal: No Abdominal Pain, no Diarrhea, no Constipation, no Nausea or Vomiting  Genitourinary: No Nocturia,Dysuria, or Incontinence. No hematuria  Extremities: no edema. No cyanosis.  Neurologic: No Focal deficit. No Paresthesias. No Syncope. No seizures  Lymphatic: No Swelling. No Lymphadenopathy   Skin: No Rash,  Ecchymoses, Wounds, or Lesions  Psychiatry: ???  10 point review of systems is otherwise negative except as mentioned above            [ ]Unable to obtain    Physical Exam:  Vital Signs Last 24 Hrs  T(C): 36.4 (08 Aug 2019 05:19), Max: 37.2 (07 Aug 2019 15:53)  T(F): 97.5 (08 Aug 2019 05:19), Max: 99 (07 Aug 2019 15:53)  HR: 54 (08 Aug 2019 05:19) (53 - 81)  BP: 117/61 (08 Aug 2019 05:19) (105/58 - 145/79)  BP(mean): --  RR: 20 (08 Aug 2019 05:19) (20 - 26)  SpO2: 96% (08 Aug 2019 05:19) (90% - 100%)  Daily Height in cm: 187.96 (07 Aug 2019 15:53)    Daily     Appearance: WD, WN, NAD  Eyes:  No scleral icterus. PERRL, EOMI  HENT: Normal oral mucosa.]NC/AT  Neck: No JVD at 90 degrees. Normal carotid upstrokes without bruits or murmurs.  Cardiovascular: Normal PMI. Normal S1, S2 physiologically split. No S3 or S4. No murmurs.  Respiratory: Clear to auscultation bilaterally. No wheezes. No rales.  Gastrointestinal: Soft.  Non-tender. No hepatosplenomegally.  Extremities: No clubbing. No edema. Pulses 2+ bilaterally symmetric including pedal.  Musculoskeletal:  No joint deformity   Neurologic: Non-focal  Lymphatic:  No lymphadenopathy  Psychiatry: [+ ] AAOx3 [ +] Mood & affect appropriate  Skin: No rashes. No ecchymoses. No cyanosis    Cardiovascular Diagnostic Testing:  ECG: < from: 12 Lead ECG (19 @ 07:27) >  Ventricular Rate 70 BPM    Atrial Rate 70 BPM    P-R Interval 172 ms    QRS Duration 76 ms    Q-T Interval 408 ms    QTC Calculation(Bezet) 440 ms    P Axis 56 degrees    R Axis 64 degrees    T Axis 42 degrees    Diagnosis Line NORMAL SINUS RHYTHM  NONSPECIFIC ST ABNORMALITY  ABNORMAL ECG    Confirmed by ATTENDING, ED ([id number]),  LINDSAY BENAVIDES ([id number]) on 2019 7:29:36 AM    < end of copied text >      Echo:< from: TTE with Doppler (w/Cont) (19 @ 18:54) >  Patient name: LESLEY SWANSON  YOB: 1965   Age: 53 (M)   MR#: [id number]  Study Date: 2019  Location: [id number]: Kalpana Grajeda  Study quality: Technically Difficult  Referring Physician: Javier Márquez MD  Blood Pressure:142/88 mmHg  Height: 185 cm  Weight: 100 kg  BSA: 2.3 m2  Heart Rate: 121 mmHg  ------------------------------------------------------------------------  PROCEDURE: Transthoracic echocardiogram with 2-D, M-Mode  and complete spectral and color flow Doppler.  Verbal consent was obtained for injection of echo contrast.  Following  intravenous injection of contrast, harmonic  imaging was performed.  LOT#[id number]  INDICATION: Unspecified atrial fibrillation (I48.91), Chest  pain, unspecified (R07.9)  ------------------------------------------------------------------------  DIMENSIONS:  Dimensions:     Normal Values:  LA:     4.4 cm    2.0 - 4.0 cm  Ao:     2.9 cm    2.0 - 3.8 cm  SEPTUM: 1.0 cm    0.6 - 1.2 cm  PWT:    1.4 cm    0.6 - 1.1 cm  LVIDd:3.7 cm    3.0 - 5.6 cm  LVIDs:  2.7 cm    1.8 - 4.0 cm  Derived Variables:  LVMI: 65 g/m2  RWT: 0.75  Fractional short: 27 %  Ejection Fraction (Visual Estimate): 65-70 %  Peak Velocity (m/sec): AoV=1.3  ------------------------------------------------------------------------  OBSERVATIONS:  Mitral Valve: Normal mitral valve. Mild-moderate mitral  regurgitation. Eccentric Jet.  Aortic Root: Normal aortic root.  Aortic Valve: Normal trileaflet aortic valve. Peak  transaortic valve gradient equals 6 mm Hg. Minimal aortic  regurgitation.  Peak left ventricular outflow tract  gradient equals 5.8 mm Hg.  Left Atrium: Severely dilated left atrium.  LA volume index  = 49 cc/m2.  Left Ventricle: Hyperdynamic left ventricle.Endocardial  visualization enhanced with intravenous injection of echo  contrast (Definity). Increased relative wall thickness with  normal left ventricular mass index, consistent with  concentric left ventricular remodeling. Normal left  ventricular diastolic function.  Right Heart: Normal right atrium. Normal right ventricular  size and function. Normal tricuspid valve.  Mild tricuspid  regurgitation. Normal pulmonic valve. Mild pulmonic  regurgitation.  Pericardium/PleuraNormal pericardium with no pericardial  effusion.  Hemodynamic: Estimated right ventricular systolic pressure  equals 42 mm Hg, assuming right atrial pressure equals 10  mm Hg, consistent with mild pulmonary hypertension.  ------------------------------------------------------------------------  CONCLUSIONS:  1. Normal mitral valve. Mild-moderate mitral regurgitation.  Eccentric Jet.  2. Severely dilated left atrium.  LA volume index = 49  cc/m2.  3. Increased relative wall thickness with normal left  ventricular mass index, consistent with concentric left  ventricular remodeling.  4. Hyperdynamic left ventricle.Endocardial visualization  enhanced with intravenous injection of echo contrast  (Definity).  5. Normal right ventricular size and function.  6. Estimated right ventricular systolic pressure equals 42  mm Hg, assuming right atrial pressure equals 10 mm Hg,  consistent with mild pulmonary hypertension.  7. Normal tricuspid valve.  Mild tricuspid regurgitation.  *** No previous Echo exam.  ------------------------------------------------------------------------  Confirmed on  2019 - 21:34:14 by Santi Buckley M.D.  -    < end of copied text >      Stress Testing:    Cath:< from: Cardiac Cath Lab - Adult (16 @ 14:50) >  [facility]  Department of Cardiology  [address] [place] [address]  [phone]  Cath Lab Report -- Comprehensive Report  Patient: LESLEY SWANSON  Study date: 2016  Account number: [id number]  MR number: [id number]  : 1965  Gender: Male  Race: W  Case Physician(s):  Maximus Sprague M.D.  Fellow:  Lester Barone M.D.  Referring Physician:  Gianni Ansari M.D.  INDICATIONS: Unstable angina - CCS4.  HISTORY: There was no prior cardiac history. The patient has hypertension.  PROCEDURE:  --  Left heart catheterization with ventriculography.  --  Left coronary angiography.  --  Right coronary angiography.  TECHNIQUE: The risks and alternatives of the procedures and conscious  sedation were explained to the patient and informed consent was obtained.  Cardiac catheterization performed electively. Coronary intervention  performed electively.  Local anesthetic given. Right radial artery access. A 6FR PRELUDE KIT was  inserted in the vessel. Left heart catheterization. Ventriculography was  performed. A 5FR PIG EXPO catheter was utilized. Left coronary artery  angiography. The vessel was injected utilizing a 5FR FL3.5 EXPO catheter.  Right coronary artery angiography. The vessel was injected utilizing a 5FR  FR4.0 EXPO catheter. RADIATION EXPOSURE: 1.8 min.  CONTRAST GIVEN: Omnipaque 58 ml.  MEDICATIONS GIVEN: Midazolam, 1 mg, IV. Fentanyl, 25 mcg, IV. Verapamil  (Isoptin, Calan, Covera), 2.5 mg, IA. Heparin, 3000 units, IA.  VENTRICLES: Global left ventricular function was hypercontractile. EF  estimated was 70 %.  CORONARY VESSELS: The coronary circulation is co-dominant.  LM:   --  LM: Normal.  LAD:   --  LAD: Normal.  CX:   --  Proximal circumflex: There was a 30 % stenosis.  RCA:   --  RCA: Normal.  COMPLICATIONS: There were no complications.  DIAGNOSTIC RECOMMENDATIONS: The patient should continue with the present  medications.  Prepared and signed by  Maximus Sprague M.D.  Signed 2016 14:23:32  HEMODYNAMIC TABLES  Pressures:  Baseline/ Room Air  Pressures:  - HR: 66  Pressures:  - Rhythm:  Pressures:  -- Aortic Pressure (S/D/M): 124/69/90  Outputs:  Baseline/ Room Air  Outputs:  -- CALCULATIONS[age]  Outputs:  -- CALCULATIONS: Body Surface Area: 2.24  Outputs:  -- CALCULATIONS: Height in cm: 185.00  Outputs:  -- CALCULATIONS: Sex: Male    < end of copied text >      Interpretation of Telemetry:    Imaging:    Labs:                                 12.3   10.85 )-----------( 200      ( 08 Aug 2019 08:29 )             37.8     08-08    141  |  106  |  14  ----------------------------<  87  3.7   |  22  |  0.90    Ca    8.4      08 Aug 2019 06:28  Phos  2.9     08-  Mg     2.0         TPro  7.2  /  Alb  4.5  /  TBili  0.4  /  DBili  x   /  AST  14  /  ALT  19  /  AlkPhos  77  08-07          Urinalysis Basic - ( 07 Aug 2019 08:29 )    Color: Light Yellow / Appearance: Clear / S.021 / pH: x  Gluc: x / Ketone: Trace  / Bili: Negative / Urobili: Negative   Blood: x / Protein: Trace / Nitrite: Negative   Leuk Esterase: Negative / RBC: 1 /hpf / WBC 1 /HPF   Sq Epi: x / Non Sq Epi: 0 /hpf / Bacteria: Negative Patient seen and evaluated @ 900  Chief Complaint: Patient is a 54y old  Male who presents with a chief complaint of Nausea/vomiting (07 Aug 2019 14:36)      HPI:  Lesley Swanson is a 54 year-old man with history of marijuana use and afib s/p ablation presenting with multiple episodes of nausea and vomiting. These episodes started yesterday night, where he was vomiting every hour. The emesis is nonbilious and nonbloody. The episodes are associated with shaking chills. Per the patient's wife, whenever he has these shaking chills, the patient assumes an "infantile posture" until the chills go away. The patient reports that hot showers improve the nausea minimally. The patient currently endorses nausea but says he has not vomited for several hours. There is no associated abdominal pain, diarrhea, constipation, or fevers. He has had previous hospital admissions for nausea and vomiting, last hospitalized in April at [facility]. He last presented to [facility] in January with similar complaints. At that time, he was worked up for nausea/vomiting, afib with RVR, and polysubstance abuse of marijuana and K2. The currently denies using K2, but admits to using marijuana. He last smoked marijuana yesterday, and says he spends $10/day on marijuana. Recently, he has been attempting to cut down on the amount of marijuana that he uses, but he still smokes at least 1-2 cigarettes of marijuana a day. In addition to nausea and vomiting, the patient also endorses significant anxiety but is unsure of the reason. He has no psychiatric hospitalizations but says he used to have a gambling "issue" and is an active participant in [employer]. Per the sister, the patient was admitted to rehab in  for a K2 overdose, where he remained for 30 days. Afterwards, he was clean of all drugs for a year and a half before he relapsed back into using marijuana. The patient, however, denies ever using K2. (07 Aug 2019 14:36)    CARDIAC HISTORY: Paroxysmal AF with ablation  Dr. Get Hardin. Lost to follow up and recurrence of AF 2019 on admission to [facility] with intoxication, acidosis. Seen by me last month in AF, bisoprolol 10 started and converted to sinus. CHADS2-VASC 0 and patient unreliable so no anticoag. Cardiac cath 2016 with only non-obstructive disease 30% proximal cx. LVEF 70%. Echo at [facility] in 2019 with hyperdynamic LV and mild-mod MR.     PMH:   Hyperlipidemia  Marijuana use  Alcohol use  Peptic ulcer disease  Anxiety  Atrial fibrillation, unspecified type  Marijuana abuse  GERD (gastroesophageal reflux disease)    PSH:   H/O prior ablation treatment  No significant past surgical history    Medications:   bisoprolol   Tablet 10 milliGRAM(s) Oral daily  lactated ringers. 1000 milliLiter(s) IV Continuous <Continuous>  LORazepam   Injectable 0.5 milliGRAM(s) IV Push every 6 hours PRN  metoclopramide Injectable 10 milliGRAM(s) IV Push every 8 hours PRN    Allergies:  No Known Allergies    FAMILY HISTORY:  Family history of hypertension in mother  Family history of prostate cancer in father  Family history of diabetes mellitus in mother    Social History: .  Smoking: Denies  Alcohol: Denies, only marijuana  Drugs: As above    Review of Systems:  Constitutional: no recent weight loss  HEENT: no scleral icterus. Normal mucosa.  Respiratory: no shortness of breath. No history of asthma. No COPD  Cardiovascular: No Chest Pain, no Palpitations, no NAVARRO, PND, or Orthopnea. no Syncope. No history of rheumatic fever.   Gastrointestinal: No Abdominal Pain, no Diarrhea, no Constipation, no Nausea or Vomiting  Genitourinary: No Nocturia,Dysuria, or Incontinence. No hematuria  Extremities: no edema. No cyanosis.  Neurologic: No Focal deficit. No Paresthesias. No Syncope. No seizures  Lymphatic: No Swelling. No Lymphadenopathy   Skin: No Rash,  Ecchymoses, Wounds, or Lesions  Psychiatry: ???  10 point review of systems is otherwise negative except as mentioned above            [ ]Unable to obtain    Physical Exam:  Vital Signs Last 24 Hrs  T(C): 36.4 (08 Aug 2019 05:19), Max: 37.2 (07 Aug 2019 15:53)  T(F): 97.5 (08 Aug 2019 05:19), Max: 99 (07 Aug 2019 15:53)  HR: 54 (08 Aug 2019 05:19) (53 - 81)  BP: 117/61 (08 Aug 2019 05:19) (105/58 - 145/79)  BP(mean): --  RR: 20 (08 Aug 2019 05:19) (20 - 26)  SpO2: 96% (08 Aug 2019 05:19) (90% - 100%)  Daily Height in cm: 187.96 (07 Aug 2019 15:53)    Daily     Appearance: WD, WN, NAD  Eyes:  No scleral icterus. PERRL, EOMI  HENT: Normal oral mucosa.]NC/AT  Neck: No JVD at 90 degrees. Normal carotid upstrokes without bruits or murmurs.  Cardiovascular: Normal PMI. Normal S1, S2 physiologically split. No S3 or S4. No murmurs.  Respiratory: Clear to auscultation bilaterally. No wheezes. No rales.  Gastrointestinal: Soft.  Non-tender. No hepatosplenomegally.  Extremities: No clubbing. No edema. Pulses 2+ bilaterally symmetric including pedal.  Musculoskeletal:  No joint deformity   Neurologic: Non-focal  Lymphatic:  No lymphadenopathy  Psychiatry: [+ ] AAOx3 [ +] Mood & affect appropriate  Skin: No rashes. No ecchymoses. No cyanosis    Cardiovascular Diagnostic Testing:  ECG: < from: 12 Lead ECG (19 @ 07:27) >  Ventricular Rate 70 BPM    Atrial Rate 70 BPM    P-R Interval 172 ms    QRS Duration 76 ms    Q-T Interval 408 ms    QTC Calculation(Bezet) 440 ms    P Axis 56 degrees    R Axis 64 degrees    T Axis 42 degrees    Diagnosis Line NORMAL SINUS RHYTHM  NONSPECIFIC ST ABNORMALITY  ABNORMAL ECG    Confirmed by ATTENDING, ED ([id number]),  LINDSAY BENAVIDES ([id number]) on 2019 7:29:36 AM    < end of copied text >      Echo:< from: TTE with Doppler (w/Cont) (19 @ 18:54) >  Patient name: LESLEY SWANSON  YOB: 1965   Age: 53 (M)   MR#: [id number]  Study Date: 2019  Location: [id number]: Kalpana Grajeda  Study quality: Technically Difficult  Referring Physician: Javier Márquez MD  Blood Pressure:142/88 mmHg  Height: 185 cm  Weight: 100 kg  BSA: 2.3 m2  Heart Rate: 121 mmHg  ------------------------------------------------------------------------  PROCEDURE: Transthoracic echocardiogram with 2-D, M-Mode  and complete spectral and color flow Doppler.  Verbal consent was obtained for injection of echo contrast.  Following  intravenous injection of contrast, harmonic  imaging was performed.  LOT#[id number]  INDICATION: Unspecified atrial fibrillation (I48.91), Chest  pain, unspecified (R07.9)  ------------------------------------------------------------------------  DIMENSIONS:  Dimensions:     Normal Values:  LA:     4.4 cm    2.0 - 4.0 cm  Ao:     2.9 cm    2.0 - 3.8 cm  SEPTUM: 1.0 cm    0.6 - 1.2 cm  PWT:    1.4 cm    0.6 - 1.1 cm  LVIDd:3.7 cm    3.0 - 5.6 cm  LVIDs:  2.7 cm    1.8 - 4.0 cm  Derived Variables:  LVMI: 65 g/m2  RWT: 0.75  Fractional short: 27 %  Ejection Fraction (Visual Estimate): 65-70 %  Peak Velocity (m/sec): AoV=1.3  ------------------------------------------------------------------------  OBSERVATIONS:  Mitral Valve: Normal mitral valve. Mild-moderate mitral  regurgitation. Eccentric Jet.  Aortic Root: Normal aortic root.  Aortic Valve: Normal trileaflet aortic valve. Peak  transaortic valve gradient equals 6 mm Hg. Minimal aortic  regurgitation.  Peak left ventricular outflow tract  gradient equals 5.8 mm Hg.  Left Atrium: Severely dilated left atrium.  LA volume index  = 49 cc/m2.  Left Ventricle: Hyperdynamic left ventricle.Endocardial  visualization enhanced with intravenous injection of echo  contrast (Definity). Increased relative wall thickness with  normal left ventricular mass index, consistent with  concentric left ventricular remodeling. Normal left  ventricular diastolic function.  Right Heart: Normal right atrium. Normal right ventricular  size and function. Normal tricuspid valve.  Mild tricuspid  regurgitation. Normal pulmonic valve. Mild pulmonic  regurgitation.  Pericardium/PleuraNormal pericardium with no pericardial  effusion.  Hemodynamic: Estimated right ventricular systolic pressure  equals 42 mm Hg, assuming right atrial pressure equals 10  mm Hg, consistent with mild pulmonary hypertension.  ------------------------------------------------------------------------  CONCLUSIONS:  1. Normal mitral valve. Mild-moderate mitral regurgitation.  Eccentric Jet.  2. Severely dilated left atrium.  LA volume index = 49  cc/m2.  3. Increased relative wall thickness with normal left  ventricular mass index, consistent with concentric left  ventricular remodeling.  4. Hyperdynamic left ventricle.Endocardial visualization  enhanced with intravenous injection of echo contrast  (Definity).  5. Normal right ventricular size and function.  6. Estimated right ventricular systolic pressure equals 42  mm Hg, assuming right atrial pressure equals 10 mm Hg,  consistent with mild pulmonary hypertension.  7. Normal tricuspid valve.  Mild tricuspid regurgitation.  *** No previous Echo exam.  ------------------------------------------------------------------------  Confirmed on  2019 - 21:34:14 by Santi Buckley M.D.  -    < end of copied text >      Stress Testing:    Cath:< from: Cardiac Cath Lab - Adult (16 @ 14:50) >  [facility]  Department of Cardiology  [address] [place] [address]  [phone]  Cath Lab Report -- Comprehensive Report  Patient: LESLEY SWANSON  Study date: 2016  Account number: [id number]  MR number: [id number]  : 1965  Gender: Male  Race: W  Case Physician(s):  Maximus Sprague M.D.  Fellow:  Lester Barone M.D.  Referring Physician:  Gianni Ansari M.D.  INDICATIONS: Unstable angina - CCS4.  HISTORY: There was no prior cardiac history. The patient has hypertension.  PROCEDURE:  --  Left heart catheterization with ventriculography.  --  Left coronary angiography.  --  Right coronary angiography.  TECHNIQUE: The risks and alternatives of the procedures and conscious  sedation were explained to the patient and informed consent was obtained.  Cardiac catheterization performed electively. Coronary intervention  performed electively.  Local anesthetic given. Right radial artery access. A 6FR PRELUDE KIT was  inserted in the vessel. Left heart catheterization. Ventriculography was  performed. A 5FR PIG EXPO catheter was utilized. Left coronary artery  angiography. The vessel was injected utilizing a 5FR FL3.5 EXPO catheter.  Right coronary artery angiography. The vessel was injected utilizing a 5FR  FR4.0 EXPO catheter. RADIATION EXPOSURE: 1.8 min.  CONTRAST GIVEN: Omnipaque 58 ml.  MEDICATIONS GIVEN: Midazolam, 1 mg, IV. Fentanyl, 25 mcg, IV. Verapamil  (Isoptin, Calan, Covera), 2.5 mg, IA. Heparin, 3000 units, IA.  VENTRICLES: Global left ventricular function was hypercontractile. EF  estimated was 70 %.  CORONARY VESSELS: The coronary circulation is co-dominant.  LM:   --  LM: Normal.  LAD:   --  LAD: Normal.  CX:   --  Proximal circumflex: There was a 30 % stenosis.  RCA:   --  RCA: Normal.  COMPLICATIONS: There were no complications.  DIAGNOSTIC RECOMMENDATIONS: The patient should continue with the present  medications.  Prepared and signed by  Maximus Sprague M.D.  Signed 2016 14:23:32  HEMODYNAMIC TABLES  Pressures:  Baseline/ Room Air  Pressures:  - HR: 66  Pressures:  - Rhythm:  Pressures:  -- Aortic Pressure (S/D/M): 124/69/90  Outputs:  Baseline/ Room Air  Outputs:  -- CALCULATIONS[age]  Outputs:  -- CALCULATIONS: Body Surface Area: 2.24  Outputs:  -- CALCULATIONS: Height in cm: 185.00  Outputs:  -- CALCULATIONS: Sex: Male    < end of copied text >      Interpretation of Telemetry:    Imaging:    Labs:                                 12.3   10.85 )-----------( 200      ( 08 Aug 2019 08:29 )             37.8     08-08    141  |  106  |  14  ----------------------------<  87  3.7   |  22  |  0.90    Ca    8.4      08 Aug 2019 06:28  Phos  2.9     08-  Mg     2.0         TPro  7.2  /  Alb  4.5  /  TBili  0.4  /  DBili  x   /  AST  14  /  ALT  19  /  AlkPhos  77  08-07          Urinalysis Basic - ( 07 Aug 2019 08:29 )    Color: Light Yellow / Appearance: Clear / S.021 / pH: x  Gluc: x / Ketone: Trace  / Bili: Negative / Urobili: Negative   Blood: x / Protein: Trace / Nitrite: Negative   Leuk Esterase: Negative / RBC: 1 /hpf / WBC 1 /HPF   Sq Epi: x / Non Sq Epi: 0 /hpf / Bacteria: Negative

## 2019-08-08 NOTE — PROGRESS NOTE BEHAVIORAL HEALTH - NSBHCHARTREVIEWINVESTIGATE_PSY_A_CORE FT
< from: 12 Lead ECG (08.07.19 @ 07:27) >      Ventricular Rate 70 BPM    Atrial Rate 70 BPM    P-R Interval 172 ms    QRS Duration 76 ms    Q-T Interval 408 ms    QTC Calculation(Bezet) 440 ms    P Axis 56 degrees    R Axis 64 degrees    T Axis 42 degrees    Diagnosis Line NORMAL SINUS RHYTHM  NONSPECIFIC ST ABNORMALITY  ABNORMAL ECG    Confirmed by ATTENDING, ED ([id number]),  LINDSAY BENAVIDES ([id number]) on 8/7/2019 7:29:36 AM    < end of copied text >

## 2019-08-08 NOTE — PROGRESS NOTE ADULT - ASSESSMENT
54M h/o substance abuse and afib admitted for nausea, vomiting, chills, and anxiety, likely related to acute marijuana intoxication.

## 2019-08-08 NOTE — PROGRESS NOTE BEHAVIORAL HEALTH - NSBHCHARTREVIEWVS_PSY_A_CORE FT
T(C): 36.4 (08-08-19 @ 05:19), Max: 37.2 (08-07-19 @ 15:53)  HR: 54 (08-08-19 @ 05:19) (53 - 81)  BP: 117/61 (08-08-19 @ 05:19) (105/58 - 145/79)  RR: 20 (08-08-19 @ 05:19) (20 - 20)  SpO2: 96% (08-08-19 @ 05:19) (90% - 100%)  Wt(kg): --

## 2019-08-08 NOTE — DISCHARGE NOTE PROVIDER - HOSPITAL COURSE
HPI:     Vitor Caraballo is a 54 year-old man with history of marijuana use and afib s/p ablation presenting with multiple episodes of nausea and vomiting. These episodes started yesterday night, where he was vomiting every hour. The emesis is nonbilious and nonbloody. The episodes are associated with shaking chills. Per the patient's wife, whenever he has these shaking chills, the patient assumes an "infantile posture" until the chills go away. The patient reports that hot showers improve the nausea minimally. The patient currently endorses nausea but says he has not vomited for several hours. There is no associated abdominal pain, diarrhea, constipation, or fevers. He has had previous hospital admissions for nausea and vomiting, last hospitalized in April at [facility]. He last presented to [facility] in January with similar complaints. At that time, he was worked up for nausea/vomiting, afib with RVR, and polysubstance abuse of marijuana and K2. The currently denies using K2, but admits to using marijuana. He last smoked marijuana yesterday, and says he spends $10/day on marijuana. Recently, he has been attempting to cut down on the amount of marijuana that he uses, but he still smokes at least 1-2 cigarettes of marijuana a day. In addition to nausea and vomiting, the patient also endorses significant anxiety but is unsure of the reason. He has no psychiatric hospitalizations but says he used to have a gambling "issue" and is an active participant in [employer]. Per the sister, the patient was admitted to rehab in 2016 for a K2 overdose, where he remained for 30 days. Afterwards, he was clean of all drugs for a year and a half before he relapsed back into using marijuana. The patient, however, denies ever using K2.             Hospital Course:     Patient was managed on PRN Zofran and Reglan for symptom control. Patient was still endorsing nausea but was no longer having episodes on hospital day 2. He was initiated on a clear liquid diet that he tolerated well; his diet was subsequently escalated to a soft diet.        Psych was consulted upon admission. Patient was placed on PRN ativan for agitation. He required ativan upon admission but as his nausea improved, patient no longer was found to be agitated.         Cardiology evaluated patient while admitted. They recommended continuation of his home dose of bisoprolol. HPI:     Vitor Caraballo is a 54 year-old man with history of marijuana use and afib s/p ablation presenting with multiple episodes of nausea and vomiting. These episodes started yesterday night, where he was vomiting every hour. The emesis is nonbilious and nonbloody. The episodes are associated with shaking chills. Per the patient's wife, whenever he has these shaking chills, the patient assumes an "infantile posture" until the chills go away. The patient reports that hot showers improve the nausea minimally. The patient currently endorses nausea but says he has not vomited for several hours. There is no associated abdominal pain, diarrhea, constipation, or fevers. He has had previous hospital admissions for nausea and vomiting, last hospitalized in April at [facility]. He last presented to [facility] in January with similar complaints. At that time, he was worked up for nausea/vomiting, afib with RVR, and polysubstance abuse of marijuana and K2. The currently denies using K2, but admits to using marijuana. He last smoked marijuana yesterday, and says he spends $10/day on marijuana. Recently, he has been attempting to cut down on the amount of marijuana that he uses, but he still smokes at least 1-2 cigarettes of marijuana a day. In addition to nausea and vomiting, the patient also endorses significant anxiety but is unsure of the reason. He has no psychiatric hospitalizations but says he used to have a gambling "issue" and is an active participant in [employer]. Per the sister, the patient was admitted to rehab in 2016 for a K2 overdose, where he remained for 30 days. Afterwards, he was clean of all drugs for a year and a half before he relapsed back into using marijuana. The patient, however, denies ever using K2.             Hospital Course:     Patient was managed on PRN Zofran and Reglan for symptom control. Patient was still endorsing nausea but was no longer having episodes on hospital day 2. He was initiated on a clear liquid diet that he tolerated well; his diet was subsequently escalated to a soft diet. The same day, the patient attempted to elope from the hospital was but stopped in the driveway out of the hospital. He was brought back to the hospital and was cooperative. On hospital day 3, his nausea had significantly improved and was able to eat a little bit of food. At this time, the patient expressed some interest in being discharged to an inpatient rehab center for marijuana addiction.         Psych was consulted upon admission. Patient was placed on PRN ativan for agitation. He required ativan upon admission but as his nausea improved, patient no longer was found to be agitated.         Cardiology evaluated patient while admitted. They recommended continuation of his home dose of bisoprolol. HPI:     Vitor Caraballo is a 54 year-old man with history of marijuana use and afib s/p ablation presenting with multiple episodes of nausea and vomiting. These episodes started yesterday night, where he was vomiting every hour. The emesis is nonbilious and nonbloody. The episodes are associated with shaking chills. Per the patient's wife, whenever he has these shaking chills, the patient assumes an "infantile posture" until the chills go away. The patient reports that hot showers improve the nausea minimally. The patient currently endorses nausea but says he has not vomited for several hours. There is no associated abdominal pain, diarrhea, constipation, or fevers. He has had previous hospital admissions for nausea and vomiting, last hospitalized in April at [facility]. He last presented to [facility] in January with similar complaints. At that time, he was worked up for nausea/vomiting, afib with RVR, and polysubstance abuse of marijuana and K2. The currently denies using K2, but admits to using marijuana. He last smoked marijuana yesterday, and says he spends $10/day on marijuana. Recently, he has been attempting to cut down on the amount of marijuana that he uses, but he still smokes at least 1-2 cigarettes of marijuana a day. In addition to nausea and vomiting, the patient also endorses significant anxiety but is unsure of the reason. He has no psychiatric hospitalizations but says he used to have a gambling "issue" and is an active participant in [employer]. Per the sister, the patient was admitted to rehab in 2016 for a K2 overdose, where he remained for 30 days. Afterwards, he was clean of all drugs for a year and a half before he relapsed back into using marijuana. The patient, however, denies ever using K2.             Hospital Course:     Patient was managed on PRN Zofran and Reglan for symptom control. Patient was still endorsing nausea but was no longer having episodes on hospital day 2. He was initiated on a clear liquid diet that he tolerated well; his diet was subsequently escalated to a soft diet. The same day, the patient attempted to elope from the hospital was but stopped in the driveway out of the hospital. He was brought back to the hospital and was cooperative. On hospital day 3, his nausea had significantly improved and was able to eat a little bit of food. At this time, the patient expressed some interest in being discharged to an inpatient rehab center for marijuana addiction.         Psych was consulted upon admission. Patient was placed on PRN ativan for agitation. He required ativan upon admission but as his nausea improved, patient no longer was found to be agitated.         Cardiology evaluated patient while admitted. They recommended continuation of his home dose of bisoprolol.         Patient was evaluated by SW. He was given resources and decided to attend a rehab facility in [place]. He will be discharged so he may attend rehab.         At the time of discharge, patient was stable and tolerating PO. He will go home on PRN Zofran for any residual nausea symptoms. HPI:     Vitor Caraballo is a 54 year-old man with history of marijuana use and afib s/p ablation presenting with multiple episodes of nausea and vomiting. These episodes started yesterday night, where he was vomiting every hour. The 54 year-old man with history of marijuana use and afib s/p ablation pw multiple episodes of nausea and vomiting associated with shaking chills. Has had previous hospital admissions for nausea and vomiting, last hospitalized in April at [facility]. Last presented to [facility] in January with similar complaints. At that time, he was worked up for nausea/vomiting, afib with RVR, and polysubstance abuse of marijuana and K2. The currently denied using K2, but admitted to using marijuana. He last smoked marijuana yesterday, and says he spends $10/day on marijuana. Recently, he had been attempting to cut down but still smoked at least 1-2 cigarettes of marijuana a day.         Pt also endorsed significant anxiety. He has had no psychiatric hospitalizations but says he used to have a gambling "issue" and is an active participant in [employer]. Per the sister, the patient was admitted to rehab in 2016 for a K2 overdose, where he remained for 30 days. Afterwards, he was clean of all drugs for a year and a half before he relapsed back into using marijuana. The patient, however, denies ever using K2.             Hospital Course:         Admitted and started on IV hydration, antiemetics. The same day, the patient attempted to elope from the hospital was but stopped in the driveway out of the hospital. He was brought back to the hospital and was cooperative. On hospital day 3, his nausea had significantly improved and was able to eat a little bit of food. At this time, the patient expressed some interest in being discharged to an inpatient rehab center for marijuana addiction.         Psych was consulted upon admission. Patient was placed on PRN ativan for agitation. He required ativan on admission but as his nausea improved, patient no longer was found to be agitated.         Cardiology evaluated patient while admitted. They recommended continuation of his home dose of bisoprolol.         Patient was evaluated by [facility]. He was given resources and decided to attend a rehab facility in [place]. He will be discharged so he may attend rehab.         At the time of discharge, patient was stable and tolerating PO. He will go home on PRN Zofran for any residual nausea symptoms.         Diagnoses: Cannabinoid hyperemesis syndrome; Severe nausea and vomiting; Dehydration; Anxiety; Hypophosphatemia; Hypomagnesemia; Lactic acidosis

## 2019-08-08 NOTE — DISCHARGE NOTE NURSING/CASE MANAGEMENT/SOCIAL WORK - NSDCPEWEB_GEN_ALL_CORE
[place] website --- [email or web address] for Tobacco Control website --- [email or web address]

## 2019-08-08 NOTE — PROGRESS NOTE ADULT - PROBLEM SELECTOR PLAN 4
DVT ppx: SCDs  Diet: Clear liquid  Dispo: Pending resolution of acute symptoms. Pt wants to leave and has capacity to leave AMA.

## 2019-08-08 NOTE — PROGRESS NOTE ADULT - PROBLEM SELECTOR PLAN 1
Leukocytosis and elevated blood venous gas lactate level of 4.1 on admission. Electrolytes and vitals WNL. Likely cannabinoid hyperemesis syndrome.   - Continue IVF LR 75mL/hr, consider stopping if pt tolerating liquids  - Metoclopramide 10mg IV PRN for nausea  - Trend WBC, lactate  - Avoid oral medications for now

## 2019-08-08 NOTE — PROGRESS NOTE BEHAVIORAL HEALTH - NSBHCHARTREVIEWLAB_PSY_A_CORE FT
12.3   10.85 )-----------( 200      ( 08 Aug 2019 08:29 )             37.8     08-08    141  |  106  |  14  ----------------------------<  87  3.7   |  22  |  0.90    Ca    8.4      08 Aug 2019 06:28  Phos  2.9     08-08  Mg     2.0     08-08    TPro  7.2  /  Alb  4.5  /  TBili  0.4  /  DBili  x   /  AST  14  /  ALT  19  /  AlkPhos  77  08-07    Urinalysis Basic - ( 07 Aug 2019 08:29 )    Color: Light Yellow / Appearance: Clear / S.021 / pH: x  Gluc: x / Ketone: Trace  / Bili: Negative / Urobili: Negative   Blood: x / Protein: Trace / Nitrite: Negative   Leuk Esterase: Negative / RBC: 1 /hpf / WBC 1 /HPF   Sq Epi: x / Non Sq Epi: 0 /hpf / Bacteria: Negative

## 2019-08-08 NOTE — PROGRESS NOTE ADULT - ASSESSMENT
From cardiac point of view, no significant CAD, ? MR but normal LV and RV function. Hx AF s/p ablation with poor f/u and compliance, but in sinus on beta-blocker. CHADS2-VASC score 0 so no anticoag.  Main issue is psych/ drug dependence on marijuana.    Plan: Continue bisoprolol 10 qd.  No further cardiac workup planned at this time.  Psych eval, dependence issues, etc.    Gianni Ansari MD, FACC  O) [phone]  (C) [phone]

## 2019-08-08 NOTE — PROGRESS NOTE ADULT - PROBLEM SELECTOR PLAN 3
Afib s/p ablation. Currently following with cardiology Dr. Gianni Ansari.  - Appreciate cardiology recs; continue bisoprolol 10mg PO QD

## 2019-08-08 NOTE — PROGRESS NOTE ADULT - SUBJECTIVE AND OBJECTIVE BOX
Patient is a 54y old Male admitted for Nausea/vomiting (08 Aug 2019 08:52)    SUBJECTIVE / OVERNIGHT EVENTS: No acute events overnight. Pt feels much less nauseous this AM after receiving Zofran and Reglan. He expresses that he wants to go home today. Currently denies abdominal pain, fevers, chills, nausea, vomiting, constipation, and diarrhea.    REVIEW OF SYSTEMS:    CONSTITUTIONAL: No weakness, fevers or chills  EYES/ENT: No visual changes;  No vertigo or throat pain   NECK: No pain or stiffness  RESPIRATORY: No cough, wheezing, hemoptysis; No shortness of breath  CARDIOVASCULAR: No chest pain or palpitations  GASTROINTESTINAL: No abdominal or epigastric pain. No nausea, vomiting, or hematemesis; No diarrhea or constipation. No melena or hematochezia.  GENITOURINARY: No dysuria, frequency or hematuria  NEUROLOGICAL: No numbness or weakness  SKIN: No itching, burning, rashes, or lesions   All other review of systems is negative unless indicated above.    MEDICATIONS  (STANDING):  bisoprolol   Tablet 10 milliGRAM(s) Oral daily  lactated ringers. 1000 milliLiter(s) (75 mL/Hr) IV Continuous <Continuous>    MEDICATIONS  (PRN):  LORazepam   Injectable 0.5 milliGRAM(s) IV Push every 6 hours PRN Anxiety  metoclopramide Injectable 10 milliGRAM(s) IV Push every 8 hours PRN Nausea/Vomiting  ondansetron Injectable 4 milliGRAM(s) IV Push every 6 hours PRN Nausea and/or Vomiting      Allergies    No Known Allergies    Intolerances        OBJECTIVE:    Vital Signs Last 24 Hrs  T(C): 36.6 (08 Aug 2019 11:53), Max: 37.2 (07 Aug 2019 15:53)  T(F): 97.9 (08 Aug 2019 11:53), Max: 99 (07 Aug 2019 15:53)  HR: 66 (08 Aug 2019 11:53) (53 - 67)  BP: 130/77 (08 Aug 2019 11:53) (105/58 - 131/70)  BP(mean): --  RR: 20 (08 Aug 2019 11:53) (20 - 20)  SpO2: 99% (08 Aug 2019 11:53) (90% - 99%)  CAPILLARY BLOOD GLUCOSE        I&O's Summary    07 Aug 2019 07:01  -  08 Aug 2019 07:00  --------------------------------------------------------  IN: 862 mL / OUT: 0 mL / NET: 862 mL      PHYSICAL EXAM:  GENERAL: No acute distress, well-developed  HEAD: atraumatic, normocephalic  EYES: EOMI, PERRLA, conjunctiva and sclera clear  NECK: Supple, no lymphadenopathy, no JVD  CHEST/LUNG: CTAB; No wheezing, rales, or rhonchi  HEART: RRR; Normal S1/S2. No murmurs, rubs, or gallops  ABDOMEN: Soft, non-tender, non-distended; normal bowel sounds, no organomegaly  EXTREMITIES:  2+ peripheral pulses b/l, No clubbing, cyanosis, or edema  NEUROLOGY: A&O x3, no focal deficits  SKIN: Warm, dry, intact; No rashes or lesions      LABS:                        12.3   10.85 )-----------( 200      ( 08 Aug 2019 08:29 )             37.8     08-08    141  |  106  |  14  ----------------------------<  87  3.7   |  22  |  0.90    Ca    8.4      08 Aug 2019 06:28  Phos  2.9     08-08  Mg     2.0     08-08    TPro  7.2  /  Alb  4.5  /  TBili  0.4  /  DBili  x   /  AST  14  /  ALT  19  /  AlkPhos  77  08-07    LIVER FUNCTIONS - ( 07 Aug 2019 04:52 )  Alb: 4.5 g/dL / Pro: 7.2 g/dL / ALK PHOS: 77 U/L / ALT: 19 U/L / AST: 14 U/L / GGT: x             Urinalysis Basic - ( 07 Aug 2019 08:29 )    Color: Light Yellow / Appearance: Clear / S.021 / pH: x  Gluc: x / Ketone: Trace  / Bili: Negative / Urobili: Negative   Blood: x / Protein: Trace / Nitrite: Negative   Leuk Esterase: Negative / RBC: 1 /hpf / WBC 1 /HPF   Sq Epi: x / Non Sq Epi: 0 /hpf / Bacteria: Negative      Culture - Urine (collected 07 Aug 2019 11:31)  Source: .Urine  Final Report (08 Aug 2019 12:11):    No growth      RADIOLOGY & ADDITIONAL TESTS:    Imaging Personally Reviewed:     Consultant(s) Notes Reviewed:     Care Discussed with Consultants/Other Providers:

## 2019-08-08 NOTE — PROGRESS NOTE BEHAVIORAL HEALTH - NSBHCHARTREVIEWIMAGING_PSY_A_CORE FT
< from: CT Head No Cont (08.07.19 @ 17:28) >      INTERPRETATION:  CLINICAL INDICATION: Lethargy and altered mental status.    TECHNIQUE : Axial CT scanning of the brain was obtained from the skull   base to the vertex without the administration of intravenous contrast.   Coronal and sagittal reformatted images were subsequently obtained.    COMPARISON: 1/29/2019    FINDINGS:  There is no CT evidence of acute transcortical infarct.    There is no hydrocephalus, mass effect, midline shift, or acute   intracranial hemorrhage. No extra-axial collection. Basal cisterns are   patent.    The visualized paranasal sinuses and mastoid air cells are clear.    The calvarium is intact.    IMPRESSION:  No evidence of acute transcortical infarct, acute intracranial   hemorrhage, or mass effect.    < end of copied text >

## 2019-08-09 VITALS
OXYGEN SATURATION: 97 % | RESPIRATION RATE: 18 BRPM | TEMPERATURE: 98 F | DIASTOLIC BLOOD PRESSURE: 71 MMHG | SYSTOLIC BLOOD PRESSURE: 124 MMHG | HEART RATE: 71 BPM

## 2019-08-09 LAB
ANION GAP SERPL CALC-SCNC: 14 MMOL/L — SIGNIFICANT CHANGE UP (ref 5–17)
BUN SERPL-MCNC: 13 MG/DL — SIGNIFICANT CHANGE UP (ref 7–23)
CALCIUM SERPL-MCNC: 8.3 MG/DL — LOW (ref 8.4–10.5)
CHLORIDE SERPL-SCNC: 103 MMOL/L — SIGNIFICANT CHANGE UP (ref 96–108)
CO2 SERPL-SCNC: 21 MMOL/L — LOW (ref 22–31)
CREAT SERPL-MCNC: 0.86 MG/DL — SIGNIFICANT CHANGE UP (ref 0.5–1.3)
GLUCOSE SERPL-MCNC: 96 MG/DL — SIGNIFICANT CHANGE UP (ref 70–99)
HCT VFR BLD CALC: 38 % — LOW (ref 39–50)
HGB BLD-MCNC: 12.8 G/DL — LOW (ref 13–17)
MAGNESIUM SERPL-MCNC: 1.9 MG/DL — SIGNIFICANT CHANGE UP (ref 1.6–2.6)
MCHC RBC-ENTMCNC: 28.8 PG — SIGNIFICANT CHANGE UP (ref 27–34)
MCHC RBC-ENTMCNC: 33.7 GM/DL — SIGNIFICANT CHANGE UP (ref 32–36)
MCV RBC AUTO: 85.4 FL — SIGNIFICANT CHANGE UP (ref 80–100)
PHOSPHATE SERPL-MCNC: 2.3 MG/DL — LOW (ref 2.5–4.5)
PLATELET # BLD AUTO: 205 K/UL — SIGNIFICANT CHANGE UP (ref 150–400)
POTASSIUM SERPL-MCNC: 3.6 MMOL/L — SIGNIFICANT CHANGE UP (ref 3.5–5.3)
POTASSIUM SERPL-SCNC: 3.6 MMOL/L — SIGNIFICANT CHANGE UP (ref 3.5–5.3)
RBC # BLD: 4.45 M/UL — SIGNIFICANT CHANGE UP (ref 4.2–5.8)
RBC # FLD: 12.9 % — SIGNIFICANT CHANGE UP (ref 10.3–14.5)
SODIUM SERPL-SCNC: 138 MMOL/L — SIGNIFICANT CHANGE UP (ref 135–145)
WBC # BLD: 12.55 K/UL — HIGH (ref 3.8–10.5)
WBC # FLD AUTO: 12.55 K/UL — HIGH (ref 3.8–10.5)

## 2019-08-09 PROCEDURE — 96376 TX/PRO/DX INJ SAME DRUG ADON: CPT

## 2019-08-09 PROCEDURE — 83605 ASSAY OF LACTIC ACID: CPT

## 2019-08-09 PROCEDURE — 85027 COMPLETE CBC AUTOMATED: CPT

## 2019-08-09 PROCEDURE — 96361 HYDRATE IV INFUSION ADD-ON: CPT

## 2019-08-09 PROCEDURE — 84132 ASSAY OF SERUM POTASSIUM: CPT

## 2019-08-09 PROCEDURE — 84100 ASSAY OF PHOSPHORUS: CPT

## 2019-08-09 PROCEDURE — 70450 CT HEAD/BRAIN W/O DYE: CPT

## 2019-08-09 PROCEDURE — 84295 ASSAY OF SERUM SODIUM: CPT

## 2019-08-09 PROCEDURE — 99239 HOSP IP/OBS DSCHRG MGMT >30: CPT

## 2019-08-09 PROCEDURE — 80048 BASIC METABOLIC PNL TOTAL CA: CPT

## 2019-08-09 PROCEDURE — 82435 ASSAY OF BLOOD CHLORIDE: CPT

## 2019-08-09 PROCEDURE — 85014 HEMATOCRIT: CPT

## 2019-08-09 PROCEDURE — 82947 ASSAY GLUCOSE BLOOD QUANT: CPT

## 2019-08-09 PROCEDURE — 74177 CT ABD & PELVIS W/CONTRAST: CPT

## 2019-08-09 PROCEDURE — 93005 ELECTROCARDIOGRAM TRACING: CPT

## 2019-08-09 PROCEDURE — 96374 THER/PROPH/DIAG INJ IV PUSH: CPT | Mod: XU

## 2019-08-09 PROCEDURE — 83735 ASSAY OF MAGNESIUM: CPT

## 2019-08-09 PROCEDURE — 96375 TX/PRO/DX INJ NEW DRUG ADDON: CPT

## 2019-08-09 PROCEDURE — 99285 EMERGENCY DEPT VISIT HI MDM: CPT | Mod: 25

## 2019-08-09 PROCEDURE — 82803 BLOOD GASES ANY COMBINATION: CPT

## 2019-08-09 PROCEDURE — 87086 URINE CULTURE/COLONY COUNT: CPT

## 2019-08-09 PROCEDURE — 82330 ASSAY OF CALCIUM: CPT

## 2019-08-09 PROCEDURE — 81001 URINALYSIS AUTO W/SCOPE: CPT

## 2019-08-09 PROCEDURE — 80307 DRUG TEST PRSMV CHEM ANLYZR: CPT

## 2019-08-09 PROCEDURE — 80053 COMPREHEN METABOLIC PANEL: CPT

## 2019-08-09 RX ORDER — ONDANSETRON 8 MG/1
1 TABLET, FILM COATED ORAL
Qty: 12 | Refills: 0
Start: 2019-08-09 | End: 2019-08-12

## 2019-08-09 RX ORDER — SODIUM,POTASSIUM PHOSPHATES 278-250MG
1 POWDER IN PACKET (EA) ORAL
Refills: 0 | Status: DISCONTINUED | OUTPATIENT
Start: 2019-08-09 | End: 2019-08-09

## 2019-08-09 RX ADMIN — Medication 1 TABLET(S): at 09:49

## 2019-08-09 RX ADMIN — Medication 0.5 MILLIGRAM(S): at 06:45

## 2019-08-09 NOTE — PROGRESS NOTE ADULT - PROBLEM SELECTOR PLAN 3
Afib s/p ablation. Currently following with cardiology Dr. Gianni Ansari.  - Continue bisoprolol 10mg PO QD

## 2019-08-09 NOTE — PROGRESS NOTE ADULT - SUBJECTIVE AND OBJECTIVE BOX
Patient is a 54y old Male admitted for Nausea/vomiting (08 Aug 2019 08:52)    SUBJECTIVE / OVERNIGHT EVENTS: The patient was seen and examined today. He had no acute events overnight. Today, the patient says he is feeling "much better." He says he currently does not have any nausea although he had minimal nausea earlier in the morning. He also says that his bowel movement this morning was watery. However, he denies fevers, chills, or abdominal pain.  His appetite has been minimal and he did not eat dinner yesterday. The patient expressed a strong desire to leave the hospital today.     MEDICATIONS  (STANDING):  bisoprolol   Tablet 10 milliGRAM(s) Oral daily  lactated ringers. 1000 milliLiter(s) (75 mL/Hr) IV Continuous <Continuous>  potassium acid phosphate/sodium acid phosphate tablet (K-PHOS No. 2) 1 Tablet(s) Oral two times a day with meals    MEDICATIONS  (PRN):  LORazepam   Injectable 0.5 milliGRAM(s) IV Push every 6 hours PRN Anxiety  metoclopramide Injectable 10 milliGRAM(s) IV Push every 8 hours PRN Nausea/Vomiting  ondansetron Injectable 4 milliGRAM(s) IV Push every 6 hours PRN Nausea and/or Vomiting      Allergies  No Known Allergies      OBJECTIVE:    Vital Signs Last 24 Hrs  T(C): 36.8 (09 Aug 2019 04:35), Max: 36.8 (08 Aug 2019 20:57)  T(F): 98.2 (09 Aug 2019 04:35), Max: 98.3 (08 Aug 2019 20:57)  HR: 72 (09 Aug 2019 04:35) (66 - 72)  BP: 146/76 (09 Aug 2019 04:35) (111/59 - 146/76)  BP(mean): --  RR: 18 (09 Aug 2019 04:35) (18 - 20)  SpO2: 96% (09 Aug 2019 04:35) (96% - 99%)    I&O's Summary    08 Aug 2019 07:01  -  09 Aug 2019 07:00  --------------------------------------------------------  IN: 600 mL / OUT: 0 mL / NET: 600 mL        PHYSICAL EXAM:  GENERAL: No acute distress, well-developed  HEAD: atraumatic, normocephalic  EYES: EOMI, PERRLA, conjunctiva and sclera clear  NECK: Supple, no lymphadenopathy, no JVD  CHEST/LUNG: CTAB; No wheezing, rales, or rhonchi  HEART: RRR; Normal S1/S2. No murmurs, rubs, or gallops  ABDOMEN: Soft, non-tender, non-distended; normal bowel sounds, no organomegaly  EXTREMITIES:  2+ peripheral pulses b/l, No clubbing, cyanosis, or edema  NEUROLOGY: A&O x2 (person, place, not to date), no focal deficits  SKIN: Warm, dry, intact; No rashes or lesions      LABS:    CBC Full  -  ( 08 Aug 2019 08:29 )  WBC Count : 10.85 K/uL  RBC Count : 4.36 M/uL  Hemoglobin : 12.3 g/dL  Hematocrit : 37.8 %  Platelet Count - Automated : 200 K/uL  Mean Cell Volume : 86.7 fl  Mean Cell Hemoglobin : 28.2 pg  Mean Cell Hemoglobin Concentration : 32.5 gm/dL  Auto Neutrophil # : x  Auto Lymphocyte # : x  Auto Monocyte # : x  Auto Eosinophil # : x  Auto Basophil # : x  Auto Neutrophil % : x  Auto Lymphocyte % : x  Auto Monocyte % : x  Auto Eosinophil % : x  Auto Basophil % : x    08-09    138  |  103  |  13  ----------------------------<  96  3.6   |  21<L>  |  0.86    Ca    8.3<L>      09 Aug 2019 06:19  Phos  2.3     08-09  Mg     1.9     08-09        Culture - Urine (collected 07 Aug 2019 11:31)  Source: .Urine  Final Report (08 Aug 2019 12:11):    No growth      RADIOLOGY & ADDITIONAL TESTS:  None

## 2019-08-09 NOTE — SBIRT NOTE ADULT - NSSBIRTDRGACTIVEREFTXDET_GEN_A_CORE
LMSW sent clinicals to [facility] in [place] with coordination from patient's spouse (Christa). Patient to be admitted to program on Sunday (8/11).

## 2019-08-09 NOTE — PROGRESS NOTE ADULT - PROBLEM SELECTOR PLAN 1
Leukocytosis and blood venous gas lactate level downtrending. Electrolytes and vitals WNL. Likely cannabinoid hyperemesis syndrome.   - Patient tolerating liquids  - Metoclopramide 10mg IV PRN for nausea  - Continue to trend WBC, lactate  - Avoid oral medications for now

## 2019-08-09 NOTE — PROGRESS NOTE ADULT - ASSESSMENT
54M h/o substance abuse and afib admitted for nausea, vomiting, chills, and anxiety, likely secondary to cannabinoid hyperemesis syndrome.

## 2019-08-12 ENCOUNTER — INBOUND DOCUMENT (OUTPATIENT)
Age: 54
End: 2019-08-12

## 2019-11-04 ENCOUNTER — MEDICATION RENEWAL (OUTPATIENT)
Age: 54
End: 2019-11-04

## 2019-12-06 ENCOUNTER — APPOINTMENT (OUTPATIENT)
Dept: CARDIOLOGY | Facility: CLINIC | Age: 54
End: 2019-12-06
Payer: COMMERCIAL

## 2019-12-06 ENCOUNTER — NON-APPOINTMENT (OUTPATIENT)
Age: 54
End: 2019-12-06

## 2019-12-06 VITALS
WEIGHT: 222 LBS | OXYGEN SATURATION: 98 % | SYSTOLIC BLOOD PRESSURE: 114 MMHG | HEART RATE: 70 BPM | BODY MASS INDEX: 29.42 KG/M2 | HEIGHT: 73 IN | DIASTOLIC BLOOD PRESSURE: 77 MMHG

## 2019-12-06 DIAGNOSIS — E66.9 OBESITY, UNSPECIFIED: ICD-10-CM

## 2019-12-06 PROCEDURE — 99214 OFFICE O/P EST MOD 30 MIN: CPT

## 2019-12-06 PROCEDURE — 93000 ELECTROCARDIOGRAM COMPLETE: CPT

## 2019-12-06 PROCEDURE — 36415 COLL VENOUS BLD VENIPUNCTURE: CPT

## 2019-12-06 NOTE — PHYSICAL EXAM
[General Appearance - Well Developed] : well developed [Normal Appearance] : normal appearance [Well Groomed] : well groomed [General Appearance - Well Nourished] : well nourished [No Deformities] : no deformities [General Appearance - In No Acute Distress] : no acute distress [Normal Conjunctiva] : the conjunctiva exhibited no abnormalities [Eyelids - No Xanthelasma] : the eyelids demonstrated no xanthelasmas [Normal Oral Mucosa] : normal oral mucosa [No Oral Cyanosis] : no oral cyanosis [Normal Jugular Venous A Waves Present] : normal jugular venous A waves present [No Oral Pallor] : no oral pallor [Normal Jugular Venous V Waves Present] : normal jugular venous V waves present [No Jugular Venous Ortega A Waves] : no jugular venous ortega A waves [Respiration, Rhythm And Depth] : normal respiratory rhythm and effort [Auscultation Breath Sounds / Voice Sounds] : lungs were clear to auscultation bilaterally [Exaggerated Use Of Accessory Muscles For Inspiration] : no accessory muscle use [Heart Rate And Rhythm] : heart rate and rhythm were normal [Heart Sounds] : normal S1 and S2 [Murmurs] : no murmurs present [Abdomen Soft] : soft [Abdomen Tenderness] : non-tender [Abdomen Mass (___ Cm)] : no abdominal mass palpated [Abnormal Walk] : normal gait [Gait - Sufficient For Exercise Testing] : the gait was sufficient for exercise testing [Nail Clubbing] : no clubbing of the fingernails [Cyanosis, Localized] : no localized cyanosis [Petechial Hemorrhages (___cm)] : no petechial hemorrhages [Skin Color & Pigmentation] : normal skin color and pigmentation [] : no rash [No Venous Stasis] : no venous stasis [Skin Lesions] : no skin lesions [No Skin Ulcers] : no skin ulcer [No Xanthoma] : no  xanthoma was observed [FreeTextEntry1] : No rashes. No cyanosis [Affect] : the affect was normal [Oriented To Time, Place, And Person] : oriented to person, place, and time [Mood] : the mood was normal [No Anxiety] : not feeling anxious

## 2019-12-06 NOTE — REVIEW OF SYSTEMS
[Recent Weight Gain (___ Lbs)] : recent [unfilled] ~Ulb weight gain [Recent Weight Loss (___ Lbs)] : no recent weight loss [Feeling Fatigued] : not feeling fatigued [Dyspnea on exertion] : not dyspnea during exertion [Shortness Of Breath] : no shortness of breath [Chest  Pressure] : no chest pressure [Chest Pain] : no chest pain [Leg Claudication] : no intermittent leg claudication [Lower Ext Edema] : no extremity edema [Palpitations] : no palpitations [Cough] : no cough [Wheezing] : no wheezing [Coughing Up Blood] : no hemoptysis [Abdominal Pain] : no abdominal pain [Nausea] : no nausea [Heartburn] : no heartburn [Vomiting] : no vomiting [Change in Appetite] : no change in appetite [Dysphagia] : no dysphagia [see HPI] : see HPI [Dizziness] : no dizziness [Confusion] : no confusion was observed [Depression] : no depression [Anxiety] : no anxiety [Under Stress] : not under stress [Suicidal] : not suicidal [Negative] : Heme/Lymph

## 2019-12-06 NOTE — HISTORY OF PRESENT ILLNESS
[FreeTextEntry1] : August 30, 2016. Patient is a 51-year-old man, who was finally diagnosed with rapid atrial fibrillation about one month ago. On July 24 related to drinking and smoking synthetic weed, etc. he was hospitalized at [facility]. He had severe palpitations and "his whole system went into shock". He had some vomitting and nauseousness, diaphoresis, and an uncomfortable feeling, but no chest pain. He was in rapid atrial fibrillation. He did make positive troponin enzymes, but was not recommended to have cardiac catheterization or even a stress test. He was hospitalized at [facility] for 6 days and echocardiogram supposedly showed no scar. He was discharged on cholesterol medications, but then he followed up with a cardiologist in [place] after being hospitalized there for 48 hours with a recurrence of his A. fib. He was placed on sotalol and did well for a while. On 26 August on 120 mg of sotalol, he broke through with an episode of A.fib. The cardiologist wanted to increase Sotalol to 160 mg, but the patient stayed at 120 and then came back to [place] and is here to see me. He has not had a stress test. He has no risk factors for coronary artery disease or a family history of coronary disease or arrhythmias. He claims he's been having symptoms for 20 years, and the episodes have lasted as long as an hour, and it is only with this episode in July, that he was finally diagnosed with atrial fibrillation. He has some shortness of breath going up stairs, but he says he has had this for years, and it has not progressed. Otherwise, with normal activity, and swimming, he does not get chest pain. While he smokes a lot of weed, he does not smoke cigarettes. He thinks he was told of a murmur recently and once as a kid, although I did not hear a murmur on exam. Has not had any syncope recently. He did have a duodenal ulcer about 10 years ago, not related to medications or NSAIDs, and not bleeding. It has not been an issue since. He is currently on sotalol 120 b.i.d., BuSpar, 5 mg b.i.d., and aspirin 81 mg q.d. However, in [facility] Electronic medical record, there are no other notes, but there is a list of medicines to be verified that includes diltiazem, calculus, Eliquis, as well as atorvastatin, and Crestor.\par September 9, 2016. The patient came for stress test and had to stop for shortness of breath at 6 minutes with a low heart rate on carvedilol. It looked like ST s were starting to go down, but did not meet criteria. APCs noted, but no atrial fibrillation. Recommended nuclear stress test and use that to help guide medicine versus statin for his hyperlipidemia.\par November 2, 2016. Patient here in followup. He did not have nuclear stress test. He is here in followup because he is running out of sotalol, but also he had 2 episodes of atrial fibrillation, which were severe and accompanied with chest pain. He admits he has not been taking it twice a day and occasionally will skip. He has not worked on his diet and in fact has gained weight. He does complain of some fatigue and shortness of breath. No exertional chest pain, however; he does not get the same chest pain with exertion that he gets during the atrial fibrillation. His EKG is sinus rhythm with nonspecific ST changes. After a long discussion with the patient and his sister, he promises to be rigid with his sotalol every 12 hours, and he is scheduling a nuclear stress test, and we will review his labs.\par November 18, 2016. Yesterday, the patient came for his nuclear stress test. He was in atrial flutter or fibrillation. His heart rate went as high as 199, and there were definite abnormal ST changes, but no chest pain. Blood pressure response was normal. The nuclear scan showed medium sized, mild defects, apical, inferior, mid to distal inferolateral that were reversible, and there was also transient ischemic dilatation of the LV with a ratio of 1.45. LVEF was 78% with normal wall motion post stress however. Patient returns today to review the findings and for reevaluation. I recommended coronary angiography and the patient and his sister are thinking about it. He is back in sinus rhythm.\par December 8, 2016. Patient had coronary angiography, which only showed a 30% lesion in the proximal circumflex. The other arteries had no obstruction and LVEF was normal at 70%. I had the patient switch over to flecainide 50 mg q.12 h.\par December 30, 2016. The patient is in sinus rhythm, however, he claims he has had a lot of breakthroughs and does not feel well on the flecainide. He could not be more specific and wanted to go back to the sotalol, although he has broken through 120 mg many times. After a long extensive discussion we agreed to try Rythmol  mg q.12 h.\par February 7, 2017. Patient called. He had never started the Rythmol. After much discussion he agreed to start the Rythmol and come in in 2 weeks.\par February 23, 2017. The patient is here in followup and is in rapid atrial fibrillation/flutter. He is tolerating the Rythmol well and thinks that most of the time he is doing okay. He was not aware that he was in atrial fibrillation today, although he did think he was yesterday. After long discussion about ablation, changing medications, increasing the dose, or using home telemetry to determine how often he truly is in atrial fibrillation he elected to go up on the Rythmol first. He is still not eager for invasive procedures.\par April 13, 2017. Patient finally returns as I would not renew his medication. He is in sinus rhythm. He says he had one or two bad days, but otherwise thinks he was in a normal rhythm most of the time. He is not  the most compliant when it comes to sticking to his q.12 h. regimen. For the last 3 days has been taking 325 mg twice a day instead of 425 as he ran out of pills.\par May 10, 2017. Patient is here because of cough and sputum. However, he admitted that after a while he decided once again to stop his Rythmol to "see what would happen" and sure enough on Saturday he could not walk to [employer] because he was out of breath and his heart was racing fast. He went back on the Rythmol and had another episode Sunday, but since then has been back in normal rhythm. He's been coughing with dark sputum for 2 days now, but no fever or chills. His EKG shows sinus rhythm with a normal QRS and QT. He is here with his wife so we had an extensive discussion about considering an ablation and about compliance with medication and he will be calling Dr. Get Hardin of [facility].\par \par July 24, 2019. First visit in over 2 years. He had an ablation with Dr. Hardin in 2017 but never continued the medications, even for just a few months and never followed up with Dr. Hardin. In January of this year was hospitalized at [facility] and it sounds like he again had substance abuse problems, mostly marijuana, but probably Ativan, and possibly other medications. He initially presented with lactic acidosis. At some point he did seem to be in sinus rhythm, but with a very bizarre EKG, and prolonged QT, which I believe, was thought to be from toxins. Eventually, was back in rapid A. Flutter and was discharged on Cardizem CD. His CHADS2-VASC score was 0 so no anticoag. No followup after that and he claims now that he had no insurance, but now that he does he came back. He has been having severe GI symptoms whenever he eats fatty, greasy foods, mostly, vomiting, and chest pain, and rapid heartbeat. Reportedly, he has an appointment with gastroenterology tomorrow. He is on absolutely no medications and is here in rapid atrial flutter with a heart rate of around 140. Seems to be tolerating it well, with no ischemic changes, no heart failure on exam, and blood pressure acceptable. I reviewed his notes from the hospitalization in January and reached out to Dr. Get Hardin for further information. In the meantime, I am placing him on bisoprolol 10 mg for additional rate control while he has his GI workup. Long discussion about compliance and self-destructive behavior.\par July 31, 2019. Patient returns in followup on bisoprolol. I spoke with Dr. Hardin after his last visit, who said that even though his CHADS2-VASC score was zero, normally he likes to anticoagulate these people after ablation, but because the patient was so erratic in his behavior and there was concern about drugs or drinking, he did not. He would be willing to do another ablation if necessary. Today he is back in sinus rhythm at 51. Labs from last week were within normal limits, except for his lipid profile, which we reviewed today.\par December 6, 2019.  Since last visit patient was hospitalized I believe twice once at [facility] and once at [facility] with marijuana intoxication.  He remains in sinus rhythm as long as he was back on his bisoprolol.  He returns now run out of the bisoprolol and is back in A. fib at 130 but totally asymptomatic.  He is not on Xarelto but his CHADS2- VASC score is 0.  Still using marijuana.  Long discussion about compliance and advised him to set up another appointment with Dr. Hardin to discuss a second ablation.  (Last time after the ablation he stopped the beta-blocker right away rather than waiting 3 months.)

## 2019-12-09 LAB
ALBUMIN SERPL ELPH-MCNC: 4.7 G/DL
ALP BLD-CCNC: 82 U/L
ALT SERPL-CCNC: 32 U/L
ANION GAP SERPL CALC-SCNC: 17 MMOL/L
AST SERPL-CCNC: 20 U/L
BASOPHILS # BLD AUTO: 0.03 K/UL
BASOPHILS NFR BLD AUTO: 0.4 %
BILIRUB SERPL-MCNC: 0.6 MG/DL
BUN SERPL-MCNC: 15 MG/DL
CALCIUM SERPL-MCNC: 9.6 MG/DL
CHLORIDE SERPL-SCNC: 104 MMOL/L
CHOLEST SERPL-MCNC: 224 MG/DL
CHOLEST/HDLC SERPL: 4.8 RATIO
CO2 SERPL-SCNC: 23 MMOL/L
CREAT SERPL-MCNC: 0.98 MG/DL
EOSINOPHIL # BLD AUTO: 0.19 K/UL
EOSINOPHIL NFR BLD AUTO: 2.4 %
ESTIMATED AVERAGE GLUCOSE: 105 MG/DL
GLUCOSE SERPL-MCNC: 98 MG/DL
HBA1C MFR BLD HPLC: 5.3 %
HCT VFR BLD CALC: 51.7 %
HDLC SERPL-MCNC: 47 MG/DL
HGB BLD-MCNC: 16.5 G/DL
IMM GRANULOCYTES NFR BLD AUTO: 0.3 %
LDLC SERPL CALC-MCNC: 154 MG/DL
LYMPHOCYTES # BLD AUTO: 2.68 K/UL
LYMPHOCYTES NFR BLD AUTO: 34.2 %
MAN DIFF?: NORMAL
MCHC RBC-ENTMCNC: 28.2 PG
MCHC RBC-ENTMCNC: 31.9 GM/DL
MCV RBC AUTO: 88.4 FL
MONOCYTES # BLD AUTO: 0.34 K/UL
MONOCYTES NFR BLD AUTO: 4.3 %
NEUTROPHILS # BLD AUTO: 4.58 K/UL
NEUTROPHILS NFR BLD AUTO: 58.4 %
NT-PROBNP SERPL-MCNC: 59 PG/ML
PLATELET # BLD AUTO: 272 K/UL
POTASSIUM SERPL-SCNC: 4.7 MMOL/L
PROT SERPL-MCNC: 7.3 G/DL
RBC # BLD: 5.85 M/UL
RBC # FLD: 13 %
SODIUM SERPL-SCNC: 144 MMOL/L
TRIGL SERPL-MCNC: 115 MG/DL
TSH SERPL-ACNC: 1.19 UIU/ML
WBC # FLD AUTO: 7.84 K/UL

## 2020-04-22 ENCOUNTER — APPOINTMENT (OUTPATIENT)
Dept: CARDIOLOGY | Facility: CLINIC | Age: 55
End: 2020-04-22
Payer: COMMERCIAL

## 2020-04-22 PROCEDURE — 99214 OFFICE O/P EST MOD 30 MIN: CPT | Mod: 95

## 2020-05-01 NOTE — PHYSICAL EXAM
Group Topic: BH Process Group     Date: 4/30/2020  Start Time:  6:30 PM  End Time:  7:30 PM  Facilitators: ROBYN Wagner    Focus: Homework Review/ Check-In  Number in attendance: 5  This visit was performed via live interactive two-way video.    During their visit, the patient was informed that their consent to treat includes permission to submit claims to their insurance on their behalf for the services received.  Clinician Location: Home   Patient Location: Home  ROBYN Wagner      Attendance: Present  Patient Response: Appropriate feedback, Attentive and Good eye contact  Mood: Anxious  Affect: Tense  Behavior/Socialization: Appropriate to group  Thought Process: Appropriate     Pt reported that she did not complete her intended goals. Pt expressed feeling more anxious and attributed it to being lonely and isolating herself. Pt shared that she has been coping with this anxiety by breathing and that she wants to reach out to people this weekend. 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 Pt indicated that she wants to focus on self-soothing to manage her anxiety rather than going to sleep. Pt acknowledged that she wants to quit smoking and that she is going to start small with this process. Group offered suggestions with ways to help pt quit smoking.      [General Appearance - Well Developed] : well developed [Normal Appearance] : normal appearance [General Appearance - Well Nourished] : well nourished [Well Groomed] : well groomed [No Deformities] : no deformities [General Appearance - In No Acute Distress] : no acute distress [Normal Conjunctiva] : the conjunctiva exhibited no abnormalities [Normal Oral Mucosa] : normal oral mucosa [No Oral Pallor] : no oral pallor [Eyelids - No Xanthelasma] : the eyelids demonstrated no xanthelasmas [Normal Jugular Venous A Waves Present] : normal jugular venous A waves present [No Oral Cyanosis] : no oral cyanosis [No Jugular Venous Ortega A Waves] : no jugular venous ortega A waves [Normal Jugular Venous V Waves Present] : normal jugular venous V waves present [Respiration, Rhythm And Depth] : normal respiratory rhythm and effort [Exaggerated Use Of Accessory Muscles For Inspiration] : no accessory muscle use [Auscultation Breath Sounds / Voice Sounds] : lungs were clear to auscultation bilaterally [Heart Rate And Rhythm] : heart rate and rhythm were normal [Murmurs] : no murmurs present [Heart Sounds] : normal S1 and S2 [Abdomen Tenderness] : non-tender [Abdomen Soft] : soft [Abnormal Walk] : normal gait [Abdomen Mass (___ Cm)] : no abdominal mass palpated [Gait - Sufficient For Exercise Testing] : the gait was sufficient for exercise testing [Nail Clubbing] : no clubbing of the fingernails [Cyanosis, Localized] : no localized cyanosis [Petechial Hemorrhages (___cm)] : no petechial hemorrhages [Skin Color & Pigmentation] : normal skin color and pigmentation [] : no rash [Skin Lesions] : no skin lesions [No Venous Stasis] : no venous stasis [No Skin Ulcers] : no skin ulcer [No Xanthoma] : no  xanthoma was observed [Oriented To Time, Place, And Person] : oriented to person, place, and time [Affect] : the affect was normal [Mood] : the mood was normal [FreeTextEntry1] : No rashes. No cyanosis [No Anxiety] : not feeling anxious

## 2020-07-02 ENCOUNTER — INPATIENT (INPATIENT)
Facility: HOSPITAL | Age: 55
LOS: 0 days | Discharge: HOME | DRG: 392 | End: 2020-07-03
Attending: HOSPITALIST | Admitting: HOSPITALIST
Payer: MEDICAID

## 2020-07-02 VITALS
HEART RATE: 56 BPM | TEMPERATURE: 98 F | OXYGEN SATURATION: 99 % | SYSTOLIC BLOOD PRESSURE: 151 MMHG | RESPIRATION RATE: 22 BRPM | DIASTOLIC BLOOD PRESSURE: 77 MMHG | HEIGHT: 73 IN | WEIGHT: 225.09 LBS

## 2020-07-02 DIAGNOSIS — Z29.9 ENCOUNTER FOR PROPHYLACTIC MEASURES, UNSPECIFIED: ICD-10-CM

## 2020-07-02 DIAGNOSIS — Z02.9 ENCOUNTER FOR ADMINISTRATIVE EXAMINATIONS, UNSPECIFIED: ICD-10-CM

## 2020-07-02 DIAGNOSIS — F41.9 ANXIETY DISORDER, UNSPECIFIED: ICD-10-CM

## 2020-07-02 DIAGNOSIS — F12.90 CANNABIS USE, UNSPECIFIED, UNCOMPLICATED: ICD-10-CM

## 2020-07-02 DIAGNOSIS — R11.10 VOMITING, UNSPECIFIED: ICD-10-CM

## 2020-07-02 DIAGNOSIS — I48.91 UNSPECIFIED ATRIAL FIBRILLATION: ICD-10-CM

## 2020-07-02 DIAGNOSIS — R10.9 UNSPECIFIED ABDOMINAL PAIN: ICD-10-CM

## 2020-07-02 DIAGNOSIS — Z98.890 OTHER SPECIFIED POSTPROCEDURAL STATES: Chronic | ICD-10-CM

## 2020-07-02 DIAGNOSIS — E87.4 MIXED DISORDER OF ACID-BASE BALANCE: ICD-10-CM

## 2020-07-02 DIAGNOSIS — R65.10 SYSTEMIC INFLAMMATORY RESPONSE SYNDROME (SIRS) OF NON-INFECTIOUS ORIGIN WITHOUT ACUTE ORGAN DYSFUNCTION: ICD-10-CM

## 2020-07-02 LAB
ALBUMIN SERPL ELPH-MCNC: 4.4 G/DL — SIGNIFICANT CHANGE UP (ref 3.3–5)
ALP SERPL-CCNC: 69 U/L — SIGNIFICANT CHANGE UP (ref 40–120)
ALT FLD-CCNC: 27 U/L — SIGNIFICANT CHANGE UP (ref 10–45)
ANION GAP SERPL CALC-SCNC: 20 MMOL/L — HIGH (ref 5–17)
AST SERPL-CCNC: 37 U/L — SIGNIFICANT CHANGE UP (ref 10–40)
BASE EXCESS BLDV CALC-SCNC: -0.3 MMOL/L — SIGNIFICANT CHANGE UP (ref -2–2)
BASE EXCESS BLDV CALC-SCNC: -2.5 MMOL/L — LOW (ref -2–2)
BASOPHILS # BLD AUTO: 0.01 K/UL — SIGNIFICANT CHANGE UP (ref 0–0.2)
BASOPHILS # BLD AUTO: 0.03 K/UL — SIGNIFICANT CHANGE UP (ref 0–0.2)
BASOPHILS NFR BLD AUTO: 0.1 % — SIGNIFICANT CHANGE UP (ref 0–2)
BASOPHILS NFR BLD AUTO: 0.2 % — SIGNIFICANT CHANGE UP (ref 0–2)
BILIRUB SERPL-MCNC: 0.7 MG/DL — SIGNIFICANT CHANGE UP (ref 0.2–1.2)
BUN SERPL-MCNC: 17 MG/DL — SIGNIFICANT CHANGE UP (ref 7–23)
CA-I SERPL-SCNC: 1.14 MMOL/L — SIGNIFICANT CHANGE UP (ref 1.12–1.3)
CA-I SERPL-SCNC: 1.15 MMOL/L — SIGNIFICANT CHANGE UP (ref 1.12–1.3)
CALCIUM SERPL-MCNC: 9.3 MG/DL — SIGNIFICANT CHANGE UP (ref 8.4–10.5)
CHLORIDE BLDV-SCNC: 111 MMOL/L — HIGH (ref 96–108)
CHLORIDE BLDV-SCNC: 111 MMOL/L — HIGH (ref 96–108)
CHLORIDE SERPL-SCNC: 104 MMOL/L — SIGNIFICANT CHANGE UP (ref 96–108)
CO2 BLDV-SCNC: 21 MMOL/L — LOW (ref 22–30)
CO2 BLDV-SCNC: 24 MMOL/L — SIGNIFICANT CHANGE UP (ref 22–30)
CO2 SERPL-SCNC: 15 MMOL/L — LOW (ref 22–31)
CREAT SERPL-MCNC: 0.89 MG/DL — SIGNIFICANT CHANGE UP (ref 0.5–1.3)
EOSINOPHIL # BLD AUTO: 0 K/UL — SIGNIFICANT CHANGE UP (ref 0–0.5)
EOSINOPHIL # BLD AUTO: 0 K/UL — SIGNIFICANT CHANGE UP (ref 0–0.5)
EOSINOPHIL NFR BLD AUTO: 0 % — SIGNIFICANT CHANGE UP (ref 0–6)
EOSINOPHIL NFR BLD AUTO: 0 % — SIGNIFICANT CHANGE UP (ref 0–6)
GAS PNL BLDV: 139 MMOL/L — SIGNIFICANT CHANGE UP (ref 135–145)
GAS PNL BLDV: 141 MMOL/L — SIGNIFICANT CHANGE UP (ref 135–145)
GAS PNL BLDV: SIGNIFICANT CHANGE UP
GLUCOSE BLDV-MCNC: 113 MG/DL — HIGH (ref 70–99)
GLUCOSE BLDV-MCNC: 155 MG/DL — HIGH (ref 70–99)
GLUCOSE SERPL-MCNC: 160 MG/DL — HIGH (ref 70–99)
HCO3 BLDV-SCNC: 20 MMOL/L — LOW (ref 21–29)
HCO3 BLDV-SCNC: 23 MMOL/L — SIGNIFICANT CHANGE UP (ref 21–29)
HCT VFR BLD CALC: 40.2 % — SIGNIFICANT CHANGE UP (ref 39–50)
HCT VFR BLD CALC: 45.2 % — SIGNIFICANT CHANGE UP (ref 39–50)
HCT VFR BLDA CALC: 43 % — SIGNIFICANT CHANGE UP (ref 39–50)
HCT VFR BLDA CALC: 48 % — SIGNIFICANT CHANGE UP (ref 39–50)
HGB BLD CALC-MCNC: 14.1 G/DL — SIGNIFICANT CHANGE UP (ref 13–17)
HGB BLD CALC-MCNC: 15.6 G/DL — SIGNIFICANT CHANGE UP (ref 13–17)
HGB BLD-MCNC: 13.6 G/DL — SIGNIFICANT CHANGE UP (ref 13–17)
HGB BLD-MCNC: 15.5 G/DL — SIGNIFICANT CHANGE UP (ref 13–17)
HOROWITZ INDEX BLDV+IHG-RTO: SIGNIFICANT CHANGE UP
IMM GRANULOCYTES NFR BLD AUTO: 0.4 % — SIGNIFICANT CHANGE UP (ref 0–1.5)
IMM GRANULOCYTES NFR BLD AUTO: 0.5 % — SIGNIFICANT CHANGE UP (ref 0–1.5)
LACTATE BLDV-MCNC: 2.1 MMOL/L — HIGH (ref 0.7–2)
LACTATE BLDV-MCNC: 4.2 MMOL/L — CRITICAL HIGH (ref 0.7–2)
LIDOCAIN IGE QN: 16 U/L — SIGNIFICANT CHANGE UP (ref 7–60)
LYMPHOCYTES # BLD AUTO: 1.11 K/UL — SIGNIFICANT CHANGE UP (ref 1–3.3)
LYMPHOCYTES # BLD AUTO: 1.69 K/UL — SIGNIFICANT CHANGE UP (ref 1–3.3)
LYMPHOCYTES # BLD AUTO: 11 % — LOW (ref 13–44)
LYMPHOCYTES # BLD AUTO: 6 % — LOW (ref 13–44)
MCHC RBC-ENTMCNC: 28.5 PG — SIGNIFICANT CHANGE UP (ref 27–34)
MCHC RBC-ENTMCNC: 29.2 PG — SIGNIFICANT CHANGE UP (ref 27–34)
MCHC RBC-ENTMCNC: 33.8 GM/DL — SIGNIFICANT CHANGE UP (ref 32–36)
MCHC RBC-ENTMCNC: 34.3 GM/DL — SIGNIFICANT CHANGE UP (ref 32–36)
MCV RBC AUTO: 84.1 FL — SIGNIFICANT CHANGE UP (ref 80–100)
MCV RBC AUTO: 85.3 FL — SIGNIFICANT CHANGE UP (ref 80–100)
MONOCYTES # BLD AUTO: 0.23 K/UL — SIGNIFICANT CHANGE UP (ref 0–0.9)
MONOCYTES # BLD AUTO: 0.45 K/UL — SIGNIFICANT CHANGE UP (ref 0–0.9)
MONOCYTES NFR BLD AUTO: 1.2 % — LOW (ref 2–14)
MONOCYTES NFR BLD AUTO: 2.9 % — SIGNIFICANT CHANGE UP (ref 2–14)
NEUTROPHILS # BLD AUTO: 13.21 K/UL — HIGH (ref 1.8–7.4)
NEUTROPHILS # BLD AUTO: 16.98 K/UL — HIGH (ref 1.8–7.4)
NEUTROPHILS NFR BLD AUTO: 85.6 % — HIGH (ref 43–77)
NEUTROPHILS NFR BLD AUTO: 92.1 % — HIGH (ref 43–77)
NRBC # BLD: 0 /100 WBCS — SIGNIFICANT CHANGE UP (ref 0–0)
NRBC # BLD: 0 /100 WBCS — SIGNIFICANT CHANGE UP (ref 0–0)
OTHER CELLS CSF MANUAL: 18 ML/DL — SIGNIFICANT CHANGE UP (ref 18–22)
PCO2 BLDV: 30 MMHG — LOW (ref 35–50)
PCO2 BLDV: 33 MMHG — LOW (ref 35–50)
PH BLDV: 7.44 — SIGNIFICANT CHANGE UP (ref 7.35–7.45)
PH BLDV: 7.45 — SIGNIFICANT CHANGE UP (ref 7.35–7.45)
PLATELET # BLD AUTO: 214 K/UL — SIGNIFICANT CHANGE UP (ref 150–400)
PLATELET # BLD AUTO: 243 K/UL — SIGNIFICANT CHANGE UP (ref 150–400)
PO2 BLDV: 46 MMHG — HIGH (ref 25–45)
PO2 BLDV: 63 MMHG — HIGH (ref 25–45)
POTASSIUM BLDV-SCNC: 3.7 MMOL/L — SIGNIFICANT CHANGE UP (ref 3.5–5.3)
POTASSIUM BLDV-SCNC: 4 MMOL/L — SIGNIFICANT CHANGE UP (ref 3.5–5.3)
POTASSIUM SERPL-MCNC: 5.3 MMOL/L — SIGNIFICANT CHANGE UP (ref 3.5–5.3)
POTASSIUM SERPL-SCNC: 5.3 MMOL/L — SIGNIFICANT CHANGE UP (ref 3.5–5.3)
PROT SERPL-MCNC: 7.7 G/DL — SIGNIFICANT CHANGE UP (ref 6–8.3)
RBC # BLD: 4.78 M/UL — SIGNIFICANT CHANGE UP (ref 4.2–5.8)
RBC # BLD: 5.3 M/UL — SIGNIFICANT CHANGE UP (ref 4.2–5.8)
RBC # FLD: 13.4 % — SIGNIFICANT CHANGE UP (ref 10.3–14.5)
RBC # FLD: 13.5 % — SIGNIFICANT CHANGE UP (ref 10.3–14.5)
SAO2 % BLDV: 80 % — SIGNIFICANT CHANGE UP (ref 67–88)
SAO2 % BLDV: 92 % — HIGH (ref 67–88)
SARS-COV-2 RNA SPEC QL NAA+PROBE: SIGNIFICANT CHANGE UP
SODIUM SERPL-SCNC: 139 MMOL/L — SIGNIFICANT CHANGE UP (ref 135–145)
WBC # BLD: 15.42 K/UL — HIGH (ref 3.8–10.5)
WBC # BLD: 18.45 K/UL — HIGH (ref 3.8–10.5)
WBC # FLD AUTO: 15.42 K/UL — HIGH (ref 3.8–10.5)
WBC # FLD AUTO: 18.45 K/UL — HIGH (ref 3.8–10.5)

## 2020-07-02 PROCEDURE — 71045 X-RAY EXAM CHEST 1 VIEW: CPT | Mod: 26

## 2020-07-02 PROCEDURE — 93010 ELECTROCARDIOGRAM REPORT: CPT

## 2020-07-02 PROCEDURE — 99223 1ST HOSP IP/OBS HIGH 75: CPT | Mod: GC

## 2020-07-02 PROCEDURE — 74177 CT ABD & PELVIS W/CONTRAST: CPT | Mod: 26

## 2020-07-02 PROCEDURE — 99285 EMERGENCY DEPT VISIT HI MDM: CPT

## 2020-07-02 RX ORDER — ONDANSETRON 8 MG/1
4 TABLET, FILM COATED ORAL ONCE
Refills: 0 | Status: COMPLETED | OUTPATIENT
Start: 2020-07-02 | End: 2020-07-02

## 2020-07-02 RX ORDER — BISOPROLOL FUMARATE 10 MG/1
10 TABLET, FILM COATED ORAL DAILY
Refills: 0 | Status: DISCONTINUED | OUTPATIENT
Start: 2020-07-02 | End: 2020-07-03

## 2020-07-02 RX ORDER — ENOXAPARIN SODIUM 100 MG/ML
40 INJECTION SUBCUTANEOUS DAILY
Refills: 0 | Status: DISCONTINUED | OUTPATIENT
Start: 2020-07-02 | End: 2020-07-03

## 2020-07-02 RX ORDER — SODIUM CHLORIDE 9 MG/ML
1000 INJECTION, SOLUTION INTRAVENOUS ONCE
Refills: 0 | Status: COMPLETED | OUTPATIENT
Start: 2020-07-02 | End: 2020-07-02

## 2020-07-02 RX ORDER — HALOPERIDOL DECANOATE 100 MG/ML
2.5 INJECTION INTRAMUSCULAR ONCE
Refills: 0 | Status: DISCONTINUED | OUTPATIENT
Start: 2020-07-02 | End: 2020-07-02

## 2020-07-02 RX ORDER — METOCLOPRAMIDE HCL 10 MG
10 TABLET ORAL ONCE
Refills: 0 | Status: COMPLETED | OUTPATIENT
Start: 2020-07-02 | End: 2020-07-02

## 2020-07-02 RX ORDER — SODIUM CHLORIDE 9 MG/ML
500 INJECTION, SOLUTION INTRAVENOUS ONCE
Refills: 0 | Status: COMPLETED | OUTPATIENT
Start: 2020-07-02 | End: 2020-07-02

## 2020-07-02 RX ORDER — SODIUM CHLORIDE 9 MG/ML
1000 INJECTION, SOLUTION INTRAVENOUS
Refills: 0 | Status: DISCONTINUED | OUTPATIENT
Start: 2020-07-02 | End: 2020-07-03

## 2020-07-02 RX ORDER — ONDANSETRON 8 MG/1
4 TABLET, FILM COATED ORAL EVERY 6 HOURS
Refills: 0 | Status: DISCONTINUED | OUTPATIENT
Start: 2020-07-02 | End: 2020-07-03

## 2020-07-02 RX ORDER — CAPSAICIN 0.025 %
1 CREAM (GRAM) TOPICAL ONCE
Refills: 0 | Status: COMPLETED | OUTPATIENT
Start: 2020-07-02 | End: 2020-07-02

## 2020-07-02 RX ORDER — HALOPERIDOL DECANOATE 100 MG/ML
5 INJECTION INTRAMUSCULAR ONCE
Refills: 0 | Status: COMPLETED | OUTPATIENT
Start: 2020-07-02 | End: 2020-07-02

## 2020-07-02 RX ADMIN — SODIUM CHLORIDE 500 MILLILITER(S): 9 INJECTION, SOLUTION INTRAVENOUS at 14:02

## 2020-07-02 RX ADMIN — SODIUM CHLORIDE 1000 MILLILITER(S): 9 INJECTION, SOLUTION INTRAVENOUS at 14:00

## 2020-07-02 RX ADMIN — HALOPERIDOL DECANOATE 5 MILLIGRAM(S): 100 INJECTION INTRAMUSCULAR at 13:03

## 2020-07-02 RX ADMIN — Medication 1 APPLICATION(S): at 10:29

## 2020-07-02 RX ADMIN — SODIUM CHLORIDE 1000 MILLILITER(S): 9 INJECTION, SOLUTION INTRAVENOUS at 10:19

## 2020-07-02 RX ADMIN — ONDANSETRON 4 MILLIGRAM(S): 8 TABLET, FILM COATED ORAL at 11:21

## 2020-07-02 RX ADMIN — Medication 10 MILLIGRAM(S): at 12:42

## 2020-07-02 RX ADMIN — ENOXAPARIN SODIUM 40 MILLIGRAM(S): 100 INJECTION SUBCUTANEOUS at 17:06

## 2020-07-02 RX ADMIN — ONDANSETRON 4 MILLIGRAM(S): 8 TABLET, FILM COATED ORAL at 10:21

## 2020-07-02 RX ADMIN — SODIUM CHLORIDE 500 MILLILITER(S): 9 INJECTION, SOLUTION INTRAVENOUS at 11:22

## 2020-07-02 RX ADMIN — Medication 1 MILLIGRAM(S): at 11:21

## 2020-07-02 RX ADMIN — SODIUM CHLORIDE 75 MILLILITER(S): 9 INJECTION, SOLUTION INTRAVENOUS at 17:06

## 2020-07-02 RX ADMIN — SODIUM CHLORIDE 1000 MILLILITER(S): 9 INJECTION, SOLUTION INTRAVENOUS at 14:02

## 2020-07-02 NOTE — ED ADULT NURSE NOTE - NSFALLRSKASSESSDT_ED_ALL_ED
Patient Education   Patient Education     MEDICATION: MEDROL DOSE PACK  MEDROL DOSE PACK (generic name: methyl prednisolone) is a steroid medicine. It reduces inflammation, swelling and allergic reactions in all parts of the body.  DIRECTIONS FOR USE:  -- Take this medicine with food to reduce stomach irritation. Take the medicine according to the schedule on the package label.  -- Do not take with grapefruit or grapefruit juice.   -- If you have taken this medicine for more than three weeks, do not stop it suddenly. You will need to reduce the dose gradually. Contact your doctor for advice on how to do this.  WHAT TO WATCH FOR:  POSSIBLE SIDE EFFECTS from SHORT TERM USE (less than two weeks): Nausea, upset stomach --> Take the medicine with food. Increased appetite, temporary weight gain from fluid retention --> These side effects go away once the medicine is stopped. Headache, dizziness, irritability, restlessness, insomnia --> Contact your doctor if these symptoms persist or become severe, lowering the dose or taking smaller doses more often may help. Bleeding from the stomach (red or black vomit, black stools) --> Contact your doctor or return to this facility at once.  ---------- IMPORTANT ----------  MEDICAL CONDITIONS: Before starting this medicine, be sure your doctor knows if you have any of the following conditions:   -- History of stomach ulcer, vomiting blood or bloody stools, psychosis, depression or seizures  -- Pregnancy or breast feeding  -- Liver or kidney disease, high blood pressure, heart disease, colitis or diverticulitis, diabetes, low thyroid, myasthenia gravis; any active viral, fungal or bacterial infections  DRUG INTERACTIONS: Before starting this medicine, be sure your doctor knows if you are taking any of the following drugs:  -- Aspirin, other anti-inflammatory medicine [ibuprofen (Motrin, Advil), naproxen (Naprosyn, Aleve) and others]  -- Barbiturates, Dilantin (phenytoin), rifampin,  Coumadin (warfarin), thiazide diuretics [Lasix (furosemide) and other \"water pills\"]  WARNINGS:  -- If you are diabetic, this medicine may make your blood sugar go higher. Monitor your blood sugars daily and report any problems to your doctor.   -- Limit your use of alcohol. It increases the risk of stomach ulcers when taken with this medicine.  [NOTE: This information topic may not include all directions, precautions, medical conditions, drug/food interactions and warnings for this drug. Check with your doctor, nurse or pharmacist for any questions that you may have.]  © 6225-1630 [employer] [facility], [address] [place] [address], [place] [address]. All rights reserved. This information is not intended as a substitute for professional medical care. Always follow your healthcare professional's instructions.   Meloxicam Oral tablet  What is this medicine?  MELOXICAM (alfred OX i cam) is a non-steroidal anti-inflammatory drug (NSAID). It is used to reduce swelling and to treat pain. It may be used for osteoarthritis, rheumatoid arthritis, or juvenile rheumatoid arthritis.  This medicine may be used for other purposes; ask your health care provider or pharmacist if you have questions.  What should I tell my health care provider before I take this medicine?  They need to know if you have any of these conditions:  · bleeding disorders  · cigarette smoker  · coronary artery bypass graft (CABG) surgery within the past 2 weeks  · drink more than 3 alcohol-containing drinks per day  · heart disease  · high blood pressure  · history of stomach bleeding  · kidney disease  · liver disease  · lung or breathing disease, like asthma  · stomach or intestine problems  · an unusual or allergic reaction to meloxicam, aspirin, other NSAIDs, other medicines, foods, dyes, or preservatives  · pregnant or trying to get pregnant  · breast-feeding  How should I use this medicine?  Take this medicine by mouth with a full glass of water. Follow the  directions on the prescription label. You can take it with or without food. If it upsets your stomach, take it with food. Take your medicine at regular intervals. Do not take it more often than directed. Do not stop taking except on your doctor's advice.  A special MedGuide will be given to you by the pharmacist with each prescription and refill. Be sure to read this information carefully each time.  Talk to your pediatrician regarding the use of this medicine in children. While this drug may be prescribed for children as young as 2 years for selected conditions, precautions do apply.  Patients over 65 years old may have a stronger reaction and need a smaller dose.  Overdosage: If you think you have taken too much of this medicine contact a poison control center or emergency room at once.  NOTE: This medicine is only for you. Do not share this medicine with others.  What if I miss a dose?  If you miss a dose, take it as soon as you can. If it is almost time for your next dose, take only that dose. Do not take double or extra doses.  What may interact with this medicine?  Do not take this medicine with any of the following medications:  · cidofovir  · ketorolac  This medicine may also interact with the following medications:  · aspirin and aspirin-like medicines  · certain medicines for blood pressure, heart disease, irregular heart beat  · certain medicines for depression, anxiety, or psychotic disturbances  · certain medicines that treat or prevent blood clots like warfarin, enoxaparin, dalteparin, apixaban, dabigatran, rivaroxaban  · cyclosporine  · digoxin  · diuretics  · methotrexate  · other NSAIDs, medicines for pain and inflammation, like ibuprofen and naproxen  · pemetrexed  This list may not describe all possible interactions. Give your health care provider a list of all the medicines, herbs, non-prescription drugs, or dietary supplements you use. Also tell them if you smoke, drink alcohol, or use illegal  drugs. Some items may interact with your medicine.  What should I watch for while using this medicine?  Tell your doctor or healthcare professional if your symptoms do not start to get better or if they get worse.  Do not take other medicines that contain aspirin, ibuprofen, or naproxen with this medicine. Side effects such as stomach upset, nausea, or ulcers may be more likely to occur. Many medicines available without a prescription should not be taken with this medicine.  This medicine can cause ulcers and bleeding in the stomach and intestines at any time during treatment. This can happen with no warning and may cause death. There is increased risk with taking this medicine for a long time. Smoking, drinking alcohol, older age, and poor health can also increase risks. Call your doctor right away if you have stomach pain or blood in your vomit or stool.  This medicine does not prevent heart attack or stroke. In fact, this medicine may increase the chance of a heart attack or stroke. The chance may increase with longer use of this medicine and in people who have heart disease. If you take aspirin to prevent heart attack or stroke, talk with your doctor or health care professional.  What side effects may I notice from receiving this medicine?  Side effects that you should report to your doctor or health care professional as soon as possible:  · allergic reactions like skin rash, itching or hives, swelling of the face, lips, or tongue  · nausea, vomiting  · signs and symptoms of a blood clot such as breathing problems; changes in vision; chest pain; severe, sudden headache; pain, swelling, warmth in the leg; trouble speaking; sudden numbness or weakness of the face, arm, or leg  · signs and symptoms of bleeding such as bloody or black, tarry stools; red or dark-brown urine; spitting up blood or brown material that looks like coffee grounds; red spots on the skin; unusual bruising or bleeding from the eye, gums, or  nose  · signs and symptoms of liver injury like dark yellow or brown urine; general ill feeling or flu-like symptoms; light-colored stools; loss of appetite; nausea; right upper belly pain; unusually weak or tired; yellowing of the eyes or skin  · signs and symptoms of stroke like changes in vision; confusion; trouble speaking or understanding; severe headaches; sudden numbness or weakness of the face, arm, or leg; trouble walking; dizziness; loss of balance or coordination  Side effects that usually do not require medical attention (report these to your doctor or health care professional if they continue or are bothersome):  · constipation  · diarrhea  · gas  This list may not describe all possible side effects. Call your doctor for medical advice about side effects. You may report side effects to FDA at 2-702-FBV-3836.  Where should I keep my medicine?  Keep out of the reach of children.  Store at room temperature between 15 and 30 degrees C (59 and 86 degrees F). Throw away any unused medicine after the expiration date.  NOTE: This sheet is a summary. It may not cover all possible information. If you have questions about this medicine, talk to your doctor, pharmacist, or health care provider.  NOTE:This sheet is a summary. It may not cover all possible information. If you have questions about this medicine, talk to your doctor, pharmacist, or health care provider. Copyright© 2016 [employer]            02-Jul-2020 10:08

## 2020-07-02 NOTE — H&P ADULT - ASSESSMENT
Patient is a 56 y/o male with pmhx of a-fib s/p ablation in 2017, HLD, anxiety, and substance-use disorder, who presented to the ED following  ~8 episodesnon-bloody, non-bilious vomiting of one-day duration Patient is a 56 y/o male with pmhx of a-fib s/p ablation in 2017, HLD, anxiety, and substance-use disorder, who presented to the ED following  ~8 episodes of non-bloody, non-bilious vomiting of one-day duration

## 2020-07-02 NOTE — H&P ADULT - PROBLEM SELECTOR PLAN 7
- s/p ablation 2017   - bisoprolol 10mg PO daily at home  -follows with Dr. Gianni Ansari of cardiology

## 2020-07-02 NOTE — H&P ADULT - NSHPLABSRESULTS_GEN_ALL_CORE
15.5   18.45 )-----------( 243      ( 02 Jul 2020 10:19 )             45.2   07-02    139  |  104  |  17  ----------------------------<  160<H>  5.3   |  15<L>  |  0.89    Ca    9.3      02 Jul 2020 10:19    TPro  7.7  /  Alb  4.4  /  TBili  0.7  /  DBili  x   /  AST  37  /  ALT  27  /  AlkPhos  69  07-02    lipases: 16  10:19 - VBG - pH: 7.44  | pCO2: 30    | pO2: 46    | Lactate: 4.2    < from: CT Abdomen and Pelvis w/ IV Cont (07.02.20 @ 12:06) >    IMPRESSION: Motion limited exam. No acute abnormality.    < end of copied text >    < from: 12 Lead ECG (07.02.20 @ 10:06) >    Ventricular Rate 70 BPM    Atrial Rate 70 BPM    P-R Interval 152 ms    QRS Duration 76 ms    Q-T Interval 428 ms    QTC Calculation(Bezet) 462 ms    P Axis 21 degrees    R Axis 33 degrees    T Axis 74 degrees    Diagnosis Line NORMAL SINUS RHYTHM  POSSIBLE LEFT ATRIAL ENLARGEMENT  NONSPECIFIC ST ABNORMALITY    < end of copied text > 15.5   18.45 )-----------( 243      ( 02 Jul 2020 10:19 )             45.2   07-02    139  |  104  |  17  ----------------------------<  160<H>  5.3   |  15<L>  |  0.89    Ca    9.3      02 Jul 2020 10:19    TPro  7.7  /  Alb  4.4  /  TBili  0.7  /  DBili  x   /  AST  37  /  ALT  27  /  AlkPhos  69  07-02    lipases: 16  10:19 - VBG - pH: 7.44  | pCO2: 30    | pO2: 46    | Lactate: 4.2    < from: CT Abdomen and Pelvis w/ IV Cont (07.02.20 @ 12:06) >    IMPRESSION: Motion limited exam. No acute abnormality.    < end of copied text >       < from: 12 Lead ECG (07.02.20 @ 10:06) >    Ventricular Rate 70 BPM    Atrial Rate 70 BPM    P-R Interval 152 ms    QRS Duration 76 ms    Q-T Interval 428 ms    QTC Calculation(Bezet) 462 ms    P Axis 21 degrees    R Axis 33 degrees    T Axis 74 degrees    Diagnosis Line NORMAL SINUS RHYTHM  POSSIBLE LEFT ATRIAL ENLARGEMENT  NONSPECIFIC ST ABNORMALITY    < end of copied text >

## 2020-07-02 NOTE — ED ADULT NURSE NOTE - CHPI ED NUR SYMPTOMS NEG
no chills/no dysuria/no hematuria/no blood in stool/no burning urination/no fever/no diarrhea/no abdominal distension

## 2020-07-02 NOTE — ED ADULT NURSE REASSESSMENT NOTE - NS ED NURSE REASSESS COMMENT FT1
Report received from Rosita BARAJAS. 55y M smokes 2 marijuana joints every day presents to ED from home c/o N/V. Pt shaking with rigors & is diaphoretic. Pt currently c/o nausea & is dry heaving into emesis bag but not actively vomiting. Cristobal GREENE currently at bedside.

## 2020-07-02 NOTE — ED PROVIDER NOTE - OBJECTIVE STATEMENT
55y M PMH A-fib s/p ablation (09/2017), every day marijuana use, HLD, anxiety, remote EtOH abuse presenting with nausea and vomiting since last night. Pt states that late last night he started to feel nauseas and has had 8 episodes of NBNB vomiting from last night into this morning. Pt states he only has abdominal pain when he vomits; also endorses chills. Pt states he started to feel better once he got to the hospital, attributes symptoms to something he ate yesterday afternoon. Pt states he smokes 2 joints of marijuana per day, which sometimes causes him to vomit but also sometimes helps when he's vomiting. No recent travel/sick contacts. Pt denies any fever, HA, chest pain/SOB/palpitations, abdominal pain, diarrhea, urinary complaints. 55y M PMH A-fib s/p ablation (09/2017), every day marijuana use, HLD, anxiety, remote EtOH abuse presenting with nausea and vomiting since last night. Pt states that late last night he started to feel nauseas and has had 8 episodes of NBNB vomiting from last night into this morning. Pt states he only has abdominal pain when he vomits; also endorses chills. Pt states he started to feel better once he got to the hospital, attributes symptoms to possibly something he ate yesterday afternoon. Pt states he smokes 2 joints of marijuana per day, which sometimes causes him to vomit but also sometimes helps when he's vomiting. No recent travel/sick contacts. Pt denies any fever, HA, chest pain/SOB/palpitations, abdominal pain, diarrhea, urinary complaints. 55y M PMH A-fib s/p ablation (09/2017), every day marijuana use, HLD, anxiety, remote EtOH abuse presenting with nausea and vomiting since last night. Pt states that late last night he started to feel nauseas and has had 8 episodes of NBNB vomiting from last night into this morning. Pt states he only has abdominal pain when he vomits; also endorses chills. Pt states he started to feel better once he got to the hospital, attributes symptoms to possibly something he ate yesterday afternoon. Pt states he smokes 2 joints of marijuana per day, which sometimes causes him to vomit but also sometimes helps when he's vomiting. No recent travel/sick contacts. Pt denies any fever, HA, chest pain/SOB/palpitations, abdominal pain, diarrhea, urinary complaints. has had multiple hospital visits for the same complaint related to marijuana use.     Benny: 5The patient reports that hot showers improve the nausea minimally.

## 2020-07-02 NOTE — H&P ADULT - PROBLEM SELECTOR PLAN 9
Transition of Care Status:  1. Name of PCP: Dr. Sean Alvarez  2. PCP contacted on admission: [x  ] Y     [  ] N  3. PCP contacted at discharge: [  ] Y     [  ] N  4. Post-discharge appointment date and location:  5. Summary of handoff given to PCP:

## 2020-07-02 NOTE — H&P ADULT - PROBLEM SELECTOR PLAN 2
- WBC 18.45; RR 22  - Leukocytosis likely reactive  - Trend CBC  - Fluid resuscitation - likely 2/2 to vomiting. Abdominal pain much improved upon admission.  - CTAP w/o evidence of inflammation.  - will continue to monitor.

## 2020-07-02 NOTE — ED ADULT NURSE NOTE - NSIMPLEMENTINTERV_GEN_ALL_ED
Implemented All Universal Safety Interventions:  [place] to call system. Call bell, personal items and telephone within reach. Instruct patient to call for assistance. Room bathroom lighting operational. Non-slip footwear when patient is off stretcher. Physically safe environment: no spills, clutter or unnecessary equipment. Stretcher in lowest position, wheels locked, appropriate side rails in place.

## 2020-07-02 NOTE — H&P ADULT - PROBLEM SELECTOR PLAN 3
-pH 7.44; venous lactate 4.2; HCO3 15; AG 20  - Possibly mixed metabolic acidosis and respiratory alkalosis (RR ~20) vs mixed metabolic acidosis and metabolic alkalosis 2/2  vomiting.   - History of lactic acidosis on prior admission in 2019 that resolved with fluid.   - Source of lactic acidosis unclear.   - IVF resuscitation.  - Trend lactate, HCO3 - WBC 18.45; RR 22  - Leukocytosis likely reactive  - Trend CBC  - Fluid resuscitation

## 2020-07-02 NOTE — ED CLERICAL - NS ED CLERK NOTE PRE-ARRIVAL INFORMATION; ADDITIONAL PRE-ARRIVAL INFORMATION
CC/Reason For referral: n/v new afib  Preferred Consultant(if applicable):  Who admits for you (if needed): Hayley Ansari  Do you have documents you would like to fax over?no  Would you still like to speak to an ED attending? After pt is seen

## 2020-07-02 NOTE — H&P ADULT - ATTENDING COMMENTS
pt seen and examined.  above plan discussed on rounds today.  In addition,    Anion Gap metabolic Acidosis - due to lactic acidosis. No uremia, no reports of ingestion of toxic substances. check a Utox.  Lactic acidosis - no clear source of end organ ischemia (no abd pain, all extremities are warm with palpable pulses) presumed to be from dehydration, will give aggressive IV fluid hydration. will repeat lactate to assess clearance.   Nausea/Vomiting -  due to metabolic disturbances vs Cannabinoid Hyperemesis Syndrome. c/w supportive care as above. pt seen and examined.  above plan discussed on rounds today.  In addition,    SIRS present on admission - no focal source of infection at this time. will hold off on abx. f/u cultures  Anion Gap metabolic Acidosis - due to lactic acidosis. No uremia, no reports of ingestion of toxic substances. check a Utox.  Lactic acidosis - no clear source of end organ ischemia (no abd pain, all extremities are warm with palpable pulses) presumed to be from dehydration, will give aggressive IV fluid hydration. will repeat lactate to assess clearance.   Nausea/Vomiting -  due to metabolic disturbances vs Cannabinoid Hyperemesis Syndrome. c/w supportive care as above.

## 2020-07-02 NOTE — H&P ADULT - HISTORY OF PRESENT ILLNESS
Majority of history provided from chart review as patient unable to provide significant history due to sedation: Patient is a 55 year old male with history of a-fib s/p ablation in 2017, HLD, anxiety, and substance-use disorder, who presented to the ED following  ~8 episodes of non-bloody, non-bilious vomiting of one-day duration. Patient endorses nausea and chills during this time.  Patient reportedly experiences diffuse abdominal pain after vomiting. He reportedly smokes two joints of marijuana per day. He finds that nausea is not significantly improved with hot showers.  No recent travel or sick contacts. Denies fever, CP, SOB. Upon entering the room, patient sedated, lying comfortably in stretcher. He was able to offer that abdominal pain is much improved.     Patient most recently was admitted in August 2019 with similar complaints of nausea and vomiting, likely 2/2 chronic marijuana use. Patient was treated with supportive care including IVF and anti-emetics. Patient improved with supportive care and was discharged to a rehab facility in [place] for substance use disorder. Patient also has history of K2 use about 4 years ago.     In the ED, patient was given 1L LR bolus, 1mg ativan, 5mg Haldol, and zofran. CTAP w/o evidence of inflammation. EKG NSR. Majority of history provided from chart review as patient unable to provide significant history due to sedation: Patient is a 55 year old male with history of a-fib s/p ablation in 2017, HLD, anxiety, and substance-use disorder, who presented to the ED following  ~8 episodes of non-bloody, non-bilious vomiting of one-day duration. Patient endorses nausea and chills during this time.  Patient reportedly experiences diffuse abdominal pain after vomiting. He reportedly smokes two joints of marijuana per day. He finds that nausea is not significantly improved with hot showers.  No recent travel or sick contacts. Denies fever, CP, SOB. Upon entering the room, patient sedated, lying comfortably in stretcher. He was able to offer that abdominal pain is much improved.     Patient most recently was admitted in August 2019 with similar complaints of nausea and vomiting, likely 2/2 chronic marijuana use. Patient was treated with supportive care including IVF and anti-emetics. Patient improved with supportive care and was discharged to a rehab facility in [place] for substance use disorder. Patient also has history of K2 use about 4 years ago.     Patient follows with his cardiologist Dr. Ansari and is s/p ablation in 2017. TTE     In the ED, patient was given 1L LR bolus, 1mg ativan, 5mg Haldol, and zofran. CTAP w/o evidence of inflammation. EKG NSR. Majority of history provided from chart review as patient unable to provide significant history due to sedation: Patient is a 55 year old male with history of a-fib s/p ablation in 2017, HLD, anxiety, and substance-use disorder, who presented to the ED following  ~8 episodes of non-bloody, non-bilious vomiting of one-day duration. Patient endorses nausea and chills during this time.  Patient reportedly experiences diffuse abdominal pain after vomiting. He reportedly smokes two joints of marijuana per day. He finds that nausea is not significantly improved with hot showers.  No recent travel or sick contacts. Denies fever, CP, SOB. Upon entering the room, patient sedated, lying comfortably in stretcher. He was able to offer that abdominal pain is much improved.     Patient most recently was admitted in August 2019 with similar complaints of nausea and vomiting, likely 2/2 chronic marijuana use. Patient was treated with supportive care including IVF and anti-emetics. Patient improved with supportive care and was discharged to a rehab facility in [place] for substance use disorder. Patient also has history of K2 use about 4 years ago.     Patient follows with his cardiologist Dr. Ansari and PCP Dr. Alvarez.    In the ED, patient was given 1L LR bolus, 1mg ativan, 5mg Haldol, and zofran. CTAP w/o evidence of inflammation. EKG NSR.

## 2020-07-02 NOTE — H&P ADULT - PROBLEM SELECTOR PLAN 6
- s/p ablation 2017   -follows with Dr. Gianni Ansari of cardiology - History of anxiety disorder  -  previously prescribed Alprazalom 0.5mg TID, but patient does not take.  - continue to monitor - History of anxiety disorder  - previously prescribed Alprazalom 0.5mg TID, but patient does not take.  - continue to monitor

## 2020-07-02 NOTE — H&P ADULT - PROBLEM SELECTOR PLAN 4
- History of chronic marijuana use with prior admissions. -pH 7.44; venous lactate 4.2; HCO3 15; AG 20  - Possibly mixed metabolic acidosis and respiratory alkalosis (RR ~20) vs mixed metabolic acidosis and metabolic alkalosis 2/2  vomiting.   - History of lactic acidosis on prior admission in 2019 that resolved with IVF.   - Source of lactic acidosis unclear.   - LR 1L 75 cc/hr  - Trend lactate, HCO3 - pH 7.44; venous lactate 4.2; HCO3 15; AG 20  - Possibly mixed metabolic acidosis and respiratory alkalosis (RR ~20) vs mixed metabolic acidosis and metabolic alkalosis 2/2  vomiting.   - History of lactic acidosis on prior admission in 2019 that resolved with IVF.   - Source of lactic acidosis unclear.   - LR 1L 75 cc/hr  - Trend lactate, HCO3

## 2020-07-02 NOTE — H&P ADULT - NSHPPHYSICALEXAM_GEN_ALL_CORE
PHYSICAL EXAM:  GENERAL: NAD  HEENT:  Head atraumatic  NECK: Supple  CHEST/LUNG: Clear to auscultation bilaterally; unlabored  HEART: Regular rate and rhythm;   ABDOMEN: Bowel sounds present; Soft, Nontender, Nondistended. No TTP  EXTREMITIES:  2+ Peripheral Pulses

## 2020-07-02 NOTE — ED PROVIDER NOTE - CLINICAL SUMMARY MEDICAL DECISION MAKING FREE TEXT BOX
Benny: 55 year old male with afib s/p ablation, every day marijuana use, hld, remote ETOH  presents to the ER with n/v since last night. reports he smokes " 2 joints" of marijuana daily.

## 2020-07-02 NOTE — H&P ADULT - PROBLEM SELECTOR PLAN 5
- History of anxiety disorder  - Ativan 1mg as needed - History of chronic marijuana use with prior admissions.  - SBIRT c/s

## 2020-07-02 NOTE — ED ADULT NURSE NOTE - OBJECTIVE STATEMENT
pt 56 yo male presents with vomiting [id number] after mariujuana smoking pt hx of previous similar episodes pt denies any abd painno diarrhea skin cool dry pt tried a shower prior to coming to er hx afib placed on cardiac monitering last vomited 2 hrs prior to arrival states positive nausea

## 2020-07-02 NOTE — ED PROVIDER NOTE - PROGRESS NOTE DETAILS
Pt reevaluated - feeling chills/shakes, nausea, retching with no vomiting. Rectal Temp 99.8. Abdomen still soft, Heart/Lungs clear to auscultation. Additional 4mg of Zofran given. Will give Ativan/Benadryl and reassess.  -Steven Jain PA-C Pt reevaluated - feeling chills/shakes, nausea, retching with no vomiting. Rectal Temp 99.8. Abdomen still soft, Heart/Lungs clear to auscultation. Additional 4mg of Zofran given. Will give Ativan and reassess.  -Steven Jain PA-C Tevin: Patient screaming and turning and having chills in bed at CT scanner. ct done but with a lot of motion artificat. Spoke with wife at home and says this happens often at home an they can't control patient with shaking and vomiting. smokes marijuana daily and has had multiple hospital visits for the same.

## 2020-07-03 ENCOUNTER — TRANSCRIPTION ENCOUNTER (OUTPATIENT)
Age: 55
End: 2020-07-03

## 2020-07-03 VITALS
TEMPERATURE: 98 F | SYSTOLIC BLOOD PRESSURE: 109 MMHG | HEART RATE: 77 BPM | RESPIRATION RATE: 18 BRPM | DIASTOLIC BLOOD PRESSURE: 68 MMHG | OXYGEN SATURATION: 97 %

## 2020-07-03 LAB
ALBUMIN SERPL ELPH-MCNC: 3.8 G/DL — SIGNIFICANT CHANGE UP (ref 3.3–5)
ALP SERPL-CCNC: 58 U/L — SIGNIFICANT CHANGE UP (ref 40–120)
ALT FLD-CCNC: 21 U/L — SIGNIFICANT CHANGE UP (ref 10–45)
ANION GAP SERPL CALC-SCNC: 12 MMOL/L — SIGNIFICANT CHANGE UP (ref 5–17)
AST SERPL-CCNC: 26 U/L — SIGNIFICANT CHANGE UP (ref 10–40)
BASOPHILS # BLD AUTO: 0.03 K/UL — SIGNIFICANT CHANGE UP (ref 0–0.2)
BASOPHILS NFR BLD AUTO: 0.2 % — SIGNIFICANT CHANGE UP (ref 0–2)
BILIRUB SERPL-MCNC: 0.8 MG/DL — SIGNIFICANT CHANGE UP (ref 0.2–1.2)
BUN SERPL-MCNC: 16 MG/DL — SIGNIFICANT CHANGE UP (ref 7–23)
CALCIUM SERPL-MCNC: 8.5 MG/DL — SIGNIFICANT CHANGE UP (ref 8.4–10.5)
CHLORIDE SERPL-SCNC: 107 MMOL/L — SIGNIFICANT CHANGE UP (ref 96–108)
CO2 SERPL-SCNC: 23 MMOL/L — SIGNIFICANT CHANGE UP (ref 22–31)
CREAT SERPL-MCNC: 0.94 MG/DL — SIGNIFICANT CHANGE UP (ref 0.5–1.3)
EOSINOPHIL # BLD AUTO: 0.05 K/UL — SIGNIFICANT CHANGE UP (ref 0–0.5)
EOSINOPHIL NFR BLD AUTO: 0.4 % — SIGNIFICANT CHANGE UP (ref 0–6)
GLUCOSE SERPL-MCNC: 91 MG/DL — SIGNIFICANT CHANGE UP (ref 70–99)
HCT VFR BLD CALC: 40.3 % — SIGNIFICANT CHANGE UP (ref 39–50)
HCV AB S/CO SERPL IA: 0.09 S/CO — SIGNIFICANT CHANGE UP (ref 0–0.99)
HCV AB SERPL-IMP: SIGNIFICANT CHANGE UP
HGB BLD-MCNC: 13.4 G/DL — SIGNIFICANT CHANGE UP (ref 13–17)
IMM GRANULOCYTES NFR BLD AUTO: 0.5 % — SIGNIFICANT CHANGE UP (ref 0–1.5)
LACTATE SERPL-SCNC: 1 MMOL/L — SIGNIFICANT CHANGE UP (ref 0.7–2)
LYMPHOCYTES # BLD AUTO: 17.1 % — SIGNIFICANT CHANGE UP (ref 13–44)
LYMPHOCYTES # BLD AUTO: 2.22 K/UL — SIGNIFICANT CHANGE UP (ref 1–3.3)
MAGNESIUM SERPL-MCNC: 2.1 MG/DL — SIGNIFICANT CHANGE UP (ref 1.6–2.6)
MCHC RBC-ENTMCNC: 28.8 PG — SIGNIFICANT CHANGE UP (ref 27–34)
MCHC RBC-ENTMCNC: 33.3 GM/DL — SIGNIFICANT CHANGE UP (ref 32–36)
MCV RBC AUTO: 86.5 FL — SIGNIFICANT CHANGE UP (ref 80–100)
MONOCYTES # BLD AUTO: 0.64 K/UL — SIGNIFICANT CHANGE UP (ref 0–0.9)
MONOCYTES NFR BLD AUTO: 4.9 % — SIGNIFICANT CHANGE UP (ref 2–14)
NEUTROPHILS # BLD AUTO: 9.94 K/UL — HIGH (ref 1.8–7.4)
NEUTROPHILS NFR BLD AUTO: 76.9 % — SIGNIFICANT CHANGE UP (ref 43–77)
NRBC # BLD: 0 /100 WBCS — SIGNIFICANT CHANGE UP (ref 0–0)
PCP SPEC-MCNC: SIGNIFICANT CHANGE UP
PHOSPHATE SERPL-MCNC: 2.2 MG/DL — LOW (ref 2.5–4.5)
PLATELET # BLD AUTO: 216 K/UL — SIGNIFICANT CHANGE UP (ref 150–400)
POTASSIUM SERPL-MCNC: 3.3 MMOL/L — LOW (ref 3.5–5.3)
POTASSIUM SERPL-SCNC: 3.3 MMOL/L — LOW (ref 3.5–5.3)
PROT SERPL-MCNC: 6.2 G/DL — SIGNIFICANT CHANGE UP (ref 6–8.3)
RBC # BLD: 4.66 M/UL — SIGNIFICANT CHANGE UP (ref 4.2–5.8)
RBC # FLD: 13.5 % — SIGNIFICANT CHANGE UP (ref 10.3–14.5)
SARS-COV-2 IGG SERPL QL IA: NEGATIVE — SIGNIFICANT CHANGE UP
SARS-COV-2 IGM SERPL IA-ACNC: 0.08 INDEX — SIGNIFICANT CHANGE UP
SODIUM SERPL-SCNC: 142 MMOL/L — SIGNIFICANT CHANGE UP (ref 135–145)
WBC # BLD: 12.95 K/UL — HIGH (ref 3.8–10.5)
WBC # FLD AUTO: 12.95 K/UL — HIGH (ref 3.8–10.5)

## 2020-07-03 PROCEDURE — 80307 DRUG TEST PRSMV CHEM ANLYZR: CPT

## 2020-07-03 PROCEDURE — 84295 ASSAY OF SERUM SODIUM: CPT

## 2020-07-03 PROCEDURE — 96375 TX/PRO/DX INJ NEW DRUG ADDON: CPT

## 2020-07-03 PROCEDURE — 83605 ASSAY OF LACTIC ACID: CPT

## 2020-07-03 PROCEDURE — 83690 ASSAY OF LIPASE: CPT

## 2020-07-03 PROCEDURE — 80053 COMPREHEN METABOLIC PANEL: CPT

## 2020-07-03 PROCEDURE — 83735 ASSAY OF MAGNESIUM: CPT

## 2020-07-03 PROCEDURE — 85014 HEMATOCRIT: CPT

## 2020-07-03 PROCEDURE — 96376 TX/PRO/DX INJ SAME DRUG ADON: CPT

## 2020-07-03 PROCEDURE — 99285 EMERGENCY DEPT VISIT HI MDM: CPT | Mod: 25

## 2020-07-03 PROCEDURE — 96374 THER/PROPH/DIAG INJ IV PUSH: CPT | Mod: XU

## 2020-07-03 PROCEDURE — 85027 COMPLETE CBC AUTOMATED: CPT

## 2020-07-03 PROCEDURE — 84100 ASSAY OF PHOSPHORUS: CPT

## 2020-07-03 PROCEDURE — 82947 ASSAY GLUCOSE BLOOD QUANT: CPT

## 2020-07-03 PROCEDURE — 96361 HYDRATE IV INFUSION ADD-ON: CPT

## 2020-07-03 PROCEDURE — 86769 SARS-COV-2 COVID-19 ANTIBODY: CPT

## 2020-07-03 PROCEDURE — 82330 ASSAY OF CALCIUM: CPT

## 2020-07-03 PROCEDURE — 71045 X-RAY EXAM CHEST 1 VIEW: CPT

## 2020-07-03 PROCEDURE — 74177 CT ABD & PELVIS W/CONTRAST: CPT

## 2020-07-03 PROCEDURE — 93005 ELECTROCARDIOGRAM TRACING: CPT

## 2020-07-03 PROCEDURE — 82803 BLOOD GASES ANY COMBINATION: CPT

## 2020-07-03 PROCEDURE — 86803 HEPATITIS C AB TEST: CPT

## 2020-07-03 PROCEDURE — 84132 ASSAY OF SERUM POTASSIUM: CPT

## 2020-07-03 PROCEDURE — 82435 ASSAY OF BLOOD CHLORIDE: CPT

## 2020-07-03 PROCEDURE — 99239 HOSP IP/OBS DSCHRG MGMT >30: CPT

## 2020-07-03 PROCEDURE — 87635 SARS-COV-2 COVID-19 AMP PRB: CPT

## 2020-07-03 RX ORDER — SODIUM,POTASSIUM PHOSPHATES 278-250MG
1 POWDER IN PACKET (EA) ORAL
Refills: 0 | Status: DISCONTINUED | OUTPATIENT
Start: 2020-07-03 | End: 2020-07-03

## 2020-07-03 RX ORDER — POTASSIUM CHLORIDE 20 MEQ
20 PACKET (EA) ORAL
Refills: 0 | Status: COMPLETED | OUTPATIENT
Start: 2020-07-03 | End: 2020-07-03

## 2020-07-03 RX ADMIN — Medication 20 MILLIEQUIVALENT(S): at 10:15

## 2020-07-03 RX ADMIN — SODIUM CHLORIDE 75 MILLILITER(S): 9 INJECTION, SOLUTION INTRAVENOUS at 10:15

## 2020-07-03 RX ADMIN — BISOPROLOL FUMARATE 10 MILLIGRAM(S): 10 TABLET, FILM COATED ORAL at 05:01

## 2020-07-03 RX ADMIN — Medication 20 MILLIEQUIVALENT(S): at 08:23

## 2020-07-03 RX ADMIN — SODIUM CHLORIDE 75 MILLILITER(S): 9 INJECTION, SOLUTION INTRAVENOUS at 08:22

## 2020-07-03 NOTE — PROGRESS NOTE ADULT - PROBLEM SELECTOR PLAN 6
- History of anxiety disorder  - previously prescribed Alprazalom 0.5mg TID, but patient does not take.  - continue to monitor

## 2020-07-03 NOTE — DISCHARGE NOTE PROVIDER - HOSPITAL COURSE
Patient is a 55 year old male with history of a-fib s/p ablation in 2017, HLD, anxiety, and substance-use disorder, who presented to the ED following  ~8 episodes of non-bloody, non-bilious vomiting of one-day duration. Patient endorsed nausea and chills during this time.  Patient reportedly experiences diffuse abdominal pain after vomiting. He reportedly smokes two joints of marijuana per day. He finds that nausea is not significantly improved with hot showers.  No recent travel or sick contacts. Denies fever, CP, SOB. Patient has multiple admissions for nausea/vomiting 2/2 cannabinoid hyperemesis syndrome. During those hospitalizations, patient was treated with supportive care, including IVF resuscitation and zofran for nausea.         In the ED:    VS: -151/67-77, HR 56-77 , RR 22 , O2 Sat  97% on room air , Tmax 99.8    Labs: significant for WBC:18.45, venous l;actate 4.4, AG 20    Imaging: CTAP with no signs of inflammation or infection        Patient given: zofran for nausea, ativan and haldol for sedation            Hospital course:    During hospitalization, patient was given IV fluid resuscitation and zofran for nausea. Patient did not require any more doses of ativan or haldol for agitation. His WBC count trended down and his blood lactate was within normal limits. Patient was offered treatment for substance use disorder, however he declined at this time. Patient's respiratory rate also normalized to w/n normal limits. On 7/3/2020, patient was deemed safe for discharge. Patient is a 55 year old male with history of a-fib s/p ablation in 2017, HLD, anxiety, and substance-use disorder, who presented to the ED following  ~8 episodes of non-bloody, non-bilious vomiting of one-day duration. Patient endorsed nausea and chills during this time.  Patient reportedly experiences diffuse abdominal pain after vomiting. He reportedly smokes two joints of marijuana per day. He finds that nausea is not significantly improved with hot showers.  No recent travel or sick contacts. Denies fever, CP, SOB. Patient has multiple previous admissions for nausea/vomiting 2/2 cannabinoid hyperemesis syndrome. During those hospitalizations, patient was treated with supportive care, including IVF resuscitation and zofran for nausea.         In the ED:    VS: -151/67-77, HR 56-77 , RR 22 , O2 Sat  97% on room air , Tmax 99.8    Labs: significant for WBC:18.45, venous lactate 4.4, AG 20    Imaging: CTAP with no signs of inflammation or infection        Patient given: zofran for nausea, ativan and haldol for sedation            Hospital course:    During hospitalization, patient was given IV fluid resuscitation with lactated ringers and zofran for nausea. Patient did not require subsequent doses of ativan or haldol for agitation. His WBC count trended down and his blood lactate trended to within normal limits.  Patient's mental status improved and he was able to eat a regular diet without experiencing nausea or vomiting. Patient was offered treatment for substance use disorder, however he declined at this time. Patient's respiratory rate also normalized to w/n normal limits. On 7/3/2020, patient was deemed safe for discharge.

## 2020-07-03 NOTE — PROGRESS NOTE ADULT - SUBJECTIVE AND OBJECTIVE BOX
PROGRESS NOTE:   Authored by Rani Murillo MD  Pager [phone] [facility]     Patient is a 55y old  Male who presents with a chief complaint of Abdominal pain, nausea/vomiting (02 Jul 2020 15:24)      SUBJECTIVE / OVERNIGHT EVENTS:    ADDITIONAL REVIEW OF SYSTEMS:    MEDICATIONS  (STANDING):  bisoprolol   Tablet 10 milliGRAM(s) Oral daily  enoxaparin Injectable 40 milliGRAM(s) SubCutaneous daily  lactated ringers. 1000 milliLiter(s) (75 mL/Hr) IV Continuous <Continuous>    MEDICATIONS  (PRN):  LORazepam   Injectable 1 milliGRAM(s) IV Push once PRN Agitation  ondansetron Injectable 4 milliGRAM(s) IV Push every 6 hours PRN Nausea and/or Vomiting      CAPILLARY BLOOD GLUCOSE        I&O's Summary      PHYSICAL EXAM:  Vital Signs Last 24 Hrs  T(C): 36.6 (03 Jul 2020 04:44), Max: 37.7 (02 Jul 2020 11:20)  T(F): 97.8 (03 Jul 2020 04:44), Max: 99.8 (02 Jul 2020 11:20)  HR: 84 (03 Jul 2020 04:44) (56 - 84)  BP: 117/51 (03 Jul 2020 04:44) (104/53 - 151/77)  BP(mean): --  RR: 18 (03 Jul 2020 04:44) (18 - 22)  SpO2: 95% (03 Jul 2020 04:44) (95% - 99%)    GENERAL: No acute distress, well-developed  HEAD:  Atraumatic, Normocephalic  EYES: EOMI, PERRLA, conjunctiva and sclera clear  NECK: Supple, no lymphadenopathy, no JVD  CHEST/LUNG: CTAB; No wheezes, rales, or rhonchi  HEART: Regular rate and rhythm; No murmurs, rubs, or gallops  ABDOMEN: Soft, non-tender, non-distended; normal bowel sounds, no organomegaly  EXTREMITIES:  2+ peripheral pulses b/l, No clubbing, cyanosis, or edema  NEUROLOGY: A&O x 3, no focal deficits  SKIN: No rashes or lesions    LABS:                        13.6   15.42 )-----------( 214      ( 02 Jul 2020 15:57 )             40.2     07-02    139  |  104  |  17  ----------------------------<  160<H>  5.3   |  15<L>  |  0.89    Ca    9.3      02 Jul 2020 10:19    TPro  7.7  /  Alb  4.4  /  TBili  0.7  /  DBili  x   /  AST  37  /  ALT  27  /  AlkPhos  69  07-02 PROGRESS NOTE:   Authored by Rani Murillo MD  Pager [phone] [facility]     Patient is a 55y old  Male who presents with a chief complaint of Abdominal pain, nausea/vomiting (02 Jul 2020 15:24)      SUBJECTIVE / OVERNIGHT EVENTS:   This AM, patient is alert and oriented. He is states that he believes the nausea and vomiting were due to his marijuana use. He states that he is not considering quitting marijuana use, and would not like to pursue treatment at this time.  He denies any additional episodes of nausea/vomiting overnight and states his abdominal pain has improved.  He denies any other complaints. No CP, SOB, subjective f/c.    MEDICATIONS  (STANDING):  bisoprolol   Tablet 10 milliGRAM(s) Oral daily  enoxaparin Injectable 40 milliGRAM(s) SubCutaneous daily  lactated ringers. 1000 milliLiter(s) (75 mL/Hr) IV Continuous <Continuous>    MEDICATIONS  (PRN):  LORazepam   Injectable 1 milliGRAM(s) IV Push once PRN Agitation  ondansetron Injectable 4 milliGRAM(s) IV Push every 6 hours PRN Nausea and/or Vomiting      CAPILLARY BLOOD GLUCOSE        I&O's Summary      PHYSICAL EXAM:  Vital Signs Last 24 Hrs  T(C): 36.6 (03 Jul 2020 04:44), Max: 37.7 (02 Jul 2020 11:20)  T(F): 97.8 (03 Jul 2020 04:44), Max: 99.8 (02 Jul 2020 11:20)  HR: 84 (03 Jul 2020 04:44) (56 - 84)  BP: 117/51 (03 Jul 2020 04:44) (104/53 - 151/77)  BP(mean): --  RR: 18 (03 Jul 2020 04:44) (18 - 22)  SpO2: 95% (03 Jul 2020 04:44) (95% - 99%)    GENERAL: No acute distress, well-developed  HEAD:  Atraumatic, Normocephalic  NECK: Supple  CHEST/LUNG: CTAB; No wheezes, rales, or rhonchi  HEART: Regular rate and rhythm; No murmurs, rubs, or gallops  ABDOMEN: Soft, non-tender, non-distended, no TTP  EXTREMITIES:  2+ peripheral pulses b/l  NEUROLOGY: A&O x 3      LABS:                        13.6   15.42 )-----------( 214      ( 02 Jul 2020 15:57 )             40.2     07-02    139  |  104  |  17  ----------------------------<  160<H>  5.3   |  15<L>  |  0.89    Ca    9.3      02 Jul 2020 10:19    TPro  7.7  /  Alb  4.4  /  TBili  0.7  /  DBili  x   /  AST  37  /  ALT  27  /  AlkPhos  69  07-02

## 2020-07-03 NOTE — PROGRESS NOTE ADULT - ATTENDING COMMENTS
Seen at bedside, symptoms now resolved, tolerating breakfast without abdominal pain, nausea or vomiting.   Should pt tolerate Lunch will plan for discharge home today. Advised on marijuana cessation to prevent further recurrence of symptoms    d/c time spent 31 minutes

## 2020-07-03 NOTE — DISCHARGE NOTE NURSING/CASE MANAGEMENT/SOCIAL WORK - PATIENT PORTAL LINK FT
You can access the FollowMyHealth Patient Portal offered by [facility] by registering at the following website: [email or web address]. By joining [facility]’s FollowMyHealth portal, you will also be able to view your health information using other applications (apps) compatible with our system.

## 2020-07-03 NOTE — PROGRESS NOTE ADULT - PROBLEM SELECTOR PLAN 1
- likely cannabinoid hyperemesis syndrome  - Prior admissions for similar complaints in 2019.  - IVF resuscitation  - Zofran 4mg q6h for nausea - likely cannabinoid hyperemesis syndrome  - Prior admissions for similar complaints in 2019.  - IVF resuscitation overnight. will discontinue.  - Zofran 4mg q6h for nausea

## 2020-07-03 NOTE — DISCHARGE NOTE PROVIDER - CARE PROVIDER_API CALL
Gianni Ansari  CARDIOVASCULAR DISEASE  [address] [place] [address]  Phone: [phone]  Fax: [phone]  Follow Up Time:

## 2020-07-03 NOTE — PROGRESS NOTE ADULT - PROBLEM SELECTOR PLAN 3
- WBC 18.45; RR 22  - Leukocytosis likely reactive  - Trend CBC  - Fluid resuscitation - WBC 18.45; RR 22 on admission  - WBC trending down this AM.  - Leukocytosis likely reactive  - Trend CBC  - Fluid resuscitation

## 2020-07-03 NOTE — DISCHARGE NOTE PROVIDER - NSDCCPCAREPLAN_GEN_ALL_CORE_FT
PRINCIPAL DISCHARGE DIAGNOSIS  Diagnosis: Vomiting  Assessment and Plan of Treatment: During your hospitalization, your were treated for nausea and vomiting. We believe that this was due to your cannabinoid use. You were given zofran for nausea and fluids to help treat dehydration. If you experience this again, please return to the emergency department      SECONDARY DISCHARGE DIAGNOSES  Diagnosis: Marijuana use  Assessment and Plan of Treatment: During your hospitalization, you were asked about your marijuana use. You were offered help with treatment regarding substance-use. Should you decide to pursue treatment please return at any time.

## 2020-07-03 NOTE — PROGRESS NOTE ADULT - PROBLEM SELECTOR PLAN 2
- likely 2/2 to vomiting. Abdominal pain much improved upon admission.  - CTAP w/o evidence of inflammation.  - will continue to monitor.

## 2020-07-03 NOTE — PROGRESS NOTE ADULT - PROBLEM SELECTOR PLAN 4
- pH 7.44; venous lactate 4.2; HCO3 15; AG 20  - Possibly mixed metabolic acidosis and respiratory alkalosis (RR ~20) vs mixed metabolic acidosis and metabolic alkalosis 2/2  vomiting.   - History of lactic acidosis on prior admission in 2019 that resolved with IVF.   - Source of lactic acidosis unclear.   - LR 1L 75 cc/hr  - Trend lactate, HCO3 - pH 7.44; venous lactate 4.2; HCO3 15; AG 20  - Possibly mixed metabolic acidosis and respiratory alkalosis (RR ~20) vs mixed metabolic acidosis and metabolic alkalosis 2/2  vomiting.   - History of lactic acidosis on prior admission in 2019 that resolved with IVF.   - Source of lactic acidosis unclear.   - Blood lactate wnl this AM.  - LR 1L 75 cc/hr  - Trend lactate, HCO3

## 2020-07-03 NOTE — DISCHARGE NOTE PROVIDER - NSDCMRMEDTOKEN_GEN_ALL_CORE_FT
bisoprolol 10 mg oral tablet: 1 tab(s) orally once a day  Zofran 4 mg oral tablet: 1 tab(s) orally 3 times a day  as needed for nausea or vomiting

## 2020-07-03 NOTE — PROGRESS NOTE ADULT - ASSESSMENT
Patient is a 56 y/o male with pmhx of a-fib s/p ablation in 2017, HLD, anxiety, and substance-use disorder, who presented to the ED following  ~8 episodes of non-bloody, non-bilious vomiting of one-day duration

## 2020-10-25 NOTE — H&P ADULT - NSHPSOCIALHISTORY_GEN_ALL_CORE
Pediatric Surgery Daily Progress Note            PATIENT NAME: Saranya Zamora OF BIRTH: 2007  MRN: [id number]  BILLING #: [id number]    DATE: 10/25/2020    CC: post-op day 5: exploratory laparoscopy, small-bowel resection and primary anastomosis, appendectomy     SUBJECTIVE:    Patient seen and examined on rounds. He is accompanied by his mother at bedside. No fevers in last 24 hours, vital signs stable. No emesis. BM x1 overnight. UOP appropriate 1.3 mL/kg/hr in last 24 hours. OBJECTIVE:   Vitals:    /54   Pulse 58   Temp 98.6 °F (37 °C) (Oral)   Resp 18   Ht 5' 5.35\" (1.66 m)   Wt 167 lb 8.8 oz (76 kg)   SpO2 100%   BMI 27.58 kg/m²      Intake/Output:  Urine Output:  1.3 mL/kg/hr x 24 hours  Stool:  1x           Constitutional:    Well-developed, well-nourished adolescent male who initially rests comfortably in bed but appears in pain when examined. Cardiovascular: Regular rate and rhythm  Lungs:    Room air. Abdomen:     RLQ incision and laparoscopic incisions appear clean, dry, intact. Diffuse abdominal tenderness in all 4 quadrants with voluntary guarding. Extremity:  Warm, dry to touch. Cap refill < 2 sec     Labs:  CBC with Differential:    Lab Results   Component Value Date    WBC 11.6 10/20/2020    RBC 4.02 10/20/2020    HGB 12.4 10/20/2020    HCT 36.8 10/20/2020     10/20/2020    MCV 91.5 10/20/2020    MCH 30.8 10/20/2020    MCHC 33.7 10/20/2020    RDW 12.9 10/20/2020    LYMPHOPCT 15 10/20/2020    MONOPCT 10 10/20/2020    BASOPCT 1 10/20/2020    MONOSABS 1.16 10/20/2020    LYMPHSABS 1.74 10/20/2020    EOSABS 0.00 10/20/2020    BASOSABS 0.12 10/20/2020    DIFFTYPE NOT REPORTED 10/20/2020     Cultures:  Urine culture negative      ASSESSMENT:    Segundo Thomas is a 15 y.o. male who is POD#5 after exploratory laparoscopy, small-bowel resection and primary anastomosis, appendectomy. PLAN:   1. Continue scheduled Toradol and Tylenol. PRN oxy.  Zofran PRN for nausea. 2. Zosyn day 6  3. Hep lock IVF  4. Will advance to clears today  5. Continued encouragement of ambulation, sitting up in bed in different positions, and incentive spirometry  6. Home clonidine. Will continue holding Vyvanse for now. 7. Strict Input and Output, continue to monitor Urine output. 8. Vitals per floor routine  9.  Activity: up as tolerated    Electronically signed by Lisandro Hutton on 10/25/2020     Attending Supervising Physicians SAILAJA Hyde 106  I was present with the resident physician during the history and exam. I discussed the findings and plans with the resident physician and agree as documented in her note     Electronically signed by Bogdan Gonzales MD on 10/25/20 at 1:27 PM EDT Patient lives with wife, Christa.   Marijuana: 2 joints a day

## 2020-11-09 ENCOUNTER — INPATIENT (INPATIENT)
Facility: HOSPITAL | Age: 55
LOS: 1 days | Discharge: LEFT BEFORE DISCHARGE | DRG: 392 | End: 2020-11-11
Attending: HOSPITALIST | Admitting: HOSPITALIST
Payer: COMMERCIAL

## 2020-11-09 VITALS
HEART RATE: 112 BPM | RESPIRATION RATE: 17 BRPM | HEIGHT: 73 IN | TEMPERATURE: 99 F | SYSTOLIC BLOOD PRESSURE: 148 MMHG | OXYGEN SATURATION: 99 % | DIASTOLIC BLOOD PRESSURE: 69 MMHG

## 2020-11-09 DIAGNOSIS — Z98.890 OTHER SPECIFIED POSTPROCEDURAL STATES: Chronic | ICD-10-CM

## 2020-11-09 LAB
ALBUMIN SERPL ELPH-MCNC: 4.6 G/DL — SIGNIFICANT CHANGE UP (ref 3.3–5)
ALP SERPL-CCNC: 80 U/L — SIGNIFICANT CHANGE UP (ref 40–120)
ALT FLD-CCNC: 23 U/L — SIGNIFICANT CHANGE UP (ref 10–45)
ANION GAP SERPL CALC-SCNC: 12 MMOL/L — SIGNIFICANT CHANGE UP (ref 5–17)
AST SERPL-CCNC: 16 U/L — SIGNIFICANT CHANGE UP (ref 10–40)
BASOPHILS # BLD AUTO: 0.02 K/UL — SIGNIFICANT CHANGE UP (ref 0–0.2)
BASOPHILS NFR BLD AUTO: 0.1 % — SIGNIFICANT CHANGE UP (ref 0–2)
BILIRUB SERPL-MCNC: 1.6 MG/DL — HIGH (ref 0.2–1.2)
BUN SERPL-MCNC: 12 MG/DL — SIGNIFICANT CHANGE UP (ref 7–23)
CALCIUM SERPL-MCNC: 9.8 MG/DL — SIGNIFICANT CHANGE UP (ref 8.4–10.5)
CHLORIDE SERPL-SCNC: 103 MMOL/L — SIGNIFICANT CHANGE UP (ref 96–108)
CO2 SERPL-SCNC: 23 MMOL/L — SIGNIFICANT CHANGE UP (ref 22–31)
CREAT SERPL-MCNC: 1.06 MG/DL — SIGNIFICANT CHANGE UP (ref 0.5–1.3)
EOSINOPHIL # BLD AUTO: 0.01 K/UL — SIGNIFICANT CHANGE UP (ref 0–0.5)
EOSINOPHIL NFR BLD AUTO: 0.1 % — SIGNIFICANT CHANGE UP (ref 0–6)
GLUCOSE SERPL-MCNC: 118 MG/DL — HIGH (ref 70–99)
HCT VFR BLD CALC: 45.3 % — SIGNIFICANT CHANGE UP (ref 39–50)
HGB BLD-MCNC: 15.8 G/DL — SIGNIFICANT CHANGE UP (ref 13–17)
IMM GRANULOCYTES NFR BLD AUTO: 0.6 % — SIGNIFICANT CHANGE UP (ref 0–1.5)
LYMPHOCYTES # BLD AUTO: 1.74 K/UL — SIGNIFICANT CHANGE UP (ref 1–3.3)
LYMPHOCYTES # BLD AUTO: 12.4 % — LOW (ref 13–44)
MCHC RBC-ENTMCNC: 29 PG — SIGNIFICANT CHANGE UP (ref 27–34)
MCHC RBC-ENTMCNC: 34.9 GM/DL — SIGNIFICANT CHANGE UP (ref 32–36)
MCV RBC AUTO: 83.3 FL — SIGNIFICANT CHANGE UP (ref 80–100)
MONOCYTES # BLD AUTO: 0.6 K/UL — SIGNIFICANT CHANGE UP (ref 0–0.9)
MONOCYTES NFR BLD AUTO: 4.3 % — SIGNIFICANT CHANGE UP (ref 2–14)
NEUTROPHILS # BLD AUTO: 11.62 K/UL — HIGH (ref 1.8–7.4)
NEUTROPHILS NFR BLD AUTO: 82.5 % — HIGH (ref 43–77)
NRBC # BLD: 0 /100 WBCS — SIGNIFICANT CHANGE UP (ref 0–0)
PLATELET # BLD AUTO: 264 K/UL — SIGNIFICANT CHANGE UP (ref 150–400)
POTASSIUM SERPL-MCNC: 3.9 MMOL/L — SIGNIFICANT CHANGE UP (ref 3.5–5.3)
POTASSIUM SERPL-SCNC: 3.9 MMOL/L — SIGNIFICANT CHANGE UP (ref 3.5–5.3)
PROT SERPL-MCNC: 7.8 G/DL — SIGNIFICANT CHANGE UP (ref 6–8.3)
RBC # BLD: 5.44 M/UL — SIGNIFICANT CHANGE UP (ref 4.2–5.8)
RBC # FLD: 12.9 % — SIGNIFICANT CHANGE UP (ref 10.3–14.5)
SODIUM SERPL-SCNC: 138 MMOL/L — SIGNIFICANT CHANGE UP (ref 135–145)
WBC # BLD: 14.07 K/UL — HIGH (ref 3.8–10.5)
WBC # FLD AUTO: 14.07 K/UL — HIGH (ref 3.8–10.5)

## 2020-11-09 PROCEDURE — 74177 CT ABD & PELVIS W/CONTRAST: CPT | Mod: 26

## 2020-11-09 PROCEDURE — 93010 ELECTROCARDIOGRAM REPORT: CPT | Mod: NC

## 2020-11-09 PROCEDURE — 99285 EMERGENCY DEPT VISIT HI MDM: CPT

## 2020-11-09 RX ORDER — MORPHINE SULFATE 50 MG/1
4 CAPSULE, EXTENDED RELEASE ORAL ONCE
Refills: 0 | Status: DISCONTINUED | OUTPATIENT
Start: 2020-11-09 | End: 2020-11-09

## 2020-11-09 RX ORDER — ONDANSETRON 8 MG/1
4 TABLET, FILM COATED ORAL ONCE
Refills: 0 | Status: COMPLETED | OUTPATIENT
Start: 2020-11-09 | End: 2020-11-09

## 2020-11-09 RX ORDER — CAPSAICIN 0.025 %
1 CREAM (GRAM) TOPICAL ONCE
Refills: 0 | Status: COMPLETED | OUTPATIENT
Start: 2020-11-09 | End: 2020-11-09

## 2020-11-09 RX ORDER — SODIUM CHLORIDE 9 MG/ML
1000 INJECTION INTRAMUSCULAR; INTRAVENOUS; SUBCUTANEOUS ONCE
Refills: 0 | Status: COMPLETED | OUTPATIENT
Start: 2020-11-09 | End: 2020-11-09

## 2020-11-09 RX ORDER — FAMOTIDINE 10 MG/ML
20 INJECTION INTRAVENOUS ONCE
Refills: 0 | Status: COMPLETED | OUTPATIENT
Start: 2020-11-09 | End: 2020-11-09

## 2020-11-09 RX ORDER — SODIUM CHLORIDE 9 MG/ML
1000 INJECTION, SOLUTION INTRAVENOUS ONCE
Refills: 0 | Status: COMPLETED | OUTPATIENT
Start: 2020-11-09 | End: 2020-11-09

## 2020-11-09 RX ADMIN — Medication 25 MILLIGRAM(S): at 23:37

## 2020-11-09 RX ADMIN — SODIUM CHLORIDE 1000 MILLILITER(S): 9 INJECTION INTRAMUSCULAR; INTRAVENOUS; SUBCUTANEOUS at 20:59

## 2020-11-09 RX ADMIN — Medication 1 APPLICATION(S): at 21:46

## 2020-11-09 RX ADMIN — FAMOTIDINE 20 MILLIGRAM(S): 10 INJECTION INTRAVENOUS at 21:45

## 2020-11-09 RX ADMIN — SODIUM CHLORIDE 1000 MILLILITER(S): 9 INJECTION, SOLUTION INTRAVENOUS at 22:59

## 2020-11-09 RX ADMIN — ONDANSETRON 4 MILLIGRAM(S): 8 TABLET, FILM COATED ORAL at 21:45

## 2020-11-09 RX ADMIN — MORPHINE SULFATE 4 MILLIGRAM(S): 50 CAPSULE, EXTENDED RELEASE ORAL at 23:37

## 2020-11-09 NOTE — ED PROVIDER NOTE - OBJECTIVE STATEMENT
55 y.o. male coming in with nasuea and retching since around 3pm today.  No blood or mucus, no belly pain, no urinary complaints.  Pt has been seen at several ED for this over the past few months.  Believes this is related to chronic marijuana use.  Last use was today.  No chest pain or SoB.  No fevers, no chills.  On prior visits to other ED's has received ativan with good relief of symptoms.

## 2020-11-09 NOTE — ED PROVIDER NOTE - ATTENDING CONTRIBUTION TO CARE
MD Dudley:  patient seen and evaluated with the PA.  I was present for key portions of the History and Physical, and I agree with the Impression and Plan.    MD Dudley:  location:  diffuse abdomen.  context:  +daily MJ (1 joint/day) smoker, "I'm so stressed."    Notes that his symptoms hilaria with hot water bathing, but not reliably so.  Associated Sx: no f/c, no CP/SOB.    Physical Exam:  adult M, ill-appearing, NCAT, PERRL, EOMI, neck supple, CTA B, no edema, AAOx3 ambulates w/o difficulty.  Abdomen:  mild diffuse TTP.  ECG - sinus bradycardia.   Impression/Plan:  Adult M with 1 mos abd pain, possible peptic ulcer, pain that is significant enough to warrant CT abdomen to r/o perf/abscess.  I do not suspect acute cholecystitis, ACS, or vascular etiology of the abdominal pain because the location, quality, and modifiers are inconsistent with these diagnoses.  Patient will be evaluated with labs, ua, CT abdomen, and treated with APAP and IVF (currently refusing stronger pain meds).  H&P inconsistent with ACS, and ECG unremarkable - no trops needed at this time. MD Dudley:  patient seen and evaluated with the PA.  I was present for key portions of the History and Physical, and I agree with the Impression and Plan.    MD Dudley:  location:  diffuse abdomen.  context:  +daily MJ (1 joint/day) smoker, "I'm so stressed."    Notes that his symptoms hilaria with hot water bathing, but not reliably so.  Associated Sx: no f/c, no CP/SOB.    Physical Exam:  adult M, ill-appearing, NCAT, PERRL, EOMI, neck supple, CTA B, no edema, AAOx3 ambulates w/o difficulty.  Abdomen:  mild diffuse TTP.  ECG - sinus bradycardia.   Impression/Plan:  Adult M with 1 mos abd pain, possible peptic ulcer, pain that is significant enough to warrant CT abdomen to r/o perf/abscess.  I do not suspect acute cholecystitis, ACS, or vascular etiology of the abdominal pain because the location, quality, and modifiers are inconsistent with these diagnoses.  Patient will be evaluated with labs, ua, CT abdomen, and treated with APAP and IVF (currently refusing stronger pain meds).  H&P inconsistent with ACS, and ECG unremarkable - troponin check not indicated at this time.

## 2020-11-09 NOTE — ED PROVIDER NOTE - RAPID ASSESSMENT
56 y/o M presents with emesis beginning last night after endorsing marijuana. Reports getting sick with marijuana use. +abd pain. Endorsed ativan with no relief.     **Pt seen in the waiting room via teletriage by Abby Olsen (MD) documentation completed by Alida Castillo. Patient to be sent to main ED for full medical evaluation. All orders placed to be followed by MD in main ED** 56 y/o M presents with emesis beginning last night after endorsing marijuana. Reports getting sick with marijuana use multiple times in past. Admitted in July for same.. +abd pain. Endorsed ativan with no relief.     **Pt seen in the waiting room via teletriage by Abby Olsen (MD) documentation completed by Alida Castillo. Patient to be sent to main ED for full medical evaluation. All orders placed to be followed by MD in main ED**

## 2020-11-09 NOTE — ED ADULT NURSE NOTE - OBJECTIVE STATEMENT
55 year old male presents ambulatory to ED c/o vomiting since this afternoon. Pt reports that this has happened before and he came into the ED and was treated with ativan. Pt said he started to feel nauseous so he smoked marijuana because he thought it would help but it has not subsided. Pt reports smoking marijuana daily for about 20 years. Pt denies chest pain, shortness of breath, headache, diarrhaea, abdominal pain, fever, chills, urinary symptoms, lightheadedness, dizziness, changes in vision

## 2020-11-09 NOTE — ED PROVIDER NOTE - CLINICAL SUMMARY MEDICAL DECISION MAKING FREE TEXT BOX
Impression/Plan:  Adult M with 1 mos abd pain, possible peptic ulcer, pain that is significant enough to warrant CT abdomen to r/o perf/abscess.  I do not suspect acute cholecystitis, ACS, or vascular etiology of the abdominal pain because the location, quality, and modifiers are inconsistent with these diagnoses.  Patient will be evaluated with labs, ua, CT abdomen, and treated with APAP and IVF (currently refusing stronger pain meds).  H&P inconsistent with ACS, and ECG unremarkable - no trops needed at this time.

## 2020-11-10 DIAGNOSIS — E80.6 OTHER DISORDERS OF BILIRUBIN METABOLISM: ICD-10-CM

## 2020-11-10 DIAGNOSIS — F12.90 CANNABIS USE, UNSPECIFIED, UNCOMPLICATED: ICD-10-CM

## 2020-11-10 DIAGNOSIS — R11.2 NAUSEA WITH VOMITING, UNSPECIFIED: ICD-10-CM

## 2020-11-10 DIAGNOSIS — F41.9 ANXIETY DISORDER, UNSPECIFIED: ICD-10-CM

## 2020-11-10 DIAGNOSIS — I48.91 UNSPECIFIED ATRIAL FIBRILLATION: ICD-10-CM

## 2020-11-10 DIAGNOSIS — Z29.9 ENCOUNTER FOR PROPHYLACTIC MEASURES, UNSPECIFIED: ICD-10-CM

## 2020-11-10 DIAGNOSIS — D72.829 ELEVATED WHITE BLOOD CELL COUNT, UNSPECIFIED: ICD-10-CM

## 2020-11-10 DIAGNOSIS — J90 PLEURAL EFFUSION, NOT ELSEWHERE CLASSIFIED: ICD-10-CM

## 2020-11-10 DIAGNOSIS — N40.0 BENIGN PROSTATIC HYPERPLASIA WITHOUT LOWER URINARY TRACT SYMPTOMS: ICD-10-CM

## 2020-11-10 DIAGNOSIS — K27.9 PEPTIC ULCER, SITE UNSPECIFIED, UNSPECIFIED AS ACUTE OR CHRONIC, WITHOUT HEMORRHAGE OR PERFORATION: ICD-10-CM

## 2020-11-10 LAB
ALBUMIN SERPL ELPH-MCNC: 3.9 G/DL — SIGNIFICANT CHANGE UP (ref 3.3–5)
ALP SERPL-CCNC: 67 U/L — SIGNIFICANT CHANGE UP (ref 40–120)
ALT FLD-CCNC: 18 U/L — SIGNIFICANT CHANGE UP (ref 10–45)
ANION GAP SERPL CALC-SCNC: 12 MMOL/L — SIGNIFICANT CHANGE UP (ref 5–17)
APPEARANCE UR: CLEAR — SIGNIFICANT CHANGE UP
AST SERPL-CCNC: 14 U/L — SIGNIFICANT CHANGE UP (ref 10–40)
BACTERIA # UR AUTO: NEGATIVE — SIGNIFICANT CHANGE UP
BILIRUB SERPL-MCNC: 1.2 MG/DL — SIGNIFICANT CHANGE UP (ref 0.2–1.2)
BILIRUB UR-MCNC: NEGATIVE — SIGNIFICANT CHANGE UP
BUN SERPL-MCNC: 11 MG/DL — SIGNIFICANT CHANGE UP (ref 7–23)
CALCIUM SERPL-MCNC: 8.9 MG/DL — SIGNIFICANT CHANGE UP (ref 8.4–10.5)
CHLORIDE SERPL-SCNC: 106 MMOL/L — SIGNIFICANT CHANGE UP (ref 96–108)
CO2 SERPL-SCNC: 21 MMOL/L — LOW (ref 22–31)
COLOR SPEC: YELLOW — SIGNIFICANT CHANGE UP
CREAT SERPL-MCNC: 0.9 MG/DL — SIGNIFICANT CHANGE UP (ref 0.5–1.3)
DIFF PNL FLD: NEGATIVE — SIGNIFICANT CHANGE UP
EPI CELLS # UR: 0 /HPF — SIGNIFICANT CHANGE UP
GLUCOSE SERPL-MCNC: 97 MG/DL — SIGNIFICANT CHANGE UP (ref 70–99)
GLUCOSE UR QL: NEGATIVE — SIGNIFICANT CHANGE UP
HCT VFR BLD CALC: 41.4 % — SIGNIFICANT CHANGE UP (ref 39–50)
HGB BLD-MCNC: 14.2 G/DL — SIGNIFICANT CHANGE UP (ref 13–17)
KETONES UR-MCNC: ABNORMAL
LEUKOCYTE ESTERASE UR-ACNC: NEGATIVE — SIGNIFICANT CHANGE UP
MAGNESIUM SERPL-MCNC: 2.2 MG/DL — SIGNIFICANT CHANGE UP (ref 1.6–2.6)
MCHC RBC-ENTMCNC: 28.4 PG — SIGNIFICANT CHANGE UP (ref 27–34)
MCHC RBC-ENTMCNC: 34.3 GM/DL — SIGNIFICANT CHANGE UP (ref 32–36)
MCV RBC AUTO: 82.8 FL — SIGNIFICANT CHANGE UP (ref 80–100)
NITRITE UR-MCNC: NEGATIVE — SIGNIFICANT CHANGE UP
NRBC # BLD: 0 /100 WBCS — SIGNIFICANT CHANGE UP (ref 0–0)
PH UR: 6 — SIGNIFICANT CHANGE UP (ref 5–8)
PHOSPHATE SERPL-MCNC: 3.6 MG/DL — SIGNIFICANT CHANGE UP (ref 2.5–4.5)
PLATELET # BLD AUTO: 246 K/UL — SIGNIFICANT CHANGE UP (ref 150–400)
POTASSIUM SERPL-MCNC: 3.8 MMOL/L — SIGNIFICANT CHANGE UP (ref 3.5–5.3)
POTASSIUM SERPL-SCNC: 3.8 MMOL/L — SIGNIFICANT CHANGE UP (ref 3.5–5.3)
PROT SERPL-MCNC: 6.7 G/DL — SIGNIFICANT CHANGE UP (ref 6–8.3)
PROT UR-MCNC: NEGATIVE — SIGNIFICANT CHANGE UP
RBC # BLD: 5 M/UL — SIGNIFICANT CHANGE UP (ref 4.2–5.8)
RBC # FLD: 13.1 % — SIGNIFICANT CHANGE UP (ref 10.3–14.5)
RBC CASTS # UR COMP ASSIST: 1 /HPF — SIGNIFICANT CHANGE UP (ref 0–4)
SARS-COV-2 IGG SERPL QL IA: NEGATIVE — SIGNIFICANT CHANGE UP
SARS-COV-2 IGM SERPL IA-ACNC: 0.09 INDEX — SIGNIFICANT CHANGE UP
SARS-COV-2 RNA SPEC QL NAA+PROBE: SIGNIFICANT CHANGE UP
SODIUM SERPL-SCNC: 139 MMOL/L — SIGNIFICANT CHANGE UP (ref 135–145)
SP GR SPEC: 1.03 — HIGH (ref 1.01–1.02)
UROBILINOGEN FLD QL: NEGATIVE — SIGNIFICANT CHANGE UP
WBC # BLD: 10.38 K/UL — SIGNIFICANT CHANGE UP (ref 3.8–10.5)
WBC # FLD AUTO: 10.38 K/UL — SIGNIFICANT CHANGE UP (ref 3.8–10.5)
WBC UR QL: 0 /HPF — SIGNIFICANT CHANGE UP (ref 0–5)

## 2020-11-10 PROCEDURE — 99223 1ST HOSP IP/OBS HIGH 75: CPT

## 2020-11-10 RX ORDER — BISOPROLOL FUMARATE 10 MG/1
5 TABLET, FILM COATED ORAL DAILY
Refills: 0 | Status: DISCONTINUED | OUTPATIENT
Start: 2020-11-10 | End: 2020-11-10

## 2020-11-10 RX ORDER — PANTOPRAZOLE SODIUM 20 MG/1
40 TABLET, DELAYED RELEASE ORAL
Refills: 0 | Status: DISCONTINUED | OUTPATIENT
Start: 2020-11-10 | End: 2020-11-11

## 2020-11-10 RX ORDER — BISOPROLOL FUMARATE 10 MG/1
10 TABLET, FILM COATED ORAL DAILY
Refills: 0 | Status: DISCONTINUED | OUTPATIENT
Start: 2020-11-10 | End: 2020-11-11

## 2020-11-10 RX ORDER — ONDANSETRON 8 MG/1
4 TABLET, FILM COATED ORAL EVERY 6 HOURS
Refills: 0 | Status: DISCONTINUED | OUTPATIENT
Start: 2020-11-10 | End: 2020-11-11

## 2020-11-10 RX ORDER — FAMOTIDINE 10 MG/ML
20 INJECTION INTRAVENOUS ONCE
Refills: 0 | Status: COMPLETED | OUTPATIENT
Start: 2020-11-10 | End: 2020-11-10

## 2020-11-10 RX ADMIN — MORPHINE SULFATE 4 MILLIGRAM(S): 50 CAPSULE, EXTENDED RELEASE ORAL at 00:26

## 2020-11-10 RX ADMIN — FAMOTIDINE 20 MILLIGRAM(S): 10 INJECTION INTRAVENOUS at 00:28

## 2020-11-10 RX ADMIN — SODIUM CHLORIDE 1000 MILLILITER(S): 9 INJECTION INTRAMUSCULAR; INTRAVENOUS; SUBCUTANEOUS at 00:26

## 2020-11-10 RX ADMIN — PANTOPRAZOLE SODIUM 40 MILLIGRAM(S): 20 TABLET, DELAYED RELEASE ORAL at 06:08

## 2020-11-10 RX ADMIN — BISOPROLOL FUMARATE 10 MILLIGRAM(S): 10 TABLET, FILM COATED ORAL at 06:08

## 2020-11-10 NOTE — H&P ADULT - PROBLEM SELECTOR PLAN 1
- resolved at time of medicine admit after being dosed IV zofran, IV fluids, and IV morphine  - c/w zofran IV prn for n/v.  - etiology maybe from stress as patient associates this with previous episodes however ddx includes cannabinoid hyperemesis syndrome. abdominal exam is benign at time of medicine admit however this is following IV morphine. There is mild elevation of bilirubin but would repeat CMP in am to verify elevation- if is persistent and abdominal discomfort is still present then would get RUQ US to evaluate for occult cholelithiasis not identified on CT a/p. less likely that PUD is causing n/v as the patient attributes vomiting to development of abdominal pain.

## 2020-11-10 NOTE — H&P ADULT - NSHPREVIEWOFSYSTEMS_GEN_ALL_CORE
CONSTITUTIONAL: No fever, no chills  EYES: No eye pain, no acute blindness  Mouth: no pain in mouth, no cuts  RESPIRATORY: No cough, No sob  CARDIOVASCULAR: No CP, no palpitations  GASTROINTESTINAL: no abdominal pain, no n/v/d  GENITOURINARY: No dysuria, no hematuria  Heme: No easy bruising, no swelling of neck  NEUROLOGICAL: No seizure, No acute paralysis  SKIN: No itching, no rashes  MUSCULOSKELETAL: No acute joint pain, no joint swelling

## 2020-11-10 NOTE — H&P ADULT - NSHPPHYSICALEXAM_GEN_ALL_CORE
Vital Signs Last 24 Hrs  T(C): 36.8 (09 Nov 2020 20:59), Max: 37.1 (09 Nov 2020 16:43)  T(F): 98.2 (09 Nov 2020 20:59), Max: 98.7 (09 Nov 2020 16:43)  HR: 76 (09 Nov 2020 23:40) (63 - 112)  BP: 141/86 (09 Nov 2020 23:40) (135/79 - 148/69)  BP(mean): 101 (09 Nov 2020 23:40) (95 - 101)  RR: 20 (09 Nov 2020 23:40) (17 - 20)  SpO2: 98% (09 Nov 2020 23:40) (98% - 99%) on RA    GENERAL: NAD, well-developed  HEAD:  Atraumatic, Normocephalic  EYES: EOMI, PERRL, conjunctiva and sclera clear  Mouth: MMM, no lesions  NECK: Supple, no appreciable masses  Lung: normal work of breathing, cta b/l  Chest: S1&S2+, rrr, no m/r/g appreciated  ABDOMEN: bs+, soft, nt, nd, no appreciable masses  : No ramirez catheter, no CVA tenderness  EXTREMITIES:  radial pulse present b/l, PT present b/l, no pitting edema present  Neuro: A&Ox3, no flaccid paralysis in extremities appreciated  SKIN: warm and dry, no visible purulence in exposed areas

## 2020-11-10 NOTE — H&P ADULT - PROBLEM SELECTOR PLAN 10
IMPROVE VTE 0; low risk for inpatient DVT. encourage early ambulation  - noted to have incidental findings of right renal cyst, enlarged prostate, and trace left pleural effusion which should be monitored in the outpatient setting following discharge

## 2020-11-10 NOTE — H&P ADULT - NSHPLABSRESULTS_GEN_ALL_CORE
Personally reviewed available labs, imaging and ekg  CBC Full  -  ( 09 Nov 2020 21:10 )  WBC Count : 14.07 K/uL  RBC Count : 5.44 M/uL  Hemoglobin : 15.8 g/dL  Hematocrit : 45.3 %  Platelet Count - Automated : 264 K/uL  Mean Cell Volume : 83.3 fl  Mean Cell Hemoglobin : 29.0 pg  Mean Cell Hemoglobin Concentration : 34.9 gm/dL  Auto Neutrophil # : 11.62 K/uL  Auto Lymphocyte # : 1.74 K/uL  Auto Monocyte # : 0.60 K/uL  Auto Eosinophil # : 0.01 K/uL  Auto Basophil # : 0.02 K/uL  Auto Neutrophil % : 82.5 %  Auto Lymphocyte % : 12.4 %  Auto Monocyte % : 4.3 %  Auto Eosinophil % : 0.1 %  Auto Basophil % : 0.1 %  11-09  138  |  103  |  12  ----------------------------<  118<H>  3.9   |  23  |  1.06  Ca    9.8      09 Nov 2020 21:10  TPro  7.8  /  Alb  4.6  /  TBili  1.6<H>  /  DBili  x   /  AST  16  /  ALT  23  /  AlkPhos  80  11-09  Troponin T, High Sensitivity Result: <6: (11.09.20 @ 21:10)  Imaging:  CT abdomen does not demonstrate bowel obstruction on my review  < from: CT Abdomen and Pelvis w/ IV Cont (11.09.20 @ 22:41) >  FINDINGS:  LOWER CHEST: Bibasilar platelike subsegmental atelectasis. Trace left pleural effusion.  LIVER: Mild hepatic steatosis.  BILE DUCTS: Normal caliber.  GALLBLADDER: Within normal limits.  SPLEEN: Within normal limits.  PANCREAS: Within normal limits.  ADRENALS: Within normal limits.  KIDNEYS/URETERS: Kidneys enhance symmetrically without hydronephrosis. 1.1 cm upper pole right renal cyst.  BLADDER: Within normal limits.  REPRODUCTIVE ORGANS: Prostate gland is mildly enlarged.  BOWEL: Small hiatus hernia. No bowel obstruction or overt bowel wall thickening. Appendix is normal.  PERITONEUM: No ascites, pneumoperitoneum, or loculated collection. No mesenteric lymphadenopathy.  VESSELS: After chronic calcifications of the aortoiliac tree. Normal caliber abdominal aorta.  RETROPERITONEUM/LYMPH NODES: No lymphadenopathy.  ABDOMINAL WALL: Within normal limits.  BONES: Mild degenerative changes of the spine.  IMPRESSION:  No acute findings on CT the abdomen and pelvis explain patient's abdominal pain.  < end of copied text >    EKG: sinus bradycardia 58, no st elevation c/w stemi appreciated

## 2020-11-10 NOTE — H&P ADULT - PROBLEM SELECTOR PLAN 9
noted to have trace left pleural effusion on CT  - likely from retching  - should have monitoring as outpatient

## 2020-11-10 NOTE — H&P ADULT - ATTENDING COMMENTS
Dave Clay MD  Medicine Attending  Department of Hospital Medicine  pager: [phone] (available from 20:00 to 08:00)

## 2020-11-10 NOTE — H&P ADULT - HISTORY OF PRESENT ILLNESS
55M w/ afib s/p ablation, daily marijuana use with hyperemesis syndrome, gastric PUD, and anxiety presents to [facility] for evaluation of n/v and abdominal pain. The patient reports recent stress w/ regard to fighting amongst his sons and believes this has triggered him to have nausea w/ NBNB vomiting that had been persistent since 3pm on day of presentation. He developed severe 10/10 generalized muscular abdominal pain which was nonradiating which he attributed to the vomiting. He charted in the past to have had canabinoid hyperemesis syndrome requiring inpatient management of nausea and pain due to inability to tolerate PO.  He reports daily smoking of marijuana for year but denies other drug use or recent alcohol use (does use socially). At time of medicine interview he reports resolution of abdominal pain since dosing of morphine by ED. On ROS he reports that he had muscular chest discomfort 1d prior which he associated with starting new exercise regimen which included pushups. Additionally, reports being recently diagnosed w/ gastric PUD by GI Dr. Ferguson and is supposed to take and antiacid medication but he is unsure of the name.    In the ED, VS 98.7, 148/69, 112, 17 99%RA  s/p famotidine 20mgIV, [id number], zofran [id number], promethazine [id number], 1L LR, 1L NS

## 2020-11-10 NOTE — SBIRT NOTE ADULT - NSSBIRTDRGBRIEFINTDET_GEN_A_CORE
Screening results were reviewed with the patient and potential negative consequences associated with level of risk. Motivation and readiness to reduce or stop was discussed. Patient declined resources.

## 2020-11-10 NOTE — H&P ADULT - NSHPSOCIALHISTORY_GEN_ALL_CORE
daily marijuana use, social alcohol, denies drug use. 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

## 2020-11-10 NOTE — H&P ADULT - NSICDXPASTMEDICALHX_GEN_ALL_CORE_FT
PAST MEDICAL HISTORY:  Anxiety     Atrial fibrillation, unspecified type     Marijuana use     PUD (peptic ulcer disease)

## 2020-11-10 NOTE — H&P ADULT - PROBLEM SELECTOR PLAN 5
- not on standing meds, patient reports chronic issue attributed to family issues  - likely to benefit from outpatient psychiatric evaluation. denies current SI/HI

## 2020-11-10 NOTE — H&P ADULT - ASSESSMENT
55M w/ afib s/p ablation, daily marijuana use with hyperemesis syndrome, gastric PUD, and anxiety presents to [facility] for evaluation of n/v and abdominal pain admitted to medicine for further management.

## 2020-11-11 ENCOUNTER — TRANSCRIPTION ENCOUNTER (OUTPATIENT)
Age: 55
End: 2020-11-11

## 2020-11-11 VITALS
OXYGEN SATURATION: 97 % | DIASTOLIC BLOOD PRESSURE: 82 MMHG | HEART RATE: 97 BPM | SYSTOLIC BLOOD PRESSURE: 129 MMHG | TEMPERATURE: 98 F | RESPIRATION RATE: 18 BRPM

## 2020-11-11 LAB
ANION GAP SERPL CALC-SCNC: 14 MMOL/L — SIGNIFICANT CHANGE UP (ref 5–17)
BUN SERPL-MCNC: 13 MG/DL — SIGNIFICANT CHANGE UP (ref 7–23)
CALCIUM SERPL-MCNC: 9 MG/DL — SIGNIFICANT CHANGE UP (ref 8.4–10.5)
CHLORIDE SERPL-SCNC: 105 MMOL/L — SIGNIFICANT CHANGE UP (ref 96–108)
CO2 SERPL-SCNC: 20 MMOL/L — LOW (ref 22–31)
CREAT SERPL-MCNC: 0.98 MG/DL — SIGNIFICANT CHANGE UP (ref 0.5–1.3)
GLUCOSE SERPL-MCNC: 93 MG/DL — SIGNIFICANT CHANGE UP (ref 70–99)
HCT VFR BLD CALC: 46.7 % — SIGNIFICANT CHANGE UP (ref 39–50)
HGB BLD-MCNC: 15.8 G/DL — SIGNIFICANT CHANGE UP (ref 13–17)
MCHC RBC-ENTMCNC: 28.5 PG — SIGNIFICANT CHANGE UP (ref 27–34)
MCHC RBC-ENTMCNC: 33.8 GM/DL — SIGNIFICANT CHANGE UP (ref 32–36)
MCV RBC AUTO: 84.1 FL — SIGNIFICANT CHANGE UP (ref 80–100)
NRBC # BLD: 0 /100 WBCS — SIGNIFICANT CHANGE UP (ref 0–0)
PLATELET # BLD AUTO: 254 K/UL — SIGNIFICANT CHANGE UP (ref 150–400)
POTASSIUM SERPL-MCNC: 3.7 MMOL/L — SIGNIFICANT CHANGE UP (ref 3.5–5.3)
POTASSIUM SERPL-SCNC: 3.7 MMOL/L — SIGNIFICANT CHANGE UP (ref 3.5–5.3)
RBC # BLD: 5.55 M/UL — SIGNIFICANT CHANGE UP (ref 4.2–5.8)
RBC # FLD: 13.2 % — SIGNIFICANT CHANGE UP (ref 10.3–14.5)
SODIUM SERPL-SCNC: 139 MMOL/L — SIGNIFICANT CHANGE UP (ref 135–145)
WBC # BLD: 9.87 K/UL — SIGNIFICANT CHANGE UP (ref 3.8–10.5)
WBC # FLD AUTO: 9.87 K/UL — SIGNIFICANT CHANGE UP (ref 3.8–10.5)

## 2020-11-11 PROCEDURE — 99285 EMERGENCY DEPT VISIT HI MDM: CPT | Mod: 25

## 2020-11-11 PROCEDURE — U0003: CPT

## 2020-11-11 PROCEDURE — 84484 ASSAY OF TROPONIN QUANT: CPT

## 2020-11-11 PROCEDURE — 12345: CPT | Mod: NC

## 2020-11-11 PROCEDURE — 80307 DRUG TEST PRSMV CHEM ANLYZR: CPT

## 2020-11-11 PROCEDURE — 93005 ELECTROCARDIOGRAM TRACING: CPT

## 2020-11-11 PROCEDURE — 81001 URINALYSIS AUTO W/SCOPE: CPT

## 2020-11-11 PROCEDURE — 85025 COMPLETE CBC W/AUTO DIFF WBC: CPT

## 2020-11-11 PROCEDURE — 74177 CT ABD & PELVIS W/CONTRAST: CPT

## 2020-11-11 PROCEDURE — 96374 THER/PROPH/DIAG INJ IV PUSH: CPT | Mod: XU

## 2020-11-11 PROCEDURE — 84100 ASSAY OF PHOSPHORUS: CPT

## 2020-11-11 PROCEDURE — 83735 ASSAY OF MAGNESIUM: CPT

## 2020-11-11 PROCEDURE — 80053 COMPREHEN METABOLIC PANEL: CPT

## 2020-11-11 PROCEDURE — 85027 COMPLETE CBC AUTOMATED: CPT

## 2020-11-11 PROCEDURE — 86769 SARS-COV-2 COVID-19 ANTIBODY: CPT

## 2020-11-11 PROCEDURE — 96375 TX/PRO/DX INJ NEW DRUG ADDON: CPT | Mod: XU

## 2020-11-11 PROCEDURE — 80048 BASIC METABOLIC PNL TOTAL CA: CPT

## 2020-11-11 PROCEDURE — 83690 ASSAY OF LIPASE: CPT

## 2020-11-11 RX ADMIN — BISOPROLOL FUMARATE 10 MILLIGRAM(S): 10 TABLET, FILM COATED ORAL at 05:33

## 2020-11-11 RX ADMIN — PANTOPRAZOLE SODIUM 40 MILLIGRAM(S): 20 TABLET, DELAYED RELEASE ORAL at 05:33

## 2020-11-11 NOTE — DISCHARGE NOTE PROVIDER - CARE PROVIDER_API CALL
Gianni Ansari  CARDIOVASCULAR DISEASE  [address], [address]  [place] [address]  Phone: [phone]  Fax: [phone]  Follow Up Time:

## 2020-11-11 NOTE — PROVIDER CONTACT NOTE (OTHER) - ASSESSMENT
pt eloped. before elopement patient was stable. VSS. ambulating in room. RN spoke with wife and informed her of preparation of discharge and that it may take some time. RN also informed patient. RN returned from break and patient was still in room. After 30 mins, RN rechecked and patient was not in room. called code flight. Called wife, picked  up at hospital and is taking him home. Charge nurse spoke to wife and strongly encourage return for proper discharge, refused and said to call . Charge RN called patient, no answer. voicemail was left.

## 2020-11-11 NOTE — PROVIDER CONTACT NOTE (OTHER) - RECOMMENDATIONS
called patients spouse and patient states  would decline to return back to hospital for proper discharge.

## 2020-11-11 NOTE — PROVIDER CONTACT NOTE (OTHER) - ACTION/TREATMENT ORDERED:
called patients spouse, she stated she picked patient up and dropped him off at home despite her protests for patient to stay. patient was notified that discharge plans were being finalized.

## 2020-11-11 NOTE — CHART NOTE - NSCHARTNOTEFT_GEN_A_CORE
Notified by JACOB Capps during my teaching rounds this AM about patient getting eager to go home.  I arrived on the floor around 11am; informed by nursing staff that patient eloped about 10 mins ago.  Review of medical record revealed no significant laboratory or hemodynamic instability.  Plan would have been a likely discharge home with outpatient follow up.  Suspected Cannabis hyperemesis syndrome.    Jose Harrison MD, MHA, FACP, FHM  Reachable via [employer] Teams

## 2020-11-11 NOTE — DISCHARGE NOTE PROVIDER - NSDCCPCAREPLAN_GEN_ALL_CORE_FT
PRINCIPAL DISCHARGE DIAGNOSIS  Diagnosis: Non-intractable vomiting with nausea  Assessment and Plan of Treatment: Resolved      SECONDARY DISCHARGE DIAGNOSES  Diagnosis: PUD (peptic ulcer disease)  Assessment and Plan of Treatment: Continue PPI    Diagnosis: Marijuana abuse  Assessment and Plan of Treatment: Substance Abuse  Chemical dependency is an addiction to drugs or alcohol. It is characterized by the repeated behavior of seeking out and using drugs and alcohol despite harmful consequences to the health and safety of oneself and others. Using drugs in a manner that brought you to an Emergency Room suggests you may have an drug abuse problem. Seek help at a drug addiction center.  SEEK IMMEDIATE MEDICAL CARE IF YOU HAVE ANY OF THE FOLLOWING SYMPTOMS: chest pain, shortness of breath, change in mental status, thoughts about hurting killing yourself, thoughts about hurting or killing somebody else, hallucinations, or worsening depression.    Diagnosis: Anxiety  Assessment and Plan of Treatment: Anxiety  Generalized anxiety disorder (LINDY) is a mental disorder. It is defined as anxiety that is not necessarily related to specific events or activities or is out of proportion to said events. Symptoms include restlessness, fatigue, difficulty concentrations, irritability and difficulty concentrating. It may interfere with life functions, including relationships, work, and school. If you were started on a medication, make sure to take exactly as prescribed and follow up with a psychiatrist.  SEEK IMMEDIATE MEDICAL CARE IF YOU HAVE ANY OF THE FOLLOWING SYMPTOMS: thoughts about hurting killing yourself, thoughts about hurting or killing somebody else, hallucinations, or worsening depression.    Diagnosis: Atrial fibrillation  Assessment and Plan of Treatment: Atrial fibrillation is the most common heart rhythm problem & has the risk of stroke & heart attack  It helps if you control your blood pressure, not drink more than 1-2 alcohol drinks per day, cut down on caffeine, getting treatment for over active thyroid gland, & getting exercise  Call your doctor if you feel your heart racing or beating unusually, chest tightness or pain, lightheaded, faint, shortness of breath especially with exercise  It is important to take your heart medication as prescribed

## 2020-11-11 NOTE — DISCHARGE NOTE PROVIDER - HOSPITAL COURSE
55M w/ afib s/p ablation, daily marijuana use with hyperemesis syndrome, gastric PUD, and anxiety presents to [facility] for evaluation of n/v and abdominal pain admitted to medicine for further management.     Problem/Plan - 1:  ·  Problem: Intractable nausea and vomiting. Plan: - resolved at time of medicine admit after being dosed IV zofran, IV fluids, and IV morphine  - c/w zofran IV prn for n/v.  - etiology maybe from stress as patient associates this with previous episodes however ddx includes cannabinoid hyperemesis syndrome. abdominal exam is benign at time of medicine admit however this is following IV morphine. There is mild elevation of bilirubin but would repeat CMP in am to verify elevation- if is persistent and abdominal discomfort is still present then would get RUQ US to evaluate for occult cholelithiasis not identified on CT a/p. less likely that PUD is causing n/v as the patient attributes vomiting to development of abdominal pain.      Problem/Plan - 2:  ·  Problem: PUD (peptic ulcer disease).  Plan: - hx of gastric PUD.  - start PPI in am.      Problem/Plan - 3:  ·  Problem: Hyperbilirubinemia.  Plan: see above  monitor w/ cmp in am.      Problem/Plan - 4:  ·  Problem: Marijuana use.  Plan: - tox screen ordered  - upon d/c continue to advise cessation.      Problem/Plan - 5:  ·  Problem: Anxiety.  Plan: - not on standing meds, patient reports chronic issue attributed to family issues  - likely to benefit from outpatient psychiatric evaluation. denies current SI/HI.      Problem/Plan - 6:  Problem: Atrial fibrillation, unspecified type. Plan: c/w bisoprolol  not on ac per EP/cards  trop <6 and therefore no evidence of myocardial infarction.     Problem/Plan - 7:  ·  Problem: Leukocytosis, unspecified type.  Plan: suspect reactive leukocytosis from retching.      Problem/Plan - 8:  ·  Problem: Enlarged prostate.  Plan: note on ct a/p  - should have outpatient f/u.      Problem/Plan - 9:  ·  Problem: Pleural effusion.  Plan: noted to have trace left pleural effusion on CT  - likely from retching  - should have monitoring as outpatient.    55M w/ afib s/p ablation, daily marijuana use with hyperemesis syndrome, gastric PUD, and anxiety presents to [facility] for evaluation of n/v and abdominal pain admitted to medicine for further management.     Problem/Plan - 1:  ·  Problem: Intractable nausea and vomiting. Plan: - resolved at time of medicine admit after being dosed IV zofran, IV fluids, and IV morphine  - c/w zofran IV prn for n/v.  - etiology maybe from stress as patient associates this with previous episodes however ddx includes cannabinoid hyperemesis syndrome. abdominal exam is benign at time of medicine admit however this is following IV morphine. There is mild elevation of bilirubin but would repeat CMP in am to verify elevation- if is persistent and abdominal discomfort is still present then would get RUQ US to evaluate for occult cholelithiasis not identified on CT a/p. less likely that PUD is causing n/v as the patient attributes vomiting to development of abdominal pain.      Problem/Plan - 2:  ·  Problem: PUD (peptic ulcer disease).  Plan: - hx of gastric PUD.  - start PPI in am.      Problem/Plan - 3:  ·  Problem: Hyperbilirubinemia.  Plan: see above  monitor w/ cmp in am.      Problem/Plan - 4:  ·  Problem: Marijuana use.  Plan: - tox screen ordered  - upon d/c continue to advise cessation.      Problem/Plan - 5:  ·  Problem: Anxiety.  Plan: - not on standing meds, patient reports chronic issue attributed to family issues  - likely to benefit from outpatient psychiatric evaluation. denies current SI/HI.      Problem/Plan - 6:  Problem: Atrial fibrillation, unspecified type. Plan: c/w bisoprolol  not on ac per EP/cards  trop <6 and therefore no evidence of myocardial infarction.     Problem/Plan - 7:  ·  Problem: Leukocytosis, unspecified type.  Plan: suspect reactive leukocytosis from retching.      Problem/Plan - 8:  ·  Problem: Enlarged prostate.  Plan: note on ct a/p  - should have outpatient f/u.      Problem/Plan - 9:  ·  Problem: Pleural effusion.  Plan: noted to have trace left pleural effusion on CT  - likely from retching  - should have monitoring as outpatient.       Patient ELOPED.

## 2020-11-17 LAB
AMPHET UR-MCNC: NEGATIVE — SIGNIFICANT CHANGE UP
BARBITURATES, URINE.: NEGATIVE — SIGNIFICANT CHANGE UP
BENZODIAZ UR-MCNC: NEGATIVE — SIGNIFICANT CHANGE UP
COCAINE METAB.OTHER UR-MCNC: NEGATIVE — SIGNIFICANT CHANGE UP
CREATININE, URINE THERAPEUTIC: 180.5 MG/DL — SIGNIFICANT CHANGE UP
METHADONE UR-MCNC: NEGATIVE — SIGNIFICANT CHANGE UP
METHAQUALONE UR QL: NEGATIVE — SIGNIFICANT CHANGE UP
METHAQUALONE UR-MCNC: NEGATIVE — SIGNIFICANT CHANGE UP
OPIATES UR-MCNC: SIGNIFICANT CHANGE UP
PCP UR-MCNC: NEGATIVE — SIGNIFICANT CHANGE UP
PROPOXYPH UR QL: NEGATIVE — SIGNIFICANT CHANGE UP
THC UR QL: SIGNIFICANT CHANGE UP

## 2020-11-17 NOTE — ED ADULT NURSE NOTE - NSFALLRSKOUTCOME_ED_ALL_ED
Universal Safety [employer] Unna Boot Text: An Unna boot was placed to help immobilize the limb and facilitate more rapid healing.

## 2020-11-28 ENCOUNTER — INPATIENT (INPATIENT)
Facility: HOSPITAL | Age: 55
LOS: 2 days | Discharge: ANOTHER HOSPITAL | DRG: 315 | End: 2020-12-01
Attending: INTERNAL MEDICINE | Admitting: HOSPITALIST
Payer: COMMERCIAL

## 2020-11-28 VITALS — HEIGHT: 73 IN | OXYGEN SATURATION: 99 % | RESPIRATION RATE: 20 BRPM | TEMPERATURE: 98 F | HEART RATE: 135 BPM

## 2020-11-28 DIAGNOSIS — Z98.890 OTHER SPECIFIED POSTPROCEDURAL STATES: Chronic | ICD-10-CM

## 2020-11-28 LAB
ALBUMIN SERPL ELPH-MCNC: 3 G/DL — LOW (ref 3.3–5)
ALP SERPL-CCNC: 108 U/L — SIGNIFICANT CHANGE UP (ref 40–120)
ALT FLD-CCNC: 25 U/L — SIGNIFICANT CHANGE UP (ref 12–78)
ANION GAP SERPL CALC-SCNC: 16 MMOL/L — SIGNIFICANT CHANGE UP (ref 5–17)
AST SERPL-CCNC: 14 U/L — LOW (ref 15–37)
BASOPHILS # BLD AUTO: 0.02 K/UL — SIGNIFICANT CHANGE UP (ref 0–0.2)
BASOPHILS NFR BLD AUTO: 0.1 % — SIGNIFICANT CHANGE UP (ref 0–2)
BILIRUB SERPL-MCNC: 1.8 MG/DL — HIGH (ref 0.2–1.2)
BUN SERPL-MCNC: 17 MG/DL — SIGNIFICANT CHANGE UP (ref 7–23)
CALCIUM SERPL-MCNC: 8.8 MG/DL — SIGNIFICANT CHANGE UP (ref 8.5–10.1)
CHLORIDE SERPL-SCNC: 107 MMOL/L — SIGNIFICANT CHANGE UP (ref 96–108)
CK MB BLD-MCNC: <0.8 % — SIGNIFICANT CHANGE UP (ref 0–3.5)
CK MB CFR SERPL CALC: <1 NG/ML — SIGNIFICANT CHANGE UP (ref 0–3.6)
CK SERPL-CCNC: 121 U/L — SIGNIFICANT CHANGE UP (ref 26–308)
CO2 SERPL-SCNC: 17 MMOL/L — LOW (ref 22–31)
CREAT SERPL-MCNC: 1.1 MG/DL — SIGNIFICANT CHANGE UP (ref 0.5–1.3)
EOSINOPHIL # BLD AUTO: 0.01 K/UL — SIGNIFICANT CHANGE UP (ref 0–0.5)
EOSINOPHIL NFR BLD AUTO: 0.1 % — SIGNIFICANT CHANGE UP (ref 0–6)
GLUCOSE SERPL-MCNC: 123 MG/DL — HIGH (ref 70–99)
HCT VFR BLD CALC: 39 % — SIGNIFICANT CHANGE UP (ref 39–50)
HGB BLD-MCNC: 13.3 G/DL — SIGNIFICANT CHANGE UP (ref 13–17)
IMM GRANULOCYTES NFR BLD AUTO: 0.5 % — SIGNIFICANT CHANGE UP (ref 0–1.5)
LIDOCAIN IGE QN: 52 U/L — LOW (ref 73–393)
LYMPHOCYTES # BLD AUTO: 1.17 K/UL — SIGNIFICANT CHANGE UP (ref 1–3.3)
LYMPHOCYTES # BLD AUTO: 8 % — LOW (ref 13–44)
MCHC RBC-ENTMCNC: 28.2 PG — SIGNIFICANT CHANGE UP (ref 27–34)
MCHC RBC-ENTMCNC: 34.1 GM/DL — SIGNIFICANT CHANGE UP (ref 32–36)
MCV RBC AUTO: 82.6 FL — SIGNIFICANT CHANGE UP (ref 80–100)
MONOCYTES # BLD AUTO: 0.91 K/UL — HIGH (ref 0–0.9)
MONOCYTES NFR BLD AUTO: 6.2 % — SIGNIFICANT CHANGE UP (ref 2–14)
NEUTROPHILS # BLD AUTO: 12.47 K/UL — HIGH (ref 1.8–7.4)
NEUTROPHILS NFR BLD AUTO: 85.1 % — HIGH (ref 43–77)
NRBC # BLD: 0 /100 WBCS — SIGNIFICANT CHANGE UP (ref 0–0)
PLATELET # BLD AUTO: 406 K/UL — HIGH (ref 150–400)
POTASSIUM SERPL-MCNC: 3.8 MMOL/L — SIGNIFICANT CHANGE UP (ref 3.5–5.3)
POTASSIUM SERPL-SCNC: 3.8 MMOL/L — SIGNIFICANT CHANGE UP (ref 3.5–5.3)
PROT SERPL-MCNC: 7.9 G/DL — SIGNIFICANT CHANGE UP (ref 6–8.3)
RBC # BLD: 4.72 M/UL — SIGNIFICANT CHANGE UP (ref 4.2–5.8)
RBC # FLD: 12.8 % — SIGNIFICANT CHANGE UP (ref 10.3–14.5)
SODIUM SERPL-SCNC: 140 MMOL/L — SIGNIFICANT CHANGE UP (ref 135–145)
TROPONIN I SERPL-MCNC: <.015 NG/ML — SIGNIFICANT CHANGE UP (ref 0.01–0.04)
WBC # BLD: 14.65 K/UL — HIGH (ref 3.8–10.5)
WBC # FLD AUTO: 14.65 K/UL — HIGH (ref 3.8–10.5)

## 2020-11-28 PROCEDURE — 99291 CRITICAL CARE FIRST HOUR: CPT

## 2020-11-28 PROCEDURE — 93010 ELECTROCARDIOGRAM REPORT: CPT

## 2020-11-28 RX ORDER — DILTIAZEM HCL 120 MG
10 CAPSULE, EXT RELEASE 24 HR ORAL ONCE
Refills: 0 | Status: COMPLETED | OUTPATIENT
Start: 2020-11-28 | End: 2020-11-28

## 2020-11-28 RX ORDER — METOCLOPRAMIDE HCL 10 MG
10 TABLET ORAL ONCE
Refills: 0 | Status: COMPLETED | OUTPATIENT
Start: 2020-11-28 | End: 2020-11-28

## 2020-11-28 RX ORDER — DILTIAZEM HCL 120 MG
5 CAPSULE, EXT RELEASE 24 HR ORAL
Qty: 125 | Refills: 0 | Status: DISCONTINUED | OUTPATIENT
Start: 2020-11-28 | End: 2020-11-29

## 2020-11-28 RX ORDER — DILTIAZEM HCL 120 MG
20 CAPSULE, EXT RELEASE 24 HR ORAL ONCE
Refills: 0 | Status: COMPLETED | OUTPATIENT
Start: 2020-11-28 | End: 2020-11-28

## 2020-11-28 RX ORDER — SODIUM CHLORIDE 9 MG/ML
1000 INJECTION INTRAMUSCULAR; INTRAVENOUS; SUBCUTANEOUS
Refills: 0 | Status: COMPLETED | OUTPATIENT
Start: 2020-11-28 | End: 2020-11-28

## 2020-11-28 RX ORDER — ONDANSETRON 8 MG/1
4 TABLET, FILM COATED ORAL ONCE
Refills: 0 | Status: COMPLETED | OUTPATIENT
Start: 2020-11-28 | End: 2020-11-28

## 2020-11-28 RX ORDER — DIGOXIN 250 MCG
0.5 TABLET ORAL ONCE
Refills: 0 | Status: COMPLETED | OUTPATIENT
Start: 2020-11-28 | End: 2020-11-28

## 2020-11-28 RX ADMIN — Medication 0.5 MILLIGRAM(S): at 22:36

## 2020-11-28 RX ADMIN — Medication 20 MILLIGRAM(S): at 20:22

## 2020-11-28 RX ADMIN — Medication 10 MILLIGRAM(S): at 22:36

## 2020-11-28 RX ADMIN — SODIUM CHLORIDE 1000 MILLILITER(S): 9 INJECTION INTRAMUSCULAR; INTRAVENOUS; SUBCUTANEOUS at 21:30

## 2020-11-28 RX ADMIN — Medication 5 MG/HR: at 23:50

## 2020-11-28 RX ADMIN — SODIUM CHLORIDE 2000 MILLILITER(S): 9 INJECTION INTRAMUSCULAR; INTRAVENOUS; SUBCUTANEOUS at 21:30

## 2020-11-28 RX ADMIN — ONDANSETRON 4 MILLIGRAM(S): 8 TABLET, FILM COATED ORAL at 20:22

## 2020-11-28 RX ADMIN — Medication 2 MILLIGRAM(S): at 20:22

## 2020-11-28 RX ADMIN — Medication 10 MILLIGRAM(S): at 23:50

## 2020-11-28 RX ADMIN — SODIUM CHLORIDE 2000 MILLILITER(S): 9 INJECTION INTRAMUSCULAR; INTRAVENOUS; SUBCUTANEOUS at 20:35

## 2020-11-28 NOTE — ED PROVIDER NOTE - PSYCHIATRIC, MLM
Alert and oriented to person, place, time/situation. restless and anxious. no apparent risk to self or others.

## 2020-11-28 NOTE — ED PROVIDER NOTE - OBJECTIVE STATEMENT
Pt is a 56 yo male hx afib.  states smokes marijuana daily. pt states n/v for past 3 days.  vague abd pain.  pt denies f/c. cp, sob, cough, denies other drugs, etoh.    PMD: dave mcleod Pt is a 56 yo male hx afib.  states smokes marijuana daily. pt states n/v for past 3 days.  vague abd pain.  pt denies f/c. cp, sob, cough, denies other drugs, etoh.    PMD: sachi mcleod----[phone]

## 2020-11-28 NOTE — ED PROVIDER NOTE - CLINICAL SUMMARY MEDICAL DECISION MAKING FREE TEXT BOX
pt iwht rapid afib restless after marijuana use,  n/v,  wbc slight elevation,  rate initially controlled but now recurrent elevation with agitation, admit tlele cardizem gtt,  cards for am,  check tox

## 2020-11-28 NOTE — ED PROVIDER NOTE - CONSTITUTIONAL, MLM
normal... anxious appearing, awake, alert, oriented to person, place, time/situation and in mild distress, restless

## 2020-11-28 NOTE — ED PROVIDER NOTE - CARE PLAN
Principal Discharge DX:	Rapid atrial fibrillation  Secondary Diagnosis:	Vomiting   Principal Discharge DX:	Rapid atrial fibrillation  Secondary Diagnosis:	Vomiting  Secondary Diagnosis:	Marijuana use

## 2020-11-28 NOTE — ED PROVIDER NOTE - PROGRESS NOTE DETAILS
pt still restless, intermittent afib now 135,  cardizem drip,  reglan, reassess, admit, cards am case d/w dr cabral,  pt persiistant afib, restless, will admit on cardizem drip and call cards consult for am.

## 2020-11-28 NOTE — ED ADULT NURSE NOTE - RESPIRATORY ASSESSMENT
I called and spoke to the pt regarding the questions she had regarding the lasix.  She stated that the questions she had was how she could get the medication costs lowered for her Revatio (sildenafil).  I reminded her that she will need to fill out the paper work we sent for the co-pay assistance through [employer]. Saritha Liriano RN on 8/15/2019 at 10:35 AM   - - -

## 2020-11-28 NOTE — ED ADULT NURSE NOTE - OBJECTIVE STATEMENT
patient a/o x 4 with an anxious affect after smoking cannabis.  patient developed into rapid afib (has Hx of a fib).  placed on cardiac monitoring, ekg completed.

## 2020-11-29 DIAGNOSIS — F12.90 CANNABIS USE, UNSPECIFIED, UNCOMPLICATED: ICD-10-CM

## 2020-11-29 DIAGNOSIS — K27.9 PEPTIC ULCER, SITE UNSPECIFIED, UNSPECIFIED AS ACUTE OR CHRONIC, WITHOUT HEMORRHAGE OR PERFORATION: ICD-10-CM

## 2020-11-29 DIAGNOSIS — E80.6 OTHER DISORDERS OF BILIRUBIN METABOLISM: ICD-10-CM

## 2020-11-29 DIAGNOSIS — R11.2 NAUSEA WITH VOMITING, UNSPECIFIED: ICD-10-CM

## 2020-11-29 DIAGNOSIS — I48.91 UNSPECIFIED ATRIAL FIBRILLATION: ICD-10-CM

## 2020-11-29 DIAGNOSIS — Z29.9 ENCOUNTER FOR PROPHYLACTIC MEASURES, UNSPECIFIED: ICD-10-CM

## 2020-11-29 DIAGNOSIS — D72.829 ELEVATED WHITE BLOOD CELL COUNT, UNSPECIFIED: ICD-10-CM

## 2020-11-29 DIAGNOSIS — F41.9 ANXIETY DISORDER, UNSPECIFIED: ICD-10-CM

## 2020-11-29 LAB
ALBUMIN SERPL ELPH-MCNC: 2.5 G/DL — LOW (ref 3.3–5)
ALP SERPL-CCNC: 92 U/L — SIGNIFICANT CHANGE UP (ref 40–120)
ALT FLD-CCNC: 22 U/L — SIGNIFICANT CHANGE UP (ref 12–78)
AMPHET UR-MCNC: NEGATIVE — SIGNIFICANT CHANGE UP
ANION GAP SERPL CALC-SCNC: 14 MMOL/L — SIGNIFICANT CHANGE UP (ref 5–17)
AST SERPL-CCNC: 15 U/L — SIGNIFICANT CHANGE UP (ref 15–37)
BARBITURATES UR SCN-MCNC: NEGATIVE — SIGNIFICANT CHANGE UP
BASOPHILS # BLD AUTO: 0.02 K/UL — SIGNIFICANT CHANGE UP (ref 0–0.2)
BASOPHILS NFR BLD AUTO: 0.2 % — SIGNIFICANT CHANGE UP (ref 0–2)
BENZODIAZ UR-MCNC: NEGATIVE — SIGNIFICANT CHANGE UP
BILIRUB DIRECT SERPL-MCNC: 0.4 MG/DL — HIGH (ref 0.05–0.2)
BILIRUB SERPL-MCNC: 1.2 MG/DL — SIGNIFICANT CHANGE UP (ref 0.2–1.2)
BUN SERPL-MCNC: 15 MG/DL — SIGNIFICANT CHANGE UP (ref 7–23)
CALCIUM SERPL-MCNC: 8.4 MG/DL — LOW (ref 8.5–10.1)
CHLORIDE SERPL-SCNC: 110 MMOL/L — HIGH (ref 96–108)
CO2 SERPL-SCNC: 18 MMOL/L — LOW (ref 22–31)
COCAINE METAB.OTHER UR-MCNC: NEGATIVE — SIGNIFICANT CHANGE UP
CREAT SERPL-MCNC: 0.85 MG/DL — SIGNIFICANT CHANGE UP (ref 0.5–1.3)
EOSINOPHIL # BLD AUTO: 0.01 K/UL — SIGNIFICANT CHANGE UP (ref 0–0.5)
EOSINOPHIL NFR BLD AUTO: 0.1 % — SIGNIFICANT CHANGE UP (ref 0–6)
GLUCOSE SERPL-MCNC: 102 MG/DL — HIGH (ref 70–99)
HCT VFR BLD CALC: 34.3 % — LOW (ref 39–50)
HGB BLD-MCNC: 11.5 G/DL — LOW (ref 13–17)
IMM GRANULOCYTES NFR BLD AUTO: 0.2 % — SIGNIFICANT CHANGE UP (ref 0–1.5)
LYMPHOCYTES # BLD AUTO: 1.79 K/UL — SIGNIFICANT CHANGE UP (ref 1–3.3)
LYMPHOCYTES # BLD AUTO: 14 % — SIGNIFICANT CHANGE UP (ref 13–44)
MCHC RBC-ENTMCNC: 28.3 PG — SIGNIFICANT CHANGE UP (ref 27–34)
MCHC RBC-ENTMCNC: 33.5 GM/DL — SIGNIFICANT CHANGE UP (ref 32–36)
MCV RBC AUTO: 84.3 FL — SIGNIFICANT CHANGE UP (ref 80–100)
METHADONE UR-MCNC: NEGATIVE — SIGNIFICANT CHANGE UP
MONOCYTES # BLD AUTO: 1.02 K/UL — HIGH (ref 0–0.9)
MONOCYTES NFR BLD AUTO: 8 % — SIGNIFICANT CHANGE UP (ref 2–14)
NEUTROPHILS # BLD AUTO: 9.96 K/UL — HIGH (ref 1.8–7.4)
NEUTROPHILS NFR BLD AUTO: 77.5 % — HIGH (ref 43–77)
NRBC # BLD: 0 /100 WBCS — SIGNIFICANT CHANGE UP (ref 0–0)
OPIATES UR-MCNC: NEGATIVE — SIGNIFICANT CHANGE UP
PCP SPEC-MCNC: SIGNIFICANT CHANGE UP
PCP UR-MCNC: NEGATIVE — SIGNIFICANT CHANGE UP
PLATELET # BLD AUTO: 371 K/UL — SIGNIFICANT CHANGE UP (ref 150–400)
POTASSIUM SERPL-MCNC: 3.9 MMOL/L — SIGNIFICANT CHANGE UP (ref 3.5–5.3)
POTASSIUM SERPL-SCNC: 3.9 MMOL/L — SIGNIFICANT CHANGE UP (ref 3.5–5.3)
PROT SERPL-MCNC: 7 G/DL — SIGNIFICANT CHANGE UP (ref 6–8.3)
RBC # BLD: 4.07 M/UL — LOW (ref 4.2–5.8)
RBC # FLD: 12.9 % — SIGNIFICANT CHANGE UP (ref 10.3–14.5)
SARS-COV-2 IGG SERPL QL IA: NEGATIVE — SIGNIFICANT CHANGE UP
SARS-COV-2 IGM SERPL IA-ACNC: 0.08 INDEX — SIGNIFICANT CHANGE UP
SARS-COV-2 RNA SPEC QL NAA+PROBE: SIGNIFICANT CHANGE UP
SODIUM SERPL-SCNC: 142 MMOL/L — SIGNIFICANT CHANGE UP (ref 135–145)
T3 SERPL-MCNC: 111 NG/DL — SIGNIFICANT CHANGE UP (ref 80–200)
T4 AB SER-ACNC: 7.6 UG/DL — SIGNIFICANT CHANGE UP (ref 4.6–12)
THC UR QL: POSITIVE — SIGNIFICANT CHANGE UP
TROPONIN I SERPL-MCNC: <.015 NG/ML — SIGNIFICANT CHANGE UP (ref 0.01–0.04)
TSH SERPL-MCNC: 0.65 UIU/ML — SIGNIFICANT CHANGE UP (ref 0.36–3.74)
WBC # BLD: 12.83 K/UL — HIGH (ref 3.8–10.5)
WBC # FLD AUTO: 12.83 K/UL — HIGH (ref 3.8–10.5)

## 2020-11-29 PROCEDURE — 93010 ELECTROCARDIOGRAM REPORT: CPT

## 2020-11-29 PROCEDURE — 99223 1ST HOSP IP/OBS HIGH 75: CPT

## 2020-11-29 PROCEDURE — 71045 X-RAY EXAM CHEST 1 VIEW: CPT | Mod: 26

## 2020-11-29 PROCEDURE — 99223 1ST HOSP IP/OBS HIGH 75: CPT | Mod: GC

## 2020-11-29 PROCEDURE — 99233 SBSQ HOSP IP/OBS HIGH 50: CPT | Mod: GC

## 2020-11-29 RX ORDER — AMIODARONE HYDROCHLORIDE 400 MG/1
0.5 TABLET ORAL
Qty: 900 | Refills: 0 | Status: DISCONTINUED | OUTPATIENT
Start: 2020-11-29 | End: 2020-12-01

## 2020-11-29 RX ORDER — METOPROLOL TARTRATE 50 MG
5 TABLET ORAL ONCE
Refills: 0 | Status: COMPLETED | OUTPATIENT
Start: 2020-11-29 | End: 2020-11-29

## 2020-11-29 RX ORDER — METOPROLOL TARTRATE 50 MG
5 TABLET ORAL EVERY 6 HOURS
Refills: 0 | Status: DISCONTINUED | OUTPATIENT
Start: 2020-11-29 | End: 2020-12-01

## 2020-11-29 RX ORDER — DILTIAZEM HCL 120 MG
20 CAPSULE, EXT RELEASE 24 HR ORAL
Qty: 125 | Refills: 0 | Status: DISCONTINUED | OUTPATIENT
Start: 2020-11-29 | End: 2020-12-01

## 2020-11-29 RX ORDER — ONDANSETRON 8 MG/1
4 TABLET, FILM COATED ORAL EVERY 6 HOURS
Refills: 0 | Status: DISCONTINUED | OUTPATIENT
Start: 2020-11-29 | End: 2020-11-29

## 2020-11-29 RX ORDER — OMEPRAZOLE 10 MG/1
0 CAPSULE, DELAYED RELEASE ORAL
Qty: 0 | Refills: 0 | DISCHARGE

## 2020-11-29 RX ORDER — PANTOPRAZOLE SODIUM 20 MG/1
40 TABLET, DELAYED RELEASE ORAL DAILY
Refills: 0 | Status: DISCONTINUED | OUTPATIENT
Start: 2020-11-29 | End: 2020-12-01

## 2020-11-29 RX ORDER — INFLUENZA VIRUS VACCINE 15; 15; 15; 15 UG/.5ML; UG/.5ML; UG/.5ML; UG/.5ML
0.5 SUSPENSION INTRAMUSCULAR ONCE
Refills: 0 | Status: DISCONTINUED | OUTPATIENT
Start: 2020-11-29 | End: 2020-12-01

## 2020-11-29 RX ORDER — SODIUM CHLORIDE 9 MG/ML
1000 INJECTION INTRAMUSCULAR; INTRAVENOUS; SUBCUTANEOUS ONCE
Refills: 0 | Status: COMPLETED | OUTPATIENT
Start: 2020-11-29 | End: 2020-11-29

## 2020-11-29 RX ORDER — AMIODARONE HYDROCHLORIDE 400 MG/1
150 TABLET ORAL ONCE
Refills: 0 | Status: COMPLETED | OUTPATIENT
Start: 2020-11-29 | End: 2020-11-29

## 2020-11-29 RX ORDER — ENOXAPARIN SODIUM 100 MG/ML
40 INJECTION SUBCUTANEOUS DAILY
Refills: 0 | Status: DISCONTINUED | OUTPATIENT
Start: 2020-11-29 | End: 2020-12-01

## 2020-11-29 RX ORDER — SODIUM CHLORIDE 9 MG/ML
1000 INJECTION INTRAMUSCULAR; INTRAVENOUS; SUBCUTANEOUS
Refills: 0 | Status: DISCONTINUED | OUTPATIENT
Start: 2020-11-29 | End: 2020-12-01

## 2020-11-29 RX ORDER — AMIODARONE HYDROCHLORIDE 400 MG/1
1 TABLET ORAL
Qty: 900 | Refills: 0 | Status: DISCONTINUED | OUTPATIENT
Start: 2020-11-29 | End: 2020-11-30

## 2020-11-29 RX ORDER — METOCLOPRAMIDE HCL 10 MG
5 TABLET ORAL EVERY 8 HOURS
Refills: 0 | Status: COMPLETED | OUTPATIENT
Start: 2020-11-29 | End: 2020-11-30

## 2020-11-29 RX ORDER — CHLORHEXIDINE GLUCONATE 213 G/1000ML
1 SOLUTION TOPICAL
Refills: 0 | Status: DISCONTINUED | OUTPATIENT
Start: 2020-11-29 | End: 2020-11-30

## 2020-11-29 RX ADMIN — Medication 5 MG/HR: at 00:54

## 2020-11-29 RX ADMIN — Medication 5 MILLIGRAM(S): at 22:07

## 2020-11-29 RX ADMIN — Medication 1 MILLIGRAM(S): at 10:48

## 2020-11-29 RX ADMIN — AMIODARONE HYDROCHLORIDE 33.3 MG/MIN: 400 TABLET ORAL at 20:20

## 2020-11-29 RX ADMIN — Medication 20 MG/HR: at 20:15

## 2020-11-29 RX ADMIN — SODIUM CHLORIDE 1000 MILLILITER(S): 9 INJECTION INTRAMUSCULAR; INTRAVENOUS; SUBCUTANEOUS at 19:09

## 2020-11-29 RX ADMIN — Medication 5 MILLIGRAM(S): at 01:22

## 2020-11-29 RX ADMIN — AMIODARONE HYDROCHLORIDE 600 MILLIGRAM(S): 400 TABLET ORAL at 20:09

## 2020-11-29 RX ADMIN — Medication 5 MILLIGRAM(S): at 17:19

## 2020-11-29 RX ADMIN — ENOXAPARIN SODIUM 40 MILLIGRAM(S): 100 INJECTION SUBCUTANEOUS at 12:09

## 2020-11-29 RX ADMIN — Medication 5 MILLIGRAM(S): at 23:58

## 2020-11-29 RX ADMIN — Medication 1 MILLIGRAM(S): at 19:04

## 2020-11-29 RX ADMIN — SODIUM CHLORIDE 75 MILLILITER(S): 9 INJECTION INTRAMUSCULAR; INTRAVENOUS; SUBCUTANEOUS at 20:13

## 2020-11-29 RX ADMIN — Medication 5 MILLIGRAM(S): at 19:10

## 2020-11-29 RX ADMIN — Medication 5 MG/HR: at 18:36

## 2020-11-29 RX ADMIN — Medication 5 MG/HR: at 10:51

## 2020-11-29 RX ADMIN — PANTOPRAZOLE SODIUM 40 MILLIGRAM(S): 20 TABLET, DELAYED RELEASE ORAL at 12:09

## 2020-11-29 RX ADMIN — Medication 2 MILLIGRAM(S): at 22:35

## 2020-11-29 NOTE — H&P ADULT - NSICDXFAMILYHX_GEN_ALL_CORE_FT
FAMILY HISTORY:  Father  Still living? Unknown  Family history of prostate cancer in father, Age at diagnosis: Age Unknown    Mother  Still living? Unknown  Family history of diabetes mellitus in mother, Age at diagnosis: Age Unknown  Family history of hypertension in mother, Age at diagnosis: Age Unknown

## 2020-11-29 NOTE — PROGRESS NOTE ADULT - PROBLEM SELECTOR PLAN 7
- Pharm VTE ppx with Lovenox 40mg sq daily    IMPROVE VTE Individual Risk Assessment          RISK                                                          Points  [  ] Previous VTE                                                3  [  ] Thrombophilia                                             2  [  ] Lower limb paralysis                                   2        (unable to hold up >15 seconds)    [  ] Current Cancer                                             2         (within 6 months)  [  ] Immobilization > 24 hrs                              1  [  ] ICU/CCU stay > 24 hours                             1  [  ] Age > 60                                                         1    IMPROVE VTE Score: 0

## 2020-11-29 NOTE — PROVIDER CONTACT NOTE (OTHER) - RECOMMENDATIONS
Stat dose of Lopressor 5MG   1L Bolus IV fluids  Maintain rate of Cardizem drip at 20  Ativan 1mg for restlessness

## 2020-11-29 NOTE — H&P ADULT - PROBLEM SELECTOR PLAN 2
- Pt has presented to the ED multiple times with these symptoms after smoking marijuana  - Reportedly smokes 1 joint per day  - UTox screen positive for THC and opiates ____  - Marijuana cessation advised  - S/p Zofran and Reglan in the ED, can continue antiemetics PRN but must monitor QTc and for extrapyramidal symptoms if patient to continually receive these medications - Pt has presented to the ED multiple times with these symptoms after smoking marijuana  - Reportedly smokes 1-2 joints per day  - UTox screen positive for THC  - Marijuana cessation advised, patient and wife both report his desire to seek help. Per wife, patient was initially brought to [facility] where he was told he needed medical attention first.  - S/p Zofran and Reglan in the ED, will avoid Zofran for now as QTc is prolonged  - Can use Tigan as antiemetic as needed  - Addiction Dr. Mireles consulted, f/u recommendations

## 2020-11-29 NOTE — ED ADULT NURSE REASSESSMENT NOTE - NS ED NURSE REASSESS COMMENT FT1
Received patient from Malena BARAJAS. Patient alert and oriented X 3. Lethargic. Heart rate in Sinus Tachycardia in 150s. Patient placed 4 liters nasal cannula after ativan given by Malena BARAJAS.  Respirations even and not labored. Abdomen soft non tender. Cardizem infusing at 20 ml/hr. Dr. Lawrence made aware of patient vitals by aJmes Joy RN.      Leeann BARAJAS Received patient from Malena BARAJAS. Patient alert and oriented X 3. Lethargic. Heart rate in Afib in 150s. Patient placed 4 liters nasal cannula after ativan given by Malena BARAJAS.  Respirations even and not labored. Abdomen soft non tender. Cardizem infusing at 20 ml/hr. Dr. Lawrence made aware of patient vitals by James Joy RN.      Leeann BARAJAS

## 2020-11-29 NOTE — PROGRESS NOTE ADULT - PROBLEM SELECTOR PLAN 3
- Pt reports recent diagnosis by outpatient GI Dr. Ferguson  - No clear etiology at this time  - Start IV PPI given vomiting

## 2020-11-29 NOTE — CONSULT NOTE ADULT - SUBJECTIVE AND OBJECTIVE BOX
Date/Time Patient Seen:  		  Referring MD:   Data Reviewed	       Patient is a 55y old  Male who presents with a chief complaint of rapid afib (29 Nov 2020 00:41)      Subjective/HPI  in bed  seen and examined  vs and meds reviewed  labs reviewed  h and p reviewed  er provider note reviewed  uses Marijuana daily - smokes and rolls joints -      56yo M, with PMH/o afib s/p ablation in 10/2017 (not on AC), PUD, anxiety, and daily marijuana use with hyperemesis, presenting with abdominal pain, nausea, and vomiting since 11/26 after using marijuana. Of note, patient with recent admission to [facility] 11/10 for similar complaint. Patient currently unable to provide further history as he appears somnolent and falls asleep during interview. Patient's wife Christa Caraballo contacted: she reports that patient was brought to [facility] initially seeking inpatient help but was told he needed to be medically stabilized first. Patient provides limited ROS: endorses palpitations; previous abd pain, N/V now resolved.    In the ED  VS: T 98.2 -->78-->156 /57 RR 20 SpO2 98% on RA  Significant labs include WBC 14.65, platelets 406, PMNs 85.1, CO2 17, total bili 1.8, positive THC in urine tox.  CXR: no obvious lung pathology, cardiomegaly present, awaiting official read  EKG: a flutter with variable block and Tw abnormalities in lateral precordial leads    Patient received 2L NS, Zofran 4mg x1, Reglan 10mg x1, Ativan 2mg x1, Diltiazem 20mg x1, Diltiazem 10mg x1, Digoxin 0.5mg x1, Lopressor 5mg x1 and was started on cardizem gtt. Cardio Dr. Saunders was consulted in the ED.    FAMILY HISTORY:  Father  Still living? Unknown  Family history of prostate cancer in father, Age at diagnosis: Age Unknown    Mother  Still living? Unknown  Family history of diabetes mellitus in mother, Age at diagnosis: Age Unknown  Family history of hypertension in mother, Age at diagnosis: Age Unknown.     Social History:  Social History (marital status, living situation, occupation, tobacco use, alcohol and drug use, and sexual history): Denies tobacco or EtOH use  No other recreational drug use besides marijuana  Lives at home with wife and two sons  Performs ADLs and ambulates independently  Has declined flu vaccine     Tobacco Screening:  · Core Measure Site	Yes  · Has the patient used tobacco in the past 30 days?	No    Risk Assessment:    Present on Admission:  Deep Venous Thrombosis	no  Pulmonary Embolus	no     Heart Failure:  Does this patient have a history of or has been diagnosed with heart failure? no.    HIV Screen (per [facility] Department of Health, HIV screening must be offered to every individual between ages 13 and 64)	Unable to offer due to clinical condition  55M w/ afib s/p ablation, daily marijuana use with hyperemesis syndrome, gastric PUD, and anxiety presents to [facility] for evaluation of n/v and abdominal pain admitted to medicine for further management.     Social History:  Social History (marital status, living situation, occupation, tobacco use, alcohol and drug use, and sexual history): daily marijuana use, social alcohol, denies drug use. 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    PAST MEDICAL & SURGICAL HISTORY:  PUD (peptic ulcer disease)    Hyperlipidemia    Marijuana use    Alcohol use    Peptic ulcer disease    Anxiety    Atrial fibrillation, unspecified type    Marijuana abuse    GERD (gastroesophageal reflux disease)    H/O prior ablation treatment  10/2017, for A-fib    No significant past surgical history          Medication list         MEDICATIONS  (STANDING):  diltiazem Infusion 5 mG/Hr (5 mL/Hr) IV Continuous <Continuous>  enoxaparin Injectable 40 milliGRAM(s) SubCutaneous daily  pantoprazole  Injectable 40 milliGRAM(s) IV Push daily    MEDICATIONS  (PRN):         Vitals log        ICU Vital Signs Last 24 Hrs  T(C): 36.7 (29 Nov 2020 05:13), Max: 36.8 (28 Nov 2020 19:13)  T(F): 98 (29 Nov 2020 05:13), Max: 98.2 (28 Nov 2020 19:13)  HR: 102 (29 Nov 2020 05:13) (77 - 158)  BP: 111/52 (29 Nov 2020 05:13) (108/53 - 134/64)  BP(mean): --  ABP: --  ABP(mean): --  RR: 17 (29 Nov 2020 05:13) (17 - 20)  SpO2: 97% (29 Nov 2020 05:13) (97% - 99%)           Input and Output:  I&O's Detail      Lab Data                        11.5   12.83 )-----------( 371      ( 29 Nov 2020 06:12 )             34.3     11-29    142  |  110<H>  |  15  ----------------------------<  102<H>  3.9   |  18<L>  |  0.85    Ca    8.4<L>      29 Nov 2020 06:12    TPro  7.0  /  Alb  2.5<L>  /  TBili  1.2  /  DBili  x   /  AST  15  /  ALT  22  /  AlkPhos  92  11-29      CARDIAC MARKERS ( 29 Nov 2020 01:53 )  <.015 ng/mL / x     / x     / x     / x      CARDIAC MARKERS ( 28 Nov 2020 20:36 )  <.015 ng/mL / x     / 121 U/L / x     / <1.0 ng/mL        Review of Systems	  anxious    Objective     Physical Examination    heart s1s2  tachy  lung dec BS  abd soft  alert  verbal  cn grossly int      Pertinent Lab findings & Imaging      [place]:  NO   Adequate UO     I&O's Detail           Discussed with:     Cultures:	        Radiology      EXAM:  CT ABDOMEN AND PELVIS IC                            PROCEDURE DATE:  11/09/2020            INTERPRETATION:  CLINICAL INFORMATION: Diffuse abdominal pain    COMPARISON: CT abdomen pelvis 7/2/2020    PROCEDURE:  CT of the Abdomen and Pelvis was performed with intravenous contrast.  Intravenous contrast: 90 ml Omnipaque 350. 10 ml discarded.  Oral contrast: None.  Sagittal and coronal reformats were performed.    FINDINGS:  LOWER CHEST: Bibasilar platelike subsegmental atelectasis. Trace left pleural effusion.    LIVER: Mild hepatic steatosis.  BILE DUCTS: Normal caliber.  GALLBLADDER: Within normal limits.  SPLEEN: Within normal limits.  PANCREAS: Within normal limits.  ADRENALS: Within normal limits.  KIDNEYS/URETERS: Kidneys enhance symmetrically without hydronephrosis. 1.1 cm upper pole right renal cyst.    BLADDER: Within normal limits.  REPRODUCTIVE ORGANS: Prostate gland is mildly enlarged.    BOWEL: Small hiatus hernia. No bowel obstruction or overt bowel wall thickening. Appendix is normal.  PERITONEUM: No ascites, pneumoperitoneum, or loculated collection. No mesenteric lymphadenopathy.  VESSELS: After chronic calcifications of the aortoiliac tree. Normal caliber abdominal aorta.  RETROPERITONEUM/LYMPH NODES: No lymphadenopathy.  ABDOMINAL WALL: Within normal limits.  BONES: Mild degenerative changes of the spine.    IMPRESSION:  No acute findings on CT the abdomen and pelvis explain patient's abdominal pain.              NOBLE BREWER MD; Resident Interventional Radiology  This document has been electronically signed.  GILBERT DELCID DO; Attending Radiologist

## 2020-11-29 NOTE — H&P ADULT - PROBLEM SELECTOR PLAN 3
- Pt reports recent diagnosis by outpatient GI Dr. Ferguson  - No clear etiology  - Start IV PPI given vomiting - Pt reports recent diagnosis by outpatient GI Dr. Ferguson  - No clear etiology at this time  - Start IV PPI given vomiting

## 2020-11-29 NOTE — H&P ADULT - NSHPSOCIALHISTORY_GEN_ALL_CORE
Denies tobacco or EtOH use  No other recreational drug use besides marijuana  Lives at home with wife and two sons  Performs ADLs and ambulates independently  Has declined flu vaccine

## 2020-11-29 NOTE — PHARMACOTHERAPY INTERVENTION NOTE - COMMENTS
Patient ordered for a titratable diltiazem gtt for rapid a fib. Discussed with Dr. Salcido since patient is being admitted to the telemetry unit nurses are not able to titrate drips on this floor. Recommended reordering without titration, MD agreed.

## 2020-11-29 NOTE — H&P ADULT - HISTORY OF PRESENT ILLNESS
CHARTING IN PROGRESS    56yo M, with PMH/o afib s/p ablation in 10/2017 (not on AC), PUD, anxiety, and daily marijuana use with hyperemesis, presenting with abdominal pain, nausea, and vomiting since 11/26 after using marijuana. Of note, patient with recent admission to [facility] 11/10 for similar complaint.     In the ED  VS: T 98.2 -->78-->156 /57 RR 20 SpO2 98% on RA  Significant labs include WBC 14.65, platelets 406, PMNs 85.1, CO2 17, total bili 1.8, positive opiate and THC in urine tox.  EKG:    Patient received 2L NS, Zofran 4mg x1, Reglan 10mg x1, Ativan 2mg x1, Diltiazem 20mg x1, Diltiazem 10mg x1, Digoxin 0.5mg x1, and was started on cardizem gtt. Cardio Dr. Saunders was consulted in the ED. CHARTING IN PROGRESS    56yo M, with PMH/o afib s/p ablation in 10/2017 (not on AC), PUD, anxiety, and daily marijuana use with hyperemesis, presenting with abdominal pain, nausea, and vomiting since 11/26 after using marijuana. Of note, patient with recent admission to [facility] 11/10 for similar complaint.     In the ED  VS: T 98.2 -->78-->156 /57 RR 20 SpO2 98% on RA  Significant labs include WBC 14.65, platelets 406, PMNs 85.1, CO2 17, total bili 1.8, positive opiate and THC in urine tox.  EKG:    Patient received 2L NS, Zofran 4mg x1, Reglan 10mg x1, Ativan 2mg x1, Diltiazem 20mg x1, Diltiazem 10mg x1, Digoxin 0.5mg x1, Lopressor 5mg x1 and was started on cardizem gtt. Cardio Dr. Saunders was consulted in the ED. CHARTING IN PROGRESS    54yo M, with PMH/o afib s/p ablation in 10/2017 (not on AC), PUD, anxiety, and daily marijuana use with hyperemesis, presenting with abdominal pain, nausea, and vomiting since 11/26 after using marijuana. Of note, patient with recent admission to [facility] 11/10 for similar complaint.     In the ED  VS: T 98.2 -->78-->156 /57 RR 20 SpO2 98% on RA  Significant labs include WBC 14.65, platelets 406, PMNs 85.1, CO2 17, total bili 1.8, positive opiate and THC in urine tox.  CXR: no obvious lung pathology, cardiomegaly present, awaiting official read  EKG:    Patient received 2L NS, Zofran 4mg x1, Reglan 10mg x1, Ativan 2mg x1, Diltiazem 20mg x1, Diltiazem 10mg x1, Digoxin 0.5mg x1, Lopressor 5mg x1 and was started on cardizem gtt. Cardio Dr. Saunders was consulted in the ED. 54yo M, with PMH/o afib s/p ablation in 10/2017 (not on AC), PUD, anxiety, and daily marijuana use with hyperemesis, presenting with abdominal pain, nausea, and vomiting since 11/26 after using marijuana. Of note, patient with recent admission to [facility] 11/10 for similar complaint. Patient currently unable to provide further history as he appears somnolent and falls asleep during interview. Patient's wife Christa Caraballo contacted: she reports that patient was brought to [facility] initially seeking inpatient help but was told he needed to be medically stabilized first. Patient provides limited ROS: endorses palpitations; previous abd pain, N/V now resolved.    In the ED  VS: T 98.2 -->78-->156 /57 RR 20 SpO2 98% on RA  Significant labs include WBC 14.65, platelets 406, PMNs 85.1, CO2 17, total bili 1.8, positive THC in urine tox.  CXR: no obvious lung pathology, cardiomegaly present, awaiting official read  EKG: a flutter with variable block and Tw abnormalities in lateral precordial leads    Patient received 2L NS, Zofran 4mg x1, Reglan 10mg x1, Ativan 2mg x1, Diltiazem 20mg x1, Diltiazem 10mg x1, Digoxin 0.5mg x1, Lopressor 5mg x1 and was started on cardizem gtt. Cardio Dr. Saunders was consulted in the ED.

## 2020-11-29 NOTE — ED ADULT NURSE REASSESSMENT NOTE - NS ED NURSE REASSESS COMMENT FT1
patient no longer confused but drowsy and oriented x4.  patient HR decreased overall on Cardizem drip.  patient remains on cont cardiac monitoring pending available bed for admission.  patient denies chest pains and seems much less restless at this time.

## 2020-11-29 NOTE — H&P ADULT - PROBLEM SELECTOR PLAN 4
- Chronic, not taking outpatient medications  - Reportedly due to family/life stressors  - Recommend outpatient psych follow up - Chronic, not taking outpatient medications  - Reportedly due to family/life stressors  - S/p 1x Ativan 2mg in the ED, wife reports patient often requires Ativan during hospital presentations and responds well  - Recommend outpatient psych follow up

## 2020-11-29 NOTE — PROGRESS NOTE ADULT - PROBLEM SELECTOR PLAN 2
- Pt has presented to the ED multiple times with these symptoms after smoking marijuana. Social hx remarkable for 1-2 joints per day  - UTox screen positive for THC  - Marijuana cessation advised, patient and wife both report his desire to seek help. Per wife, patient was initially brought to [facility] where he was told he needed medical attention first.  - S/p Zofran and Reglan in the ED, has prolonged QTc. Tigan PRN for nausea and emesis  - Addiction Dr. Mireles consulted, Ativan PRN

## 2020-11-29 NOTE — H&P ADULT - PROBLEM SELECTOR PLAN 1
- Chronic, h/o ablation in 10/2017  -  on presentation, patient restless  - Admit to tele  - S/p Ativan 2mg x1, diltiazem 20mg x1, diltiazem 10mg x1, though remained in rapid AF, was then given Digoxin 0.5mg x1, and started on Cardizem gtt currently dosed at 5mg/hr  - Pt only noting palpitations but denies active chest pain/ischemic symptoms  - EKG showing ____  - Cardiac enzymes x1 negative  - [id number] score is 0, not currently on AC  - Monitor routine hemodynamics  - Cardio Dr. Saunders consulted in the ED, f/u recommendations - Chronic, h/o ablation in 10/2017  -  on presentation, patient restless  - Admit to tele  - S/p Ativan 2mg x1, diltiazem 20mg x1, diltiazem 10mg x1, though remained in rapid AF, was then given Digoxin 0.5mg x1 and Lopressor 5mg x1, and started on Cardizem gtt currently dosed at 5mg/hr  - Pt only noting palpitations but denies active chest pain/ischemic symptoms  - EKG showing ____  - Cardiac enzymes x1 negative  - [id number] score is 0, not currently on AC  - Monitor routine hemodynamics  - Cardio Dr. Saunders consulted in the ED, f/u recommendations - Chronic, h/o ablation in 10/2017  -  on presentation, patient restless  - Admit to tele  - S/p Ativan 2mg x1, diltiazem 20mg x1, diltiazem 10mg x1, though remained in rapid AF, was then given Digoxin 0.5mg x1 and Lopressor 5mg x1, and started on Cardizem gtt currently dosed at 5mg/hr  - Pt only noting palpitations but denies active chest pain/ischemic symptoms  - EKG showing ____  - Cardiac enzymes x1 negative, f/u second set  - [id number] score is 0, not currently on AC  - F/u TFTs  - Monitor routine hemodynamics  - Cardio Dr. Saunders consulted in the ED, f/u recommendations - Chronic, h/o ablation in 10/2017  -  on presentation, patient restless  - Admit to tele  - S/p Ativan 2mg x1, diltiazem 20mg x1, diltiazem 10mg x1, though remained in rapid AF, was then given Digoxin 0.5mg x1 and Lopressor 5mg x1, and started on Cardizem gtt currently dosed at 10mg/hr, will uptitrate to 15mg/hr with immediate  goal HR <110  - Pt only noting palpitations but denies active chest pain/ischemic symptoms  - EKG showing a flutter with vent rate 135, Tw inversions in V5-6  - Cardiac enzymes x1 negative, f/u second set  - [id number] score is 0, not currently on AC  - F/u TFTs  - Monitor routine hemodynamics  - Cardio Dr. Saunders consulted in the ED, f/u recommendations - Chronic, h/o ablation in 10/2017  -  on presentation, patient restless  - Admit to tele  - S/p Ativan 2mg x1, diltiazem 20mg x1, diltiazem 10mg x1, though remained in rapid AF, was then given Digoxin 0.5mg x1 and Lopressor 5mg x1, and started on Cardizem gtt currently dosed at 10mg/hr, will uptitrate to 15mg/hr with immediate  goal HR <110  - Pt only noting palpitations but denies active chest pain/ischemic symptoms  - EKG showing a flutter with vent rate 135, Tw inversions in V5-6  - Cardiac enzymes x1 negative, f/u second set  - [id number] score is 0, not currently on AC  - Takes bisoprolol 40mg daily at home, will hold beta blocker at this time to allow room for further cardizem increases if necessary  - F/u TFTs  - Monitor routine hemodynamics  - Cardio Dr. Saunders consulted in the ED, f/u recommendations - Chronic, h/o ablation in 10/2017  - Admit to tele  - S/p Ativan 2mg x1, diltiazem 20mg x1, diltiazem 10mg x1, though remained in rapid AF, was then given Digoxin 0.5mg x1 and Lopressor 5mg x1, HR 150s after initiation of cardizem gtt by ED, increased to 10 mg, improved to 120s, will increase further to 15 mg, d/w RN with immediate  goal HR <110  - Pt only noting palpitations but denies active chest pain/ischemic symptoms  - EKG showing a flutter with vent rate 135, Tw inversions in V5-6  - Cardiac enzymes x1 negative, f/u second set  - [id number] score is 0, not currently on AC  - Takes bisoprolol 40mg daily at home, will hold beta blocker at this time to allow room for further cardizem increases if necessary  - F/u TFTs  - Monitor routine hemodynamics  - Cardio Dr. Saunders consulted in the ED, f/u recommendations

## 2020-11-29 NOTE — CONSULT NOTE ADULT - SUBJECTIVE AND OBJECTIVE BOX
[facility]  Bryson Clayton PA-C  [address] [place] [address]  [phone]      Chief Complaint:  Patient is a 55y old  Male who presents with a chief complaint of rapid afib (29 Nov 2020 11:53)      HPI:56yo M, with PMH/o afib s/p ablation in 10/2017 (not on AC), PUD, anxiety, and daily marijuana use with hyperemesis, presenting with abdominal pain, nausea, and vomiting since 11/26 after using marijuana. Of note, patient with recent admission to [facility] 11/10 for similar complaint. Patient currently unable to provide further history as he appears somnolent and falls asleep during interview. Patient's wife Christa Caraballo contacted: she reports that patient was brought to [facility] initially seeking inpatient help but was told he needed to be medically stabilized first. Patient provides limited ROS: endorses palpitations; previous abd pain, N/V now resolved.    Allergies:  No Known Allergies      Medications:  diltiazem Infusion 5 mG/Hr IV Continuous <Continuous>  enoxaparin Injectable 40 milliGRAM(s) SubCutaneous daily  influenza   Vaccine 0.5 milliLiter(s) IntraMuscular once  LORazepam     Tablet 0.5 milliGRAM(s) Oral every 4 hours PRN  LORazepam   Injectable 1 milliGRAM(s) IV Push every 8 hours PRN  pantoprazole  Injectable 40 milliGRAM(s) IV Push daily      PMHX/PSHX:  PUD (peptic ulcer disease)    Hyperlipidemia    Marijuana use    Alcohol use    Peptic ulcer disease    Anxiety    Atrial fibrillation, unspecified type    Marijuana abuse    GERD (gastroesophageal reflux disease)    H/O prior ablation treatment    No significant past surgical history        Family history:  Family history of hypertension in mother (Mother)    Family history of prostate cancer in father (Father)    Family history of diabetes mellitus in mother (Mother)        Social History:     ROS:     General:  No wt loss, fevers, chills, night sweats, fatigue,   Eyes:  Good vision, no reported pain  ENT:  No sore throat, pain, runny nose, dysphagia  CV:  No pain, palpitations, hypo/hypertension  Resp:  No dyspnea, cough, tachypnea, wheezing  GI:  No pain, No nausea, No vomiting, No diarrhea, No constipation, No weight loss, No fever, No pruritis, No rectal bleeding, No tarry stools, No dysphagia,  :  No pain, bleeding, incontinence, nocturia  Muscle:  No pain, weakness  Neuro:  No weakness, tingling, memory problems  Psych:  No fatigue, insomnia, mood problems, depression  Endocrine:  No polyuria, polydipsia, cold/heat intolerance  Heme:  No petechiae, ecchymosis, easy bruisability  Skin:  No rash, tattoos, scars, edema      PHYSICAL EXAM:   Vital Signs:  Vital Signs Last 24 Hrs  T(C): 36.9 (29 Nov 2020 12:08), Max: 36.9 (29 Nov 2020 12:08)  T(F): 98.5 (29 Nov 2020 12:08), Max: 98.5 (29 Nov 2020 12:08)  HR: 142 (29 Nov 2020 12:08) (77 - 158)  BP: 129/59 (29 Nov 2020 12:08) (108/53 - 134/64)  BP(mean): --  RR: 20 (29 Nov 2020 12:08) (17 - 20)  SpO2: 97% (29 Nov 2020 12:08) (97% - 99%)  Daily Height in cm: 185.42 (28 Nov 2020 19:13)    Daily     GENERAL:  Appears stated age, well-groomed, well-nourished, no distress  HEENT:  NC/AT,  conjunctivae clear and pink, no thyromegaly, nodules, adenopathy, no JVD, sclera -anicteric  CHEST:  Full & symmetric excursion, no increased effort, breath sounds clear  HEART:  Regular rhythm, S1, S2, no murmur/rub/S3/S4, no abdominal bruit, no edema  ABDOMEN:  Soft, non-tender, non-distended, normoactive bowel sounds,  no masses ,no hepato-splenomegaly, no signs of chronic liver disease  EXTEREMITIES:  no cyanosis,clubbing or edema  SKIN:  No rash/erythema/ecchymoses/petechiae/wounds/abscess/warm/dry  NEURO:  Alert, oriented, no asterixis, no tremor, no encephalopathy    LABS:                        11.5   12.83 )-----------( 371      ( 29 Nov 2020 06:12 )             34.3     11-29    142  |  110<H>  |  15  ----------------------------<  102<H>  3.9   |  18<L>  |  0.85    Ca    8.4<L>      29 Nov 2020 06:12    TPro  7.0  /  Alb  2.5<L>  /  TBili  1.2  /  DBili  .40<H>  /  AST  15  /  ALT  22  /  AlkPhos  92  11-29    LIVER FUNCTIONS - ( 29 Nov 2020 06:12 )  Alb: 2.5 g/dL / Pro: 7.0 g/dL / ALK PHOS: 92 U/L / ALT: 22 U/L / AST: 15 U/L / GGT: x               Amylase Serum--      Lipase serum52       Ammonia--      Imaging:

## 2020-11-29 NOTE — PROGRESS NOTE ADULT - SUBJECTIVE AND OBJECTIVE BOX
Patient is a 55y old  Male who presents with a chief complaint of rapid afib (29 Nov 2020 10:55)      INTERVAL HPI/OVERNIGHT EVENTS: Patient seen and examined at bedside. No overnight events occurred. Unable to obtain information from patient 2/2 mental status.     MEDICATIONS  (STANDING):  diltiazem Infusion 5 mG/Hr (5 mL/Hr) IV Continuous <Continuous>  enoxaparin Injectable 40 milliGRAM(s) SubCutaneous daily  influenza   Vaccine 0.5 milliLiter(s) IntraMuscular once  pantoprazole  Injectable 40 milliGRAM(s) IV Push daily    MEDICATIONS  (PRN):  LORazepam     Tablet 0.5 milliGRAM(s) Oral every 4 hours PRN for anxiety  LORazepam   Injectable 1 milliGRAM(s) IV Push every 8 hours PRN for severe anxiety and agitation      Allergies    No Known Allergies    Intolerances        REVIEW OF SYSTEMS: unable to obtain 2/2 mental status      Vital Signs Last 24 Hrs  T(C): 36.7 (29 Nov 2020 05:13), Max: 36.8 (28 Nov 2020 19:13)  T(F): 98 (29 Nov 2020 05:13), Max: 98.2 (28 Nov 2020 19:13)  HR: 102 (29 Nov 2020 05:13) (77 - 158)  BP: 111/52 (29 Nov 2020 05:13) (108/53 - 134/64)  BP(mean): --  RR: 17 (29 Nov 2020 05:13) (17 - 20)  SpO2: 97% (29 Nov 2020 05:13) (97% - 99%)    PHYSICAL EXAM:  GENERAL: NAD  HEENT:  anicteric, dry mucous membranes  CHEST/LUNG:  CTA b/l, no rales, wheezes, or rhonchi  HEART:  +tachycardic, irregularly irregular, + S1/ S2  ABDOMEN:  BS+, soft, nontender, nondistended  EXTREMITIES: no edema, cyanosis, or calf tenderness  NERVOUS SYSTEM: altered mental status, unable to respond to questions during interview  LABS:                        11.5   12.83 )-----------( 371      ( 29 Nov 2020 06:12 )             34.3     CBC Full  -  ( 29 Nov 2020 06:12 )  WBC Count : 12.83 K/uL  Hemoglobin : 11.5 g/dL  Hematocrit : 34.3 %  Platelet Count - Automated : 371 K/uL  Mean Cell Volume : 84.3 fl  Mean Cell Hemoglobin : 28.3 pg  Mean Cell Hemoglobin Concentration : 33.5 gm/dL  Auto Neutrophil # : 9.96 K/uL  Auto Lymphocyte # : 1.79 K/uL  Auto Monocyte # : 1.02 K/uL  Auto Eosinophil # : 0.01 K/uL  Auto Basophil # : 0.02 K/uL  Auto Neutrophil % : 77.5 %  Auto Lymphocyte % : 14.0 %  Auto Monocyte % : 8.0 %  Auto Eosinophil % : 0.1 %  Auto Basophil % : 0.2 %    29 Nov 2020 06:12    142    |  110    |  15     ----------------------------<  102    3.9     |  18     |  0.85     Ca    8.4        29 Nov 2020 06:12    TPro  7.0    /  Alb  2.5    /  TBili  1.2    /  DBili  .40    /  AST  15     /  ALT  22     /  AlkPhos  92     29 Nov 2020 06:12        CAPILLARY BLOOD GLUCOSE              RADIOLOGY & ADDITIONAL TESTS:    Personally reviewed.     Consultant(s) Notes Reviewed:  [x] YES  [ ] NO

## 2020-11-29 NOTE — H&P ADULT - PROBLEM SELECTOR PLAN 7
- SCDs as patient is low risk for DVT, encourage ambulation    IMPROVE VTE Individual Risk Assessment          RISK                                                          Points  [  ] Previous VTE                                                3  [  ] Thrombophilia                                             2  [  ] Lower limb paralysis                                   2        (unable to hold up >15 seconds)    [  ] Current Cancer                                             2         (within 6 months)  [  ] Immobilization > 24 hrs                              1  [  ] ICU/CCU stay > 24 hours                             1  [  ] Age > 60                                                         1    IMPROVE VTE Score: 0 - Pharm VTE ppx with Lovenox 40mg sq daily    IMPROVE VTE Individual Risk Assessment          RISK                                                          Points  [  ] Previous VTE                                                3  [  ] Thrombophilia                                             2  [  ] Lower limb paralysis                                   2        (unable to hold up >15 seconds)    [  ] Current Cancer                                             2         (within 6 months)  [  ] Immobilization > 24 hrs                              1  [  ] ICU/CCU stay > 24 hours                             1  [  ] Age > 60                                                         1    IMPROVE VTE Score: 0

## 2020-11-29 NOTE — PROGRESS NOTE ADULT - PROBLEM SELECTOR PLAN 1
- Chronic, h/o ablation in 10/2017  - In ED S/p Ativan 1, diltiazem 30mg (remained in rapid AF), Digoxin x1 and Lopressor 5mg x1, HR 150s after initiation of cardizem gtt by ED, increased to 10 mg, improved to 120s, will increase further to 15 mg, d/w RN with immediate  goal HR <110  - Pt only noting palpitations but denies active chest pain/ischemic symptoms  - EKG showing a flutter with vent rate 135, Tw inversions in V5-6  - Cardiac enzymes x1 negative, f/u second set  - [id number] score is 0, not currently on AC  - Takes bisoprolol 40mg daily at home, will hold beta blocker at this time to allow room for further cardizem increases if necessary  - F/u TFTs  - Monitor routine hemodynamics  - Cardio Dr. Saunders consulted in the ED, f/u recommendations

## 2020-11-29 NOTE — PROGRESS NOTE ADULT - PROBLEM SELECTOR PLAN 4
- Chronic, not taking outpatient medications  - Reportedly due to family/life stressors  - S/p 1x Ativan 2mg in the ED, wife reports patient often requires Ativan during hospital presentations and responds well  - Ativan PRN for anxiety  - Recommend outpatient psych follow up

## 2020-11-29 NOTE — PROVIDER CONTACT NOTE (OTHER) - ASSESSMENT
Continued with restless behavior. Reoriented as needed. Cardizem titrated to rate of 20 per order for sustained HR of 140-150

## 2020-11-29 NOTE — CONSULT NOTE ADULT - SUBJECTIVE AND OBJECTIVE BOX
Patient is a 55y old  Male who presents with a chief complaint of rapid afib (29 Nov 2020 12:30)    HPI:  56yo M, with PMH/o afib s/p ablation in 10/2017 (not on AC), PUD, anxiety, and daily marijuana use with hyperemesis, presenting with abdominal pain, nausea, and vomiting since 11/26 after using marijuana. Of note, patient with recent admission to [facility] 11/10 for similar complaint. Admitted to hospitalist service for Afib w/ RVR    In ED patient received multiple antiarrythmic agents IVP without improvement in rate and eventually started on Cardizem gtt. Pt HR with minimal improvement despite titration to 20 ml/hr. Cardiology consulted and recc Amiodarone load and start infusion. At bedside patient is lethargic but answers questions appropriately. Poor historian but denies any acute distress, chest pain, palpitations, or SOB      Allergies: No Known Allergies    PAST MEDICAL & SURGICAL HISTORY:  PUD (peptic ulcer disease)    Marijuana use    Anxiety    Atrial fibrillation, unspecified type    H/O prior ablation treatment  10/2017, for A-fib      FAMILY HISTORY:  Family history of hypertension in mother (Mother)    Family history of prostate cancer in father (Father)    Family history of diabetes mellitus in mother (Mother)      SOCIAL HISTORY:    Home Medications:    Review of Systems:  ROS as noted above, unable to obtain accurate ROS secondary to poor historian/lethargy    T(F): 98.6 (11-29-20 @ 19:38), Max: 98.9 (11-29-20 @ 16:18)  HR: 152 (11-29-20 @ 20:20) (77 - 156)  BP: 135/77 (11-29-20 @ 20:20) (108/53 - 154/79)  RR: 20 (11-29-20 @ 20:20) (17 - 22)  SpO2: 99% (11-29-20 @ 20:20)  Wt(kg): --    CAPILLARY BLOOD GLUCOSE        I&O's Summary      Physical Exam:     Gen: ill appearing  Neuro: lethargic, arousable and follows commands, oriented  CVS: Irregular rate/rythm  Resp: CTA  Abd: soft, NT, ND  Ext: warm, dry, no edema    Meds:    aMIOdarone Infusion 1 mG/Min IV Continuous <Continuous>  aMIOdarone Infusion 0.5 mG/Min IV Continuous <Continuous>  diltiazem Infusion 20 mG/Hr IV Continuous <Continuous>  metoprolol tartrate Injectable 5 milliGRAM(s) IV Push every 6 hours           LORazepam   Injectable 1 milliGRAM(s) IV Push every 6 hours PRN  metoclopramide Injectable 5 milliGRAM(s) IV Push every 8 hours        enoxaparin Injectable 40 milliGRAM(s) SubCutaneous daily     pantoprazole  Injectable 40 milliGRAM(s) IV Push daily        sodium chloride 0.9%. 1000 milliLiter(s) IV Continuous <Continuous>     influenza   Vaccine 0.5 milliLiter(s) IntraMuscular once     chlorhexidine 4% Liquid 1 Application(s) Topical <User Schedule>                              11.5   12.83 )-----------( 371      ( 29 Nov 2020 06:12 )             34.3       11-29    142  |  110<H>  |  15  ----------------------------<  102<H>  3.9   |  18<L>  |  0.85    Ca    8.4<L>      29 Nov 2020 06:12    TPro  7.0  /  Alb  2.5<L>  /  TBili  1.2  /  DBili  .40<H>  /  AST  15  /  ALT  22  /  AlkPhos  92  11-29      CARDIAC MARKERS ( 29 Nov 2020 01:53 )  <.015 ng/mL / x     / x     / x     / x      CARDIAC MARKERS ( 28 Nov 2020 20:36 )  <.015 ng/mL / x     / 121 U/L / x     / <1.0 ng/mL                  Radiology:     EXAM:  XR CHEST PORTABLE URGENT 1V                            PROCEDURE DATE:  11/29/2020          INTERPRETATION:  HISTORY: Shortness of breath    TECHNIQUE: Single frontal view of the chest with comparison to 7/2/2020    FINDINGS:    Heart is enlarged. Minimal pulmonary vascular congestion. The apices and hemidiaphragms are unremarkable. Degenerative changes.        IMPRESSION:    Minimal pulmonary vascular congestion            DELMIS CRUZ M.D., ATTENDING RADIOLOGIST  This document has been electronically signed. Nov 29 2020  8:31PM        Assessment/Plan:  56yo M, with PMH/o afib s/p ablation in 10/2017 (not on AC), PUD, anxiety, and daily marijuana use with hyperemesis, presenting with abdominal pain, nausea, and vomiting since 11/26 after using marijuana. Admit for marijuana withdrawal/hyperemesis and Afib w/ RVR    -Marijuana use disorder w/ hyperemesis withdrawal. PRN Ativan as needed for anxiety/agitation.   -Cardizem gtt titrated up to 20ml/hr with minimal improvement, HR remains in 140's. Bolus Amiodarone and start Amiodarone gtt. Cardiology following  -Utilize additional Lopressor IVP as BP tolerates  -Not AC candidate per Cards due to lack of compliance and follow up  -Repeat electrolytes in attempt to optimize. Will supplement to keep K >4 and Mg >2  -Echo ordered  -PRN Reglan for nausea/vomitting. Advance diet as tolerated  -DVT ppx: Heparin SC    Discussed case with e-ICU attending, admit to ICU    Critical care time spent (mins): 38 minutes including time spent reviewing chart, ordering tests/labs, discussing with interdisciplinary team. Not including time spent performing procedures.

## 2020-11-29 NOTE — H&P ADULT - ASSESSMENT
54yo M, with PMH/o afib s/p ablation in 10/2017 (not on AC), PUD, anxiety, and daily marijuana use with hyperemesis, presenting with abdominal pain, nausea, and vomiting since 11/26 after using marijuana, found to be in rapid afib, admitted for further management of afib with RVR and cannabinoid hyperemesis syndrome.

## 2020-11-29 NOTE — CONSULT NOTE ADULT - SUBJECTIVE AND OBJECTIVE BOX
[facility]         [place], Alberto, Nicky, Taylor, Tra, Julien        [phone] (office)    CHIEF COMPLAINT: Patient is a 55y old  Male who presents with a chief complaint of rapid afib (2020 07:09)      HPI: The patient is not coherent and is unable to provide a hx.  Hx from patient, medical record and wife by phone.    56yo M, with PMH/o afib s/p ablation in 10/2017 (not on AC), PUD, anxiety, and daily marijuana use with hyperemesis, presenting with abdominal pain, nausea, and vomiting since  after using marijuana. Of note, patient with recent admission to [facility] 11/10 for similar complaint.  Patient's wife Christa Swanson reported that patient was brought to [facility] initially seeking inpatient help but was told he needed to be medically stabilized first.      CARDIAC HISTORY: Paroxysmal AF with ablation  Dr. Get Hardin. Lost to follow up and recurrence of AF 2019 on admission to [facility] with intoxication, acidosis. Seen by me last month in AF, bisoprolol 10 started and converted to sinus. CHADS2-VASC 0 and patient unreliable so no anticoag. Cardiac cath 2016 with only non-obstructive disease 30% proximal cx. LVEF 70%. Echo at [facility] in 2019 with hyperdynamic LV and mild-mod MR.     In the ED  VS: T 98.2 -->78-->156 /57 RR 20 SpO2 98% on RA  Significant labs include WBC 14.65, platelets 406, PMNs 85.1, CO2 17, total bili 1.8, positive THC in urine tox.  CXR: no obvious lung pathology, cardiomegaly present, awaiting official read  EKG was initially sr, but he developed af/afl with a rapid rate, Tw abnormalities in lateral precordial leads    Patient received 2L NS, Zofran 4mg x1, Reglan 10mg x1, Ativan 2mg x1, Diltiazem 20mg x1, Diltiazem 10mg x1, Digoxin 0.5mg x1, Lopressor 5mg x1 and was started on cardizem gtt.      Overnight he has remained with an altered mental status.  He has been tachycardic throughout.  His IV was out when I saw him and it is unclear how long that is the case.    PAST MEDICAL & SURGICAL HISTORY:  PUD (peptic ulcer disease)    Marijuana use    Anxiety    Atrial fibrillation, unspecified type    H/O prior ablation treatment  10/2017, for A-fib        SOCIAL HISTORY: No active tobacco, former smoker.  Active use of marijuana and other substances in the past    FAMILY HISTORY:  Family history of hypertension in mother (Mother)    Family history of prostate cancer in father (Father)    Family history of diabetes mellitus in mother (Mother)     No pertinent family history of CAD    Outpatient medications:    MEDICATIONS  (STANDING):  diltiazem Infusion 5 mG/Hr (5 mL/Hr) IV Continuous <Continuous>  enoxaparin Injectable 40 milliGRAM(s) SubCutaneous daily  influenza   Vaccine 0.5 milliLiter(s) IntraMuscular once  pantoprazole  Injectable 40 milliGRAM(s) IV Push daily    MEDICATIONS  (PRN):  LORazepam     Tablet 0.5 milliGRAM(s) Oral every 4 hours PRN for anxiety  LORazepam   Injectable 1 milliGRAM(s) IV Push every 8 hours PRN for severe anxiety and agitation      Allergies    No Known Allergies    Intolerances        REVIEW OF SYSTEMS: unable    VITAL SIGNS:   Vital Signs Last 24 Hrs  T(C): 36.7 (2020 05:13), Max: 36.8 (2020 19:13)  T(F): 98 (2020 05:13), Max: 98.2 (2020 19:13)  HR: 102 (2020 05:13) (77 - 158)  BP: 111/52 (2020 05:13) (108/53 - 134/64)  BP(mean): --  RR: 17 (2020 05:13) (17 - 20)  SpO2: 97% (2020 05:13) (97% - 99%)    I&O's Summary      PHYSICAL EXAM:    Constitutional: NAD, awake and alert, well-developed  Eyes:  EOMI, no oral cyanosis, conjunctivae clear, anicteric.  Pulmonary: Non-labored, breath sounds are clear bilaterally, no wheezing, rales or rhonchi  Cardiovascular:  tachy, irregular S1 and S2. No murmur.  No rubs, gallops or clicks  Gastrointestinal: Bowel Sounds present, soft, nontender.   Lymph: No peripheral edema.   Neurological: Alert, altered  Skin: No obvious lesions/rashes.   Psych:  altered    LABS: All Labs Reviewed:                        11.5   12.83 )-----------( 371      ( 2020 06:12 )             34.3                         13.3   14.65 )-----------( 406      ( 2020 20:36 )             39.0     2020 06:12    142    |  110    |  15     ----------------------------<  102    3.9     |  18     |  0.85   2020 20:36    140    |  107    |  17     ----------------------------<  123    3.8     |  17     |  1.10     Ca    8.4        2020 06:12  Ca    8.8        2020 20:36    TPro  7.0    /  Alb  2.5    /  TBili  1.2    /  DBili  .40    /  AST  15     /  ALT  22     /  AlkPhos  92     2020 06:12  TPro  7.9    /  Alb  3.0    /  TBili  1.8    /  DBili  x      /  AST  14     /  ALT  25     /  AlkPhos  108    2020 20:36      CARDIAC MARKERS ( 2020 01:53 )  <.015 ng/mL / x     / x     / x     / x      CARDIAC MARKERS ( 2020 20:36 )  <.015 ng/mL / x     / 121 U/L / x     / <1.0 ng/mL      Blood Culture:      @ 06:12  TSH: 0.65      RADIOLOGY:  < from: TTE with Doppler (w/Cont) (19 @ 18:54) >    Patient name: LESLEY SWANSON  YOB: 1965   Age: 53 (M)   MR#: [id number]  Study Date: 2019  Location: [id number]: Kalpana Grajeda  Study quality: Technically Difficult  Referring Physician: Javier Márquez MD  Blood Pressure:142/88 mmHg  Height: 185 cm  Weight: 100 kg  BSA: 2.3 m2  Heart Rate: 121 mmHg  ------------------------------------------------------------------------  PROCEDURE: Transthoracic echocardiogram with 2-D, M-Mode  and complete spectral and color flow Doppler.  Verbal consent was obtained for injection of echo contrast.  Following  intravenous injection of contrast, harmonic  imaging was performed.  LOT#[id number]  INDICATION: Unspecified atrial fibrillation (I48.91), Chest  pain, unspecified (R07.9)  ------------------------------------------------------------------------  DIMENSIONS:  Dimensions:     Normal Values:  LA:     4.4 cm    2.0 - 4.0 cm  Ao:     2.9 cm    2.0 - 3.8 cm  SEPTUM: 1.0 cm    0.6 - 1.2 cm  PWT:    1.4 cm    0.6 - 1.1 cm  LVIDd:3.7 cm    3.0 - 5.6 cm  LVIDs:  2.7 cm    1.8 - 4.0 cm  Derived Variables:  LVMI: 65 g/m2  RWT: 0.75  Fractional short: 27 %  Ejection Fraction (Visual Estimate): 65-70 %  Peak Velocity (m/sec): AoV=1.3  ------------------------------------------------------------------------  OBSERVATIONS:  Mitral Valve: Normal mitral valve. Mild-moderate mitral  regurgitation. Eccentric Jet.  Aortic Root: Normal aortic root.  Aortic Valve: Normal trileaflet aortic valve. Peak  transaortic valve gradient equals 6 mm Hg. Minimal aortic  regurgitation.  Peak left ventricular outflow tract  gradient equals 5.8 mm Hg.  Left Atrium: Severely dilated left atrium.  LA volume index  = 49 cc/m2.  Left Ventricle: Hyperdynamic left ventricle.Endocardial  visualization enhanced with intravenous injection of echo  contrast (Definity). Increased relative wall thickness with  normal left ventricular mass index, consistent with  concentric left ventricular remodeling. Normal left  ventricular diastolic function.  Right Heart: Normal right atrium. Normal right ventricular  size and function. Normal tricuspid valve.  Mild tricuspid  regurgitation. Normal pulmonic valve. Mild pulmonic  regurgitation.  Pericardium/PleuraNormal pericardium with no pericardial  effusion.  Hemodynamic: Estimated right ventricular systolic pressure  equals 42 mm Hg, assuming right atrial pressure equals 10  mm Hg, consistent with mild pulmonary hypertension.  ------------------------------------------------------------------------  CONCLUSIONS:  1. Normal mitral valve. Mild-moderate mitral regurgitation.  Eccentric Jet.  2. Severely dilated left atrium.  LA volume index = 49  cc/m2.  3. Increased relative wall thickness with normal left  ventricular mass index, consistent with concentric left  ventricular remodeling.  4. Hyperdynamic left ventricle.Endocardial visualization  enhanced with intravenous injection of echo contrast  (Definity).  5. Normal right ventricular size and function.  6. Estimated right ventricular systolic pressure equals 42  mm Hg, assuming right atrial pressure equals 10 mm Hg,  consistent with mild pulmonary hypertension.  7. Normal tricuspid valve.  Mild tricuspid regurgitation.  *** No previous Echo exam.  ------------------------------------------------------------------------  Confirmed on  2019 - 21:34:14 by Santi Buckley M.D.  ------------------------------------------------------------------------    < end of copied text >          < from: Cardiac Cath Lab - Adult (16 @ 14:50) >    [facility]  Department of Cardiology  [address] [place] [id number]  [phone]  Cath Lab Report -- Comprehensive Report  Patient: LESLEY SWANSON  Study date: 2016  Account number: [id number]  MR number: [id number]  : 1965  Gender: Male  Race: W  Case Physician(s):  Maximus [place] M.D.  Fellow:  Lester Barone M.D.  Referring Physician:  Gianni Ansari M.D.  INDICATIONS: Unstable angina - CCS4.  HISTORY: There was no prior cardiac history. The patient has hypertension.  PROCEDURE:  --  Left heart catheterization with ventriculography.  --  Left coronary angiography.  --  Right coronary angiography.  TECHNIQUE: The risks and alternatives of the procedures and conscious  sedation were explained to the patient and informed consent was obtained.  Cardiac catheterization performed electively. Coronary intervention  performed electively.  Local anesthetic given. Right radial artery access. A 6FR PRELUDE KIT was  inserted in the vessel. Left heart catheterization. Ventriculography was  performed. A 5FR PIG EXPO catheter was utilized. Left coronary artery  angiography. The vessel was injected utilizing a 5FR FL3.5 EXPO catheter.  Right coronary artery angiography. The vessel was injected utilizing a 5FR  FR4.0 EXPO catheter. RADIATION EXPOSURE: 1.8 min.  CONTRAST GIVEN: Omnipaque 58 ml.  MEDICATIONS GIVEN: Midazolam, 1 mg, IV. Fentanyl, 25 mcg, IV. Verapamil  (Isoptin, Calan, Covera), 2.5 mg, IA. Heparin, 3000 units, IA.  VENTRICLES: Global left ventricular function was hypercontractile. EF  estimated was 70 %.  CORONARY VESSELS: The coronary circulation is co-dominant.  LM:   --  LM: Normal.  LAD:   --  LAD: Normal.  CX:   --  Proximal circumflex: There was a 30 % stenosis.  RCA:   --  RCA: Normal.  COMPLICATIONS: There were no complications.  DIAGNOSTIC RECOMMENDATIONS: The patient should continue with the present  medications.  Prepared and signed by  Maximus [place] M.D.  Signed 2016 14:23:32  HEMODYNAMIC TABLES  Pressures:  Baseline/ Room Air  Pressures:  - HR: 66  Pressures:  - Rhythm:  Pressures:  -- Aortic Pressure (S/D/M): 124/69/90  Outputs:  Baseline/ Room Air  Outputs:  -- CALCULATIONS[age]  Outputs:  -- CALCULATIONS: Body Surface Area: 2.24  Outputs:  -- CALCULATIONS: Height in cm: 185.00  Outputs:  -- CALCULATIONS: Sex: Male  Outputs:  -- CALCULATIONS: Weight in k.80    < end of copied text >    EKG: nsr -> rapid af/fl

## 2020-11-29 NOTE — H&P ADULT - NSHPPHYSICALEXAM_GEN_ALL_CORE
T(C): 36.8 (11-28-20 @ 22:15), Max: 36.8 (11-28-20 @ 19:13)  HR: 156 (11-28-20 @ 22:15) (77 - 158)  BP: 134/64 (11-28-20 @ 22:15) (128/57 - 134/64)  RR: 20 (11-28-20 @ 22:15) (18 - 20)  SpO2: 98% (11-28-20 @ 22:15) (97% - 99%)    GENERAL: patient appears well, no acute distress, appropriate, pleasant  EYES: sclera clear, no exudates  ENMT: oropharynx clear without erythema, no exudates, moist mucous membranes  NECK: supple, soft, no thyromegaly noted  LUNGS: good air entry bilaterally, clear to auscultation, symmetric breath sounds, no wheezing or rhonchi appreciated  HEART: soft S1/S2, regular rate and rhythm, no murmurs noted, no lower extremity edema  GASTROINTESTINAL: abdomen is soft, nontender, nondistended, normoactive bowel sounds, no palpable masses  INTEGUMENT: good skin turgor, no lesions noted  MUSCULOSKELETAL: no clubbing or cyanosis, no obvious deformity  NEUROLOGIC: awake, alert, oriented x3, good muscle tone in 4 extremities, no obvious sensory deficits  PSYCHIATRIC: mood is good, affect is congruent, linear and logical thought process  HEME/LYMPH: no palpable supraclavicular nodules, no obvious ecchymosis or petechiae T(C): 36.8 (11-28-20 @ 22:15), Max: 36.8 (11-28-20 @ 19:13)  HR: 156 (11-28-20 @ 22:15) (77 - 158)  BP: 134/64 (11-28-20 @ 22:15) (128/57 - 134/64)  RR: 20 (11-28-20 @ 22:15) (18 - 20)  SpO2: 98% (11-28-20 @ 22:15) (97% - 99%)    GENERAL: patient appears fatigued, no acute distress  EYES: sclera clear, no exudates  ENMT: oropharynx clear without erythema, no exudates, moist mucous membranes  NECK: supple, soft, no thyromegaly noted  LUNGS: good air entry bilaterally, clear to auscultation, symmetric breath sounds, no wheezing or rhonchi appreciated  HEART: soft S1/S2, tachycardic and irregular, no murmurs noted, no lower extremity edema  GASTROINTESTINAL: abdomen is soft, nontender, nondistended, normoactive bowel sounds, no palpable masses  INTEGUMENT: good skin turgor, no lesions noted  MUSCULOSKELETAL: no clubbing or cyanosis, no obvious deformity  NEUROLOGIC: somnolent though easily arousable, no obvious sensory deficits  HEME/LYMPH: no obvious ecchymosis or petechiae T(C): 36.8 (11-28-20 @ 22:15), Max: 36.8 (11-28-20 @ 19:13)  HR: 156 (11-28-20 @ 22:15) (77 - 158)  BP: 134/64 (11-28-20 @ 22:15) (128/57 - 134/64)  RR: 20 (11-28-20 @ 22:15) (18 - 20)  SpO2: 98% (11-28-20 @ 22:15) (97% - 99%)    Constitutional: patient appears fatigued, no acute distress  Ears, Nose, Mouth, and Throat: normal external ears and nose, normal hearing, moist oral mucosa  Eyes: normal conjunctiva, EOMI, PERRL  Neck: supple, no JVD  Respiratory: Clear to auscultation bilaterally. No wheezes, rales or rhonchi. Normal respiratory effort  Cardiovascular: soft S1/S2, tachycardic and irregular, no murmurs noted, no lower extremity edema  Gastrointestinal: abdomen is soft, nontender, nondistended, normoactive bowel sounds, no palpable masses  Skin: warm, dry, no rash  Neurologic: sensation grossly intact, CN grossly intact, non-focal exam  Musculoskeletal: no clubbing, no cyanosis, no joint swelling  Psychiatric: somnolent though easily arousable, no agitation, anxiety

## 2020-11-29 NOTE — ED ADULT NURSE REASSESSMENT NOTE - NSIMPLEMENTINTERV_GEN_ALL_ED
Implemented All Fall Risk Interventions:  [place] to call system. Call bell, personal items and telephone within reach. Instruct patient to call for assistance. Room bathroom lighting operational. Non-slip footwear when patient is off stretcher. Physically safe environment: no spills, clutter or unnecessary equipment. Stretcher in lowest position, wheels locked, appropriate side rails in place. Provide visual cue, wrist band, yellow gown, etc. Monitor gait and stability. Monitor for mental status changes and reorient to person, place, and time. Review medications for side effects contributing to fall risk. Reinforce activity limits and safety measures with patient and family.

## 2020-11-29 NOTE — PROGRESS NOTE ADULT - ATTENDING COMMENTS
55M, THC abuse c/b cyclic vomiting syndrome, Afib/not on a/c 2/2 poor compliance, PUD; recent admit [place] for THC induced GI decompensation; in ED, HR 150s, w/persistent n/v, and +THC - mgmt. for Afib/RVR, THC-assoc hyperemesis syndrome  -on helene tx w/Cardizem gtt for rate control; s/p IV digoxin x1, s/p lopressor IV; continues off a/c per Cards  -on multidrug antiemetic tx – off Zofran 2/2 prolonged QT on EKG; on reglan IV prn; f/u GI recomms  -d/c’d reg diet; placed on clear liquids given exacerbation of GI sxs p eating food today  -on prn anxiolytic tx w/ativan  -dvt prophy 55M, THC abuse c/b cyclic vomiting syndrome, Afib/not on a/c 2/2 poor compliance, PUD; recent admit [facility] for THC induced GI decompensation; in ED, HR 150s, w/persistent n/v, and +THC - mgmt. for Afib/RVR, THC intoxication, THC-assoc hyperemesis syndrome  -on helene tx w/Cardizem gtt for rate control; s/p IV digoxin x1, s/p lopressor IV; continues off a/c per Cards  -on multidrug antiemetic tx – off Zofran 2/2 prolonged QT on EKG; on reglan IV prn; f/u GI recomms  -d/c’d reg diet; placed on clear liquids given exacerbation of GI sxs p eating food today  -on prn anxiolytic tx w/ativan  -encourage drug use cessation  -dvt prophy

## 2020-11-29 NOTE — H&P ADULT - PROBLEM SELECTOR PLAN 6
- Total bili elevated on admission  - F/u direct bilirubin level  - Nausea and vomiting could potentially be related to possible biliary disease  - Monitor daily CMP, if persistently elevated, consider RUQ US to r/o biliary pathology

## 2020-11-29 NOTE — PROGRESS NOTE ADULT - PROBLEM SELECTOR PLAN 5
- Present on admission, downtrend in AM labs. Will continue to monitor.  - Possibly reactive to vomiting  - No obvious signs of infection  - Monitor daily CBC

## 2020-11-29 NOTE — H&P ADULT - NSHPREVIEWOFSYSTEMS_GEN_ALL_CORE
CONSTITUTIONAL: denies fever, chills, fatigue, weakness  HEENT: denies blurred vision, sore throat  SKIN: denies new lesions, rash  CARDIOVASCULAR: denies chest pain, chest pressure, palpitations  RESPIRATORY: denies shortness of breath, sputum production  GASTROINTESTINAL: denies nausea, vomiting, diarrhea, abdominal pain  GENITOURINARY: denies dysuria, discharge  NEUROLOGICAL: denies numbness, headache, focal weakness  MUSCULOSKELETAL: denies new joint pain, muscle aches  HEMATOLOGIC: denies gross bleeding, bruising  LYMPHATICS: denies enlarged lymph nodes, extremity swelling  PSYCHIATRIC: denies recent changes in anxiety, depression  ENDOCRINOLOGIC: denies sweating, cold or heat intolerance Unable to obtain ROS at time of admission due to patient's somnolence.

## 2020-11-30 ENCOUNTER — NON-APPOINTMENT (OUTPATIENT)
Age: 55
End: 2020-11-30

## 2020-11-30 LAB
ALBUMIN SERPL ELPH-MCNC: 2.3 G/DL — LOW (ref 3.3–5)
ALBUMIN SERPL ELPH-MCNC: 2.5 G/DL — LOW (ref 3.3–5)
ALP SERPL-CCNC: 89 U/L — SIGNIFICANT CHANGE UP (ref 40–120)
ALP SERPL-CCNC: 94 U/L — SIGNIFICANT CHANGE UP (ref 40–120)
ALT FLD-CCNC: 25 U/L — SIGNIFICANT CHANGE UP (ref 12–78)
ALT FLD-CCNC: 28 U/L — SIGNIFICANT CHANGE UP (ref 12–78)
ANION GAP SERPL CALC-SCNC: 9 MMOL/L — SIGNIFICANT CHANGE UP (ref 5–17)
ANION GAP SERPL CALC-SCNC: 9 MMOL/L — SIGNIFICANT CHANGE UP (ref 5–17)
APTT BLD: 32.8 SEC — SIGNIFICANT CHANGE UP (ref 27.5–35.5)
AST SERPL-CCNC: 14 U/L — LOW (ref 15–37)
AST SERPL-CCNC: 18 U/L — SIGNIFICANT CHANGE UP (ref 15–37)
BASOPHILS # BLD AUTO: 0.03 K/UL — SIGNIFICANT CHANGE UP (ref 0–0.2)
BASOPHILS NFR BLD AUTO: 0.2 % — SIGNIFICANT CHANGE UP (ref 0–2)
BILIRUB SERPL-MCNC: 2.3 MG/DL — HIGH (ref 0.2–1.2)
BILIRUB SERPL-MCNC: 2.4 MG/DL — HIGH (ref 0.2–1.2)
BUN SERPL-MCNC: 14 MG/DL — SIGNIFICANT CHANGE UP (ref 7–23)
BUN SERPL-MCNC: 15 MG/DL — SIGNIFICANT CHANGE UP (ref 7–23)
CALCIUM SERPL-MCNC: 7.8 MG/DL — LOW (ref 8.5–10.1)
CALCIUM SERPL-MCNC: 7.8 MG/DL — LOW (ref 8.5–10.1)
CHLORIDE SERPL-SCNC: 108 MMOL/L — SIGNIFICANT CHANGE UP (ref 96–108)
CHLORIDE SERPL-SCNC: 109 MMOL/L — HIGH (ref 96–108)
CO2 SERPL-SCNC: 20 MMOL/L — LOW (ref 22–31)
CO2 SERPL-SCNC: 21 MMOL/L — LOW (ref 22–31)
CREAT SERPL-MCNC: 0.89 MG/DL — SIGNIFICANT CHANGE UP (ref 0.5–1.3)
CREAT SERPL-MCNC: 0.93 MG/DL — SIGNIFICANT CHANGE UP (ref 0.5–1.3)
CRP SERPL-MCNC: 19.45 MG/DL — HIGH (ref 0–0.4)
EOSINOPHIL # BLD AUTO: 0 K/UL — SIGNIFICANT CHANGE UP (ref 0–0.5)
EOSINOPHIL NFR BLD AUTO: 0 % — SIGNIFICANT CHANGE UP (ref 0–6)
ERYTHROCYTE [SEDIMENTATION RATE] IN BLOOD: 83 MM/HR — HIGH (ref 0–20)
GLUCOSE SERPL-MCNC: 138 MG/DL — HIGH (ref 70–99)
GLUCOSE SERPL-MCNC: 154 MG/DL — HIGH (ref 70–99)
HCT VFR BLD CALC: 36.2 % — LOW (ref 39–50)
HGB BLD-MCNC: 12.1 G/DL — LOW (ref 13–17)
IMM GRANULOCYTES NFR BLD AUTO: 0.5 % — SIGNIFICANT CHANGE UP (ref 0–1.5)
INR BLD: 1.74 RATIO — HIGH (ref 0.88–1.16)
LDH SERPL L TO P-CCNC: 241 U/L — SIGNIFICANT CHANGE UP (ref 50–242)
LYMPHOCYTES # BLD AUTO: 1.79 K/UL — SIGNIFICANT CHANGE UP (ref 1–3.3)
LYMPHOCYTES # BLD AUTO: 9.7 % — LOW (ref 13–44)
MAGNESIUM SERPL-MCNC: 2.1 MG/DL — SIGNIFICANT CHANGE UP (ref 1.6–2.6)
MAGNESIUM SERPL-MCNC: 2.1 MG/DL — SIGNIFICANT CHANGE UP (ref 1.6–2.6)
MCHC RBC-ENTMCNC: 28.1 PG — SIGNIFICANT CHANGE UP (ref 27–34)
MCHC RBC-ENTMCNC: 33.4 GM/DL — SIGNIFICANT CHANGE UP (ref 32–36)
MCV RBC AUTO: 84 FL — SIGNIFICANT CHANGE UP (ref 80–100)
MONOCYTES # BLD AUTO: 1.46 K/UL — HIGH (ref 0–0.9)
MONOCYTES NFR BLD AUTO: 7.9 % — SIGNIFICANT CHANGE UP (ref 2–14)
MRSA PCR RESULT.: SIGNIFICANT CHANGE UP
NEUTROPHILS # BLD AUTO: 15.03 K/UL — HIGH (ref 1.8–7.4)
NEUTROPHILS NFR BLD AUTO: 81.7 % — HIGH (ref 43–77)
NRBC # BLD: 0 /100 WBCS — SIGNIFICANT CHANGE UP (ref 0–0)
PHOSPHATE SERPL-MCNC: 2.2 MG/DL — LOW (ref 2.5–4.5)
PHOSPHATE SERPL-MCNC: 2.3 MG/DL — LOW (ref 2.5–4.5)
PLATELET # BLD AUTO: 368 K/UL — SIGNIFICANT CHANGE UP (ref 150–400)
POTASSIUM SERPL-MCNC: 3.8 MMOL/L — SIGNIFICANT CHANGE UP (ref 3.5–5.3)
POTASSIUM SERPL-MCNC: 3.9 MMOL/L — SIGNIFICANT CHANGE UP (ref 3.5–5.3)
POTASSIUM SERPL-SCNC: 3.8 MMOL/L — SIGNIFICANT CHANGE UP (ref 3.5–5.3)
POTASSIUM SERPL-SCNC: 3.9 MMOL/L — SIGNIFICANT CHANGE UP (ref 3.5–5.3)
PROT SERPL-MCNC: 6.8 G/DL — SIGNIFICANT CHANGE UP (ref 6–8.3)
PROT SERPL-MCNC: 6.9 G/DL — SIGNIFICANT CHANGE UP (ref 6–8.3)
PROTHROM AB SERPL-ACNC: 19.8 SEC — HIGH (ref 10.6–13.6)
RAPID RVP RESULT: SIGNIFICANT CHANGE UP
RBC # BLD: 4.31 M/UL — SIGNIFICANT CHANGE UP (ref 4.2–5.8)
RBC # FLD: 13 % — SIGNIFICANT CHANGE UP (ref 10.3–14.5)
S AUREUS DNA NOSE QL NAA+PROBE: DETECTED
SARS-COV-2 RNA SPEC QL NAA+PROBE: SIGNIFICANT CHANGE UP
SODIUM SERPL-SCNC: 138 MMOL/L — SIGNIFICANT CHANGE UP (ref 135–145)
SODIUM SERPL-SCNC: 138 MMOL/L — SIGNIFICANT CHANGE UP (ref 135–145)
WBC # BLD: 18.41 K/UL — HIGH (ref 3.8–10.5)
WBC # FLD AUTO: 18.41 K/UL — HIGH (ref 3.8–10.5)

## 2020-11-30 PROCEDURE — 99291 CRITICAL CARE FIRST HOUR: CPT

## 2020-11-30 PROCEDURE — 71275 CT ANGIOGRAPHY CHEST: CPT | Mod: 26

## 2020-11-30 PROCEDURE — 99233 SBSQ HOSP IP/OBS HIGH 50: CPT | Mod: GC

## 2020-11-30 PROCEDURE — 93010 ELECTROCARDIOGRAM REPORT: CPT

## 2020-11-30 PROCEDURE — 93306 TTE W/DOPPLER COMPLETE: CPT | Mod: 26

## 2020-11-30 PROCEDURE — 99223 1ST HOSP IP/OBS HIGH 75: CPT

## 2020-11-30 PROCEDURE — 76705 ECHO EXAM OF ABDOMEN: CPT | Mod: 26

## 2020-11-30 RX ORDER — POTASSIUM PHOSPHATE, MONOBASIC POTASSIUM PHOSPHATE, DIBASIC 236; 224 MG/ML; MG/ML
30 INJECTION, SOLUTION INTRAVENOUS ONCE
Refills: 0 | Status: COMPLETED | OUTPATIENT
Start: 2020-11-30 | End: 2020-11-30

## 2020-11-30 RX ORDER — COLCHICINE 0.6 MG
0.6 TABLET ORAL
Refills: 0 | Status: DISCONTINUED | OUTPATIENT
Start: 2020-11-30 | End: 2020-12-01

## 2020-11-30 RX ORDER — SODIUM CHLORIDE 9 MG/ML
500 INJECTION, SOLUTION INTRAVENOUS ONCE
Refills: 0 | Status: COMPLETED | OUTPATIENT
Start: 2020-11-30 | End: 2020-11-30

## 2020-11-30 RX ORDER — ASPIRIN/CALCIUM CARB/MAGNESIUM 324 MG
650 TABLET ORAL EVERY 12 HOURS
Refills: 0 | Status: DISCONTINUED | OUTPATIENT
Start: 2020-11-30 | End: 2020-12-01

## 2020-11-30 RX ORDER — SODIUM CHLORIDE 9 MG/ML
1000 INJECTION, SOLUTION INTRAVENOUS ONCE
Refills: 0 | Status: COMPLETED | OUTPATIENT
Start: 2020-11-30 | End: 2020-11-30

## 2020-11-30 RX ORDER — DIGOXIN 250 MCG
0.25 TABLET ORAL ONCE
Refills: 0 | Status: COMPLETED | OUTPATIENT
Start: 2020-11-30 | End: 2020-11-30

## 2020-11-30 RX ORDER — POTASSIUM PHOSPHATE, MONOBASIC POTASSIUM PHOSPHATE, DIBASIC 236; 224 MG/ML; MG/ML
30 INJECTION, SOLUTION INTRAVENOUS ONCE
Refills: 0 | Status: DISCONTINUED | OUTPATIENT
Start: 2020-11-30 | End: 2020-11-30

## 2020-11-30 RX ORDER — HYDROMORPHONE HYDROCHLORIDE 2 MG/ML
0.5 INJECTION INTRAMUSCULAR; INTRAVENOUS; SUBCUTANEOUS ONCE
Refills: 0 | Status: DISCONTINUED | OUTPATIENT
Start: 2020-11-30 | End: 2020-11-30

## 2020-11-30 RX ORDER — DEXMEDETOMIDINE HYDROCHLORIDE IN 0.9% SODIUM CHLORIDE 4 UG/ML
0.1 INJECTION INTRAVENOUS
Qty: 200 | Refills: 0 | Status: DISCONTINUED | OUTPATIENT
Start: 2020-11-30 | End: 2020-12-01

## 2020-11-30 RX ORDER — CHLORHEXIDINE GLUCONATE 213 G/1000ML
1 SOLUTION TOPICAL EVERY 24 HOURS
Refills: 0 | Status: DISCONTINUED | OUTPATIENT
Start: 2020-12-01 | End: 2020-12-01

## 2020-11-30 RX ADMIN — Medication 5 MILLIGRAM(S): at 05:33

## 2020-11-30 RX ADMIN — PANTOPRAZOLE SODIUM 40 MILLIGRAM(S): 20 TABLET, DELAYED RELEASE ORAL at 11:12

## 2020-11-30 RX ADMIN — Medication 5 MILLIGRAM(S): at 23:05

## 2020-11-30 RX ADMIN — Medication 0.6 MILLIGRAM(S): at 19:09

## 2020-11-30 RX ADMIN — SODIUM CHLORIDE 75 MILLILITER(S): 9 INJECTION INTRAMUSCULAR; INTRAVENOUS; SUBCUTANEOUS at 05:33

## 2020-11-30 RX ADMIN — Medication 20 MG/HR: at 15:15

## 2020-11-30 RX ADMIN — POTASSIUM PHOSPHATE, MONOBASIC POTASSIUM PHOSPHATE, DIBASIC 83.33 MILLIMOLE(S): 236; 224 INJECTION, SOLUTION INTRAVENOUS at 05:32

## 2020-11-30 RX ADMIN — HYDROMORPHONE HYDROCHLORIDE 0.5 MILLIGRAM(S): 2 INJECTION INTRAMUSCULAR; INTRAVENOUS; SUBCUTANEOUS at 15:13

## 2020-11-30 RX ADMIN — SODIUM CHLORIDE 75 MILLILITER(S): 9 INJECTION INTRAMUSCULAR; INTRAVENOUS; SUBCUTANEOUS at 23:07

## 2020-11-30 RX ADMIN — SODIUM CHLORIDE 500 MILLILITER(S): 9 INJECTION, SOLUTION INTRAVENOUS at 11:04

## 2020-11-30 RX ADMIN — Medication 20 MG/HR: at 11:04

## 2020-11-30 RX ADMIN — ENOXAPARIN SODIUM 40 MILLIGRAM(S): 100 INJECTION SUBCUTANEOUS at 11:11

## 2020-11-30 RX ADMIN — Medication 0.25 MILLIGRAM(S): at 17:06

## 2020-11-30 RX ADMIN — DEXMEDETOMIDINE HYDROCHLORIDE IN 0.9% SODIUM CHLORIDE 2.41 MICROGRAM(S)/KG/HR: 4 INJECTION INTRAVENOUS at 15:02

## 2020-11-30 RX ADMIN — Medication 20 MG/HR: at 23:07

## 2020-11-30 RX ADMIN — HYDROMORPHONE HYDROCHLORIDE 0.5 MILLIGRAM(S): 2 INJECTION INTRAMUSCULAR; INTRAVENOUS; SUBCUTANEOUS at 15:30

## 2020-11-30 RX ADMIN — Medication 5 MILLIGRAM(S): at 15:01

## 2020-11-30 RX ADMIN — Medication 5 MILLIGRAM(S): at 11:11

## 2020-11-30 RX ADMIN — Medication 0.25 MILLIGRAM(S): at 00:00

## 2020-11-30 RX ADMIN — DEXMEDETOMIDINE HYDROCHLORIDE IN 0.9% SODIUM CHLORIDE 2.41 MICROGRAM(S)/KG/HR: 4 INJECTION INTRAVENOUS at 00:17

## 2020-11-30 RX ADMIN — Medication 650 MILLIGRAM(S): at 17:05

## 2020-11-30 RX ADMIN — Medication 20 MG/HR: at 00:17

## 2020-11-30 RX ADMIN — SODIUM CHLORIDE 1000 MILLILITER(S): 9 INJECTION, SOLUTION INTRAVENOUS at 15:05

## 2020-11-30 RX ADMIN — Medication 5 MILLIGRAM(S): at 17:06

## 2020-11-30 NOTE — PROGRESS NOTE ADULT - PROBLEM SELECTOR PLAN 1
- Chronic, h/o ablation in 10/2017  - In ED S/p Ativan 1, diltiazem 30mg (remained in rapid AF), Digoxin x1 and Lopressor 5mg x1, HR 150s after initiation of cardizem gtt by ED, increased to 10 mg, improved to 120s, will increase further to 15 mg, d/w RN with immediate  goal HR <110  - Pt only noting palpitations but denies active chest pain/ischemic symptoms  - EKG showing a flutter with vent rate 135, Tw inversions in V5-6  - Cardiac enzymes x1 negative, f/u second set  - [id number] score is 0, not currently on AC  - Takes bisoprolol 40mg daily at home, will hold beta blocker at this time to allow room for further cardizem increases if necessary  - F/u TFTs  - Monitor routine hemodynamics  - Cardio Dr. Saunders consulted in the ED, f/u recommendations - Chronic, h/o ablation in 10/2017  - In ED S/p Ativan 1, diltiazem 30mg (remained in rapid AF), Digoxin x1 and Lopressor 5mg x1, HR 150s after initiation of cardizem gtt by ED, increased to 10 mg, improved to 120s, will increase further to 15 mg, d/w RN with immediate  goal HR <110  - EKG showing a flutter with vent rate 135, Tw inversions in V5-6  - Cardiac enzymes x2 negative  - [id number] score is 0, not currently on AC  - Takes bisoprolol 40mg daily at home, will hold beta blocker at this time to allow room for further cardizem increases if necessary  - Monitor routine hemodynamics  - Cardio Dr. Saunders consulted in the ED, f/u recommendations - Chronic, h/o ablation in 10/2017  - In ED S/p Ativan 1, diltiazem 30mg (remained in rapid AF), Digoxin x1 and Lopressor 5mg x1, HR 150s after initiation of cardizem gtt by ED, increased to 10 mg, improved to 120s, will increase further to 15 mg, d/w RN with immediate  goal HR <110  - EKG showing a flutter with vent rate 135, Tw inversions in V5-6  - Cardiac enzymes x2 negative  - Monitor routine hemodynamics  - Cardio Dr. Saunders following  - Pt transferred to ICU due to uncontrolled rate. In ICU, pt on amiodarone and cardizem gtt, Lopressor 5mg IVP q6h, 500cc bolus LR   -follow up TTE  - medical management as per ICU team - Chronic, h/o ablation in 10/2017  - In ED S/p Ativan 1, diltiazem 30mg (remained in rapid AF), Digoxin x1 and Lopressor 5mg x1, HR 150s after initiation of cardizem gtt by ED, increased to 10 mg and further to 15 mg, goal HR <110  - EKG showing a flutter with vent rate 135, Tw inversions in V5-6  - Cardiac enzymes x2 negative  - Monitor routine hemodynamics  - Cardio Dr. Saunders following  - Pt transferred to ICU due to uncontrolled rate. In ICU, pt on amiodarone and cardizem gtt, Lopressor 5mg IVP q6h, 500cc bolus LR   -follow up TTE  - medical management as per ICU team

## 2020-11-30 NOTE — DIETITIAN INITIAL EVALUATION ADULT. - PROBLEM SELECTOR PLAN 2
- Pt has presented to the ED multiple times with these symptoms after smoking marijuana  - Reportedly smokes 1-2 joints per day  - [facility]ox screen positive for THC  - Marijuana cessation advised, patient and wife both report his desire to seek help. Per wife, patient was initially brought to [facility] where he was told he needed medical attention first.  - S/p Zofran and Reglan in the ED, will avoid Zofran for now as QTc is prolonged  - Can use Tigan as antiemetic as needed  - Addiction Dr. Mireles consulted, f/u recommendations

## 2020-11-30 NOTE — PROGRESS NOTE ADULT - PROBLEM SELECTOR PLAN 3
- Pt reports recent diagnosis by outpatient GI Dr. Ferguson  - No clear etiology at this time  - Start IV PPI given vomiting - Pt reports recent diagnosis by outpatient GI Dr. Ferguson  - No clear etiology at this time  - c/w PPI  -Management as per ICU team

## 2020-11-30 NOTE — PROGRESS NOTE ADULT - PROBLEM SELECTOR PLAN 6
- Total bili elevated on admission  - F/u direct bilirubin level  - Nausea and vomiting could potentially be related to possible biliary disease  - Monitor daily CMP, if persistently elevated, consider RUQ US to r/o biliary pathology - Total bili and direct bilirubin level elevated. f/u hepatic panel in Am  - Nausea and vomiting could potentially be related to possible biliary disease  - Monitor daily CMP, if persistently elevated, consider RUQ US to r/o biliary pathology  -Management as per ICU team

## 2020-11-30 NOTE — PROGRESS NOTE ADULT - SUBJECTIVE AND OBJECTIVE BOX
[facility] -- [place], Nicky Dominguez, Tra Vazquez Savella  Office # [phone]      Follow Up: AF with RVR    Subjective/Observations: Patient seen and examined. Events noted. Resting  in bed. Appears lethargic but arousable. Denies any palp, cp, dyspnea. no signs of orthopnea. Remains on dilt and amio gtt.       REVIEW OF SYSTEMS: Limited 2/2 comorbidities     PAST MEDICAL & SURGICAL HISTORY:  PUD (peptic ulcer disease)    Marijuana use    Anxiety    Atrial fibrillation, unspecified type    H/O prior ablation treatment  10/2017, for A-fib        MEDICATIONS  (STANDING):  aMIOdarone Infusion 1 mG/Min (33.3 mL/Hr) IV Continuous <Continuous>  aMIOdarone Infusion 0.5 mG/Min (16.7 mL/Hr) IV Continuous <Continuous>  chlorhexidine 4% Liquid 1 Application(s) Topical <User Schedule>  dexMEDEtomidine Infusion 0.1 MICROgram(s)/kG/Hr (2.41 mL/Hr) IV Continuous <Continuous>  diltiazem Infusion 20 mG/Hr (20 mL/Hr) IV Continuous <Continuous>  enoxaparin Injectable 40 milliGRAM(s) SubCutaneous daily  influenza   Vaccine 0.5 milliLiter(s) IntraMuscular once  metoclopramide Injectable 5 milliGRAM(s) IV Push every 8 hours  metoprolol tartrate Injectable 5 milliGRAM(s) IV Push every 6 hours  pantoprazole  Injectable 40 milliGRAM(s) IV Push daily  sodium chloride 0.9%. 1000 milliLiter(s) (75 mL/Hr) IV Continuous <Continuous>    MEDICATIONS  (PRN):  LORazepam   Injectable 1 milliGRAM(s) IV Push every 6 hours PRN Anxiety      Allergies    No Known Allergies    Intolerances            Vital Signs Last 24 Hrs  T(C): 36.8 (30 Nov 2020 05:06), Max: 38.2 (29 Nov 2020 23:23)  T(F): 98.3 (30 Nov 2020 05:06), Max: 100.8 (29 Nov 2020 23:23)  HR: 116 (30 Nov 2020 07:00) (104 - 162)  BP: 135/76 (30 Nov 2020 07:00) (108/74 - 155/91)  BP(mean): 95 (30 Nov 2020 07:00) (84 - 113)  RR: 38 (30 Nov 2020 07:00) (20 - 49)  SpO2: 98% (30 Nov 2020 07:00) (94% - 100%)    I&O's Summary    29 Nov 2020 07:01  -  30 Nov 2020 07:00  --------------------------------------------------------  IN: 1389.9 mL / OUT: 470 mL / NET: 919.9 mL      Weight (kg): 96.3 (11-29 @ 21:55)    PHYSICAL EXAM:  TELE: -130  Constitutional: NAD,lethargic   HEENT: Moist Mucous Membranes, Anicteric  Pulmonary: Decreased breath sounds b/l. No rales, crackles or wheeze appreciated.   Cardiovascular: IRRR, S1 and S2, distant   Gastrointestinal: Bowel Sounds present, soft, nontender.   Lymph: No peripheral edema. No lymphadenopathy.  Skin: No visible rashes or ulcers.  Psych:  Mood & affect appropriate for situation    LABS: All Labs Reviewed:                        12.1   18.41 )-----------( 368      ( 30 Nov 2020 05:01 )             36.2                         11.5   12.83 )-----------( 371      ( 29 Nov 2020 06:12 )             34.3                         13.3   14.65 )-----------( 406      ( 28 Nov 2020 20:36 )             39.0     30 Nov 2020 05:01    138    |  108    |  14     ----------------------------<  138    3.9     |  21     |  0.89   29 Nov 2020 23:43    138    |  109    |  15     ----------------------------<  154    3.8     |  20     |  0.93   29 Nov 2020 06:12    142    |  110    |  15     ----------------------------<  102    3.9     |  18     |  0.85     Ca    7.8        30 Nov 2020 05:01  Ca    7.8        29 Nov 2020 23:43  Ca    8.4        29 Nov 2020 06:12  Phos  2.3       30 Nov 2020 05:01  Phos  2.2       29 Nov 2020 23:43  Mg     2.1       30 Nov 2020 05:01  Mg     2.1       29 Nov 2020 23:43    TPro  6.8    /  Alb  2.3    /  TBili  2.3    /  DBili  x      /  AST  14     /  ALT  25     /  AlkPhos  89     30 Nov 2020 05:01  TPro  6.9    /  Alb  2.5    /  TBili  2.4    /  DBili  x      /  AST  18     /  ALT  28     /  AlkPhos  94     29 Nov 2020 23:43  TPro  7.0    /  Alb  2.5    /  TBili  1.2    /  DBili  .40    /  AST  15     /  ALT  22     /  AlkPhos  92     29 Nov 2020 06:12      CARDIAC MARKERS ( 29 Nov 2020 01:53 )  <.015 ng/mL / x     / x     / x     / x      CARDIAC MARKERS ( 28 Nov 2020 20:36 )  <.015 ng/mL / x     / 121 U/L / x     / <1.0 ng/mL      < from: CT Angio Chest w/ IV Cont (11.30.20 @ 02:43) >    EXAM:  CT ANGIO CHEST (W)AW IC                            PROCEDURE DATE:  11/30/2020          INTERPRETATION:  CLINICAL INFORMATION: Atrial fibrillation, shortness of breath, on anticoagulation    COMPARISON: No similar prior    PROCEDURE:  CT Angiography of the Chest.  90 ml of Omnipaque 350 was injected intravenously. 10 ml were discarded.  Sagittal and coronal reformats were performed as well as 3D (MIP) reconstructions.  CT dose lowering techniques were used, to include: automated exposurecontrol, adjustment for patient size, and/or use of iterative reconstruction.?      FINDINGS:    LUNGS AND AIRWAYS: Patent central airways.  Patchy dependent groundglass opacities, nonspecific.  PLEURA: Small bilateral pleural effusions.  MEDIASTINUMAND JEROD: Mild mediastinal adenopathy. No pneumomediastinum.  VESSELS: Thoracic aorta is normal in caliber without dissection. Pulmonary arterial system is opacified to the segmental level. Within the limitations of respiratory motion artifact degrades distal segmental and segmental branches in the apices and lung bases, no evidence of acute pulmonary embolus. Main pulmonary artery is normal in caliber. Coronary artery calcifications.  HEART: Heart size is normal. Large circumferential pericardial effusion. No CT findings to suggest acute right heart strain.  CHEST WALL AND LOWER NECK: Within normal limits.  VISUALIZED UPPER ABDOMEN: No acute abnormality.  BONES: No acute fracture    IMPRESSION:    1. Large circumferential pericardial effusion.  2. No evidence of acute pulmonary embolus to the proximal segmental level. Distal segmental and subsegmental branches are partially obscured due to respiratory motion artifact.  3. Small bilateral pleural effusions              BETHANIE MALDONADO MD; Attending Radiologist  This document has been electronically signed. Nov 30 2020  3:24AM    < end of copied text >

## 2020-11-30 NOTE — PROGRESS NOTE ADULT - SUBJECTIVE AND OBJECTIVE BOX
[facility]  Bryson Clayton PA-C  [address] [place] [address]  [phone]      INTERVAL HPI/OVERNIGHT EVENTS:    events noted  sedated in ICU on precedex     MEDICATIONS  (STANDING):  aMIOdarone Infusion 0.5 mG/Min (16.7 mL/Hr) IV Continuous <Continuous>  aspirin 650 milliGRAM(s) Oral every 12 hours  colchicine 0.6 milliGRAM(s) Oral two times a day  dexMEDEtomidine Infusion 0.1 MICROgram(s)/kG/Hr (2.41 mL/Hr) IV Continuous <Continuous>  diltiazem Infusion 20 mG/Hr (20 mL/Hr) IV Continuous <Continuous>  enoxaparin Injectable 40 milliGRAM(s) SubCutaneous daily  influenza   Vaccine 0.5 milliLiter(s) IntraMuscular once  metoprolol tartrate Injectable 5 milliGRAM(s) IV Push every 6 hours  pantoprazole  Injectable 40 milliGRAM(s) IV Push daily  sodium chloride 0.9%. 1000 milliLiter(s) (75 mL/Hr) IV Continuous <Continuous>    MEDICATIONS  (PRN):  LORazepam   Injectable 1 milliGRAM(s) IV Push every 6 hours PRN Anxiety      Allergies    No Known Allergies    Intolerances        ROS:   unable to obtain        PHYSICAL EXAM:   Vital Signs:  Vital Signs Last 24 Hrs  T(C): 37.4 (30 Nov 2020 16:19), Max: 38.2 (29 Nov 2020 23:23)  T(F): 99.3 (30 Nov 2020 16:19), Max: 100.8 (29 Nov 2020 23:23)  HR: 111 (30 Nov 2020 18:00) (94 - 162)  BP: 136/70 (30 Nov 2020 18:00) (108/74 - 158/63)  BP(mean): 96 (30 Nov 2020 18:00) (84 - 114)  RR: 34 (30 Nov 2020 18:00) (20 - 49)  SpO2: 92% (30 Nov 2020 18:00) (91% - 100%)  Daily     Daily     nad  sedated  frail  non toxic  soft, nt  no edema        LABS:                        12.1   18.41 )-----------( 368      ( 30 Nov 2020 05:01 )             36.2     11-30    138  |  108  |  14  ----------------------------<  138<H>  3.9   |  21<L>  |  0.89    Ca    7.8<L>      30 Nov 2020 05:01  Phos  2.3     11-30  Mg     2.1     11-30    TPro  6.8  /  Alb  2.3<L>  /  TBili  2.3<H>  /  DBili  1.20<H>  /  AST  14<L>  /  ALT  25  /  AlkPhos  89  11-30    PT/INR - ( 30 Nov 2020 17:37 )   PT: 19.8 sec;   INR: 1.74 ratio         PTT - ( 30 Nov 2020 17:37 )  PTT:32.8 sec      RADIOLOGY & ADDITIONAL TESTS:

## 2020-11-30 NOTE — PROGRESS NOTE ADULT - ASSESSMENT
56yo M, with PMH/o afib s/p ablation in 10/2017 (not on AC), PUD, anxiety, and daily marijuana use with hyperemesis, presenting with abdominal pain, nausea, and vomiting since 11/26 after using marijuana, found to be in rapid afib, admitted for further management of afib with RVR and cannabinoid hyperemesis syndrome. Transferred to ICU for afib with uncontrolled rate w/ need for pressors and gtt.

## 2020-11-30 NOTE — DIETITIAN INITIAL EVALUATION ADULT. - OTHER INFO
Pt admit with afib, hx daily marijuana use with c/p cannabinoid hyperemesis syndrome. Vomiting x 3 days. Glu elevated, rec Hba1c. BMI 28. On clear liquids today.

## 2020-11-30 NOTE — CHART NOTE - NSCHARTNOTEFT_GEN_A_CORE
Case d/w RAMONA Sommer.  56 y/o male admitted 11/29/20 for abd, vomiting, hx of Afib, hx of ablation, hx of cannabis abuse, cannabinoid hyperemesis from previous admissions, admitted last night to ICU for uncontrolled rapid afib, requiring Cardizem drip and Amiodarone drip.  Pt also required Ativan IVPushes for restlessness, then started on Precedex drip. His trops were neg.  Bedside POCUS revealed small to moderate effusion, no evidence of tamponade.  Pt's HR at that time was in the 130's-140's.  /70.  Digoxin 0.25 mg IVP x 1 given and CT Chest ordered.  CT Angio Chest done confirms mod to large pericardial effusion, small bilat pl effusions, no Pulm embolism. ( c/w CT Abd from 11/9/20 -no notable evid of effusion)  Assessment /Plan: Acute Pericardial effusion, ?inflammatory, Rapid Afib  Cannabis Abuse, Anxiety, hx of PUD  Will repeat echo to monitor for tamponade  Will send for ESR, CRP, ffup CBC with diff, lytes.  Cont Cardizem, Amiodarone drip, hopefully can be switched to po  Cont Precedex for now Case d/w RAMONA Sommer.  56 y/o male admitted 11/29/20 for abd, vomiting, hx of Afib, hx of ablation, hx of cannabis abuse, cannabinoid hyperemesis from previous admissions, admitted last night to ICU for uncontrolled rapid afib, requiring Cardizem drip and Amiodarone drip.  Pt also required Ativan IVPushes for restlessness, then started on Precedex drip. His trops were neg.  Bedside POCUS revealed small to moderate effusion, no evidence of tamponade.  Pt's HR at that time was in the 130's-140's.  /70.  Digoxin 0.25 mg IVP x 1 given and CT Chest ordered.  CT Angio Chest done confirms mod to large pericardial effusion, small bilat pl effusions, no Pulm embolism. ( c/w CT Abd from 11/9/20 -no notable evid of effusion)  Assessment /Plan: Acute Pericardial effusion, ?inflammatory, Rapid Afib  Cannabis Abuse, Anxiety, hx of PUD  Will repeat echo to monitor for tamponade  Will send for ESR, CRP, FRANCIA, ffup CBC with diff, lytes.  Cont Cardizem, Amiodarone drip, hopefully can be switched to po  Cont Precedex for now

## 2020-11-30 NOTE — PROGRESS NOTE ADULT - PROBLEM SELECTOR PLAN 5
- Present on admission, downtrend in AM labs. Will continue to monitor.  - Possibly reactive to vomiting  - No obvious signs of infection  - Monitor daily CBC - Present on admission, downtrend yesterday AM labs, with increase in todays AM labs. Will continue to monitor.  - Possibly reactive to vomiting and demand.   - No obvious signs of infection  - Monitor daily CBC  -management as per ICU team

## 2020-11-30 NOTE — PROGRESS NOTE ADULT - PROBLEM SELECTOR PLAN 1
overnight events noted - anxious - THC use disorder -   AF management in progress -   pt is in ICU for AF management - on Amio and Cardizem  on Precedex for Anxiety   CT chest shows small eff and pericardial eff - pt needs an ECHO -   Cardio follow up  serial CE noted  I and O  monitor VS and HD and Sat  for management of THC withdrawal - may use Ativan PRN -   Psych eval - emotional support - reassurance  spoke with Wife -

## 2020-11-30 NOTE — DIETITIAN INITIAL EVALUATION ADULT. - PROBLEM SELECTOR PLAN 5
- Present on admission  - Possibly reactive to vomiting  - No obvious signs of infection  - Monitor daily CBC

## 2020-11-30 NOTE — PROGRESS NOTE ADULT - SUBJECTIVE AND OBJECTIVE BOX
Patient is a 55y old  Male who presents with a chief complaint of rapid afib (29 Nov 2020 20:46)    HPI:  54yo M, with PMH/o afib s/p ablation in 10/2017 (not on AC), PUD, anxiety, and daily marijuana use with hyperemesis, presenting with abdominal pain, nausea, and vomiting since 11/26 after using marijuana. Of note, patient with recent admission to [facility] 11/10 for similar complaint. Admitted to ICU for Afib w/ RVR requiring Cardizem gtt and Amiodarone gtt    Contacted by RN as patient has remained in Afib w/o improvement in rate despite Amiodarone gtt and Cardizem gtt. HR afib on monitor in 140's.    Allergies: No Known Allergies    PAST MEDICAL & SURGICAL HISTORY:  PUD (peptic ulcer disease)    Marijuana use    Anxiety    Atrial fibrillation, unspecified type    H/O prior ablation treatment  10/2017, for A-fib      FAMILY HISTORY:  Family history of hypertension in mother (Mother)    Family history of prostate cancer in father (Father)    Family history of diabetes mellitus in mother (Mother)      SOCIAL HISTORY:    Home Medications:    Review of Systems:  ROS as noted above, unable to obtain accurate ROS secondary to poor historian/lethargy    T(F): 100.8 (11-29-20 @ 23:23), Max: 100.8 (11-29-20 @ 23:23)  HR: 160 (11-30-20 @ 00:30) (102 - 162)  BP: 123/74 (11-30-20 @ 00:30) (111/52 - 155/91)  RR: 43 (11-30-20 @ 00:30) (17 - 48)  SpO2: 97% (11-30-20 @ 00:30)  Wt(kg): --    CAPILLARY BLOOD GLUCOSE        I&O's Summary    29 Nov 2020 07:01  -  30 Nov 2020 01:49  --------------------------------------------------------  IN: 296.6 mL / OUT: 250 mL / NET: 46.6 mL        Physical Exam:     Gen: ill appearing  Neuro: lethargic,   CVS: Irregular rate/rythm  Resp: CTA  Abd: soft, NT, ND  Ext: warm, dry, no edema    Meds:    aMIOdarone Infusion 1 mG/Min IV Continuous <Continuous>  aMIOdarone Infusion 0.5 mG/Min IV Continuous <Continuous>  digoxin  Injectable 0.25 milliGRAM(s) IV Push once  diltiazem Infusion 20 mG/Hr IV Continuous <Continuous>  metoprolol tartrate Injectable 5 milliGRAM(s) IV Push every 6 hours           dexMEDEtomidine Infusion 0.1 MICROgram(s)/kG/Hr IV Continuous <Continuous>  LORazepam   Injectable 1 milliGRAM(s) IV Push every 6 hours PRN  metoclopramide Injectable 5 milliGRAM(s) IV Push every 8 hours        enoxaparin Injectable 40 milliGRAM(s) SubCutaneous daily     pantoprazole  Injectable 40 milliGRAM(s) IV Push daily        sodium chloride 0.9%. 1000 milliLiter(s) IV Continuous <Continuous>     influenza   Vaccine 0.5 milliLiter(s) IntraMuscular once     chlorhexidine 4% Liquid 1 Application(s) Topical <User Schedule>                              11.5   12.83 )-----------( 371      ( 29 Nov 2020 06:12 )             34.3       11-29    138  |  109<H>  |  15  ----------------------------<  154<H>  3.8   |  20<L>  |  0.93    Ca    7.8<L>      29 Nov 2020 23:43  Phos  2.2     11-29  Mg     2.1     11-29    TPro  6.9  /  Alb  2.5<L>  /  TBili  2.4<H>  /  DBili  x   /  AST  18  /  ALT  28  /  AlkPhos  94  11-29      CARDIAC MARKERS ( 29 Nov 2020 01:53 )  <.015 ng/mL / x     / x     / x     / x      CARDIAC MARKERS ( 28 Nov 2020 20:36 )  <.015 ng/mL / x     / 121 U/L / x     / <1.0 ng/mL                  Radiology:     EXAM:  XR CHEST PORTABLE URGENT 1V                            PROCEDURE DATE:  11/29/2020          INTERPRETATION:  HISTORY: Shortness of breath    TECHNIQUE: Single frontal view of the chest with comparison to 7/2/2020    FINDINGS:    Heart is enlarged. Minimal pulmonary vascular congestion. The apices and hemidiaphragms are unremarkable. Degenerative changes.        IMPRESSION:    Minimal pulmonary vascular congestion            DELMIS CRUZ M.D., ATTENDING RADIOLOGIST  This document has been electronically signed. Nov 29 2020  8:31PM    Assessment/Plan:  4yo M, with PMH/o afib s/p ablation in 10/2017 (not on AC), PUD, anxiety, and daily marijuana use with hyperemesis, presenting with abdominal pain, nausea, and vomiting since 11/26 after using marijuana. Admitted for Afib w/ RVR, remains uncontrolled at this time    -Afib w/ RVR; HR uncontrolled despite Amiodarone gtt and Cardizem gtt  -Ordered for Digoxin 0.25mg. Standing Lopressor Q6 (home Bisoprolol; may be reflexive from beta blockade withdrawal)  -Bedside POCUS with small-moderate pericardial effusion. Reviewed with e-ICU attending no evidence of tamponade   -Difficulty evaluating for evidence of RV strain on POCUS. Will obtain STAT CTA to evaluate for PE given hx of Afib without anticoagulation  -Precedex gtt started for anxiety/agitation    Discussed case with e-ICU attending Dr. Woody    Critical Care Time: 40 minutes including time spent reviewing chart, ordering tests/labs, discussing with interdisciplinary team. Not including time spent performing procedures.

## 2020-11-30 NOTE — PROGRESS NOTE ADULT - SUBJECTIVE AND OBJECTIVE BOX
Patient is a 55y old  Male who presents with a chief complaint of rapid afib (30 Nov 2020 08:54)      INTERVAL HPI/OVERNIGHT EVENTS: Patient seen and examined at bedside. Patient transferred to ICU due to afib w/ uncontrolled rate. Patient has continues to be somnolent on interview, falling asleep mid sentence but patient is on precedex which would contribute to patients sedation. History is limited 2/2 patients mental status. Denies fevers, chills, headache, lightheadedness, chest pain, dyspnea, abdominal pain, n/v/d/c. Patient on IVF, amiodarone and cardizem gtt, lopressor. When patient seen during AM rounds, HR remained elevated between 110-120.     MEDICATIONS  (STANDING):  aMIOdarone Infusion 1 mG/Min (33.3 mL/Hr) IV Continuous <Continuous>  aMIOdarone Infusion 0.5 mG/Min (16.7 mL/Hr) IV Continuous <Continuous>  dexMEDEtomidine Infusion 0.1 MICROgram(s)/kG/Hr (2.41 mL/Hr) IV Continuous <Continuous>  diltiazem Infusion 20 mG/Hr (20 mL/Hr) IV Continuous <Continuous>  enoxaparin Injectable 40 milliGRAM(s) SubCutaneous daily  influenza   Vaccine 0.5 milliLiter(s) IntraMuscular once  lactated ringers Bolus 1000 milliLiter(s) IV Bolus once  metoclopramide Injectable 5 milliGRAM(s) IV Push every 8 hours  metoprolol tartrate Injectable 5 milliGRAM(s) IV Push every 6 hours  pantoprazole  Injectable 40 milliGRAM(s) IV Push daily  sodium chloride 0.9%. 1000 milliLiter(s) (75 mL/Hr) IV Continuous <Continuous>    MEDICATIONS  (PRN):  LORazepam   Injectable 1 milliGRAM(s) IV Push every 6 hours PRN Anxiety      Allergies    No Known Allergies    Intolerances        REVIEW OF SYSTEMS: limited 2/2 mental status   CONSTITUTIONAL: No fever or chills  HEENT:  No headache, no sore throat  RESPIRATORY: No cough, wheezing, or shortness of breath  CARDIOVASCULAR: No chest pain, palpitations  GASTROINTESTINAL: No abd pain, nausea, vomiting, or diarrhea  GENITOURINARY: No dysuria, frequency, or hematuria  NEUROLOGICAL: no focal weakness or dizziness  MUSCULOSKELETAL: no myalgias     Vital Signs Last 24 Hrs  T(C): 36.8 (30 Nov 2020 05:06), Max: 38.2 (29 Nov 2020 23:23)  T(F): 98.3 (30 Nov 2020 05:06), Max: 100.8 (29 Nov 2020 23:23)  HR: 111 (30 Nov 2020 13:00) (99 - 162)  BP: 158/63 (30 Nov 2020 13:00) (108/74 - 158/63)  BP(mean): 88 (30 Nov 2020 13:00) (84 - 114)  RR: 30 (30 Nov 2020 13:00) (20 - 49)  SpO2: 95% (30 Nov 2020 13:00) (93% - 100%)    PHYSICAL EXAM:  GENERAL: NAD, lethargic but arousable to verbal stimuli, initially with NC over face and not in nostrils.   HEENT:  anicteric, dry mucous membranes  CHEST/LUNG:  CTA b/l, no rales, wheezes, or rhonchi  HEART:  soft, +tachycardic, irregularly irregular, +S1/S2  ABDOMEN:  BS+, soft, nontender, nondistended  EXTREMITIES: no edema, cyanosis, or calf tenderness  NERVOUS SYSTEM: answers questions and follows commands appropriately    LABS:                        12.1   18.41 )-----------( 368      ( 30 Nov 2020 05:01 )             36.2     CBC Full  -  ( 30 Nov 2020 05:01 )  WBC Count : 18.41 K/uL  Hemoglobin : 12.1 g/dL  Hematocrit : 36.2 %  Platelet Count - Automated : 368 K/uL  Mean Cell Volume : 84.0 fl  Mean Cell Hemoglobin : 28.1 pg  Mean Cell Hemoglobin Concentration : 33.4 gm/dL  Auto Neutrophil # : 15.03 K/uL  Auto Lymphocyte # : 1.79 K/uL  Auto Monocyte # : 1.46 K/uL  Auto Eosinophil # : 0.00 K/uL  Auto Basophil # : 0.03 K/uL  Auto Neutrophil % : 81.7 %  Auto Lymphocyte % : 9.7 %  Auto Monocyte % : 7.9 %  Auto Eosinophil % : 0.0 %  Auto Basophil % : 0.2 %    30 Nov 2020 05:01    138    |  108    |  14     ----------------------------<  138    3.9     |  21     |  0.89     Ca    7.8        30 Nov 2020 05:01  Phos  2.3       30 Nov 2020 05:01  Mg     2.1       30 Nov 2020 05:01    TPro  6.8    /  Alb  2.3    /  TBili  2.3    /  DBili  1.20   /  AST  14     /  ALT  25     /  AlkPhos  89     30 Nov 2020 05:01        CAPILLARY BLOOD GLUCOSE              RADIOLOGY & ADDITIONAL TESTS:    Personally reviewed.     Consultant(s) Notes Reviewed:  [x] YES  [ ] NO

## 2020-11-30 NOTE — PROGRESS NOTE ADULT - ASSESSMENT
56yo M, with PMH/o afib s/p ablation in 10/2017 (not on AC), PUD, anxiety, and daily marijuana use with hyperemesis, presenting with abdominal pain, nausea, and vomiting since 11/26 after using marijuana, admitted for afib with RVR, now on cardizem and amiodarone gtt, course complicated with large circumferential pericardial effusion.    Neuro:  -Anxiety/agitation: started on precedex gtt overnight, wean as tolerated, continue Lorazepam 1mg q6h PRN for anxiety    CV:  -pericardial effusion: large circumferential pericardial effusion found on CTA, consult cardiothoracic surgery for pericardiocentesis, source infectious vs. rheumatological, ID workup pending, rheum workup TBD  -afib with RVR: hx of ablation 2017, poorly controlled not on AC at home, continue amiodarone and cardizem gtt, continue Lopressor 5mg IVP q6h, STAT 500cc bolus LR over 1 hour for increased volume, likely exacerbated in the setting of large pericardial effusion, follow up TTE  -Cardio following    Resp: tachypneic, continue supplemental O2 PRN    GI:  -Cannabinoid Hyperemesis Syndrome: smokes 1-2 joints per day, Utox + THC, continue Reglan 5mg IVP q8h for nausea  -PUD: hx of PUD recently diagnosed, continue protonix daily  -Elevated total and direct bilirubin, f/u hepatic panel in AM, will continue to monitor    Renal: Stable, hypophosphatemia today, supplemented    ID: Increasing leukocytosis, with fever 100.8 overnight, f/u Blood cultures, UA, urine culture, RVP, MRSA PCR    Heme: Lovenox for DVT ppx    Endo: stable    Dispo: Continue ICU care   54yo M, with PMH/o afib s/p ablation in 10/2017 (not on AC), PUD, anxiety, and daily marijuana use with hyperemesis, presenting with abdominal pain, nausea, and vomiting since 11/26 after using marijuana, admitted for afib with RVR, now on cardizem and amiodarone gtt, course complicated with large circumferential pericardial effusion.    Neuro:  -Anxiety/agitation: started on precedex gtt overnight, wean as tolerated, continue Lorazepam 1mg q6h PRN for anxiety    CV:  -pericardial effusion: large circumferential pericardial effusion found on CTA, consult cardiothoracic surgery for pericardiocentesis, source infectious vs. rheumatological given ESR and CRP significantly elevated, ID and Rheum workup pending  -afib with RVR: hx of ablation 2017, poorly controlled not on AC at home, continue amiodarone and cardizem gtt, continue Lopressor 5mg IVP q6h, STAT 500cc bolus LR over 1 hour for increased volume, likely exacerbated in the setting of large pericardial effusion, follow up TTE  -Cardio following    Resp: tachypneic, continue supplemental O2 PRN    GI:  -Cannabinoid Hyperemesis Syndrome: smokes 1-2 joints per day, Utox + THC, continue Reglan 5mg IVP q8h for nausea  -PUD: hx of PUD recently diagnosed, continue protonix daily  -Elevated total and direct bilirubin, f/u hepatic panel in AM, will continue to monitor    Renal: Stable, hypophosphatemia today, supplemented    ID: Increasing leukocytosis, with fever 100.8 overnight, f/u Blood cultures, UA, urine culture, RVP, MRSA PCR    Heme: Lovenox for DVT ppx    Endo: stable    Dispo: Continue ICU care

## 2020-11-30 NOTE — DIETITIAN INITIAL EVALUATION ADULT. - PROBLEM SELECTOR PLAN 1
- Chronic, h/o ablation in 10/2017  - Admit to tele  - S/p Ativan 2mg x1, diltiazem 20mg x1, diltiazem 10mg x1, though remained in rapid AF, was then given Digoxin 0.5mg x1 and Lopressor 5mg x1, HR 150s after initiation of cardizem gtt by ED, increased to 10 mg, improved to 120s, will increase further to 15 mg, d/w RN with immediate  goal HR <110  - Pt only noting palpitations but denies active chest pain/ischemic symptoms  - EKG showing a flutter with vent rate 135, Tw inversions in V5-6  - Cardiac enzymes x1 negative, f/u second set  - [id number] score is 0, not currently on AC  - Takes bisoprolol 40mg daily at home, will hold beta blocker at this time to allow room for further cardizem increases if necessary  - F/u TFTs  - Monitor routine hemodynamics  - Cardio Dr. Saunders consulted in the ED, f/u recommendations

## 2020-11-30 NOTE — PROGRESS NOTE ADULT - ASSESSMENT
54yo M, with PMH/o afib s/p ablation in 10/2017 (not on AC), PUD, anxiety, and daily marijuana use with hyperemesis, presenting with abdominal pain, nausea, and vomiting since 11/26 after using marijuana. Of note, patient with recent admission to [facility] 11/10 for similar complaint.  Patient's wife Christa Caraballo reported that patient was brought to [facility] initially seeking inpatient help but was told he needed to be medically stabilized first.      CARDIAC HISTORY: Paroxysmal AF with ablation 2017 Dr. Get Hardin. Lost to follow up and recurrence of AF January 2019 on admission to [facility] with intoxication, acidosis. Seen by Dr. Saunders in 10/2020 in AF, bisoprolol 10 started and converted to sinus. CHADS2-VASC 0 and patient unreliable so no anticoag. Cardiac cath 12/8/2016 with only non-obstructive disease 30% proximal cx. LVEF 70%. Echo at [facility] in January 2019 with hyperdynamic LV and mild-mod MR.            -has a hx of paf, with an ablation in 2017  -unclear how often he has had recurrent af since then but it happens at least occasionally  -has not been considered a good candidate for ac given his lack of compliance and followup, and his CHADS Vasc is 0  -for now would focus on rate control  -restoration of sr does not seem appropriate now given his altered ms and ongoing issues with emesis, which could easily serve to provoke recurrent af  - though currently on amio gtt but it is primarily being used for rate control. I think would likely dc   -cont dilt gtt  - cont lopressor 5mg q6h  - would dig load today.   - if hr does not improve despite dig load and cannot take po consider esmolol gtt.   -last echo 2019, would repeat once his hr is better controlled or is back in sr    -there is no evidence of acute ischemia.  -no sig cad by cath 2016, only 30% stenosis of the lcx and no other disease    -there is no evidence for meaningful  volume overload.  - There is a large circumferential pericardial effusion seen on CT chest on 11/30. unclear etiology  - No clinical signs of tamponade. Would get stat echo today.   - avoid diuresis. maintain adequate preload.   - monitor hemodynamics closely.     -management of substance issues and emesis issues as per med  -monitor electrolytes, keep k>4, Mg>2    -DVT prophylaxis  - Further cardiac workup will depend on clinical course.   -will follow

## 2020-11-30 NOTE — PROGRESS NOTE ADULT - ATTENDING COMMENTS
Patient seen and examined     54 y/o male with hx paroxysmal A.fib s/p ablation in 2017, PUD, anxiety, daily marijuana use with hyperemesis, initially presented with nausea, vomiting, found to have A.fib with RVR. He underwent CTA chest which did not show central PE but showed circumferential pericardial effusion. Patient denies any recent fever, chills, cough, chest pain, joint pains, rash or other skin lesions.     HR ~ 110s, BP normal (MAP>80)   He is somnolent but arousable and oriented x 3 when interactive   Mucosa moist, no abnormal lesions   Eyes with mild icterus, no conjunctival injection   Abd soft, mild upper abdominal area tenderness w/o rebound    Labs and imaging reviewed     Bedside POCUS with bibasilar small infiltrates, hyperdynamic LV sys fn, moderate to large pericardial effusion w/o evidence of tamponade physiology however unable to obtain AP4 and SC views     Plan:   -Continue amio, diltiazem drip, iv metoprolol   -Finish Dig load   -Not a candidate for AC due to low CHADS Vasc score and poor compliance   -Pericardial effusion with broad differential - marijuana/infectious/autoimmune/malignancy/other, spoke with thoracic sx, pericardial window scheduled for tomorrow, will test fluid and consider other tests based on fluid analysis. Consulted ID to help with infectious w/u. Will d/w cardiology re: anti-inflammatory tx   -Continue IVF for hyperdynamic LV + pericardial effusion   -Continue Precedex for comfort   -RVP is neg, send UA, UCx, BCx and MRSA PCR   -Hyperbilirubinemia - check RUQ US (abd CT w/o obvious pathology)   -PPI for hx PUD   -Lovenox for DVT ppx     Patient critically ill, 45 mins spent

## 2020-11-30 NOTE — DIETITIAN INITIAL EVALUATION ADULT. - PROBLEM SELECTOR PLAN 4
- Chronic, not taking outpatient medications  - Reportedly due to family/life stressors  - S/p 1x Ativan 2mg in the ED, wife reports patient often requires Ativan during hospital presentations and responds well  - Recommend outpatient psych follow up

## 2020-11-30 NOTE — PROGRESS NOTE ADULT - SUBJECTIVE AND OBJECTIVE BOX
Patient is a 55y old  Male who presents with a chief complaint of rapid afib (30 Nov 2020 07:11)    54yo M, with PMH/o afib s/p ablation in 10/2017 (not on AC), PUD, anxiety, and daily marijuana use with hyperemesis, presenting with abdominal pain, nausea, and vomiting since 11/26 after using marijuana. Of note, patient with recent admission to [facility] 11/10 for similar complaint. Patient's wife Christa Caraballo contacted: she reports that patient was brought to [facility] initially seeking inpatient help but was told he needed to be medically stabilized first.     In ED patient received multiple antiarrythmic agents IVP without improvement in rate and eventually started on Cardizem gtt. Pt HR with minimal improvement despite titration to 20 ml/hr. Cardiology consulted and recc Amiodarone load and start infusion.     24 hour events: Admitted to ICU for uncontrolled afib with RVR. Started Lopressor q6h, Digoxin .25mg x1,   -CTA to evaluate for PE given hx of afib without anticoagulation  -precedex started for agitation/anxiety    REVIEW OF SYSTEMS  Constitutional: No fever, chills, fatigue  Neuro: No headache, numbness, weakness  Resp: No cough, wheezing, shortness of breath  CVS: No chest pain, palpitations, leg swelling  GI: No abdominal pain, nausea, vomiting, diarrhea   : No dysuria, frequency, incontinence  Skin: No itching, burning, rashes, or lesions   Msk: No joint pain or swelling  Psych: No depression, anxiety, mood swings  Heme: No bleeding    T(F): 98.3 (11-30-20 @ 05:06), Max: 100.8 (11-29-20 @ 23:23)  HR: 116 (11-30-20 @ 07:00) (104 - 162)  BP: 135/76 (11-30-20 @ 07:00) (108/74 - 155/91)  RR: 38 (11-30-20 @ 07:00) (20 - 49)  SpO2: 98% (11-30-20 @ 07:00) (94% - 100%)  Wt(kg): --            I&O's Summary    11-29 @ 07:01  -  11-30 @ 07:00  --------------------------------------------------------  IN: 1389.9 mL / OUT: 470 mL / NET: 919.9 mL      PHYSICAL EXAM  General:   CNS:   HEENT:   Resp:   CVS:   Abd:   Ext:   Skin:     MEDICATIONS    aMIOdarone Infusion IV Continuous  aMIOdarone Infusion IV Continuous  diltiazem Infusion IV Continuous  metoprolol tartrate Injectable IV Push        dexMEDEtomidine Infusion IV Continuous  LORazepam   Injectable IV Push PRN  metoclopramide Injectable IV Push      enoxaparin Injectable SubCutaneous    pantoprazole  Injectable IV Push      potassium phosphate IVPB IV Intermittent  sodium chloride 0.9%. IV Continuous    influenza   Vaccine IntraMuscular    chlorhexidine 4% Liquid Topical                            12.1   18.41 )-----------( 368      ( 30 Nov 2020 05:01 )             36.2       11-30    138  |  108  |  14  ----------------------------<  138<H>  3.9   |  21<L>  |  0.89    Ca    7.8<L>      30 Nov 2020 05:01  Phos  2.3     11-30  Mg     2.1     11-30    TPro  6.8  /  Alb  2.3<L>  /  TBili  2.3<H>  /  DBili  x   /  AST  14<L>  /  ALT  25  /  AlkPhos  89  11-30      CARDIAC MARKERS ( 29 Nov 2020 01:53 )  <.015 ng/mL / x     / x     / x     / x      CARDIAC MARKERS ( 28 Nov 2020 20:36 )  <.015 ng/mL / x     / 121 U/L / x     / <1.0 ng/mL                Radiology: ***  Bedside lung ultrasound: ***  Bedside ECHO: ***    CENTRAL LINE: Y/N          DATE INSERTED:              REMOVE: Y/N  FINLEY: Y/N                        DATE INSERTED:              REMOVE: Y/N  A-LINE: Y/N                       DATE INSERTED:              REMOVE: Y/N    GLOBAL ISSUE/BEST PRACTICE  Analgesia:   Sedation:   CAM-ICU:   HOB elevation: yes  Stress ulcer prophylaxis:   VTE prophylaxis:   Glycemic control:   Nutrition:     CODE STATUS: ***  [facility] discussion: Y       Patient is a 55y old  Male who presents with a chief complaint of rapid afib (30 Nov 2020 07:11)    56yo M, with PMH/o afib s/p ablation in 10/2017 (not on AC), PUD, anxiety, and daily marijuana use with hyperemesis, presenting with abdominal pain, nausea, and vomiting since 11/26 after using marijuana. Of note, patient with recent admission to [facility] 11/10 for similar complaint. Patient's wife Christa Caraballo contacted: she reports that patient was brought to [facility] initially seeking inpatient help but was told he needed to be medically stabilized first.     In ED patient received multiple antiarrythmic agents IVP without improvement in rate and eventually started on Cardizem gtt. Pt HR with minimal improvement despite titration to 20 ml/hr. Cardiology consulted and recc Amiodarone load and start infusion.     24 hour events: Patient admitted to ICU for uncontrolled afib with RVR. Patient on amiodarone and cardizem gtt, also given Digoxin .25mg x1 and started on Lopressor 5mg IVP q6h without adequate control. A CTA was performed to rule out PE in the setting of afib without proper anticoagulation, a large circumferential pericardial effusion was found with small bilat pleural effusions. Patient was started on a precedex gtt for anxiety/agitation. Patient also noted to have a temp of 100.8 overnight.  Patient seen and examined at bedside. Patient lethargic but A&Ox3. States he is having some mild abdominal pain but denies any palpitations, nausea, vomiting, diarrhea, CP, SOB.      REVIEW OF SYSTEMS  Constitutional: + fever, no chills  Neuro: No headache, weakness  Resp: No cough, shortness of breath  CVS: No chest pain, palpitations  GI: +minimal abdominal pain, no nausea, vomiting, diarrhea   Skin: No rashes or lesions   Msk: No myalgias or extremity swelling        T(F): 98.3 (11-30-20 @ 05:06), Max: 100.8 (11-29-20 @ 23:23)  HR: 116 (11-30-20 @ 07:00) (104 - 162)  BP: 135/76 (11-30-20 @ 07:00) (108/74 - 155/91)  RR: 38 (11-30-20 @ 07:00) (20 - 49)  SpO2: 98% (11-30-20 @ 07:00) (94% - 100%)  Wt(kg): --            I&O's Summary    11-29 @ 07:01  -  11-30 @ 07:00  --------------------------------------------------------  IN: 1389.9 mL / OUT: 470 mL / NET: 919.9 mL      PHYSICAL EXAM  General: sleeping in bed, lethargic but arousable to voice  CNS: A&Ox3, answering questions appropriately  HEENT: normocephalic, atraumatic, dry mucous membranes  Resp: clear to auscultation, no wheezing rhonchi rales  CVS: distant heart sounds, irregularly irregular rhythm/rate   Abd: soft, nontender to palpation, nondistended, +BS  Ext: no edema bilat, no clubbing or cyanosis  Skin: warm, appears well perfused     MEDICATIONS    aMIOdarone Infusion IV Continuous  aMIOdarone Infusion IV Continuous  diltiazem Infusion IV Continuous  metoprolol tartrate Injectable IV Push      dexMEDEtomidine Infusion IV Continuous  LORazepam   Injectable IV Push PRN  metoclopramide Injectable IV Push    enoxaparin Injectable SubCutaneous    pantoprazole  Injectable IV Push    potassium phosphate IVPB IV Intermittent  sodium chloride 0.9%. IV Continuous    influenza   Vaccine IntraMuscular    chlorhexidine 4% Liquid Topical                            12.1   18.41 )-----------( 368      ( 30 Nov 2020 05:01 )             36.2       11-30    138  |  108  |  14  ----------------------------<  138<H>  3.9   |  21<L>  |  0.89    Ca    7.8<L>      30 Nov 2020 05:01  Phos  2.3     11-30  Mg     2.1     11-30    TPro  6.8  /  Alb  2.3<L>  /  TBili  2.3<H>  /  DBili  x   /  AST  14<L>  /  ALT  25  /  AlkPhos  89  11-30      CARDIAC MARKERS ( 29 Nov 2020 01:53 )  <.015 ng/mL / x     / x     / x     / x      CARDIAC MARKERS ( 28 Nov 2020 20:36 )  <.015 ng/mL / x     / 121 U/L / x     / <1.0 ng/mL        Radiology:   < from: CT Angio Chest w/ IV Cont (11.30.20 @ 02:43) >  EXAM:  CT ANGIO CHEST (W)AW IC                            PROCEDURE DATE:  11/30/2020          INTERPRETATION:  CLINICAL INFORMATION: Atrial fibrillation, shortness of breath, on anticoagulation    COMPARISON: No similar prior    PROCEDURE:  CT Angiography of the Chest.  90 ml of Omnipaque 350 was injected intravenously. 10 ml were discarded.  Sagittal and coronal reformats were performed as well as 3D (MIP) reconstructions.  CT dose lowering techniques were used, to include: automated exposurecontrol, adjustment for patient size, and/or use of iterative reconstruction.?      FINDINGS:    LUNGS AND AIRWAYS: Patent central airways.  Patchy dependent groundglass opacities, nonspecific.  PLEURA: Small bilateral pleural effusions.  MEDIASTINUMAND JEROD: Mild mediastinal adenopathy. No pneumomediastinum.  VESSELS: Thoracic aorta is normal in caliber without dissection. Pulmonary arterial system is opacified to the segmental level. Within the limitations of respiratory motion artifact degrades distal segmental and segmental branches in the apices and lung bases, no evidence of acute pulmonary embolus. Main pulmonary artery is normal in caliber. Coronary artery calcifications.  HEART: Heart size is normal. Large circumferential pericardial effusion. No CT findings to suggest acute right heart strain.  CHEST WALL AND LOWER NECK: Within normal limits.  VISUALIZED UPPER ABDOMEN: No acute abnormality.  BONES: No acute fracture    IMPRESSION:    1. Large circumferential pericardial effusion.  2. No evidence of acute pulmonary embolus to the proximal segmental level. Distal segmental and subsegmental branches are partially obscured due to respiratory motion artifact.  3. Small bilateral pleural effusions              BETHANIE MALDONADO MD; Attending Radiologist  This document has been electronically signed. Nov 30 2020  3:24AM    < end of copied text >      Bedside lung ultrasound: small base consolidations on R and L,   Bedside ECHO: pericardial fluid noted, hyperdynamic systolic function, low LV preload, IVC appears large    CENTRAL LINE: N            FINLEY: N                       A-LINE: N                         GLOBAL ISSUE/BEST PRACTICE  Analgesia: N  Sedation: Y, precedex  HOB elevation: yes  Stress ulcer prophylaxis: Y  VTE prophylaxis: Lovenox  Glycemic control: Y  Nutrition: clear liquid    CODE STATUS: full code

## 2020-11-30 NOTE — CONSULT NOTE ADULT - SUBJECTIVE AND OBJECTIVE BOX
HPI:  56 y/o M, with PMH/o afib s/p ablation in 10/2017 (not on AC), PUD, anxiety, and daily marijuana use with hyperemesis, presenting with abdominal pain, nausea, and vomiting since 11/26 after using marijuana. Of note, patient with recent admission to [facility] 11/10 for similar complaint. Patient currently unable to provide further history as he appears somnolent and falls asleep during interview. Patient's wife Christa Caraballo contacted: she reports that patient was brought to [facility] initially seeking inpatient help but was told he needed to be medically stabilized first. Patient provides limited ROS: endorses palpitations; previous abd pain, N/V now resolved.    Interval Events:   On HOD # 1 pt transferred to ICU for closer monitoring as pt remained in afib. At this time pt seen and examined at bedside. Pt mildly somnolent likely due to precedex and dilaudid, without complaints at this time.  PT unable to provide complete hx however AOx3 to person, place and time.     PAST MEDICAL & SURGICAL HISTORY:  PUD (peptic ulcer disease)    Marijuana use    Anxiety    Atrial fibrillation, unspecified type    H/O prior ablation treatment  10/2017, for A-fib        MEDICATIONS  (STANDING):  aMIOdarone Infusion 0.5 mG/Min (16.7 mL/Hr) IV Continuous <Continuous>  dexMEDEtomidine Infusion 0.1 MICROgram(s)/kG/Hr (2.41 mL/Hr) IV Continuous <Continuous>  diltiazem Infusion 20 mG/Hr (20 mL/Hr) IV Continuous <Continuous>  enoxaparin Injectable 40 milliGRAM(s) SubCutaneous daily  influenza   Vaccine 0.5 milliLiter(s) IntraMuscular once  metoprolol tartrate Injectable 5 milliGRAM(s) IV Push every 6 hours  pantoprazole  Injectable 40 milliGRAM(s) IV Push daily  sodium chloride 0.9%. 1000 milliLiter(s) (75 mL/Hr) IV Continuous <Continuous>    MEDICATIONS  (PRN):  LORazepam   Injectable 1 milliGRAM(s) IV Push every 6 hours PRN Anxiety      aMIOdarone Infusion 0.5 mG/Min IV Continuous <Continuous>  dexMEDEtomidine Infusion 0.1 MICROgram(s)/kG/Hr IV Continuous <Continuous>  diltiazem Infusion 20 mG/Hr IV Continuous <Continuous>  enoxaparin Injectable 40 milliGRAM(s) SubCutaneous daily  influenza   Vaccine 0.5 milliLiter(s) IntraMuscular once  LORazepam   Injectable 1 milliGRAM(s) IV Push every 6 hours PRN  metoprolol tartrate Injectable 5 milliGRAM(s) IV Push every 6 hours  pantoprazole  Injectable 40 milliGRAM(s) IV Push daily  sodium chloride 0.9%. 1000 milliLiter(s) IV Continuous <Continuous>      No Known Allergies    FAMILY HISTORY:  Family history of hypertension in mother (Mother)    Family history of prostate cancer in father (Father)    Family history of diabetes mellitus in mother (Mother)    Vital Signs Last 24 Hrs  T(C): 36.8 (30 Nov 2020 05:06), Max: 38.2 (29 Nov 2020 23:23)  T(F): 98.3 (30 Nov 2020 05:06), Max: 100.8 (29 Nov 2020 23:23)  HR: 111 (30 Nov 2020 13:00) (99 - 162)  BP: 158/63 (30 Nov 2020 13:00) (108/74 - 158/63)  BP(mean): 88 (30 Nov 2020 13:00) (84 - 114)  RR: 30 (30 Nov 2020 13:00) (20 - 49)  SpO2: 95% (30 Nov 2020 13:00) (93% - 100%)    ROS  General: No acute distress, awake and alert  Head: Normal cephalic/atraumatic  Neck: Denies neck pain or palpable masses, hoarseness or stridor  Lymphatic: Denies cervical or axillary or femoral lymphadenopathy  Chest: SEE HPI  Heart: No chest pain, palpitations  Abdomen: No abdominal distention, nausea or vomiting, no abdominal pain  Extremities: Denies cyanosis, open sores or swollen extremity  Neuro: Denies weakness, paralysis, syncope, loss of vision  Psych: Denies hallucinations, visual disturbances, or depression    PHYSICAL EXAM:  GENERAL: NAD, well-developed  CHEST/LUNG: CTABL, no ronchi, rales or wheezing  HEART: tachycardic, irregularly irregular   ABDOMEN: soft, nondistended, nonttp, +bs  PSYCH: AAOx3      LABS:                       12.1   18.41 )-----------( 368      ( 30 Nov 2020 05:01 )             36.2     11-30    138  |  108  |  14  ----------------------------<  138<H>  3.9   |  21<L>  |  0.89    Ca    7.8<L>      30 Nov 2020 05:01  Phos  2.3     11-30  Mg     2.1     11-30    TPro  6.8  /  Alb  2.3<L>  /  TBili  2.3<H>  /  DBili  1.20<H>  /  AST  14<L>  /  ALT  25  /  AlkPhos  89  11-30      Imaging:  < from: CT Angio Chest w/ IV Cont (11.30.20 @ 02:43) >    EXAM:  CT ANGIO CHEST (W)AW IC                            PROCEDURE DATE:  11/30/2020          INTERPRETATION:  CLINICAL INFORMATION: Atrial fibrillation, shortness of breath, on anticoagulation    COMPARISON: No similar prior    PROCEDURE:  CT Angiography of the Chest.  90 ml of Omnipaque 350 was injected intravenously. 10 ml were discarded.  Sagittal and coronal reformats were performed as well as 3D (MIP) reconstructions.  CT dose lowering techniques were used, to include: automated exposurecontrol, adjustment for patient size, and/or use of iterative reconstruction.?      FINDINGS:    LUNGS AND AIRWAYS: Patent central airways.  Patchy dependent groundglass opacities, nonspecific.  PLEURA: Small bilateral pleural effusions.  MEDIASTINUMAND JEROD: Mild mediastinal adenopathy. No pneumomediastinum.  VESSELS: Thoracic aorta is normal in caliber without dissection. Pulmonary arterial system is opacified to the segmental level. Within the limitations of respiratory motion artifact degrades distal segmental and segmental branches in the apices and lung bases, no evidence of acute pulmonary embolus. Main pulmonary artery is normal in caliber. Coronary artery calcifications.  HEART: Heart size is normal. Large circumferential pericardial effusion. No CT findings to suggest acute right heart strain.  CHEST WALL AND LOWER NECK: Within normal limits.  VISUALIZED UPPER ABDOMEN: No acute abnormality.  BONES: No acute fracture    IMPRESSION:    1. Large circumferential pericardial effusion.  2. No evidence of acute pulmonary embolus to the proximal segmental level. Distal segmental and subsegmental branches are partially obscured due to respiratory motion artifact.  3. Small bilateral pleural effusions      BETHANIE MALDONADO MD; Attending Radiologist  This document has been electronically signed. Nov 30 2020  3:24AM    < end of copied text >        Assessment:  56 y/o M pmhx a fib ( s/p ablation 2017), PUD, anxiety, marijuana use, with continued a fib found to have large circumferential pericardial effusion on CT angio of chest, of so far unknown etiology,     Plan:  - OR tomorrow for subxiphoid pericardial   - f/u echo  - NPOM  - cont care per primary team  - will follow  - am labs  - discussed with Dr. Goodwin

## 2020-11-30 NOTE — PROGRESS NOTE ADULT - PROBLEM SELECTOR PLAN 4
- Chronic, not taking outpatient medications  - Reportedly due to family/life stressors  - S/p 1x Ativan 2mg in the ED, wife reports patient often requires Ativan during hospital presentations and responds well  - Ativan PRN for anxiety  - Recommend outpatient psych follow up - Chronic, not taking outpatient medications  - Reportedly due to family/life stressors  - S/p 1x Ativan 2mg in the ED, wife reports patient often requires Ativan during hospital presentations and responds well  - started on precedex gtt overnight, wean as tolerated, continue Lorazepam 1mg q6h PRN for anxiety  - Recommend outpatient psych follow up  -management as per ICU team

## 2020-11-30 NOTE — PROGRESS NOTE ADULT - ATTENDING COMMENTS
Patient seen and examined at bedside. Patient transferred to ICU overnight for persistent Afib with RVR, started on amio drip and precedex for sedation. Patient had CTA which showed no PE, but concerning for large pericardial effusion. CT surgery consulted, appreciate recs, may need pericardial window. Discussed finding with wife at length, all questions were answered to the best of my ability.

## 2020-11-30 NOTE — CONSULT NOTE ADULT - ATTENDING COMMENTS
Patient seen and examined. Vitals and CT chest reviewed independently. See large-moderate pericardial effusion on the CT chest which was not present on 11/9/2020 CT of the abdomen. The effusion appears to be acute or subacute.   Patient is a bit lethargic. He denies any difficulty breathing, he tells me he cannot lay flat. The ECHO is done but the results are pending.     I consented the wife for the procedure. She is extremely concerned because the subxiphiod window is more invasive than a pericardiocentesis.   I personally called Dr. Ansari (cardiology) at [facility] to potentially transfer the Patient for a pericardiocentesis. After discussion, it was felt that the window was a better diagnostic/therapuetic intervention than a pericardiocentesis.     Patient will be added to the OR schedule for tomorrow for elective subxiphoid window.

## 2020-11-30 NOTE — PROGRESS NOTE ADULT - PROBLEM SELECTOR PLAN 2
- Pt has presented to the ED multiple times with these symptoms after smoking marijuana. Social hx remarkable for 1-2 joints per day  - UTox screen positive for THC  - Marijuana cessation advised, patient and wife both report his desire to seek help. Per wife, patient was initially brought to [facility] where he was told he needed medical attention first.  - S/p Zofran and Reglan in the ED, has prolonged QTc. Tigan PRN for nausea and emesis  - Addiction Dr. Mireles consulted, Ativan PRN - Pt has presented to the ED multiple times with these symptoms after smoking marijuana. Social hx remarkable for 1-2 joints per day  - UTox screen positive for THC  - Marijuana cessation advised, patient and wife both report his desire to seek help. Per wife, patient was initially brought to [facility] where he was told he needed medical attention first.  - S/p Zofran and Reglan in the ED, has prolonged QTc. Reglan PRN for nausea and emesis  - Addiction Dr. Mireles following, recs Ativan PRN  -Management as per ICU team

## 2020-11-30 NOTE — PROGRESS NOTE ADULT - ASSESSMENT
canniboid hyperemesis  afib  pericardial effusion    cont clears  no emesis today  iv fluid  CT and cardiology following

## 2020-12-01 ENCOUNTER — TRANSCRIPTION ENCOUNTER (OUTPATIENT)
Age: 55
End: 2020-12-01

## 2020-12-01 VITALS
OXYGEN SATURATION: 95 % | SYSTOLIC BLOOD PRESSURE: 141 MMHG | HEART RATE: 98 BPM | DIASTOLIC BLOOD PRESSURE: 71 MMHG | RESPIRATION RATE: 34 BRPM

## 2020-12-01 DIAGNOSIS — R11.10 VOMITING, UNSPECIFIED: ICD-10-CM

## 2020-12-01 DIAGNOSIS — I31.3 PERICARDIAL EFFUSION (NONINFLAMMATORY): ICD-10-CM

## 2020-12-01 DIAGNOSIS — R63.4 ABNORMAL WEIGHT LOSS: ICD-10-CM

## 2020-12-01 LAB
ALBUMIN SERPL ELPH-MCNC: 2.1 G/DL — LOW (ref 3.3–5)
ALP SERPL-CCNC: 95 U/L — SIGNIFICANT CHANGE UP (ref 40–120)
ALT FLD-CCNC: 20 U/L — SIGNIFICANT CHANGE UP (ref 12–78)
ANA TITR SER: NEGATIVE — SIGNIFICANT CHANGE UP
ANION GAP SERPL CALC-SCNC: 9 MMOL/L — SIGNIFICANT CHANGE UP (ref 5–17)
AST SERPL-CCNC: 15 U/L — SIGNIFICANT CHANGE UP (ref 15–37)
BASOPHILS # BLD AUTO: 0.02 K/UL — SIGNIFICANT CHANGE UP (ref 0–0.2)
BASOPHILS NFR BLD AUTO: 0.1 % — SIGNIFICANT CHANGE UP (ref 0–2)
BILIRUB DIRECT SERPL-MCNC: 1 MG/DL — HIGH (ref 0.05–0.2)
BILIRUB INDIRECT FLD-MCNC: 0.9 MG/DL — SIGNIFICANT CHANGE UP (ref 0.2–1)
BILIRUB SERPL-MCNC: 1.9 MG/DL — HIGH (ref 0.2–1.2)
BUN SERPL-MCNC: 13 MG/DL — SIGNIFICANT CHANGE UP (ref 7–23)
CALCIUM SERPL-MCNC: 7.9 MG/DL — LOW (ref 8.5–10.1)
CHLORIDE SERPL-SCNC: 107 MMOL/L — SIGNIFICANT CHANGE UP (ref 96–108)
CO2 SERPL-SCNC: 23 MMOL/L — SIGNIFICANT CHANGE UP (ref 22–31)
CREAT SERPL-MCNC: 0.65 MG/DL — SIGNIFICANT CHANGE UP (ref 0.5–1.3)
EBV PATRN SPEC IB-IMP: SIGNIFICANT CHANGE UP
EOSINOPHIL # BLD AUTO: 0.03 K/UL — SIGNIFICANT CHANGE UP (ref 0–0.5)
EOSINOPHIL NFR BLD AUTO: 0.2 % — SIGNIFICANT CHANGE UP (ref 0–6)
FERRITIN SERPL-MCNC: 727 NG/ML — HIGH (ref 30–400)
GLUCOSE SERPL-MCNC: 104 MG/DL — HIGH (ref 70–99)
HAV IGG SER QL IA: REACTIVE
HAV IGM SER-ACNC: SIGNIFICANT CHANGE UP
HBV CORE IGM SER-ACNC: SIGNIFICANT CHANGE UP
HBV SURFACE AB SER-ACNC: REACTIVE
HBV SURFACE AG SER-ACNC: SIGNIFICANT CHANGE UP
HCT VFR BLD CALC: 34.5 % — LOW (ref 39–50)
HGB BLD-MCNC: 11.7 G/DL — LOW (ref 13–17)
IMM GRANULOCYTES NFR BLD AUTO: 0.6 % — SIGNIFICANT CHANGE UP (ref 0–1.5)
LYMPHOCYTES # BLD AUTO: 1.55 K/UL — SIGNIFICANT CHANGE UP (ref 1–3.3)
LYMPHOCYTES # BLD AUTO: 10.9 % — LOW (ref 13–44)
M PNEUMO IGG SER IA-ACNC: 2.57 INDEX — HIGH (ref 0–0.9)
M PNEUMO IGG SER IA-ACNC: POSITIVE
M PNEUMO IGM SER-ACNC: 0.03 INDEX — SIGNIFICANT CHANGE UP (ref 0–0.9)
MAGNESIUM SERPL-MCNC: 2.2 MG/DL — SIGNIFICANT CHANGE UP (ref 1.6–2.6)
MCHC RBC-ENTMCNC: 28.3 PG — SIGNIFICANT CHANGE UP (ref 27–34)
MCHC RBC-ENTMCNC: 33.9 GM/DL — SIGNIFICANT CHANGE UP (ref 32–36)
MCV RBC AUTO: 83.5 FL — SIGNIFICANT CHANGE UP (ref 80–100)
MONOCYTES # BLD AUTO: 0.93 K/UL — HIGH (ref 0–0.9)
MONOCYTES NFR BLD AUTO: 6.6 % — SIGNIFICANT CHANGE UP (ref 2–14)
MYCOPLASMA AG SPEC QL: NEGATIVE — SIGNIFICANT CHANGE UP
NEUTROPHILS # BLD AUTO: 11.58 K/UL — HIGH (ref 1.8–7.4)
NEUTROPHILS NFR BLD AUTO: 81.6 % — HIGH (ref 43–77)
NRBC # BLD: 0 /100 WBCS — SIGNIFICANT CHANGE UP (ref 0–0)
PHOSPHATE SERPL-MCNC: 2 MG/DL — LOW (ref 2.5–4.5)
PLATELET # BLD AUTO: 337 K/UL — SIGNIFICANT CHANGE UP (ref 150–400)
POTASSIUM SERPL-MCNC: 3.4 MMOL/L — LOW (ref 3.5–5.3)
POTASSIUM SERPL-SCNC: 3.4 MMOL/L — LOW (ref 3.5–5.3)
PROCALCITONIN SERPL-MCNC: 0.32 NG/ML — HIGH (ref 0–0.04)
PROT SERPL-MCNC: 6.5 G/DL — SIGNIFICANT CHANGE UP (ref 6–8.3)
RBC # BLD: 4.13 M/UL — LOW (ref 4.2–5.8)
RBC # FLD: 12.6 % — SIGNIFICANT CHANGE UP (ref 10.3–14.5)
SODIUM SERPL-SCNC: 139 MMOL/L — SIGNIFICANT CHANGE UP (ref 135–145)
WBC # BLD: 14.19 K/UL — HIGH (ref 3.8–10.5)
WBC # FLD AUTO: 14.19 K/UL — HIGH (ref 3.8–10.5)

## 2020-12-01 PROCEDURE — 85730 THROMBOPLASTIN TIME PARTIAL: CPT

## 2020-12-01 PROCEDURE — 99291 CRITICAL CARE FIRST HOUR: CPT

## 2020-12-01 PROCEDURE — 87340 HEPATITIS B SURFACE AG IA: CPT

## 2020-12-01 PROCEDURE — 99291 CRITICAL CARE FIRST HOUR: CPT | Mod: 25

## 2020-12-01 PROCEDURE — 83615 LACTATE (LD) (LDH) ENZYME: CPT

## 2020-12-01 PROCEDURE — 87086 URINE CULTURE/COLONY COUNT: CPT

## 2020-12-01 PROCEDURE — 96374 THER/PROPH/DIAG INJ IV PUSH: CPT

## 2020-12-01 PROCEDURE — 86709 HEPATITIS A IGM ANTIBODY: CPT

## 2020-12-01 PROCEDURE — 86738 MYCOPLASMA ANTIBODY: CPT

## 2020-12-01 PROCEDURE — 83735 ASSAY OF MAGNESIUM: CPT

## 2020-12-01 PROCEDURE — 86140 C-REACTIVE PROTEIN: CPT

## 2020-12-01 PROCEDURE — 80048 BASIC METABOLIC PNL TOTAL CA: CPT

## 2020-12-01 PROCEDURE — 36415 COLL VENOUS BLD VENIPUNCTURE: CPT

## 2020-12-01 PROCEDURE — 87040 BLOOD CULTURE FOR BACTERIA: CPT

## 2020-12-01 PROCEDURE — 86665 EPSTEIN-BARR CAPSID VCA: CPT

## 2020-12-01 PROCEDURE — 84443 ASSAY THYROID STIM HORMONE: CPT

## 2020-12-01 PROCEDURE — 80307 DRUG TEST PRSMV CHEM ANLYZR: CPT

## 2020-12-01 PROCEDURE — 93306 TTE W/DOPPLER COMPLETE: CPT

## 2020-12-01 PROCEDURE — 86769 SARS-COV-2 COVID-19 ANTIBODY: CPT

## 2020-12-01 PROCEDURE — 85652 RBC SED RATE AUTOMATED: CPT

## 2020-12-01 PROCEDURE — 86901 BLOOD TYPING SEROLOGIC RH(D): CPT

## 2020-12-01 PROCEDURE — 86900 BLOOD TYPING SEROLOGIC ABO: CPT

## 2020-12-01 PROCEDURE — 96376 TX/PRO/DX INJ SAME DRUG ADON: CPT

## 2020-12-01 PROCEDURE — 86480 TB TEST CELL IMMUN MEASURE: CPT

## 2020-12-01 PROCEDURE — 85025 COMPLETE CBC W/AUTO DIFF WBC: CPT

## 2020-12-01 PROCEDURE — 71045 X-RAY EXAM CHEST 1 VIEW: CPT

## 2020-12-01 PROCEDURE — 82553 CREATINE MB FRACTION: CPT

## 2020-12-01 PROCEDURE — 82728 ASSAY OF FERRITIN: CPT

## 2020-12-01 PROCEDURE — 83690 ASSAY OF LIPASE: CPT

## 2020-12-01 PROCEDURE — 86644 CMV ANTIBODY: CPT

## 2020-12-01 PROCEDURE — 82550 ASSAY OF CK (CPK): CPT

## 2020-12-01 PROCEDURE — 80053 COMPREHEN METABOLIC PANEL: CPT

## 2020-12-01 PROCEDURE — 87640 STAPH A DNA AMP PROBE: CPT

## 2020-12-01 PROCEDURE — 86706 HEP B SURFACE ANTIBODY: CPT

## 2020-12-01 PROCEDURE — 76705 ECHO EXAM OF ABDOMEN: CPT

## 2020-12-01 PROCEDURE — U0003: CPT

## 2020-12-01 PROCEDURE — 99239 HOSP IP/OBS DSCHRG MGMT >30: CPT | Mod: GC

## 2020-12-01 PROCEDURE — 85610 PROTHROMBIN TIME: CPT

## 2020-12-01 PROCEDURE — 86664 EPSTEIN-BARR NUCLEAR ANTIGEN: CPT

## 2020-12-01 PROCEDURE — 93308 TTE F-UP OR LMTD: CPT

## 2020-12-01 PROCEDURE — 86658 ENTEROVIRUS ANTIBODY: CPT

## 2020-12-01 PROCEDURE — 86705 HEP B CORE ANTIBODY IGM: CPT

## 2020-12-01 PROCEDURE — 86747 PARVOVIRUS ANTIBODY: CPT

## 2020-12-01 PROCEDURE — 84480 ASSAY TRIIODOTHYRONINE (T3): CPT

## 2020-12-01 PROCEDURE — 93005 ELECTROCARDIOGRAM TRACING: CPT

## 2020-12-01 PROCEDURE — 0225U NFCT DS DNA&RNA 21 SARSCOV2: CPT

## 2020-12-01 PROCEDURE — 86645 CMV ANTIBODY IGM: CPT

## 2020-12-01 PROCEDURE — 99232 SBSQ HOSP IP/OBS MODERATE 35: CPT

## 2020-12-01 PROCEDURE — 87799 DETECT AGENT NOS DNA QUANT: CPT

## 2020-12-01 PROCEDURE — 86038 ANTINUCLEAR ANTIBODIES: CPT

## 2020-12-01 PROCEDURE — 86850 RBC ANTIBODY SCREEN: CPT

## 2020-12-01 PROCEDURE — 84436 ASSAY OF TOTAL THYROXINE: CPT

## 2020-12-01 PROCEDURE — 96361 HYDRATE IV INFUSION ADD-ON: CPT

## 2020-12-01 PROCEDURE — 71275 CT ANGIOGRAPHY CHEST: CPT

## 2020-12-01 PROCEDURE — 99233 SBSQ HOSP IP/OBS HIGH 50: CPT

## 2020-12-01 PROCEDURE — 84484 ASSAY OF TROPONIN QUANT: CPT

## 2020-12-01 PROCEDURE — 96375 TX/PRO/DX INJ NEW DRUG ADDON: CPT

## 2020-12-01 PROCEDURE — 80076 HEPATIC FUNCTION PANEL: CPT

## 2020-12-01 PROCEDURE — 86663 EPSTEIN-BARR ANTIBODY: CPT

## 2020-12-01 PROCEDURE — 93306 TTE W/DOPPLER COMPLETE: CPT | Mod: 26

## 2020-12-01 PROCEDURE — 82248 BILIRUBIN DIRECT: CPT

## 2020-12-01 PROCEDURE — 84100 ASSAY OF PHOSPHORUS: CPT

## 2020-12-01 PROCEDURE — 87641 MR-STAPH DNA AMP PROBE: CPT

## 2020-12-01 PROCEDURE — 84145 PROCALCITONIN (PCT): CPT

## 2020-12-01 PROCEDURE — 86708 HEPATITIS A ANTIBODY: CPT

## 2020-12-01 RX ORDER — DILTIAZEM HCL 120 MG
1 CAPSULE, EXT RELEASE 24 HR ORAL
Qty: 0 | Refills: 0 | DISCHARGE
Start: 2020-12-01

## 2020-12-01 RX ORDER — ALPRAZOLAM 0.25 MG
0.5 TABLET ORAL ONCE
Refills: 0 | Status: DISCONTINUED | OUTPATIENT
Start: 2020-12-01 | End: 2020-12-01

## 2020-12-01 RX ORDER — METOPROLOL TARTRATE 50 MG
1 TABLET ORAL
Qty: 0 | Refills: 0 | DISCHARGE
Start: 2020-12-01

## 2020-12-01 RX ORDER — SODIUM CHLORIDE 9 MG/ML
1000 INJECTION, SOLUTION INTRAVENOUS
Refills: 0 | Status: DISCONTINUED | OUTPATIENT
Start: 2020-12-01 | End: 2020-12-01

## 2020-12-01 RX ORDER — OMEPRAZOLE 10 MG/1
1 CAPSULE, DELAYED RELEASE ORAL
Qty: 0 | Refills: 0 | DISCHARGE

## 2020-12-01 RX ORDER — METOPROLOL TARTRATE 50 MG
25 TABLET ORAL EVERY 8 HOURS
Refills: 0 | Status: DISCONTINUED | OUTPATIENT
Start: 2020-12-01 | End: 2020-12-01

## 2020-12-01 RX ORDER — AMIODARONE HYDROCHLORIDE 400 MG/1
16.7 TABLET ORAL
Qty: 0 | Refills: 0 | DISCHARGE
Start: 2020-12-01

## 2020-12-01 RX ORDER — COLCHICINE 0.6 MG
1 TABLET ORAL
Qty: 0 | Refills: 0 | DISCHARGE
Start: 2020-12-01

## 2020-12-01 RX ORDER — PANTOPRAZOLE SODIUM 20 MG/1
40 TABLET, DELAYED RELEASE ORAL
Qty: 0 | Refills: 0 | DISCHARGE
Start: 2020-12-01

## 2020-12-01 RX ORDER — MORPHINE SULFATE 50 MG/1
2 CAPSULE, EXTENDED RELEASE ORAL ONCE
Refills: 0 | Status: DISCONTINUED | OUTPATIENT
Start: 2020-12-01 | End: 2020-12-01

## 2020-12-01 RX ORDER — POTASSIUM PHOSPHATE, MONOBASIC POTASSIUM PHOSPHATE, DIBASIC 236; 224 MG/ML; MG/ML
30 INJECTION, SOLUTION INTRAVENOUS ONCE
Refills: 0 | Status: COMPLETED | OUTPATIENT
Start: 2020-12-01 | End: 2020-12-01

## 2020-12-01 RX ORDER — SODIUM CHLORIDE 9 MG/ML
125 INJECTION, SOLUTION INTRAVENOUS
Qty: 0 | Refills: 0 | DISCHARGE
Start: 2020-12-01

## 2020-12-01 RX ORDER — BISOPROLOL FUMARATE 10 MG/1
1 TABLET, FILM COATED ORAL
Qty: 0 | Refills: 0 | DISCHARGE

## 2020-12-01 RX ORDER — DILTIAZEM HCL 120 MG
60 CAPSULE, EXT RELEASE 24 HR ORAL EVERY 6 HOURS
Refills: 0 | Status: DISCONTINUED | OUTPATIENT
Start: 2020-12-01 | End: 2020-12-01

## 2020-12-01 RX ADMIN — CHLORHEXIDINE GLUCONATE 1 APPLICATION(S): 213 SOLUTION TOPICAL at 05:10

## 2020-12-01 RX ADMIN — ENOXAPARIN SODIUM 40 MILLIGRAM(S): 100 INJECTION SUBCUTANEOUS at 13:18

## 2020-12-01 RX ADMIN — Medication 25 MILLIGRAM(S): at 21:50

## 2020-12-01 RX ADMIN — Medication 25 MILLIGRAM(S): at 13:17

## 2020-12-01 RX ADMIN — DEXMEDETOMIDINE HYDROCHLORIDE IN 0.9% SODIUM CHLORIDE 2.41 MICROGRAM(S)/KG/HR: 4 INJECTION INTRAVENOUS at 13:18

## 2020-12-01 RX ADMIN — Medication 60 MILLIGRAM(S): at 17:41

## 2020-12-01 RX ADMIN — MORPHINE SULFATE 2 MILLIGRAM(S): 50 CAPSULE, EXTENDED RELEASE ORAL at 02:30

## 2020-12-01 RX ADMIN — Medication 20 MG/HR: at 05:10

## 2020-12-01 RX ADMIN — PANTOPRAZOLE SODIUM 40 MILLIGRAM(S): 20 TABLET, DELAYED RELEASE ORAL at 13:18

## 2020-12-01 RX ADMIN — MORPHINE SULFATE 2 MILLIGRAM(S): 50 CAPSULE, EXTENDED RELEASE ORAL at 03:20

## 2020-12-01 RX ADMIN — SODIUM CHLORIDE 75 MILLILITER(S): 9 INJECTION INTRAMUSCULAR; INTRAVENOUS; SUBCUTANEOUS at 05:12

## 2020-12-01 RX ADMIN — SODIUM CHLORIDE 125 MILLILITER(S): 9 INJECTION, SOLUTION INTRAVENOUS at 13:18

## 2020-12-01 RX ADMIN — Medication 60 MILLIGRAM(S): at 13:17

## 2020-12-01 RX ADMIN — Medication 5 MILLIGRAM(S): at 05:10

## 2020-12-01 RX ADMIN — Medication 0.5 MILLIGRAM(S): at 16:01

## 2020-12-01 RX ADMIN — Medication 0.6 MILLIGRAM(S): at 17:40

## 2020-12-01 RX ADMIN — POTASSIUM PHOSPHATE, MONOBASIC POTASSIUM PHOSPHATE, DIBASIC 83.33 MILLIMOLE(S): 236; 224 INJECTION, SOLUTION INTRAVENOUS at 08:23

## 2020-12-01 NOTE — DISCHARGE NOTE NURSING/CASE MANAGEMENT/SOCIAL WORK - PATIENT PORTAL LINK FT
You can access the FollowMyHealth Patient Portal offered by [facility] by registering at the following website: [email or web address]. By joining [facility]’s FollowMyHealth portal, you will also be able to view your health information using other applications (apps) compatible with our system.

## 2020-12-01 NOTE — BEHAVIORAL HEALTH ASSESSMENT NOTE - RISK ASSESSMENT
Low Acute Suicide Risk Risk factors: substance abuse, acute medical issue, anxiety, unemployed    Protective factors: no current SIIP/HIIP, no h/o SA/SIB, no h/o psych admissions, domiciled, intact marriage, engaged in gamblers anonymous, help-seeking behaviors    Pt with chronically elevated risk in the setting of substance abuse mitigated by multiple protective factors; does not meet criteria for psychiatric admission.

## 2020-12-01 NOTE — PROGRESS NOTE ADULT - PROBLEM SELECTOR PROBLEM 1
Cannabinoid hyperemesis syndrome
Pericardial effusion
Rapid atrial fibrillation

## 2020-12-01 NOTE — CONSULT NOTE ADULT - SUBJECTIVE AND OBJECTIVE BOX
[facility] DIVISION of INFECTIOUS DISEASE  Johnathon Urbano MD PhD, Pattie Baca MD, Shelli Matson MD, Ana Dutta MD  and providing coverage with Lena Sams MD and Loki Bentley MD  Providing Infectious Disease Consultations at [facility], [facility], [facility], [facility]'s    Office# [phone] to schedule follow up appointments  Answering Service for urgent calls or New Consults [phone]  Cell# to text for urgent issues Johnathon Urbano [phone]     HPI:  54yo M, originally from [facility] with PMH/o afib s/p ablation in 10/2017 (not on AC), PUD, anxiety, and daily marijuana use with hyperemesis, presenting with 40lb weight loss  abdominal pain, nausea, and vomiting since 11/26 after using marijuana. Of note, patient with recent admission to [facility] 11/10 for similar complaints.  Patient provides limited ROS: endorses palpitations; previous abd pain, N/V now resolved. PT noted to have pericardial effusion and ID consultation requested.      PAST MEDICAL & SURGICAL HISTORY:  PUD (peptic ulcer disease)    Marijuana use    Anxiety    Atrial fibrillation, unspecified type    H/O prior ablation treatment  10/2017, for A-fib      Antimicrobials      Immunological  influenza   Vaccine 0.5 milliLiter(s) IntraMuscular once      Other  aMIOdarone Infusion 0.5 mG/Min IV Continuous <Continuous>  aspirin 650 milliGRAM(s) Oral every 12 hours  chlorhexidine 2% Cloths 1 Application(s) Topical every 24 hours  colchicine 0.6 milliGRAM(s) Oral two times a day  dexMEDEtomidine Infusion 0.1 MICROgram(s)/kG/Hr IV Continuous <Continuous>  diltiazem Infusion 20 mG/Hr IV Continuous <Continuous>  enoxaparin Injectable 40 milliGRAM(s) SubCutaneous daily  LORazepam   Injectable 1 milliGRAM(s) IV Push every 6 hours PRN  metoprolol tartrate Injectable 5 milliGRAM(s) IV Push every 6 hours  pantoprazole  Injectable 40 milliGRAM(s) IV Push daily  sodium chloride 0.9%. 1000 milliLiter(s) IV Continuous <Continuous>      Allergies    No Known Allergies    Intolerances        SOCIAL HISTORY:  Social History:  Denies tobacco or EtOH use  No other recreational drug use besides marijuana  Lives at home with wife and two sons  Performs ADLs and ambulates independently  Has declined flu vaccine (29 Nov 2020 00:41)      FAMILY HISTORY:  Family history of hypertension in mother (Mother)  Family history of prostate cancer in father (Father)  Family history of diabetes mellitus in mother (Mother)        ROS:    EYES:  Negative  blurry vision or double vision  GASTROINTESTINAL:  Negative for cough, no night sweats  -otherwise negative except for subjective    Vital Signs Last 24 Hrs  T(C): 36.7 (01 Dec 2020 07:53), Max: 37.4 (30 Nov 2020 16:19)  T(F): 98 (01 Dec 2020 07:53), Max: 99.3 (30 Nov 2020 16:19)  HR: 107 (01 Dec 2020 08:00) (92 - 131)  BP: 159/76 (01 Dec 2020 08:00) (117/69 - 159/76)  BP(mean): 108 (01 Dec 2020 08:00) (88 - 121)  RR: 36 (01 Dec 2020 08:00) (28 - 68)  SpO2: 91% (01 Dec 2020 08:00) (91% - 96%)    PE:  WDWN in no distress  HEENT:  NC, PERRL, sclerae anicteric, conjunctivae clear, EOMI.  Sinuses nontender, no nasal exudate.  No buccal or pharyngeal lesions, erythema or exudate  Neck:  Supple, no adenopathy  Lungs:  No accessory muscle use, bilaterally clear to auscultation  Cor: irreg irreg, S1, S2, no murmur appreciated  Abd:  Symmetric, normoactive BS.  Soft, nontender, no masses, guarding or rebound.  Liver and spleen not enlarged  Extrem:  No cyanosis or edema  Skin:  No rashes.  Neuro: grossly intact  Musc: moving all limbs freely, no focal deficits    LABS:                        11.7   14.19 )-----------( 337      ( 01 Dec 2020 06:10 )             34.5       WBC Count: 14.19 K/uL (12-01-20 @ 06:10)  WBC Count: 18.41 K/uL (11-30-20 @ 05:01)  WBC Count: 12.83 K/uL (11-29-20 @ 06:12)  WBC Count: 14.65 K/uL (11-28-20 @ 20:36)      12-01    139  |  107  |  13  ----------------------------<  104<H>  3.4<L>   |  23  |  0.65    Ca    7.9<L>      01 Dec 2020 06:10  Phos  2.0     12-01  Mg     2.2     12-01    TPro  6.5  /  Alb  2.1<L>  /  TBili  1.9<H>  /  DBili  1.00<H>  /  AST  15  /  ALT  20  /  AlkPhos  95  12-01      Creatinine, Serum: 0.65 mg/dL (12-01-20 @ 06:10)  Creatinine, Serum: 0.89 mg/dL (11-30-20 @ 05:01)  Creatinine, Serum: 0.93 mg/dL (11-29-20 @ 23:43)  Creatinine, Serum: 0.85 mg/dL (11-29-20 @ 06:12)  Creatinine, Serum: 1.10 mg/dL (11-28-20 @ 20:36)      MICROBIOLOGY:      RADIOLOGY & ADDITIONAL STUDIES:    --< from: CT Angio Chest w/ IV Cont (11.30.20 @ 02:43) >    EXAM:  CT ANGIO CHEST (W)AW IC                            PROCEDURE DATE:  11/30/2020          INTERPRETATION:  CLINICAL INFORMATION: Atrial fibrillation, shortness of breath, on anticoagulation    COMPARISON: No similar prior    PROCEDURE:  CT Angiography of the Chest.  90 ml of Omnipaque 350 was injected intravenously. 10 ml were discarded.  Sagittal and coronal reformats were performed as well as 3D (MIP) reconstructions.  CT dose lowering techniques were used, to include: automated exposurecontrol, adjustment for patient size, and/or use of iterative reconstruction.?      FINDINGS:    LUNGS AND AIRWAYS: Patent central airways.  Patchy dependent groundglass opacities, nonspecific.  PLEURA: Small bilateral pleural effusions.  MEDIASTINUMAND JEROD: Mild mediastinal adenopathy. No pneumomediastinum.  VESSELS: Thoracic aorta is normal in caliber without dissection. Pulmonary arterial system is opacified to the segmental level. Within the limitations of respiratory motion artifact degrades distal segmental and segmental branches in the apices and lung bases, no evidence of acute pulmonary embolus. Main pulmonary artery is normal in caliber. Coronary artery calcifications.  HEART: Heart size is normal. Large circumferential pericardial effusion. No CT findings to suggest acute right heart strain.  CHEST WALL AND LOWER NECK: Within normal limits.  VISUALIZED UPPER ABDOMEN: No acute abnormality.  BONES: No acute fracture    IMPRESSION:    1. Large circumferential pericardial effusion.  2. No evidence of acute pulmonary embolus to the proximal segmental level. Distal segmental and subsegmental branches are partially obscured due to respiratory motion artifact.  3. Small bilateral pleural effusions

## 2020-12-01 NOTE — PROGRESS NOTE ADULT - ASSESSMENT
54yo M, with PMH/o afib s/p ablation in 10/2017 (not on AC), PUD, anxiety, and daily marijuana use with hyperemesis, presenting with abdominal pain, nausea, and vomiting since 11/26 after using marijuana, found to be in rapid afib, admitted for further management of afib with RVR and cannabinoid hyperemesis syndrome. Transferred to ICU for afib with uncontrolled rate w/ need for pressors and gtt.

## 2020-12-01 NOTE — PROGRESS NOTE ADULT - PROBLEM SELECTOR PLAN 6
- Present on admission, downtrend yesterday AM labs, with increase in todays AM labs. Will continue to monitor.  - Possibly reactive to vomiting and demand.   - No obvious signs of infection  - Monitor daily CBC  -management as per ICU team - Present on admission, initially down trending with then increase to 18.41 -- Now improving with wbc 14.19 noted  - Possibly reactive to vomiting and demand.   - No obvious signs of infection  - Monitor daily CBC  - ID, Dr. Urbano, following.  - management as per ICU team

## 2020-12-01 NOTE — PROGRESS NOTE ADULT - ATTENDING COMMENTS
Patient seen and examined     54 y/o male with hx paroxysmal A.fib s/p ablation in 2017, PUD, anxiety, daily marijuana use with hyperemesis, initially presented with nausea, vomiting, found to have A.fib with RVR. He underwent CTA chest which did not show central PE but showed circumferential pericardial effusion. Patient denies any recent fever, chills, cough, chest pain, joint pains, rash or other skin lesions.     HR improved, 90-110s mostly, BP stable   C/o dryness, thirst, has dry mucosa on exam   More interactive today and mostly oriented however has fluctuating mental status and seems extremely anxious and restless    Denies pain     Bedside ECHO with moderate pericardial effusion without tamponade physiology     -Patient was scheduled for a diagnostic pericardial window today however it was canceled due to patient being on aspirin 650 mg (started along with colchicine after d/w cardiology yesterday) and bleeding with potential tamponade risk if the drain gets clogged. Repeat ECHO in 2-3 days with subsequent pericardial window if necessary   -Start diet, po diltiazem and metoprolol and d/c iv drip   -Continue amio for today, will likely d/c tomorrow   -Not an AC candidate due to low CHADS vasc score and noncompliance   -Continue Precedex, add prn Xanax for anxiety   -Change IVF to LR at 125 ml/hr   -Infectious w/u ordered by ID  -Further diagnostic w/u for pericardial effusion after diagnostic drainage     Patient's wife is requesting transfer to [place] for further care, I called Dr. Su at her request and he accepts the patient under his service    Patient critically ill, 37 mins spent

## 2020-12-01 NOTE — BEHAVIORAL HEALTH ASSESSMENT NOTE - NSBHCHARTREVIEWLAB_PSY_A_CORE FT
11.7   14.19 )-----------( 337      ( 01 Dec 2020 06:10 )             34.5   12-01    139  |  107  |  13  ----------------------------<  104<H>  3.4<L>   |  23  |  0.65    Ca    7.9<L>      01 Dec 2020 06:10  Phos  2.0     12-01  Mg     2.2     12-01    TPro  6.5  /  Alb  2.1<L>  /  TBili  1.9<H>  /  DBili  1.00<H>  /  AST  15  /  ALT  20  /  AlkPhos  95  12-01

## 2020-12-01 NOTE — PROGRESS NOTE ADULT - ASSESSMENT
cannabinoid hyperemesis  elevated lfts  afib  pericardial effusion    no further vomiting  no gi objection to diet as tolerated  ivf prn, monitor elytes  cont ppi daily  trend lfts   CT and cardiology evals noted  further care per icu

## 2020-12-01 NOTE — DISCHARGE NOTE PROVIDER - NSDCMRMEDTOKEN_GEN_ALL_CORE_FT
bisoprolol 10 mg oral tablet: 1 tab(s) orally once a day  omeprazole 40 mg oral delayed release capsule: 1 cap(s) orally once a day   amiodarone: 16.7 milliliter(s) intravenous every hour  colchicine 0.6 mg oral tablet: 1 tab(s) orally 2 times a day  dilTIAZem 60 mg oral tablet: 1 tab(s) orally every 6 hours  Lactated Ringers Injection intravenous solution: 125 milliliter(s) intravenous every hour  LORazepam: 1 milligram(s) intravenous every 6 hours, As Needed  metoprolol tartrate 25 mg oral tablet: 1 tab(s) orally every 8 hours  pantoprazole 40 mg intravenous injection: 40 milligram(s) intravenous once a day   amiodarone: 16.7 milliliter(s) intravenous every hour  colchicine 0.6 mg oral tablet: 1 tab(s) orally 2 times a day  dilTIAZem 60 mg oral tablet: 1 tab(s) orally every 6 hours  LORazepam: 1 milligram(s) intravenous every 6 hours, As Needed  metoprolol tartrate 25 mg oral tablet: 1 tab(s) orally every 8 hours  pantoprazole 40 mg intravenous injection: 40 milligram(s) intravenous once a day

## 2020-12-01 NOTE — BEHAVIORAL HEALTH ASSESSMENT NOTE - NSBHCHARTREVIEWIMAGING_PSY_A_CORE FT
< from: Xray Chest 1 View- PORTABLE-Urgent (Xray Chest 1 View- PORTABLE-Urgent .) (11.29.20 @ 01:10) >    EXAM:  XR CHEST PORTABLE URGENT 1V                            PROCEDURE DATE:  11/29/2020          INTERPRETATION:  HISTORY: Shortness of breath    TECHNIQUE: Single frontal view of the chest with comparison to 7/2/2020    FINDINGS:    Heart is enlarged. Minimal pulmonary vascular congestion. The apices and hemidiaphragms are unremarkable. Degenerative changes.        IMPRESSION:    Minimal pulmonary vascular congestion        < end of copied text >

## 2020-12-01 NOTE — BEHAVIORAL HEALTH ASSESSMENT NOTE - NSBHCHARTREVIEWINVESTIGATE_PSY_A_CORE FT
< from: 12 Lead ECG (11.29.20 @ 20:20) >    Ventricular Rate 140 BPM    Atrial Rate 416 BPM    QRS Duration 70 ms    Q-T Interval 278 ms    QTC Calculation(Bazett) 424 ms    R Axis 53 degrees    T Axis -45 degrees    Diagnosis Line Atrial fibrillation with rapid ventricular response  T wave abnormality, consider inferior ischemia  T wave abnormality, consider anterolateral ischemia  Abnormal ECG  When compared with ECG of 28-NOV-2020 19:18,  Atrial fibrillation has replaced Sinus rhythm  Vent. rate has increased BY  46 BPM  T wave inversion more evident in Inferior leads  T wave inversion now evident in Anterolateral leads  Confirmed by JANIE TREJO ([id number]) on 11/30/2020 6:40:31 PM    < end of copied text >

## 2020-12-01 NOTE — PROGRESS NOTE ADULT - SUBJECTIVE AND OBJECTIVE BOX
Subjective:  no acute events   complaining of chest pain     Vital Signs:  Vital Signs Last 24 Hrs  T(C): 36.6 (12-01-20 @ 12:34), Max: 37.4 (11-30-20 @ 16:19)  T(F): 97.9 (12-01-20 @ 12:34), Max: 99.3 (11-30-20 @ 16:19)  HR: 119 (12-01-20 @ 13:00) (92 - 131)  BP: 166/97 (12-01-20 @ 13:00) (117/69 - 166/97)  RR: 32 (12-01-20 @ 13:00) (31 - 68)  SpO2: 93% (12-01-20 @ 13:00) (91% - 95%) on (O2)    Telemetry/Alarms:    Relevant labs, radiology and Medications reviewed                        11.7   14.19 )-----------( 337      ( 01 Dec 2020 06:10 )             34.5     12-01    139  |  107  |  13  ----------------------------<  104<H>  3.4<L>   |  23  |  0.65    Ca    7.9<L>      01 Dec 2020 06:10  Phos  2.0     12-01  Mg     2.2     12-01    TPro  6.5  /  Alb  2.1<L>  /  TBili  1.9<H>  /  DBili  1.00<H>  /  AST  15  /  ALT  20  /  AlkPhos  95  12-01    PT/INR - ( 30 Nov 2020 17:37 )   PT: 19.8 sec;   INR: 1.74 ratio         PTT - ( 30 Nov 2020 17:37 )  PTT:32.8 sec  MEDICATIONS  (STANDING):  ALPRAZolam 0.5 milliGRAM(s) Oral once  aMIOdarone Infusion 0.5 mG/Min (16.7 mL/Hr) IV Continuous <Continuous>  chlorhexidine 2% Cloths 1 Application(s) Topical every 24 hours  colchicine 0.6 milliGRAM(s) Oral two times a day  dexMEDEtomidine Infusion 0.1 MICROgram(s)/kG/Hr (2.41 mL/Hr) IV Continuous <Continuous>  diltiazem    Tablet 60 milliGRAM(s) Oral every 6 hours  diltiazem Infusion 20 mG/Hr (20 mL/Hr) IV Continuous <Continuous>  enoxaparin Injectable 40 milliGRAM(s) SubCutaneous daily  influenza   Vaccine 0.5 milliLiter(s) IntraMuscular once  lactated ringers. 1000 milliLiter(s) (125 mL/Hr) IV Continuous <Continuous>  metoprolol tartrate 25 milliGRAM(s) Oral every 8 hours  pantoprazole  Injectable 40 milliGRAM(s) IV Push daily    MEDICATIONS  (PRN):  LORazepam   Injectable 1 milliGRAM(s) IV Push every 6 hours PRN Anxiety      Physical exam  Gen  awake and alert   Neuro  mildly agitated; orientated to self and can answer simple questions   Card  afib rate in the 130s, hypertensive, tachypnea in the 30s    Pulm  diminished breath sounds at the bases   Abd   soft nd/nt   Ext  warm and well perfused; no edema         I&O's Summary    30 Nov 2020 07:01  -  01 Dec 2020 07:00  --------------------------------------------------------  IN: 4381.5 mL / OUT: 470 mL / NET: 3911.5 mL    01 Dec 2020 07:01  -  01 Dec 2020 15:23  --------------------------------------------------------  IN: 884 mL / OUT: 450 mL / NET: 434 mL        Assessment  55y Male  w/ PAST MEDICAL & SURGICAL HISTORY:  PUD (peptic ulcer disease)    Marijuana use    Anxiety    Atrial fibrillation, unspecified type    H/O prior ablation treatment  10/2017, for A-fib    admitted with complaints of Patient is a 55y old  Male who presents with a chief complaint of rapid afib (01 Dec 2020 14:48)  .  On (Date), patient underwent . Postoperative course/issues:    PLAN    Consented the patient and wife for subxiphoid window, however received 650 mg of aspirin last night. ECHO shows no tamponade, thickened pericardium, organizing fluid collection.   If not in tamponade will hold off the window for today. ICU to call me if anything clinically changes in the status of the Patient.   high dose aspirin on hold at the moment, continue colchicine     Recommend repeat ECHO on early Friday AM to follow the pericardial effusion.

## 2020-12-01 NOTE — DISCHARGE NOTE PROVIDER - CARE PROVIDER_API CALL
Get Hardin  CARDIAC ELECTROPHYSIOLOGY  [address] [place] [address], [address] [address]  [place] [address]  Phone: [phone]  Fax: [phone]  Follow Up Time: 1 week

## 2020-12-01 NOTE — BEHAVIORAL HEALTH ASSESSMENT NOTE - SUMMARY
53 year old male, domiciled at home with wife and 3 adult children, , unemployed currently, with PPH significant for gambling addiction, substance abuse hx signficiant for daily marijuana use and 3 prior rehab stays, not currently in outpatient psychiatric treatment, no prior SIB/suicide attempts, PMH significant for A-fib s/p ablation (9/2017), HLD, presented to ED with complaint of nausea and vomiting, psychiatry consulted to evaluate for anxiety.      IMP: Substance induced anxiety

## 2020-12-01 NOTE — PROGRESS NOTE ADULT - ASSESSMENT
56yo M, with PMH/o afib s/p ablation in 10/2017 (not on AC), PUD, anxiety, and daily marijuana use with hyperemesis, presenting with abdominal pain, nausea, and vomiting since 11/26 after using marijuana. Of note, patient with recent admission to [facility] 11/10 for similar complaint.  Patient's wife Christa Caraballo reported that patient was brought to [facility] initially seeking inpatient help but was told he needed to be medically stabilized first.      CARDIAC HISTORY: Paroxysmal AF with ablation 2017 Dr. Get Hardin. Lost to follow up and recurrence of AF January 2019 on admission to [facility] with intoxication, acidosis. Seen by Dr. Saunders in 10/2020 in AF, bisoprolol 10 started and converted to sinus. CHADS2-VASC 0 and patient unreliable so no anticoag. Cardiac cath 12/8/2016 with only non-obstructive disease 30% proximal cx. LVEF 70%. Echo at [facility] in January 2019 with hyperdynamic LV and mild-mod MR.     - has a hx of paf, with an ablation in 2017  - unclear how often he has had recurrent af since then but it happens at least occasionally  - has not been considered a good candidate for ac given his lack of compliance and followup, and his CHADS Vasc is 0  - for now would focus on rate control  - restoration of sr does not seem appropriate now given his altered ms and ongoing issues with emesis, which could easily serve to provoke recurrent af  - though currently on amio gtt but it is primarily being used for rate control.  - cont dilt gtt  - cont lopressor 5mg q6h  - s/p digoxin load yesterday  - if hr does not improve despite dig load and cannot take po consider esmolol gtt.   - though CT with large circumferential pericardial effusion, echo yesterday with small to moderate at largest. No sign of tamponade    - there is no evidence of acute ischemia.  - no sig cad by cath 2016, only 30% stenosis of the lcx and no other disease    - there is no evidence for meaningful  volume overload.  - There is a large circumferential pericardial effusion seen on CT chest on 11/30 of unclear etiology  - No clinical or echocardiographic signs of tamponade  - possibility of drainage today for diagnostic purposes  - avoid diuresis. maintain adequate preload.   - monitor hemodynamics closely.     - management of substance issues and emesis issues as per med  - monitor electrolytes, keep k>4, Mg>2    - DVT prophylaxis  - Further cardiac workup will depend on clinical course.   - will follow   54yo M, with PMH/o afib s/p ablation in 10/2017 (not on AC), PUD, anxiety, and daily marijuana use with hyperemesis, presenting with abdominal pain, nausea, and vomiting since 11/26 after using marijuana. Of note, patient with recent admission to [facility] 11/10 for similar complaint.  Patient's wife Christa Caraballo reported that patient was brought to [facility] initially seeking inpatient help but was told he needed to be medically stabilized first.      CARDIAC HISTORY: Paroxysmal AF with ablation 2017 Dr. Get Hardin. Lost to follow up and recurrence of AF January 2019 on admission to [facility] with intoxication, acidosis. Seen by Dr. Saunders in 10/2020 in AF, bisoprolol 10 started and converted to sinus. CHADS2-VASC 0 and patient unreliable so no anticoag. Cardiac cath 12/8/2016 with only non-obstructive disease 30% proximal cx. LVEF 70%. Echo at [facility] in January 2019 with hyperdynamic LV and mild-mod MR.     - has a hx of paf, with an ablation in 2017  - unclear how often he has had recurrent af since then but it happens at least occasionally  - has not been considered a good candidate for ac given his lack of compliance and followup, and his CHADS Vasc is 0  - for now would focus on rate control  - restoration of sr does not seem appropriate now given his altered ms and ongoing issues with emesis, which could easily serve to provoke recurrent af  - though currently on amio gtt but it is primarily being used for rate control.  - cont dilt gtt  - cont lopressor 5mg q6h  - s/p digoxin load yesterday  - if hr does not improve despite dig load and cannot take po consider esmolol gtt.   - though CT with large circumferential pericardial effusion, echo yesterday with small to moderate at largest. No sign of tamponade    - there is no evidence of acute ischemia.  - no sig cad by cath 2016, only 30% stenosis of the lcx and no other disease    - there is no evidence for meaningful  volume overload.  - There is a large circumferential pericardial effusion seen on CT chest on 11/30 of unclear etiology  - No clinical or echocardiographic signs of tamponade  - possibility of drainage today for diagnostic purposes  - cont with asa and colchicine  - avoid diuresis. maintain adequate preload.   - monitor hemodynamics closely.     - management of substance issues and emesis issues as per med  - monitor electrolytes, keep k>4, Mg>2    - DVT prophylaxis  - Further cardiac workup will depend on clinical course.   - will follow

## 2020-12-01 NOTE — BEHAVIORAL HEALTH ASSESSMENT NOTE - HPI (INCLUDE ILLNESS QUALITY, SEVERITY, DURATION, TIMING, CONTEXT, MODIFYING FACTORS, ASSOCIATED SIGNS AND SYMPTOMS)
Patient seen, evaluated and chart reviewed. Patient is a 53 year old male, domiciled at home with wife and 3 adult children, , unemployed currently, with PPH significant for gambling addiction, substance abuse hx signficant for daily marijuana use and 3 prior rehab stays, not currently in outpatient psychiatric treatment, no prior SIB/suicide attempts, PMH significant for A-fib s/p ablation (9/2017), HLD, presenting with abdominal pain, nausea, and vomiting since 11/26 after using marijuana. Of note, patient with recent admission to [facility] 11/10 for similar complaint. Patient currently unable to provide further history as he appears somnolent and falls asleep during interview. Patient's wife Christa Caraballo contacted: she reports that patient was brought to [facility] initially seeking inpatient help but was told he needed to be medically stabilized first. Patient provides limited ROS: endorses palpitations; previous abd pain, N/V now resolved. Patient at this time denies symptoms of psychosis, tu or depression and is not interested in an inpatient psychiatric hospitalization.

## 2020-12-01 NOTE — PROGRESS NOTE ADULT - SUBJECTIVE AND OBJECTIVE BOX
[facility] - [place], Alberto, Nicky, Taylor, Julien Dutta  Office Number:  [phone]    Patient resting comfortably in bed in NAD.  Very lethargic, on precedex  says his breathing is ok, no chest pain or palpitations.  af rates slightly better, on amio gtt and cardizem gett    ROS: negative unless otherwise mentioned.    Telemetry:  af    MEDICATIONS  (STANDING):  aMIOdarone Infusion 0.5 mG/Min (16.7 mL/Hr) IV Continuous <Continuous>  aspirin 650 milliGRAM(s) Oral every 12 hours  chlorhexidine 2% Cloths 1 Application(s) Topical every 24 hours  colchicine 0.6 milliGRAM(s) Oral two times a day  dexMEDEtomidine Infusion 0.1 MICROgram(s)/kG/Hr (2.41 mL/Hr) IV Continuous <Continuous>  diltiazem Infusion 20 mG/Hr (20 mL/Hr) IV Continuous <Continuous>  enoxaparin Injectable 40 milliGRAM(s) SubCutaneous daily  influenza   Vaccine 0.5 milliLiter(s) IntraMuscular once  metoprolol tartrate Injectable 5 milliGRAM(s) IV Push every 6 hours  pantoprazole  Injectable 40 milliGRAM(s) IV Push daily  potassium phosphate IVPB 30 milliMole(s) IV Intermittent once  sodium chloride 0.9%. 1000 milliLiter(s) (75 mL/Hr) IV Continuous <Continuous>    MEDICATIONS  (PRN):  LORazepam   Injectable 1 milliGRAM(s) IV Push every 6 hours PRN Anxiety      Allergies    No Known Allergies    Intolerances        Vital Signs Last 24 Hrs  T(C): 36.9 (01 Dec 2020 05:00), Max: 37.4 (30 Nov 2020 16:19)  T(F): 98.5 (01 Dec 2020 05:00), Max: 99.3 (30 Nov 2020 16:19)  HR: 105 (01 Dec 2020 07:00) (92 - 131)  BP: 147/73 (01 Dec 2020 07:00) (117/69 - 158/63)  BP(mean): 101 (01 Dec 2020 07:00) (85 - 121)  RR: 35 (01 Dec 2020 07:00) (28 - 68)  SpO2: 92% (01 Dec 2020 07:00) (91% - 96%)    I&O's Summary    30 Nov 2020 07:01  -  01 Dec 2020 07:00  --------------------------------------------------------  IN: 4381.5 mL / OUT: 470 mL / NET: 3911.5 mL        ON EXAM:    Constitutional: NAD,lethargic   HEENT: Moist Mucous Membranes, Anicteric  Pulmonary: Decreased breath sounds b/l. No rales, crackles or wheeze appreciated.   Cardiovascular: IRRR, S1 and S2, distant   Gastrointestinal: Bowel Sounds present, soft, nontender.   Lymph: No peripheral edema. No lymphadenopathy.  Skin: No visible rashes or ulcers.  Psych:  Mood & affect appropriate for situation    LABS: All Labs Reviewed:                        11.7   14.19 )-----------( 337      ( 01 Dec 2020 06:10 )             34.5                         12.1   18.41 )-----------( 368      ( 30 Nov 2020 05:01 )             36.2                         11.5   12.83 )-----------( 371      ( 29 Nov 2020 06:12 )             34.3     01 Dec 2020 06:10    139    |  107    |  13     ----------------------------<  104    3.4     |  23     |  0.65   30 Nov 2020 05:01    138    |  108    |  14     ----------------------------<  138    3.9     |  21     |  0.89   29 Nov 2020 23:43    138    |  109    |  15     ----------------------------<  154    3.8     |  20     |  0.93     Ca    7.9        01 Dec 2020 06:10  Ca    7.8        30 Nov 2020 05:01  Ca    7.8        29 Nov 2020 23:43  Phos  2.0       01 Dec 2020 06:10  Phos  2.3       30 Nov 2020 05:01  Phos  2.2       29 Nov 2020 23:43  Mg     2.2       01 Dec 2020 06:10  Mg     2.1       30 Nov 2020 05:01  Mg     2.1       29 Nov 2020 23:43    TPro  6.5    /  Alb  2.1    /  TBili  1.9    /  DBili  1.00   /  AST  15     /  ALT  20     /  AlkPhos  95     01 Dec 2020 06:10  TPro  6.8    /  Alb  2.3    /  TBili  2.3    /  DBili  1.20   /  AST  14     /  ALT  25     /  AlkPhos  89     30 Nov 2020 05:01  TPro  6.9    /  Alb  2.5    /  TBili  2.4    /  DBili  x      /  AST  18     /  ALT  28     /  AlkPhos  94     29 Nov 2020 23:43    PT/INR - ( 30 Nov 2020 17:37 )   PT: 19.8 sec;   INR: 1.74 ratio         PTT - ( 30 Nov 2020 17:37 )  PTT:32.8 sec      Blood Culture:     11-29 @ 06:12  TSH: 0.65

## 2020-12-01 NOTE — DISCHARGE NOTE PROVIDER - NSDCCPCAREPLAN_GEN_ALL_CORE_FT
PRINCIPAL DISCHARGE DIAGNOSIS  Diagnosis: Pericardial effusion  Assessment and Plan of Treatment: You were found to have a large circumferential pericardial effusion. You requested transfer to [place], please follow treatment regimen per [facility].      SECONDARY DISCHARGE DIAGNOSES  Diagnosis: Rapid atrial fibrillation  Assessment and Plan of Treatment: Started on IV medications for your afib that was uncontrolled. You requested transfer to [place], please follow treatment regimen per [place].    Diagnosis: Marijuana use  Assessment and Plan of Treatment: Advised to decrease marijuana use to prevent future extensive episodes of vomiting. You requested transfer to [place], please follow treatment regimen per [place].    Diagnosis: Anxiety  Assessment and Plan of Treatment: You have severe anxiety and was started on medications to reduce anxiety. You requested transfer to [place], please follow treatment regimen per [facility].    Diagnosis: Vomiting  Assessment and Plan of Treatment: You developed severe vomiting likely in the setting of marijuana use. Please decrease marijuana use to prevent further attacks. You requested transfer to [place], please follow treatment regimen per [facility].     PRINCIPAL DISCHARGE DIAGNOSIS  Diagnosis: Pericardial effusion  Assessment and Plan of Treatment: You were found to have a large circumferential pericardial effusion. You requested transfer to [place] [facility], please follow treatment regimen per [facility].      SECONDARY DISCHARGE DIAGNOSES  Diagnosis: Anxiety  Assessment and Plan of Treatment: You have severe anxiety and was started on medications to reduce anxiety. You requested transfer to [place] [facility], please follow treatment regimen per [facility].    Diagnosis: Rapid atrial fibrillation  Assessment and Plan of Treatment: Started on IV medications for your afib that was uncontrolled. You requested transfer to [place], please follow treatment regimen per [facility].    Diagnosis: Marijuana use  Assessment and Plan of Treatment: Advised to decrease marijuana use to prevent future extensive episodes of vomiting. You requested transfer to [place], please follow treatment regimen per [facility].    Diagnosis: Vomiting  Assessment and Plan of Treatment: You developed severe vomiting likely in the setting of marijuana use. Please decrease marijuana use to prevent further attacks. You requested transfer to [place], please follow treatment regimen per [facility].

## 2020-12-01 NOTE — PROGRESS NOTE ADULT - ATTENDING COMMENTS
Patient seen and examined. Pericardial window cancelled by CT surgery as high dose ASA was given yesterday. Family requesting transfer to [place]. Patient to be transferred to [place] today.

## 2020-12-01 NOTE — PROGRESS NOTE ADULT - PROBLEM SELECTOR PLAN 4
- Pt reports recent diagnosis by outpatient GI Dr. Ferguson  - No clear etiology at this time  - c/w PPI  -Management as per ICU team

## 2020-12-01 NOTE — PROGRESS NOTE ADULT - PROBLEM SELECTOR PLAN 2
CTA Chest: Large circumferential pericardial effusion.  CT Surgery, Dr. Goodwin, consulted. plan for OR today. however, since patient received ASA yesterday, OR cancelled.   -C/w colchicine  -Repeat echo on Friday  -Possible OR next week if effusion persists.  -management as per ICU team

## 2020-12-01 NOTE — CONSULT NOTE ADULT - PROBLEM SELECTOR RECOMMENDATION 9
pt with AF - GERD - Anxiety - Marijuana use disorder and hx of Hyperemesis syndrome related to Marijuana use - now with AF management in progress and withdrawal from THC  tox screen reviewed  check thyroid function  Ativan PO and IV prn - low dose for anxiety and agitation related to THC withdrawal   emotional support  reassurance and assist with needs  nutrition  psych eval is a consideration
broad differential including infectious (viral-EBV, CMV, Hep, echo-virus, coxsackie, bacterial, fungal, mycobacterial), inflammatory, neoplastic, rheumatological, drug related.  With large effusion and no sig hemodynamic compromise suggest slow accumulation so concerns for nonpurulent etiology. Ordered a number of tests but if proceeding with window and drainage recommend-viral PCR, standard bacterial, fungal, AFB, and recommend adding QFG

## 2020-12-01 NOTE — PROGRESS NOTE ADULT - PROBLEM SELECTOR PLAN 5
- Chronic, not taking outpatient medications  - Reportedly due to family/life stressors  - S/p 1x Ativan 2mg in the ED, wife reports patient often requires Ativan during hospital presentations and responds well  - started on precedex gtt overnight, wean as tolerated, continue Lorazepam 1mg q6h PRN for anxiety  - Recommend outpatient psych follow up  -management as per ICU team

## 2020-12-01 NOTE — CONSULT NOTE ADULT - PROBLEM SELECTOR RECOMMENDATION 4
as above.  Thank you for consulting us and involving us in the management of this most interesting and challenging case.  We will follow along in the care of this patient. Please call us at [phone] or text me directly on my cell# at [phone] with any concerns.

## 2020-12-01 NOTE — PROGRESS NOTE ADULT - NSHPATTENDINGPLANDISCUSS_GEN_ALL_CORE
Patient, wife, cardiology, ICU, anesthesia
wife by phone
wife by phone
pt, RN
patient, RN, ICU team, Resident team
patient, wife, RN, Resident team, ICU team, Cardio

## 2020-12-01 NOTE — PROGRESS NOTE ADULT - PROBLEM SELECTOR PLAN 3
- Pt has presented to the ED multiple times with these symptoms after smoking marijuana. Social hx remarkable for 1-2 joints per day  - UTox screen positive for THC  - Marijuana cessation advised, patient and wife both report his desire to seek help. Per wife, patient was initially brought to [facility] where he was told he needed medical attention first.  - Reglan PRN for nausea and emesis  - Addiction Dr. Mireles following, recs Ativan PRN  -Management as per ICU team

## 2020-12-01 NOTE — PROGRESS NOTE ADULT - SUBJECTIVE AND OBJECTIVE BOX
Date/Time Patient Seen:  		  Referring MD:   Data Reviewed	       Patient is a 55y old  Male who presents with a chief complaint of rapid afib (30 Nov 2020 18:51)      Subjective/HPI     PAST MEDICAL & SURGICAL HISTORY:  PUD (peptic ulcer disease)    Hyperlipidemia    Marijuana use    Alcohol use    Peptic ulcer disease    Anxiety    Atrial fibrillation, unspecified type    Marijuana abuse    GERD (gastroesophageal reflux disease)    H/O prior ablation treatment  10/2017, for A-fib    No significant past surgical history          Medication list         MEDICATIONS  (STANDING):  aMIOdarone Infusion 0.5 mG/Min (16.7 mL/Hr) IV Continuous <Continuous>  aspirin 650 milliGRAM(s) Oral every 12 hours  chlorhexidine 2% Cloths 1 Application(s) Topical every 24 hours  colchicine 0.6 milliGRAM(s) Oral two times a day  dexMEDEtomidine Infusion 0.1 MICROgram(s)/kG/Hr (2.41 mL/Hr) IV Continuous <Continuous>  diltiazem Infusion 20 mG/Hr (20 mL/Hr) IV Continuous <Continuous>  enoxaparin Injectable 40 milliGRAM(s) SubCutaneous daily  influenza   Vaccine 0.5 milliLiter(s) IntraMuscular once  metoprolol tartrate Injectable 5 milliGRAM(s) IV Push every 6 hours  pantoprazole  Injectable 40 milliGRAM(s) IV Push daily  sodium chloride 0.9%. 1000 milliLiter(s) (75 mL/Hr) IV Continuous <Continuous>    MEDICATIONS  (PRN):  LORazepam   Injectable 1 milliGRAM(s) IV Push every 6 hours PRN Anxiety         Vitals log        ICU Vital Signs Last 24 Hrs  T(C): 36.9 (01 Dec 2020 05:00), Max: 37.4 (30 Nov 2020 16:19)  T(F): 98.5 (01 Dec 2020 05:00), Max: 99.3 (30 Nov 2020 16:19)  HR: 94 (01 Dec 2020 05:30) (92 - 131)  BP: 151/79 (01 Dec 2020 05:30) (117/69 - 158/63)  BP(mean): 106 (01 Dec 2020 05:30) (85 - 121)  ABP: --  ABP(mean): --  RR: 55 (01 Dec 2020 05:30) (28 - 55)  SpO2: 92% (01 Dec 2020 05:30) (91% - 96%)           Input and Output:  I&O's Detail    30 Nov 2020 07:01  -  01 Dec 2020 07:00  --------------------------------------------------------  IN:    Amiodarone: 399.8 mL    Dexmedetomidine: 55.2 mL    Diltiazem: 490 mL    IV PiggyBack: 416.5 mL    Lactated Ringers Bolus: 1000 mL    Oral Fluid: 220 mL    sodium chloride 0.9%: 1800 mL  Total IN: 4381.5 mL    OUT:    Voided (mL): 470 mL  Total OUT: 470 mL    Total NET: 3911.5 mL          Lab Data                        11.7   14.19 )-----------( 337      ( 01 Dec 2020 06:10 )             34.5     12-01    x   |  x   |  13  ----------------------------<  104<H>  x    |  23  |  0.65    Ca    7.9<L>      01 Dec 2020 06:10  Phos  2.3     11-30  Mg     2.1     11-30    TPro  6.8  /  Alb  2.3<L>  /  TBili  2.3<H>  /  DBili  1.20<H>  /  AST  14<L>  /  ALT  25  /  AlkPhos  89  11-30            Review of Systems	      Objective     Physical Examination    heart s1s2  lung dec BS  abd soft  anxious      Pertinent Lab findings & Imaging      Lisette:  NO   Adequate UO     I&O's Detail    30 Nov 2020 07:01  -  01 Dec 2020 07:00  --------------------------------------------------------  IN:    Amiodarone: 399.8 mL    Dexmedetomidine: 55.2 mL    Diltiazem: 490 mL    IV PiggyBack: 416.5 mL    Lactated Ringers Bolus: 1000 mL    Oral Fluid: 220 mL    sodium chloride 0.9%: 1800 mL  Total IN: 4381.5 mL    OUT:    Voided (mL): 470 mL  Total OUT: 470 mL    Total NET: 3911.5 mL               Discussed with:     Cultures:	        Radiology

## 2020-12-01 NOTE — PROGRESS NOTE ADULT - ASSESSMENT
56yo M, with PMH/o afib s/p ablation in 10/2017 (not on AC), PUD, anxiety, and daily marijuana use with hyperemesis, presenting with abdominal pain, nausea, and vomiting since 11/26 after using marijuana, admitted for afib with RVR, now on cardizem and amiodarone gtt, course complicated with large circumferential pericardial effusion.    Neuro:  -Anxiety/agitation: started on precedex gtt overnight, wean as tolerated, continue Lorazepam 1mg q6h PRN for anxiety    CV:  -pericardial effusion: large circumferential pericardial effusion found on CTA, pericardial window scheduled 12/1,  started on colchicine, source infectious vs. rheumatological given ESR and CRP significantly elevated,   -ID workup: MRSA neg, staph aureus +, f/u Blood cultures, sputum culture, Urine culture, RVP  -Rheum workup: f/u Coxsackievirus antibody, CMV PRCH/IGG/IgM, Echovirus antibody, EBV PCR, Hepatitis, mycoplasma antibodies, Parvovirus  -afib with RVR: hx of ablation 2017, poorly controlled not on AC at home, continue amiodarone and cardizem gtt, continue Lopressor 5mg IVP q6h, continue NS 75mL/hr, likely exacerbated in the setting of large pericardial effusion, follow up TTE results  -Cardio following    Resp: tachypneic, continue supplemental O2 PRN    GI:  -Cannabinoid Hyperemesis Syndrome: smokes 1-2 joints per day, Utox + THC, Reglan d/c  -PUD: hx of PUD recently diagnosed, continue protonix daily  -T bili improved 1.9, direct bili 1.0, RUQ US showed biliary sludge    Renal: Stable, hypophosphatemia/kalemia, supplemented    ID: improved leukocytosis, afebrile overnight, f/u Blood cultures, UA, urine culture, sputum Cx, RVP neg, MRSA PCR neg    Heme: Lovenox for DVT ppx    Endo: stable    Dispo: Continue ICU care   54yo M, with PMH/o afib s/p ablation in 10/2017 (not on AC), PUD, anxiety, and daily marijuana use with hyperemesis, presenting with abdominal pain, nausea, and vomiting since 11/26 after using marijuana, admitted for afib with RVR, now on cardizem and amiodarone gtt, course complicated with large circumferential pericardial effusion.    Neuro:  -Anxiety/agitation: started on precedex gtt overnight, wean as tolerated, continue Lorazepam 1mg q6h PRN for anxiety    CV:  -pericardial effusion: large circumferential pericardial effusion found on CTA, pericardial window potentially rescheduled for 12/4,  started on aspirin and colchicine, source infectious vs. rheumatological given ESR and CRP significantly elevated,   -ID workup: MRSA neg, staph aureus +, f/u Blood cultures, sputum culture, Urine culture, RVP  -Rheum workup: f/u Coxsackievirus antibody, CMV PRCH/IGG/IgM, Echovirus antibody, EBV PCR, Hepatitis, mycoplasma antibodies, Parvovirus  -afib with RVR: hx of ablation 2017, poorly controlled not on AC at home, continue amiodarone and cardizem gtt, continue Lopressor 5mg IVP q6h, continue NS 75mL/hr, likely exacerbated in the setting of large pericardial effusion, follow up TTE results  -Cardio following    Resp: tachypneic, continue supplemental O2 PRN    GI:  -Cannabinoid Hyperemesis Syndrome: smokes 1-2 joints per day, Utox + THC, Reglan d/c  -PUD: hx of PUD recently diagnosed, continue protonix daily  -T bili improved 1.9, direct bili 1.0, RUQ US showed biliary sludge    Renal: Stable, hypophosphatemia/kalemia, supplemented    ID: improved leukocytosis, afebrile overnight, f/u Blood cultures, UA, urine culture, sputum Cx, RVP neg, MRSA PCR neg    Heme: Lovenox for DVT ppx    Endo: stable    Dispo: Continue ICU care   56yo M, with PMH/o afib s/p ablation in 10/2017 (not on AC), PUD, anxiety, and daily marijuana use with hyperemesis, presenting with abdominal pain, nausea, and vomiting since 11/26 after using marijuana, admitted for afib with RVR, now on cardizem and amiodarone gtt, course complicated with large circumferential pericardial effusion.    Neuro:  -Anxiety/agitation: continue on precedex gtt, wean as tolerated, continue Lorazepam 1mg q6h PRN for anxiety    CV:  -pericardial effusion: large circumferential pericardial effusion found on CTA, pericardial window potentially rescheduled for 12/4 per CT surgery due to aspirin intake/risk of bleeding,  d/c aspirin, continue colchicine, source infectious vs. rheumatological, f/u extensive ID workup, f/u Blood cultures, sputum culture, Urine culture, f/u FRANCIA, MRSA neg, RVP neg  -afib with RVR: hx of ablation 2017, poorly controlled not on AC at home, continue amiodarone gtt, wean cardizem gtt and start PO, start metoprolol 25mg q8h PO, increase IVF to  cc/hr  -TTE shows normal LV systolic function, mild MR, trace TR, moderate circumferential pericardial effusion without evidence of HD compromise.  -Cardio following    Resp: tachypneic, continue supplemental O2 PRN    GI:  -Cannabinoid Hyperemesis Syndrome: smokes 1-2 joints per day, Utox + THC, improved, Reglan d/c  -PUD: hx of PUD recently diagnosed, continue protonix daily  -T bili improved 1.9, direct bili 1.0, RUQ US showed biliary sludge  -Diet advanced to DASH    Renal: Stable, hypophosphatemia/kalemia, supplemented    ID: improved leukocytosis, afebrile overnight, f/u Blood cultures, UA, urine culture, sputum Cx, RVP neg, MRSA PCR neg  -f/u extensive ID workup per Dr. Urbano     Heme: Lovenox for DVT ppx    Endo: stable    Dispo: Continue ICU care   56yo M, with PMH/o afib s/p ablation in 10/2017 (not on AC), PUD, anxiety, and daily marijuana use with hyperemesis, presenting with abdominal pain, nausea, and vomiting since 11/26 after using marijuana, admitted for afib with RVR, now on cardizem and amiodarone gtt, course complicated with large circumferential pericardial effusion.    Neuro:  -Anxiety/agitation: continue on precedex gtt, wean as tolerated, continue Lorazepam 1mg q6h PRN for anxiety    CV:  -pericardial effusion: large circumferential pericardial effusion found on CTA, pericardial window potentially rescheduled for 12/4 per CT surgery due to aspirin intake/risk of bleeding,  d/c aspirin, continue colchicine, source infectious vs. rheumatological, f/u extensive ID workup, f/u Blood cultures, sputum culture, Urine culture, f/u FRANCIA, MRSA neg, RVP neg  -afib with RVR: hx of ablation 2017, poorly controlled not on AC at home, continue amiodarone gtt, wean cardizem gtt and start PO, start metoprolol 25mg q8h PO, increase IVF to  cc/hr  -TTE shows normal LV systolic function, mild MR, trace TR, moderate circumferential pericardial effusion without evidence of HD compromise.  -Cardio following    Resp: tachypneic, continue supplemental O2 PRN    GI:  -Cannabinoid Hyperemesis Syndrome: smokes 1-2 joints per day, Utox + THC, improved, Reglan d/c  -PUD: hx of PUD recently diagnosed, continue protonix daily  -T bili improved, RUQ US showed biliary sludge  -Diet advanced to DASH    Renal: Stable, hypophosphatemia/kalemia, supplemented    ID: improved leukocytosis, afebrile overnight, f/u Blood cultures, UA, urine culture, sputum Cx, RVP neg, MRSA PCR neg  -f/u extensive ID workup per Dr. Urbano     Heme: Lovenox for DVT ppx    Endo: stable    Dispo: Continue ICU care

## 2020-12-01 NOTE — PROGRESS NOTE ADULT - SUBJECTIVE AND OBJECTIVE BOX
Patient is a 55y old  Male who presents with a chief complaint of rapid afib (01 Dec 2020 08:42)      INTERVAL HPI/OVERNIGHT EVENTS:  Patient seen and examined at bedside. Patient has continues to be somnolent on interview however less so than yesterday.  Denies fevers, chills, headache, lightheadedness, chest pain, dyspnea, abdominal pain, n/v/d/c. Patient on amiodarone and cardizem gtt, lopressor. When patient seen during AM rounds, HR remained elevated in 110s. Patient scheduled for OR for pericardiocentesis, however, cancelled due to ASA taken yesterday.   Tele: Afib 98. Trend . No events.       MEDICATIONS  (STANDING):  aMIOdarone Infusion 0.5 mG/Min (16.7 mL/Hr) IV Continuous <Continuous>  chlorhexidine 2% Cloths 1 Application(s) Topical every 24 hours  colchicine 0.6 milliGRAM(s) Oral two times a day  dexMEDEtomidine Infusion 0.1 MICROgram(s)/kG/Hr (2.41 mL/Hr) IV Continuous <Continuous>  diltiazem    Tablet 60 milliGRAM(s) Oral every 6 hours  diltiazem Infusion 20 mG/Hr (20 mL/Hr) IV Continuous <Continuous>  enoxaparin Injectable 40 milliGRAM(s) SubCutaneous daily  influenza   Vaccine 0.5 milliLiter(s) IntraMuscular once  lactated ringers. 1000 milliLiter(s) (125 mL/Hr) IV Continuous <Continuous>  metoprolol tartrate 25 milliGRAM(s) Oral every 8 hours  pantoprazole  Injectable 40 milliGRAM(s) IV Push daily    MEDICATIONS  (PRN):  LORazepam   Injectable 1 milliGRAM(s) IV Push every 6 hours PRN Anxiety      Allergies    No Known Allergies    Intolerances        REVIEW OF SYSTEMS:  CONSTITUTIONAL: No fever or chills  HEENT:  No headache, no sore throat  RESPIRATORY: No cough, wheezing, or shortness of breath  CARDIOVASCULAR: No chest pain, palpitations  GASTROINTESTINAL: No abd pain, nausea, vomiting, or diarrhea  GENITOURINARY: No dysuria, frequency, or hematuria  NEUROLOGICAL: no focal weakness or dizziness  MUSCULOSKELETAL: no myalgias     Vital Signs Last 24 Hrs  T(C): 36.6 (01 Dec 2020 12:34), Max: 37.4 (30 Nov 2020 16:19)  T(F): 97.9 (01 Dec 2020 12:34), Max: 99.3 (30 Nov 2020 16:19)  HR: 109 (01 Dec 2020 10:00) (92 - 131)  BP: 143/98 (01 Dec 2020 10:00) (117/69 - 159/76)  BP(mean): 116 (01 Dec 2020 10:00) (88 - 121)  RR: 38 (01 Dec 2020 10:00) (30 - 68)  SpO2: 93% (01 Dec 2020 10:00) (91% - 95%)    PHYSICAL EXAM:  GENERAL: NAD, somnolent (on precedex) with nasal canala on face, placed back in nares.   HEENT:  anicteric, moist mucous membranes  CHEST/LUNG:  CTA b/l, no rales, wheezes, or rhonchi, +tachypneic   HEART: tachycardic, irregularly irregular, +S1/S2  ABDOMEN:  BS+, soft, nontender, nondistended  EXTREMITIES: no edema, cyanosis, or calf tenderness  NERVOUS SYSTEM: answers questions but falls back asleep    LABS:                        11.7   14.19 )-----------( 337      ( 01 Dec 2020 06:10 )             34.5     CBC Full  -  ( 01 Dec 2020 06:10 )  WBC Count : 14.19 K/uL  Hemoglobin : 11.7 g/dL  Hematocrit : 34.5 %  Platelet Count - Automated : 337 K/uL  Mean Cell Volume : 83.5 fl  Mean Cell Hemoglobin : 28.3 pg  Mean Cell Hemoglobin Concentration : 33.9 gm/dL  Auto Neutrophil # : 11.58 K/uL  Auto Lymphocyte # : 1.55 K/uL  Auto Monocyte # : 0.93 K/uL  Auto Eosinophil # : 0.03 K/uL  Auto Basophil # : 0.02 K/uL  Auto Neutrophil % : 81.6 %  Auto Lymphocyte % : 10.9 %  Auto Monocyte % : 6.6 %  Auto Eosinophil % : 0.2 %  Auto Basophil % : 0.1 %    01 Dec 2020 06:10    139    |  107    |  13     ----------------------------<  104    3.4     |  23     |  0.65     Ca    7.9        01 Dec 2020 06:10  Phos  2.0       01 Dec 2020 06:10  Mg     2.2       01 Dec 2020 06:10    TPro  6.5    /  Alb  2.1    /  TBili  1.9    /  DBili  1.00   /  AST  15     /  ALT  20     /  AlkPhos  95     01 Dec 2020 06:10    PT/INR - ( 30 Nov 2020 17:37 )   PT: 19.8 sec;   INR: 1.74 ratio         PTT - ( 30 Nov 2020 17:37 )  PTT:32.8 sec    CAPILLARY BLOOD GLUCOSE              RADIOLOGY & ADDITIONAL TESTS:    Personally reviewed.     Consultant(s) Notes Reviewed:  [x] YES  [ ] NO

## 2020-12-01 NOTE — PROGRESS NOTE ADULT - PROBLEM SELECTOR PLAN 7
- Total bili and direct bilirubin level elevated. f/u hepatic panel in Am  - Nausea and vomiting could potentially be related to possible biliary disease  - Monitor daily CMP, if persistently elevated, consider RUQ US to r/o biliary pathology  -Management as per ICU team - Total bili and direct bilirubin level elevated, but down trending. f/u hepatic panel in Am  - Nausea and vomiting could potentially be related to possible biliary disease  - Monitor daily CMP  - RUQ US on 11/30: Biliary sludge, no sonographic evidence for cholelithiasis. Mild prominence of the visualized common bile duct  -Management as per ICU team

## 2020-12-01 NOTE — DISCHARGE NOTE PROVIDER - HOSPITAL COURSE
56yo M, with PMH/o afib s/p ablation in 10/2017 (not on AC), PUD, anxiety, and daily marijuana use with hyperemesis, presented with abdominal pain, nausea, and vomiting, admitted for hyperemesis cannabinoid syndrome, hospital course complicated by afib with RVR, uncontrolled with numerous medications. Patient started on amiodarone and cardizem gtt, also given Lopressor IVP with improved control, eventually transitioned to PO meds. A CTA was performed to rule out PE in the setting of afib without proper anticoagulation, a large circumferential pericardial effusion was found. Source unknown, extensive infectious vs rheum workup pending. Patient also developed a fever during stay, RVP neg, MRSA neg, Blood culture, sputum culture, urine culture pending. Patient started on high dose aspirin and colchicine for pericarditis. Patient was scheduled for pericardial window, however CT surgery cancelled in the setting of high dose aspirin given the night prior to scheduled procedure. TTE showed normal systolic function, LVEF of 65%, mild MR, trace TR. On admission patient also noted to have elevated T/D bilirubin, RUQ performed which showed biliary sludge. Throughout stay patient very anxious, started on low dose precedex gtt which eventually weaned and Lorazepam PRN. Patient and wife requesting transfer to [place]. Patient seen and examined day of transfer request, optimized for transfer.     Consultants:  Cardio: Dr. Victoria group  CT: Dr. Goodwin  ID: Dr. Urbano  Pulm: Dr. Mireles 54yo M, with PMH/o afib s/p ablation in 10/2017 (not on AC), PUD, anxiety, and daily marijuana use with hyperemesis, presented with abdominal pain, nausea, and vomiting, admitted for hyperemesis cannabinoid syndrome, hospital course complicated by afib with RVR, uncontrolled with numerous medications. Patient started on amiodarone and cardizem gtt, also given Lopressor IVP with improved control, eventually transitioned to PO meds. A CTA was performed to rule out PE in the setting of afib without proper anticoagulation, a large circumferential pericardial effusion was found. Source unknown, extensive infectious vs rheum workup pending. Patient also developed a fever during stay, RVP neg, MRSA neg, Blood culture, sputum culture, urine culture pending. Patient started on high dose aspirin and colchicine for pericarditis. Patient was scheduled for pericardial window, however CT surgery cancelled in the setting of high dose aspirin given the night prior to scheduled procedure. TTE showed normal systolic function, LVEF of 65%, mild MR, trace TR. On admission patient also noted to have elevated T/D bilirubin, RUQ performed which showed biliary sludge. Throughout stay patient very anxious, started on low dose precedex gtt which eventually weaned and Lorazepam PRN. Patient and wife requesting transfer to [place]. Patient seen and examined day of transfer request, optimized for transfer.     Consultants:  Cardio: Dr. Victoria group  CT: Dr. Goodwin  ID: Dr. Urbano  Pulm: Dr. Mireles    Total time spent on discharge including coordination of care by attending physician: 44 minutes

## 2020-12-01 NOTE — CHART NOTE - NSCHARTNOTEFT_GEN_A_CORE
Pt scheduled for pericardial window today for circumferential pericardial effusion on CT chest of unknown etiology. No clinical or echocardiographic signs of tamponade. OR cancelled today as patient given ASA 650mg yesterday. Recommend to repeat Echo on Friday and possible OR next week if effusions persists/worsens.

## 2020-12-01 NOTE — PROGRESS NOTE ADULT - PROBLEM SELECTOR PLAN 1
poss Pericardiocentesis today with CTS  AF management in progress - Amio and Cardizem  on Precedex for anxiety - restlessness   THC use and abuse - monitor for w/d sx - Ativan PRN  CT and US imaging noted  labs reviewed  work up under way  ID eval in progress  supportive ICU care in progress  spoke with WIFE

## 2020-12-01 NOTE — PROGRESS NOTE ADULT - REASON FOR ADMISSION
rapid afib

## 2020-12-01 NOTE — BEHAVIORAL HEALTH ASSESSMENT NOTE - NSBHCHARTREVIEWVS_PSY_A_CORE FT
Negative Screen
Vital Signs Last 24 Hrs  T(C): 36.6 (01 Dec 2020 12:34), Max: 37.4 (30 Nov 2020 16:19)  T(F): 97.9 (01 Dec 2020 12:34), Max: 99.3 (30 Nov 2020 16:19)  HR: 119 (01 Dec 2020 13:00) (92 - 131)  BP: 166/97 (01 Dec 2020 13:00) (117/69 - 166/97)  BP(mean): 120 (01 Dec 2020 12:00) (89 - 121)  RR: 32 (01 Dec 2020 13:00) (31 - 68)  SpO2: 93% (01 Dec 2020 13:00) (91% - 95%)

## 2020-12-01 NOTE — CONSULT NOTE ADULT - ASSESSMENT
[facility] Infectious Diseases  Chart Reviewed-Case discussed with Dr Dutta and additional blood tests ordered for am.  Full Consult to follow for any immediate concerns please fell free to contact us directly at  [phone] and have us paged or text my cell # [phone]  Johnathon Urbano MD PhD  
54yo M, with PMH/o afib s/p ablation in 10/2017 (not on AC), PUD, anxiety, and daily marijuana use with hyperemesis, presenting with abdominal pain, nausea, and vomiting since 11/26 after using marijuana. Of note, patient with recent admission to [facility] 11/10 for similar complaint.  Patient's wife Christa Caraballo reported that patient was brought to [facility] initially seeking inpatient help but was told he needed to be medically stabilized first.      CARDIAC HISTORY: Paroxysmal AF with ablation 2017 Dr. Get Hardin. Lost to follow up and recurrence of AF January 2019 on admission to [facility] with intoxication, acidosis. Seen by me last month in AF, bisoprolol 10 started and converted to sinus. CHADS2-VASC 0 and patient unreliable so no anticoag. Cardiac cath 12/8/2016 with only non-obstructive disease 30% proximal cx. LVEF 70%. Echo at [facility] in January 2019 with hyperdynamic LV and mild-mod MR.     In the ED  VS: T 98.2 -->78-->156 /57 RR 20 SpO2 98% on RA  Significant labs include WBC 14.65, platelets 406, PMNs 85.1, CO2 17, total bili 1.8, positive THC in urine tox.  CXR: no obvious lung pathology, cardiomegaly present, awaiting official read  EKG was initially sr, but he developed af/afl with a rapid rate, Tw abnormalities in lateral precordial leads    Patient received 2L NS, Zofran 4mg x1, Reglan 10mg x1, Ativan 2mg x1, Diltiazem 20mg x1, Diltiazem 10mg x1, Digoxin 0.5mg x1, Lopressor 5mg x1 and was started on cardizem gtt.      Overnight he has remained with an altered mental status.  He has been tachycardic throughout.  His IV was out when I saw him and it is unclear how long that is the case.    -has a hx of paf, with an ablation in 2017  -unclear how often he has had recurrent af since then but it happens at least occasionally  -has not been considered a good candidate for ac given his lack of compliance and followup, and his CHADS Vasc is 0  -for now would focus on rate control  -antiarrhythmic therapy would have risk>benefit for him given what I've seen so far  -restoration of sr does not seem appropriate now given his altered ms and ongoing issues with emesis, which could easily serve to provoke recurrent af  -cont dilt gtt  -add bb as needed, tolerated  -he has not been felt to be a candidate for ac and would not start now unless circumstances change  -last echo 2019, would repeat once his hr is better controlled or is back in sr    -there is no evidence of acute ischemia.  -no sig cad by cath 2016, only 30% stenosis of the lcx and no other disease    -there is no evidence for meaningful  volume overload.  -hyperdynamic lv by echo 2019    -management of substance issues and emesis issues as per med    -DVT prophylaxis  -monitor electrolytes, keep k>4, Mg>2  -will follow  
56yo M, originally from [facility] with PMH/o afib s/p ablation in 10/2017 (not on AC), PUD, anxiety, and daily marijuana use with hyperemesis, presenting with 40lb weight loss  abdominal pain, nausea, and vomiting since 11/26 after using marijuana. Of note, patient with recent admission to [facility] 11/10 for similar complaints.  Patient provides limited ROS: endorses palpitations; previous abd pain, N/V now resolved. PT noted to have pericardial effusion and ID consultation requested.
rapid afib  canniboid hyperemesis syndrome    cardiology eval noted  cardizem drip  anti-emetics prn  symptoms resolved  cont proton pump inhibitor  trails of clears, if tolerates then Advance diet as tolerated  will follow

## 2020-12-01 NOTE — PROGRESS NOTE ADULT - SUBJECTIVE AND OBJECTIVE BOX
INTERVAL HPI/OVERNIGHT EVENTS:  pt seen and examined earlier this am  denies n/v/abd pain  unsure of last bm  US noted    MEDICATIONS  (STANDING):  aMIOdarone Infusion 0.5 mG/Min (16.7 mL/Hr) IV Continuous <Continuous>  chlorhexidine 2% Cloths 1 Application(s) Topical every 24 hours  colchicine 0.6 milliGRAM(s) Oral two times a day  dexMEDEtomidine Infusion 0.1 MICROgram(s)/kG/Hr (2.41 mL/Hr) IV Continuous <Continuous>  diltiazem    Tablet 60 milliGRAM(s) Oral every 6 hours  diltiazem Infusion 20 mG/Hr (20 mL/Hr) IV Continuous <Continuous>  enoxaparin Injectable 40 milliGRAM(s) SubCutaneous daily  influenza   Vaccine 0.5 milliLiter(s) IntraMuscular once  lactated ringers. 1000 milliLiter(s) (125 mL/Hr) IV Continuous <Continuous>  metoprolol tartrate 25 milliGRAM(s) Oral every 8 hours  pantoprazole  Injectable 40 milliGRAM(s) IV Push daily    MEDICATIONS  (PRN):  LORazepam   Injectable 1 milliGRAM(s) IV Push every 6 hours PRN Anxiety      Allergies    No Known Allergies    Intolerances        Review of Systems:    General:  No wt loss, fevers, chills, night sweats, fatigue   Eyes:  Good vision, no reported pain  ENT:  No sore throat, pain, runny nose, dysphagia  CV:  No pain, palpitations, hypo/hypertension  Resp:  No dyspnea, cough, tachypnea, wheezing  GI:  No pain, No nausea, No vomiting, No diarrhea, No constipation, No weight loss, No fever, No pruritis, No rectal bleeding, No melena, No dysphagia  :  No pain, bleeding, incontinence, nocturia  Muscle:  No pain, weakness  Neuro:  No weakness, tingling, memory problems  Psych:  No fatigue, insomnia, mood problems, depression  Endocrine:  No polyuria, polydypsia, cold/heat intolerance  Heme:  No petechiae, ecchymosis, easy bruisability  Skin:  No rash, tattoos, scars, edema      Vital Signs Last 24 Hrs  T(C): 36.6 (01 Dec 2020 12:34), Max: 37.4 (30 Nov 2020 16:19)  T(F): 97.9 (01 Dec 2020 12:34), Max: 99.3 (30 Nov 2020 16:19)  HR: 119 (01 Dec 2020 13:00) (92 - 131)  BP: 166/97 (01 Dec 2020 13:00) (117/69 - 166/97)  BP(mean): 120 (01 Dec 2020 12:00) (89 - 121)  RR: 32 (01 Dec 2020 13:00) (31 - 68)  SpO2: 93% (01 Dec 2020 13:00) (91% - 95%)    PHYSICAL EXAM:    Constitutional: lying in bed  HEENT: ncat  Gastrointestinal: soft nt nd  Extremities: No peripheral edema  Vascular: + peripheral pulses  Neurological: A/O x  2-3      LABS:                        11.7   14.19 )-----------( 337      ( 01 Dec 2020 06:10 )             34.5     12-01    139  |  107  |  13  ----------------------------<  104<H>  3.4<L>   |  23  |  0.65    Ca    7.9<L>      01 Dec 2020 06:10  Phos  2.0     12-01  Mg     2.2     12-01    TPro  6.5  /  Alb  2.1<L>  /  TBili  1.9<H>  /  DBili  1.00<H>  /  AST  15  /  ALT  20  /  AlkPhos  95  12-01    PT/INR - ( 30 Nov 2020 17:37 )   PT: 19.8 sec;   INR: 1.74 ratio         PTT - ( 30 Nov 2020 17:37 )  PTT:32.8 sec      RADIOLOGY & ADDITIONAL TESTS:

## 2020-12-01 NOTE — PROGRESS NOTE ADULT - SUBJECTIVE AND OBJECTIVE BOX
Patient is a 55y old  Male who presents with a chief complaint of rapid afib (01 Dec 2020 07:02)    24 hour events: afib better controlled, still tachypneic on supplemental O2    REVIEW OF SYSTEMS  Constitutional: No fever, chills, fatigue  Neuro: No headache, numbness, weakness  Resp: No cough, wheezing, shortness of breath  CVS: No chest pain, palpitations, leg swelling  GI: No abdominal pain, nausea, vomiting, diarrhea   : No dysuria, frequency, incontinence  Skin: No itching, burning, rashes, or lesions   Msk: No joint pain or swelling  Psych: No depression, anxiety, mood swings  Heme: No bleeding    T(F): 98.5 (12-01-20 @ 05:00), Max: 99.3 (11-30-20 @ 16:19)  HR: 105 (12-01-20 @ 07:00) (92 - 131)  BP: 147/73 (12-01-20 @ 07:00) (117/69 - 158/63)  RR: 35 (12-01-20 @ 07:00) (28 - 68)  SpO2: 92% (12-01-20 @ 07:00) (91% - 96%)  Wt(kg): --            I&O's Summary    11-30 @ 07:01  -  12-01 @ 07:00  --------------------------------------------------------  IN: 4381.5 mL / OUT: 470 mL / NET: 3911.5 mL      PHYSICAL EXAM  General:   CNS:   HEENT:   Resp:   CVS:   Abd:   Ext:   Skin:     MEDICATIONS    aMIOdarone Infusion IV Continuous  diltiazem Infusion IV Continuous  metoprolol tartrate Injectable IV Push    colchicine Oral      aspirin Oral  dexMEDEtomidine Infusion IV Continuous  LORazepam   Injectable IV Push PRN      enoxaparin Injectable SubCutaneous    pantoprazole  Injectable IV Push      potassium phosphate IVPB IV Intermittent  sodium chloride 0.9%. IV Continuous    influenza   Vaccine IntraMuscular    chlorhexidine 2% Cloths Topical                            11.7   14.19 )-----------( 337      ( 01 Dec 2020 06:10 )             34.5       12-01    139  |  107  |  13  ----------------------------<  104<H>  3.4<L>   |  23  |  0.65    Ca    7.9<L>      01 Dec 2020 06:10  Phos  2.0     12-01  Mg     2.2     12-01    TPro  6.5  /  Alb  2.1<L>  /  TBili  1.9<H>  /  DBili  1.00<H>  /  AST  15  /  ALT  20  /  AlkPhos  95  12-01          PT/INR - ( 30 Nov 2020 17:37 )   PT: 19.8 sec;   INR: 1.74 ratio         PTT - ( 30 Nov 2020 17:37 )  PTT:32.8 sec        Rapid RVP Result: NotDetec (11-30 @ 11:45)    Radiology:   < from: US Gallbladder (US Gallbladder .) (11.30.20 @ 16:51) >  EXAM:  US GALLBLADDER                            PROCEDURE DATE:  11/30/2020          INTERPRETATION:  RIGHT UPPER QUADRANT ULTRASOUND    CLINICAL INFORMATION:  Nausea vomiting with right upper quadrant abdominal pain.    COMPARISON: CT angiogram chest 11/30/2020.    PROCEDURE:  Real time imaging of the right upper abdomen (gallbladder) were performed by technologist and made available for review.    FINDINGS:    Echogenic biliary sludge is noted within the gallbladder. No shadowing gallstone is noted.  No gallbladder wall thickening or pericholecystic fluid is noted.    There is mild prominence of the visualized common bile duct, measuring up to 8 mm.      IMPRESSION:      Biliary sludge, no sonographic evidence for cholelithiasis.    Mildprominence of the visualized common bile duct.    Correlate with biochemical markers.                      JUMA CASTELLANOS M.D., ATTENDING RADIOLOGIST  This document has been electronically signed. Nov 30 2020  4:56PM    < end of copied text >        Bedside lung ultrasound: ***  Bedside ECHO: ***    CENTRAL LINE: N            FINLEY: N                         A-LINE: N                           GLOBAL ISSUE/BEST PRACTICE  Analgesia:   Sedation:   CAM-ICU:   HOB elevation: yes  Stress ulcer prophylaxis:   VTE prophylaxis:   Glycemic control:   Nutrition:     CODE STATUS: ***  GOC discussion: Y       Patient is a 55y old  Male who presents with a chief complaint of rapid afib (01 Dec 2020 07:02)    24 hour events: No acute events overnight. Afib better controlled, still tachypneic on supplemental O2    REVIEW OF SYSTEMS  Constitutional: No fever, chills, fatigue  Neuro: No headache, numbness, weakness  Resp: No cough, wheezing, shortness of breath  CVS: No chest pain, palpitations, leg swelling  GI: No abdominal pain, nausea, vomiting, diarrhea   : No dysuria, frequency, incontinence  Skin: No itching, burning, rashes, or lesions   Msk: No joint pain or swelling  Psych: No depression, anxiety, mood swings  Heme: No bleeding    T(F): 98.5 (12-01-20 @ 05:00), Max: 99.3 (11-30-20 @ 16:19)  HR: 105 (12-01-20 @ 07:00) (92 - 131)  BP: 147/73 (12-01-20 @ 07:00) (117/69 - 158/63)  RR: 35 (12-01-20 @ 07:00) (28 - 68)  SpO2: 92% (12-01-20 @ 07:00) (91% - 96%)  Wt(kg): --            I&O's Summary    11-30 @ 07:01  -  12-01 @ 07:00  --------------------------------------------------------  IN: 4381.5 mL / OUT: 470 mL / NET: 3911.5 mL      PHYSICAL EXAM  General:   CNS:   HEENT:   Resp:   CVS:   Abd:   Ext:   Skin:     MEDICATIONS    aMIOdarone Infusion IV Continuous  diltiazem Infusion IV Continuous  metoprolol tartrate Injectable IV Push    colchicine Oral      aspirin Oral  dexMEDEtomidine Infusion IV Continuous  LORazepam   Injectable IV Push PRN      enoxaparin Injectable SubCutaneous    pantoprazole  Injectable IV Push      potassium phosphate IVPB IV Intermittent  sodium chloride 0.9%. IV Continuous    influenza   Vaccine IntraMuscular    chlorhexidine 2% Cloths Topical                            11.7   14.19 )-----------( 337      ( 01 Dec 2020 06:10 )             34.5       12-01    139  |  107  |  13  ----------------------------<  104<H>  3.4<L>   |  23  |  0.65    Ca    7.9<L>      01 Dec 2020 06:10  Phos  2.0     12-01  Mg     2.2     12-01    TPro  6.5  /  Alb  2.1<L>  /  TBili  1.9<H>  /  DBili  1.00<H>  /  AST  15  /  ALT  20  /  AlkPhos  95  12-01          PT/INR - ( 30 Nov 2020 17:37 )   PT: 19.8 sec;   INR: 1.74 ratio         PTT - ( 30 Nov 2020 17:37 )  PTT:32.8 sec        Rapid RVP Result: NotDetec (11-30 @ 11:45)    Radiology:   < from: US Gallbladder (US Gallbladder .) (11.30.20 @ 16:51) >  EXAM:  US GALLBLADDER                            PROCEDURE DATE:  11/30/2020          INTERPRETATION:  RIGHT UPPER QUADRANT ULTRASOUND    CLINICAL INFORMATION:  Nausea vomiting with right upper quadrant abdominal pain.    COMPARISON: CT angiogram chest 11/30/2020.    PROCEDURE:  Real time imaging of the right upper abdomen (gallbladder) were performed by technologist and made available for review.    FINDINGS:    Echogenic biliary sludge is noted within the gallbladder. No shadowing gallstone is noted.  No gallbladder wall thickening or pericholecystic fluid is noted.    There is mild prominence of the visualized common bile duct, measuring up to 8 mm.      IMPRESSION:      Biliary sludge, no sonographic evidence for cholelithiasis.    Mildprominence of the visualized common bile duct.    Correlate with biochemical markers.                      JUMA CASTELLANOS M.D., ATTENDING RADIOLOGIST  This document has been electronically signed. Nov 30 2020  4:56PM    < end of copied text >        Bedside lung ultrasound: ***  Bedside ECHO: ***    CENTRAL LINE: N            FINLEY: N                         A-LINE: N                           GLOBAL ISSUE/BEST PRACTICE  Analgesia:   Sedation:   CAM-ICU:   HOB elevation: yes  Stress ulcer prophylaxis:   VTE prophylaxis:   Glycemic control:   Nutrition:     CODE STATUS: ***  [facility] discussion: Y       Patient is a 55y old  Male who presents with a chief complaint of rapid afib (01 Dec 2020 07:02)    24 hour events: No acute events overnight. Afib better controlled, still tachypneic on supplemental O2. A bedside US of the RUQ was performed to rule out gallbladder pathology, biliary sludge was found with mild prominence of the common bile duct but no cholelithiasis was seen. Patient was started on aspirin and colchicine for pericarditis. Patient seen and examined at bedside. Appeared lethargic, but A&Ox3. On exam, he complained of increasing chest pressure and dry mouth, but denies abdominal pain, palpitations, nausea, vomiting, diarrhea, and SOB     REVIEW OF SYSTEMS  Constitutional: No fever, chills  Neuro: No headache, weakness  Resp: No cough, wheezing, shortness of breath  CVS: Admits chest pressure, Denies palpitations and leg swelling  GI: No abdominal pain, nausea, vomiting, diarrhea   Skin: No rashes, or lesions   Msk: No joint pain or swelling  Heme: No bleeding    T(F): 98.5 (12-01-20 @ 05:00), Max: 99.3 (11-30-20 @ 16:19)  HR: 105 (12-01-20 @ 07:00) (92 - 131)  BP: 147/73 (12-01-20 @ 07:00) (117/69 - 158/63)  RR: 35 (12-01-20 @ 07:00) (28 - 68)  SpO2: 92% (12-01-20 @ 07:00) (91% - 96%)  Wt(kg): --            I&O's Summary    11-30 @ 07:01  -  12-01 @ 07:00  --------------------------------------------------------  IN: 4381.5 mL / OUT: 470 mL / NET: 3911.5 mL      PHYSICAL EXAM  General: sleeping in bed, lethargic but arousable to voice  CNS: A&Ox3, answering questions appropriately  HEENT: normocephalic, atraumatic, dry mucous membranes  Resp: clear to auscultation, no wheezing rhonchi rales  CVS: distant heart sounds, irregularly irregular rhythm/rate   Abd: soft, nontender to palpation, nondistended, +BS  Ext: no edema bilat, no clubbing or cyanosis  Skin: warm, appears well perfused     MEDICATIONS    aMIOdarone Infusion IV Continuous  diltiazem Infusion IV Continuous  metoprolol tartrate Injectable IV Push    colchicine Oral      aspirin Oral  dexMEDEtomidine Infusion IV Continuous  LORazepam   Injectable IV Push PRN      enoxaparin Injectable SubCutaneous    pantoprazole  Injectable IV Push      potassium phosphate IVPB IV Intermittent  sodium chloride 0.9%. IV Continuous    influenza   Vaccine IntraMuscular    chlorhexidine 2% Cloths Topical                            11.7   14.19 )-----------( 337      ( 01 Dec 2020 06:10 )             34.5       12-01    139  |  107  |  13  ----------------------------<  104<H>  3.4<L>   |  23  |  0.65    Ca    7.9<L>      01 Dec 2020 06:10  Phos  2.0     12-01  Mg     2.2     12-01    TPro  6.5  /  Alb  2.1<L>  /  TBili  1.9<H>  /  DBili  1.00<H>  /  AST  15  /  ALT  20  /  AlkPhos  95  12-01          PT/INR - ( 30 Nov 2020 17:37 )   PT: 19.8 sec;   INR: 1.74 ratio         PTT - ( 30 Nov 2020 17:37 )  PTT:32.8 sec        Rapid RVP Result: NotDetec (11-30 @ 11:45)    Radiology:   < from: US Gallbladder (US Gallbladder .) (11.30.20 @ 16:51) >  EXAM:  US GALLBLADDER                            PROCEDURE DATE:  11/30/2020          INTERPRETATION:  RIGHT UPPER QUADRANT ULTRASOUND    CLINICAL INFORMATION:  Nausea vomiting with right upper quadrant abdominal pain.    COMPARISON: CT angiogram chest 11/30/2020.    PROCEDURE:  Real time imaging of the right upper abdomen (gallbladder) were performed by technologist and made available for review.    FINDINGS:    Echogenic biliary sludge is noted within the gallbladder. No shadowing gallstone is noted.  No gallbladder wall thickening or pericholecystic fluid is noted.    There is mild prominence of the visualized common bile duct, measuring up to 8 mm.      IMPRESSION:      Biliary sludge, no sonographic evidence for cholelithiasis.    Mildprominence of the visualized common bile duct.    Correlate with biochemical markers.                      JUMA CASTELLANOS M.D., ATTENDING RADIOLOGIST  This document has been electronically signed. Nov 30 2020  4:56PM    < end of copied text >        Bedside lung ultrasound: ***  Bedside ECHO: no tamponade physiology seen, 1.5 cm pericardial effusion noted     CENTRAL LINE: N            FINLEY: N                         A-LINE: N                           GLOBAL ISSUE/BEST PRACTICE  Analgesia: N  Sedation: Y, precedex  HOB elevation: yes  Stress ulcer prophylaxis: Y  VTE prophylaxis: lovenox  Glycemic control: Y  Nutrition: clear liquid    CODE STATUS: full code   GOC discussion: Y       Patient is a 55y old  Male who presents with a chief complaint of rapid afib (01 Dec 2020 07:02)    24 hour events: No acute events overnight. Afib better controlled, still tachypneic on supplemental O2. A bedside US of the RUQ was performed yesterday to rule out gallbladder pathology, biliary sludge was found with mild prominence of the common bile duct but no cholelithiasis was seen. Patient seen and examined at bedside. Appeared lethargic, but A&Ox3. On exam, he complained of increasing chest pressure and dry mouth, but denies abdominal pain, palpitations, nausea, vomiting, diarrhea, and SOB     REVIEW OF SYSTEMS  Constitutional: No fever, chills  Neuro: No headache, weakness  Resp: No cough, wheezing, shortness of breath  CVS: Admits chest pressure, Denies palpitations and leg swelling  GI: No abdominal pain, nausea, vomiting, diarrhea   Skin: No rashes, or lesions   Msk: No myalgias or swelling      T(F): 98.5 (12-01-20 @ 05:00), Max: 99.3 (11-30-20 @ 16:19)  HR: 105 (12-01-20 @ 07:00) (92 - 131)  BP: 147/73 (12-01-20 @ 07:00) (117/69 - 158/63)  RR: 35 (12-01-20 @ 07:00) (28 - 68)  SpO2: 92% (12-01-20 @ 07:00) (91% - 96%)  Wt(kg): --        I&O's Summary    11-30 @ 07:01  -  12-01 @ 07:00  --------------------------------------------------------  IN: 4381.5 mL / OUT: 470 mL / NET: 3911.5 mL      PHYSICAL EXAM  General: sleeping in bed, lethargic but arousable to voice  CNS: A&Ox3, answering questions appropriately  HEENT: normocephalic, atraumatic, dry mucous membranes  Resp: clear to auscultation, no wheezing rhonchi rales  CVS: distant heart sounds, irregularly irregular rhythm/rate   Abd: soft, nontender to palpation, nondistended, +BS  Ext: no edema bilat, no clubbing or cyanosis  Skin: warm, appears well perfused     MEDICATIONS    aMIOdarone Infusion IV Continuous  diltiazem Infusion IV Continuous  metoprolol tartrate Injectable IV Push    colchicine Oral    aspirin Oral  dexMEDEtomidine Infusion IV Continuous  LORazepam   Injectable IV Push PRN    enoxaparin Injectable SubCutaneous    pantoprazole  Injectable IV Push    potassium phosphate IVPB IV Intermittent  sodium chloride 0.9%. IV Continuous    influenza   Vaccine IntraMuscular    chlorhexidine 2% Cloths Topical                            11.7   14.19 )-----------( 337      ( 01 Dec 2020 06:10 )             34.5       12-01    139  |  107  |  13  ----------------------------<  104<H>  3.4<L>   |  23  |  0.65    Ca    7.9<L>      01 Dec 2020 06:10  Phos  2.0     12-01  Mg     2.2     12-01    TPro  6.5  /  Alb  2.1<L>  /  TBili  1.9<H>  /  DBili  1.00<H>  /  AST  15  /  ALT  20  /  AlkPhos  95  12-01          PT/INR - ( 30 Nov 2020 17:37 )   PT: 19.8 sec;   INR: 1.74 ratio         PTT - ( 30 Nov 2020 17:37 )  PTT:32.8 sec        Rapid RVP Result: NotDetec (11-30 @ 11:45)    Radiology:   < from: US Gallbladder (US Gallbladder .) (11.30.20 @ 16:51) >  EXAM:  US GALLBLADDER                            PROCEDURE DATE:  11/30/2020          INTERPRETATION:  RIGHT UPPER QUADRANT ULTRASOUND    CLINICAL INFORMATION:  Nausea vomiting with right upper quadrant abdominal pain.    COMPARISON: CT angiogram chest 11/30/2020.    PROCEDURE:  Real time imaging of the right upper abdomen (gallbladder) were performed by technologist and made available for review.    FINDINGS:    Echogenic biliary sludge is noted within the gallbladder. No shadowing gallstone is noted.  No gallbladder wall thickening or pericholecystic fluid is noted.    There is mild prominence of the visualized common bile duct, measuring up to 8 mm.      IMPRESSION:      Biliary sludge, no sonographic evidence for cholelithiasis.    Mildprominence of the visualized common bile duct.    Correlate with biochemical markers.                      JUMA CASTELLANOS M.D., ATTENDING RADIOLOGIST  This document has been electronically signed. Nov 30 2020  4:56PM    < end of copied text >        Bedside lung ultrasound: ***  Bedside ECHO: no tamponade physiology seen, 1.5 cm pericardial effusion noted     CENTRAL LINE: N            FINLEY: N                         A-LINE: N                           GLOBAL ISSUE/BEST PRACTICE  Analgesia: N  Sedation: Y, precedex  HOB elevation: yes  Stress ulcer prophylaxis: Y  VTE prophylaxis: lovenox  Glycemic control: Y  Nutrition: clear liquid    CODE STATUS: full code   GOC discussion: Y       Patient is a 55y old  Male who presents with a chief complaint of rapid afib (01 Dec 2020 07:02)    24 hour events: No acute events overnight. Afib better controlled, still tachypneic on supplemental O2. A bedside US of the RUQ was performed yesterday to rule out gallbladder pathology, biliary sludge was found with mild prominence of the common bile duct but no cholelithiasis was seen. Patient seen and examined at bedside. Appeared lethargic, but A&Ox3. On exam, he complained of increasing chest pressure and dry mouth, but denies abdominal pain, palpitations, nausea, vomiting, diarrhea, and SOB     REVIEW OF SYSTEMS  Constitutional: No fever, chills  Neuro: No headache, weakness  Resp: No cough, wheezing, shortness of breath  CVS: Admits chest pressure, Denies palpitations and leg swelling  GI: No abdominal pain, nausea, vomiting, diarrhea   Skin: No rashes, or lesions   Msk: No myalgias or swelling      T(F): 98.5 (12-01-20 @ 05:00), Max: 99.3 (11-30-20 @ 16:19)  HR: 105 (12-01-20 @ 07:00) (92 - 131)  BP: 147/73 (12-01-20 @ 07:00) (117/69 - 158/63)  RR: 35 (12-01-20 @ 07:00) (28 - 68)  SpO2: 92% (12-01-20 @ 07:00) (91% - 96%)  Wt(kg): --        I&O's Summary    11-30 @ 07:01  -  12-01 @ 07:00  --------------------------------------------------------  IN: 4381.5 mL / OUT: 470 mL / NET: 3911.5 mL      PHYSICAL EXAM  General: sleeping in bed, lethargic but arousable to voice  CNS: A&Ox3, answering questions appropriately  HEENT: normocephalic, atraumatic, dry mucous membranes  Resp: clear to auscultation, no wheezing rhonchi rales  CVS: distant heart sounds, irregularly irregular rhythm/rate   Abd: soft, nontender to palpation, nondistended, +BS  Ext: no edema bilat, no clubbing or cyanosis  Skin: warm, appears well perfused     MEDICATIONS    aMIOdarone Infusion IV Continuous  diltiazem Infusion IV Continuous  metoprolol tartrate Injectable IV Push    colchicine Oral    aspirin Oral  dexMEDEtomidine Infusion IV Continuous  LORazepam   Injectable IV Push PRN    enoxaparin Injectable SubCutaneous    pantoprazole  Injectable IV Push    potassium phosphate IVPB IV Intermittent  sodium chloride 0.9%. IV Continuous    influenza   Vaccine IntraMuscular    chlorhexidine 2% Cloths Topical                            11.7   14.19 )-----------( 337      ( 01 Dec 2020 06:10 )             34.5       12-01    139  |  107  |  13  ----------------------------<  104<H>  3.4<L>   |  23  |  0.65    Ca    7.9<L>      01 Dec 2020 06:10  Phos  2.0     12-01  Mg     2.2     12-01    TPro  6.5  /  Alb  2.1<L>  /  TBili  1.9<H>  /  DBili  1.00<H>  /  AST  15  /  ALT  20  /  AlkPhos  95  12-01          PT/INR - ( 30 Nov 2020 17:37 )   PT: 19.8 sec;   INR: 1.74 ratio         PTT - ( 30 Nov 2020 17:37 )  PTT:32.8 sec        Rapid RVP Result: NotDetec (11-30 @ 11:45)    Radiology:   < from: US Gallbladder (US Gallbladder .) (11.30.20 @ 16:51) >  EXAM:  US GALLBLADDER                            PROCEDURE DATE:  11/30/2020          INTERPRETATION:  RIGHT UPPER QUADRANT ULTRASOUND    CLINICAL INFORMATION:  Nausea vomiting with right upper quadrant abdominal pain.    COMPARISON: CT angiogram chest 11/30/2020.    PROCEDURE:  Real time imaging of the right upper abdomen (gallbladder) were performed by technologist and made available for review.    FINDINGS:    Echogenic biliary sludge is noted within the gallbladder. No shadowing gallstone is noted.  No gallbladder wall thickening or pericholecystic fluid is noted.    There is mild prominence of the visualized common bile duct, measuring up to 8 mm.      IMPRESSION:      Biliary sludge, no sonographic evidence for cholelithiasis.    Mildprominence of the visualized common bile duct.    Correlate with biochemical markers.                      JUMA CASTELLANOS M.D., ATTENDING RADIOLOGIST  This document has been electronically signed. Nov 30 2020  4:56PM    < end of copied text >    TTE: Technically difficult study.   Concentric LVH with normal internal dimensions and systolic function, estimated LVEF of 65%.  Normal RV size and systolic function  Normal trileaflet aortic valve, without AI  Mild MR  Trace TR  Moderate circumferential pericardial effusion without evidence of hemodynamic compromise.       Bedside lung ultrasound: ***  Bedside ECHO: no tamponade physiology seen, 1.5 cm pericardial effusion noted     CENTRAL LINE: N            FINLEY: N                         A-LINE: N                           GLOBAL ISSUE/BEST PRACTICE  Analgesia: N  Sedation: Y, precedex  HOB elevation: yes  Stress ulcer prophylaxis: Y  VTE prophylaxis: lovenox  Glycemic control: Y  Nutrition: DASH/TLC    CODE STATUS: full code

## 2020-12-01 NOTE — CONSULT NOTE ADULT - PROVIDER SPECIALTY LIST ADULT
CT Surgery
Infectious Disease
MICU
Cardiology
Gastroenterology
Addiction Medicine
Infectious Disease

## 2020-12-02 ENCOUNTER — NON-APPOINTMENT (OUTPATIENT)
Age: 55
End: 2020-12-02

## 2020-12-02 LAB
B19V IGG SER-ACNC: 2.86 INDEX — HIGH (ref 0–0.9)
B19V IGG+IGM SER-IMP: POSITIVE
B19V IGG+IGM SER-IMP: SIGNIFICANT CHANGE UP
B19V IGM FLD-ACNC: 0.11 INDEX — SIGNIFICANT CHANGE UP (ref 0–0.9)
B19V IGM SER-ACNC: NEGATIVE — SIGNIFICANT CHANGE UP
CULTURE RESULTS: SIGNIFICANT CHANGE UP
GAMMA INTERFERON BACKGROUND BLD IA-ACNC: 0.01 IU/ML — SIGNIFICANT CHANGE UP
M TB IFN-G BLD-IMP: NEGATIVE — SIGNIFICANT CHANGE UP
M TB IFN-G CD4+ BCKGRND COR BLD-ACNC: 0 IU/ML — SIGNIFICANT CHANGE UP
M TB IFN-G CD4+CD8+ BCKGRND COR BLD-ACNC: 0 IU/ML — SIGNIFICANT CHANGE UP
QUANT TB PLUS MITOGEN MINUS NIL: 5.32 IU/ML — SIGNIFICANT CHANGE UP
SPECIMEN SOURCE: SIGNIFICANT CHANGE UP

## 2020-12-03 LAB
CK MB BLD-MCNC: HIGH TITER
CMV DNA CSF QL NAA+PROBE: SIGNIFICANT CHANGE UP
CMV IGG FLD QL: 9.6 U/ML — HIGH
CMV IGG SERPL-IMP: POSITIVE
CMV IGM FLD-ACNC: <8 AU/ML — SIGNIFICANT CHANGE UP
CMV IGM SERPL QL: NEGATIVE — SIGNIFICANT CHANGE UP
COXSACKIE TYPE A-24: HIGH TITER
CV A24 IGG TITR SER IF: HIGH TITER
CV A7 AB SER-ACNC: HIGH TITER
CV A9 AB TITR FLD: HIGH TITER
CV B1 AB TITR FLD: NEGATIVE — SIGNIFICANT CHANGE UP
CV B2 AB TITR FLD: HIGH
CV B3 AB TITR FLD: NEGATIVE — SIGNIFICANT CHANGE UP
CV B4 AB TITR FLD: NEGATIVE — SIGNIFICANT CHANGE UP
CV B5 AB TITR FLD: HIGH
CV B6 AB TITR FLD: HIGH
EBV EA AB SER IA-ACNC: <5 U/ML — SIGNIFICANT CHANGE UP
EBV EA AB TITR SER IF: POSITIVE
EBV EA IGG SER-ACNC: NEGATIVE — SIGNIFICANT CHANGE UP
EBV NA IGG SER IA-ACNC: 227 U/ML — HIGH
EBV VCA IGG AVIDITY SER QL IA: POSITIVE
EBV VCA IGM SER IA-ACNC: <10 U/ML — SIGNIFICANT CHANGE UP
EBV VCA IGM SER IA-ACNC: >750 U/ML — HIGH
EBV VCA IGM TITR FLD: NEGATIVE — SIGNIFICANT CHANGE UP

## 2020-12-04 LAB — EBV DNA SERPL NAA+PROBE-ACNC: ABNORMAL IU/ML

## 2020-12-05 LAB
CULTURE RESULTS: SIGNIFICANT CHANGE UP
CULTURE RESULTS: SIGNIFICANT CHANGE UP
ECV11 AB TITR SER CF: SIGNIFICANT CHANGE UP
ECV30 AB TITR SER CF: SIGNIFICANT CHANGE UP
ECV4 AB TITR SER CF: SIGNIFICANT CHANGE UP
ECV7 AB TITR SER CF: SIGNIFICANT CHANGE UP
ECV9 AB TITR SER CF: SIGNIFICANT CHANGE UP
SPECIMEN SOURCE: SIGNIFICANT CHANGE UP
SPECIMEN SOURCE: SIGNIFICANT CHANGE UP

## 2020-12-10 ENCOUNTER — NON-APPOINTMENT (OUTPATIENT)
Age: 55
End: 2020-12-10

## 2020-12-10 ENCOUNTER — APPOINTMENT (OUTPATIENT)
Dept: CARDIOLOGY | Facility: CLINIC | Age: 55
End: 2020-12-10
Payer: SELF-PAY

## 2020-12-10 ENCOUNTER — APPOINTMENT (OUTPATIENT)
Dept: CARDIOLOGY | Facility: CLINIC | Age: 55
End: 2020-12-10
Payer: MEDICAID

## 2020-12-10 VITALS
DIASTOLIC BLOOD PRESSURE: 81 MMHG | WEIGHT: 200 LBS | BODY MASS INDEX: 26.51 KG/M2 | OXYGEN SATURATION: 99 % | HEIGHT: 73 IN | SYSTOLIC BLOOD PRESSURE: 154 MMHG | HEART RATE: 96 BPM

## 2020-12-10 VITALS — HEART RATE: 90 BPM | DIASTOLIC BLOOD PRESSURE: 85 MMHG | SYSTOLIC BLOOD PRESSURE: 136 MMHG

## 2020-12-10 DIAGNOSIS — D64.9 ANEMIA, UNSPECIFIED: ICD-10-CM

## 2020-12-10 PROCEDURE — 36415 COLL VENOUS BLD VENIPUNCTURE: CPT

## 2020-12-10 PROCEDURE — 93000 ELECTROCARDIOGRAM COMPLETE: CPT

## 2020-12-10 PROCEDURE — 99072 ADDL SUPL MATRL&STAF TM PHE: CPT

## 2020-12-10 PROCEDURE — 99215 OFFICE O/P EST HI 40 MIN: CPT

## 2020-12-10 PROCEDURE — 93306 TTE W/DOPPLER COMPLETE: CPT

## 2020-12-10 RX ORDER — RIVAROXABAN 20 MG/1
20 TABLET, FILM COATED ORAL
Qty: 90 | Refills: 1 | Status: DISCONTINUED | COMMUNITY
Start: 2017-09-07 | End: 2020-12-10

## 2020-12-11 LAB
ALBUMIN SERPL ELPH-MCNC: 3.7 G/DL
ALP BLD-CCNC: 211 U/L
ALT SERPL-CCNC: 85 U/L
ANION GAP SERPL CALC-SCNC: 18 MMOL/L
AST SERPL-CCNC: 31 U/L
B19V DNA FLD QL NAA+PROBE: SIGNIFICANT CHANGE UP IU/ML
BASOPHILS # BLD AUTO: 0.08 K/UL
BASOPHILS NFR BLD AUTO: 0.6 %
BILIRUB SERPL-MCNC: 0.7 MG/DL
BUN SERPL-MCNC: 11 MG/DL
CALCIUM SERPL-MCNC: 9 MG/DL
CHLORIDE SERPL-SCNC: 101 MMOL/L
CO2 SERPL-SCNC: 18 MMOL/L
CREAT SERPL-MCNC: 1.35 MG/DL
EOSINOPHIL # BLD AUTO: 0.55 K/UL
EOSINOPHIL NFR BLD AUTO: 4.2 %
HCT VFR BLD CALC: 39.9 %
HGB BLD-MCNC: 11.8 G/DL
IMM GRANULOCYTES NFR BLD AUTO: 2.2 %
IRON SATN MFR SERPL: 18 %
IRON SERPL-MCNC: 62 UG/DL
LYMPHOCYTES # BLD AUTO: 2.16 K/UL
LYMPHOCYTES NFR BLD AUTO: 16.4 %
MAN DIFF?: NORMAL
MCHC RBC-ENTMCNC: 27.7 PG
MCHC RBC-ENTMCNC: 29.6 GM/DL
MCV RBC AUTO: 93.7 FL
MONOCYTES # BLD AUTO: 0.53 K/UL
MONOCYTES NFR BLD AUTO: 4 %
NEUTROPHILS # BLD AUTO: 9.59 K/UL
NEUTROPHILS NFR BLD AUTO: 72.6 %
NT-PROBNP SERPL-MCNC: 187 PG/ML
PLATELET # BLD AUTO: 394 K/UL
POTASSIUM SERPL-SCNC: 4.7 MMOL/L
PROT SERPL-MCNC: 7.3 G/DL
RBC # BLD: 4.26 M/UL
RBC # FLD: 14.3 %
SODIUM SERPL-SCNC: 138 MMOL/L
TIBC SERPL-MCNC: 335 UG/DL
UIBC SERPL-MCNC: 273 UG/DL
WBC # FLD AUTO: 13.2 K/UL

## 2020-12-11 NOTE — REASON FOR VISIT
[FreeTextEntry1] : 55-year-old man with atrial fibrillation and abnormal stress test, s/p atrial flutter ablation 2017, now back in rapid Afib.\par Multiple admissions for marijuana toxicity and on the latest one found to have moderate pericardial effusion as well.

## 2020-12-11 NOTE — HISTORY OF PRESENT ILLNESS
[FreeTextEntry1] : August 30, 2016. Patient is a 51-year-old man, who was finally diagnosed with rapid atrial fibrillation about one month ago. On July 24 related to drinking and smoking synthetic weed, etc. he was hospitalized at [facility]. He had severe palpitations and "his whole system went into shock". He had some vomitting and nauseousness, diaphoresis, and an uncomfortable feeling, but no chest pain. He was in rapid atrial fibrillation. He did make positive troponin enzymes, but was not recommended to have cardiac catheterization or even a stress test. He was hospitalized at [facility] for 6 days and echocardiogram supposedly showed no scar. He was discharged on cholesterol medications, but then he followed up with a cardiologist in [place] after being hospitalized there for 48 hours with a recurrence of his A. fib. He was placed on sotalol and did well for a while. On 26 August on 120 mg of sotalol, he broke through with an episode of A.fib. The cardiologist wanted to increase Sotalol to 160 mg, but the patient stayed at 120 and then came back to [place] and is here to see me. He has not had a stress test. He has no risk factors for coronary artery disease or a family history of coronary disease or arrhythmias. He claims he's been having symptoms for 20 years, and the episodes have lasted as long as an hour, and it is only with this episode in July, that he was finally diagnosed with atrial fibrillation. He has some shortness of breath going up stairs, but he says he has had this for years, and it has not progressed. Otherwise, with normal activity, and swimming, he does not get chest pain. While he smokes a lot of weed, he does not smoke cigarettes. He thinks he was told of a murmur recently and once as a kid, although I did not hear a murmur on exam. Has not had any syncope recently. He did have a duodenal ulcer about 10 years ago, not related to medications or NSAIDs, and not bleeding. It has not been an issue since. He is currently on sotalol 120 b.i.d., BuSpar, 5 mg b.i.d., and aspirin 81 mg q.d. However, in [facility] Electronic medical record, there are no other notes, but there is a list of medicines to be verified that includes diltiazem, calculus, Eliquis, as well as atorvastatin, and Crestor.\par September 9, 2016. The patient came for stress test and had to stop for shortness of breath at 6 minutes with a low heart rate on carvedilol. It looked like ST s were starting to go down, but did not meet criteria. APCs noted, but no atrial fibrillation. Recommended nuclear stress test and use that to help guide medicine versus statin for his hyperlipidemia.\par November 2, 2016. Patient here in followup. He did not have nuclear stress test. He is here in followup because he is running out of sotalol, but also he had 2 episodes of atrial fibrillation, which were severe and accompanied with chest pain. He admits he has not been taking it twice a day and occasionally will skip. He has not worked on his diet and in fact has gained weight. He does complain of some fatigue and shortness of breath. No exertional chest pain, however; he does not get the same chest pain with exertion that he gets during the atrial fibrillation. His EKG is sinus rhythm with nonspecific ST changes. After a long discussion with the patient and his sister, he promises to be rigid with his sotalol every 12 hours, and he is scheduling a nuclear stress test, and we will review his labs.\par November 18, 2016. Yesterday, the patient came for his nuclear stress test. He was in atrial flutter or fibrillation. His heart rate went as high as 199, and there were definite abnormal ST changes, but no chest pain. Blood pressure response was normal. The nuclear scan showed medium sized, mild defects, apical, inferior, mid to distal inferolateral that were reversible, and there was also transient ischemic dilatation of the LV with a ratio of 1.45. LVEF was 78% with normal wall motion post stress however. Patient returns today to review the findings and for reevaluation. I recommended coronary angiography and the patient and his sister are thinking about it. He is back in sinus rhythm.\par December 8, 2016. Patient had coronary angiography, which only showed a 30% lesion in the proximal circumflex. The other arteries had no obstruction and LVEF was normal at 70%. I had the patient switch over to flecainide 50 mg q.12 h.\par December 30, 2016. The patient is in sinus rhythm, however, he claims he has had a lot of breakthroughs and does not feel well on the flecainide. He could not be more specific and wanted to go back to the sotalol, although he has broken through 120 mg many times. After a long extensive discussion we agreed to try Rythmol  mg q.12 h.\par February 7, 2017. Patient called. He had never started the Rythmol. After much discussion he agreed to start the Rythmol and come in in 2 weeks.\par February 23, 2017. The patient is here in followup and is in rapid atrial fibrillation/flutter. He is tolerating the Rythmol well and thinks that most of the time he is doing okay. He was not aware that he was in atrial fibrillation today, although he did think he was yesterday. After long discussion about ablation, changing medications, increasing the dose, or using home telemetry to determine how often he truly is in atrial fibrillation he elected to go up on the Rythmol first. He is still not eager for invasive procedures.\par April 13, 2017. Patient finally returns as I would not renew his medication. He is in sinus rhythm. He says he had one or two bad days, but otherwise thinks he was in a normal rhythm most of the time. He is not  the most compliant when it comes to sticking to his q.12 h. regimen. For the last 3 days has been taking 325 mg twice a day instead of 425 as he ran out of pills.\par May 10, 2017. Patient is here because of cough and sputum. However, he admitted that after a while he decided once again to stop his Rythmol to "see what would happen" and sure enough on Saturday he could not walk to [facility] because he was out of breath and his heart was racing fast. He went back on the Rythmol and had another episode Sunday, but since then has been back in normal rhythm. He's been coughing with dark sputum for 2 days now, but no fever or chills. His EKG shows sinus rhythm with a normal QRS and QT. He is here with his wife so we had an extensive discussion about considering an ablation and about compliance with medication and he will be calling Dr. Get Hardin of [facility].\par \par July 24, 2019. First visit in over 2 years. He had an ablation with Dr. Hardin in 2017 but never continued the medications, even for just a few months and never followed up with Dr. Hardin. In January of this year was hospitalized at [facility] and it sounds like he again had substance abuse problems, mostly marijuana, but probably Ativan, and possibly other medications. He initially presented with lactic acidosis. At some point he did seem to be in sinus rhythm, but with a very bizarre EKG, and prolonged QT, which I believe, was thought to be from toxins. Eventually, was back in rapid A. Flutter and was discharged on Cardizem CD. His CHADS2-VASC score was 0 so no anticoag. No followup after that and he claims now that he had no insurance, but now that he does he came back. He has been having severe GI symptoms whenever he eats fatty, greasy foods, mostly, vomiting, and chest pain, and rapid heartbeat. Reportedly, he has an appointment with gastroenterology tomorrow. He is on absolutely no medications and is here in rapid atrial flutter with a heart rate of around 140. Seems to be tolerating it well, with no ischemic changes, no heart failure on exam, and blood pressure acceptable. I reviewed his notes from the hospitalization in January and reached out to Dr. Get Hardin for further information. In the meantime, I am placing him on bisoprolol 10 mg for additional rate control while he has his GI workup. Long discussion about compliance and self-destructive behavior.\par July 31, 2019. Patient returns in followup on bisoprolol. I spoke with Dr. Hardin after his last visit, who said that even though his CHADS2-VASC score was zero, normally he likes to anticoagulate these people after ablation, but because the patient was so erratic in his behavior and there was concern about drugs or drinking, he did not. He would be willing to do another ablation if necessary. Today he is back in sinus rhythm at 51. Labs from last week were within normal limits, except for his lipid profile, which we reviewed today.\par December 6, 2019.  Since last visit patient was hospitalized I believe twice once at [facility] and once at [place] with marijuana intoxication.  He remains in sinus rhythm as long as he was back on his bisoprolol.  He returns now run out of the bisoprolol and is back in A. fib at 130 but totally asymptomatic.  He is not on Xarelto but his CHADS2- VASC score is 0.  Still using marijuana.  Long discussion about compliance and advised him to set up another appointment with Dr. Hardin to discuss a second ablation.  (Last time after the ablation he stopped the beta-blocker right away rather than waiting 3 months.)\par April 22, 2020.  Telehealth visit. Narrative: On the whole patient has been doing well, remaining on the beta-blocker with rare atrial fibrillation. There was a lot of stress as his wife and father-in-law and mother-in-law had COVID-19 with his father-in-law being hospitalized. He was left on his own for some time and had what he thinks was just a panic attack. He still is smoking marijuana at least daily but he tries to keep it down to once a day. His wife spoke to me about what to do when he has some of these episodes because he gets very nauseous and he gets very anxious and she almost wants to call EMS. I explained to have him breathe in and out of a paper bag as the first step otherwise we can try a little bit of alprazolam or a little bit of Zofran. (At the end the patient left and I spoke with the wife because he did not want to hear about his anxiety attacks). We also discussed follow-up with Dr. Hardin of [facility] for possible ablation given his recurrence of atrial fibrillation although most of the time if he is compliant with his beta-blocker he is in sinus rhythm. Currently he is on no anticoagulation because of his low CHADS2-VASC score.\par Assessment: For the most part stable in terms of his atrial fibrillation. Marijuana abuse still somewhat of a problem along with anxiety but on the whole functioning better than he had previously.\par Continue beta-blocker. Alprazolam 0.5 mg as needed and Zofran 4 mg as needed.\par Follow-up: 2 months\par July 2, 2020.Issues with nauseousness and vomiting and going to the emergency room at [facility].\par July 5, 2020. Once again diagnosed with marijuana intoxication. Remained in sinus rhythm throughout. Was discharged on July 3. \par December 2, 2020.Patient had issues again with marijuana overdose with nausea and vomiting and lethargy and finally agreed to go to rehab.  However at rehab was found to be in rapid atrial fibrillation and was sent to [facility].  He was there for a few days and I kept in contact with the physicians there.  They were having trouble controlling his rate and he was placed on IV amiodarone as CT angio showed a large pericardial effusion although by echo it was more mild to moderate and no hemodynamic compromise.  Sed rate was 86.  Initial plan was to have a thoracic surgery just to a pericardial window and send off the fluid for labs and serologies and cytology etc.  However he had been placed on 650 mg of aspirin along with the colchicine so the surgery was canceled.  In the interim he was transferred to [facility]. \par December 10, 2020.  Patient here now with wife.  I reviewed the records from [facility] on the patient's wife's cell phone.  He had an extensive collagen vascular work-up that was totally negative looking for a cause for his pericarditis.  Also cardiolipin antibody negative.  He did have abnormal liver function tests that persisted.  A HIDA scan was negative.  His echo on December 2 showed a small to medium pleural effusion pericardial effusion and a small left pleural effusion.  A follow-up echo the next day showed a small to mild effusion and bilateral pleural effusions but that was a limited study.  Patient was placed on amiodarone Cardizem metoprolol along with Eliquis and colchicine, and the patient left in sinus rhythm.  Here he is in sinus rhythm at 89 with abnormal ST-T changes diffusely.  He swears no more marijuana but he has done this before.\par Patient return for echocardiogram at the end of the day.  Pericardial effusion gone.  No significant pleural effusion.  Otherwise mild S.A.M. but no gradient.  Mild to moderate MR.  Moderate LAE.  Normal LV systolic function normal RV systolic function.  Mild TR with RVSP 35.

## 2020-12-11 NOTE — PHYSICAL EXAM
[General Appearance - Well Developed] : well developed [Normal Appearance] : normal appearance [Well Groomed] : well groomed [General Appearance - Well Nourished] : well nourished [No Deformities] : no deformities [General Appearance - In No Acute Distress] : no acute distress [Normal Conjunctiva] : the conjunctiva exhibited no abnormalities [Eyelids - No Xanthelasma] : the eyelids demonstrated no xanthelasmas [Normal Oral Mucosa] : normal oral mucosa [No Oral Pallor] : no oral pallor [No Oral Cyanosis] : no oral cyanosis [Normal Jugular Venous A Waves Present] : normal jugular venous A waves present [Normal Jugular Venous V Waves Present] : normal jugular venous V waves present [No Jugular Venous Ortega A Waves] : no jugular venous ortega A waves [Respiration, Rhythm And Depth] : normal respiratory rhythm and effort [Exaggerated Use Of Accessory Muscles For Inspiration] : no accessory muscle use [Auscultation Breath Sounds / Voice Sounds] : lungs were clear to auscultation bilaterally [Heart Rate And Rhythm] : heart rate and rhythm were normal [Heart Sounds] : normal S1 and S2 [Murmurs] : no murmurs present [Abdomen Soft] : soft [Abdomen Tenderness] : non-tender [Abdomen Mass (___ Cm)] : no abdominal mass palpated [Abnormal Walk] : normal gait [Gait - Sufficient For Exercise Testing] : the gait was sufficient for exercise testing [Nail Clubbing] : no clubbing of the fingernails [Cyanosis, Localized] : no localized cyanosis [Petechial Hemorrhages (___cm)] : no petechial hemorrhages [Skin Color & Pigmentation] : normal skin color and pigmentation [] : no rash [No Venous Stasis] : no venous stasis [Skin Lesions] : no skin lesions [No Skin Ulcers] : no skin ulcer [No Xanthoma] : no  xanthoma was observed [Oriented To Time, Place, And Person] : oriented to person, place, and time [Affect] : the affect was normal [Mood] : the mood was normal [No Anxiety] : not feeling anxious [FreeTextEntry1] : No rashes. No cyanosis

## 2020-12-11 NOTE — REVIEW OF SYSTEMS
[see HPI] : see HPI [Negative] : Heme/Lymph [Recent Weight Loss (___ Lbs)] : recent [unfilled] ~Ulb weight loss [Recent Weight Gain (___ Lbs)] : no recent weight gain [Feeling Fatigued] : not feeling fatigued [Shortness Of Breath] : no shortness of breath [Dyspnea on exertion] : not dyspnea during exertion [Chest  Pressure] : no chest pressure [Chest Pain] : no chest pain [Lower Ext Edema] : no extremity edema [Leg Claudication] : no intermittent leg claudication [Palpitations] : no palpitations [Cough] : no cough [Wheezing] : no wheezing [Coughing Up Blood] : no hemoptysis [Abdominal Pain] : no abdominal pain [Nausea] : no nausea [Vomiting] : no vomiting [Heartburn] : no heartburn [Change in Appetite] : no change in appetite [Dysphagia] : no dysphagia [Dizziness] : no dizziness [Confusion] : no confusion was observed [Depression] : no depression [Anxiety] : no anxiety [Under Stress] : not under stress [Suicidal] : not suicidal

## 2020-12-12 LAB — FERRITIN SERPL-MCNC: 703 NG/ML

## 2021-01-02 NOTE — DIETITIAN INITIAL EVALUATION ADULT. - CALCULATED FROM (G/KG)
24 year old female no pmh presents here c/o atraumatic right foot pain x 1 week. Patient states she woke up on sunday of last week and began having pain. Pain improves with rest but is worse upon waking up in the morning  and increases to 8/10 with walking. VS reviewed. Pain medication given. Xray obtained demonstrates no acute fracture. Patient with likely plantar fascititis. Patient to follow up with podiatry. Return precautions given. 83.4

## 2021-01-29 NOTE — H&P ADULT - PROBLEM SELECTOR PLAN 5
Subjective     The patient is a 43 y.o. female who is here to undergo EGD for GERD. She is considering a bariatric procedure for morbid obesity       Past Medical History:   Diagnosis Date    GERD (gastroesophageal reflux disease)     Hyperlipidemia     Hypertension     Major depressive disorder, recurrent episode, mild ([facility] [address] 10/21/2015    Obesity, Class III, BMI 40-49.9 (morbid obesity) ([facility] [address] [facility]     TRISHA (obstructive sleep apnea)     Pre-diabetes    . Review of Systems - A complete 14 point review of systems was performed. All was negative unless otherwise documented in HPI.     Allergies:  No Known Allergies    Past Surgical History:  Past Surgical History:   Procedure Laterality Date     SECTION      x1       Family History:  Family History   Problem Relation Age of Onset    Hypertension Mother     Asthma Mother     Hypertension Maternal Aunt     Asthma Maternal Uncle     Hypertension Maternal Uncle     Arthritis Maternal Grandmother     Hypertension Maternal Grandmother     Stroke Maternal Grandmother     Breast Cancer Neg Hx     Cancer Neg Hx     Colon Cancer Neg Hx     Diabetes Neg Hx     Eclampsia Neg Hx     Ovarian Cancer Neg Hx      Labor Neg Hx     Spont Abortions Neg Hx        Social History:  Social History     Socioeconomic History    Marital status: Single     Spouse name: Not on file    Number of children: Not on file    Years of education: Not on file    Highest education level: Not on file   Occupational History    Not on file   Social Needs    Financial resource strain: Not on file    Food insecurity     Worry: Not on file     Inability: Not on file    Transportation needs     Medical: Not on file     Non-medical: Not on file   Tobacco Use    Smoking status: Never Smoker    Smokeless tobacco: Never Used   Substance and Sexual Activity    Alcohol use: No     Alcohol/week: 0.0 standard drinks    Drug use: No    Sexual activity: Not Currently Partners: Male   Lifestyle    Physical activity     Days per week: Not on file     Minutes per session: Not on file    Stress: Not on file   Relationships    Social connections     Talks on phone: Not on file     Gets together: Not on file     Attends Christianity service: Not on file     Active member of club or organization: Not on file     Attends meetings of clubs or organizations: Not on file     Relationship status: Not on file    Intimate partner violence     Fear of current or ex partner: Not on file     Emotionally abused: Not on file     Physically abused: Not on file     Forced sexual activity: Not on file   Other Topics Concern    Not on file   Social History Narrative    Not on file       No current facility-administered medications for this encounter. Objective      Physical Exam  There were no vitals taken for this visit. Constitutional:  Vital signs are normal. The patient appears well-developed and well-nourished. HEENT:   Head: Normocephalic. Atraumatic  Eyes: pupils are equal and reactive. No scleral icterus is present. Neck: No mass and no thyromegaly present. Cardiovascular: Normal rate, regular rhythm, S1 normal and S2 normal.    Pulmonary/Chest: Effort normal and breath sounds normal.   Abdominal: Soft. Normal appearance. There is no organomegaly. No tenderness. There is no rigidity, no rebound, no guarding and no Villagomez's sign. Musculoskeletal:        Right lower leg: Normal. No tenderness and no edema. Left lower leg: Normal. No tenderness and no edema. Lymphadenopathy:     No cervical adenopathy, No Exrtemity Adenopathy. Neurological: The patient is alert and oriented. Skin: Skin is warm, dry and intact. Psychiatric: The patient has a normal mood and affect.  Speech is normal and behavior is normal. Judgment and thought content normal. Cognition and memory are normal.     Assessment     Patient Active Problem List   Diagnosis    Hyperlipidemia  Essential hypertension    Hypokalemia    Vitamin D deficiency    Obesity (BMI 30-39. 9)    Gastroesophageal reflux disease without esophagitis    Prediabetes    Thiamine deficiency    TRISHA (obstructive sleep apnea)    Obesity, Class III, BMI 40-49.9 (morbid obesity) ([facility] [address]       Plan   EGD - No osmolar gap. - No osmolar gap.  - Check UDS. - Plan as noted above.  - SW consult. - CIWA initiated, symptom-triggered with PRNs.  - Continue to monitor.  - A-fib with RVR likely in the setting of EtOH withdrawal. - CIWA initiated, symptom-triggered with PRNs.  - Continue to monitor. - Pt's history is not c/w EtOH withdrawal, low suspicion at this time.  - Will discontinue CIWA and restart if pt becomes tachycardic or diaphoretic. - With RVR.  RVR resolved with IVF, now intermittently bradycardic in sinus.  - Continue to monitor on tele.  - Would avoid BB / CCB if bradycardia persists.  - Would need to consider amiodarone if RVR returns and is persistent.  Would also consult EP if A-fib with RVR recurs.  - [id number], defer AC at this time.

## 2021-02-01 NOTE — PATIENT PROFILE ADULT - IS THERE A SUSPICION OF ABUSE/NEGLIGENCE?
Anticoagulation Clinic Progress Note    Anticoagulation Summary  As of 2021    INR goal:  2.0-3.0   TTR:  72.5 % (2.3 y)   INR used for dosin.90 (2021)   Warfarin maintenance plan:  5 mg every Mon, Thu; 2.5 mg all other days   Weekly warfarin total:  22.5 mg   Plan last modified:  [facility] (2021)   Next INR check:  2021   Priority:  Maintenance   Target end date:  Indefinite    Indications    Atrial fibrillation  persistent (CMS/HCC) [I48.19]             Anticoagulation Episode Summary     INR check location:      Preferred lab:      Send INR reminders to:   [facility] CLINICAL POOL    Comments:        Anticoagulation Care Providers     Provider Role Specialty Phone number    Mary Grace Culp MD Referring Cardiology [phone]          INR History:  Anticoagulation Monitoring 2021   INR 2.50 1.80 1.90   INR Date 2021   INR Goal 2.0-3.0 2.0-3.0 2.0-3.0   Trend Down Up Up   Last Week Total 12.5 mg 17.5 mg 20 mg   Next Week Total 17.5 mg 20 mg 22.5 mg   Sun 2.5 mg 2.5 mg 2.5 mg   Mon - - 5 mg   Tue - - 2.5 mg   Wed - 5 mg 2.5 mg   Thu 2.5 mg 2.5 mg 5 mg   Fri 2.5 mg 2.5 mg 2.5 mg   Sat 2.5 mg 2.5 mg 2.5 mg   Visit Report - - -   Some recent data might be hidden       Plan:  1. INR is Subtherapeutic today- see above in Anticoagulation Summary.   Provided instructions to Maria Antonia with [facility] to increase Brittany Villeda's warfarin regimen from 20mg to 22.5mg/week (5mg on Monday and Thursday; 2.5mg all other days) - see above in Anticoagulation Summary.  2. Follow up in 1 week      [facility]     no

## 2021-02-09 ENCOUNTER — LABORATORY RESULT (OUTPATIENT)
Age: 56
End: 2021-02-09

## 2021-02-09 ENCOUNTER — APPOINTMENT (OUTPATIENT)
Dept: CARDIOLOGY | Facility: CLINIC | Age: 56
End: 2021-02-09
Payer: MEDICAID

## 2021-02-09 ENCOUNTER — NON-APPOINTMENT (OUTPATIENT)
Age: 56
End: 2021-02-09

## 2021-02-09 VITALS
HEART RATE: 53 BPM | WEIGHT: 210 LBS | OXYGEN SATURATION: 100 % | BODY MASS INDEX: 27.71 KG/M2 | DIASTOLIC BLOOD PRESSURE: 81 MMHG | SYSTOLIC BLOOD PRESSURE: 142 MMHG | TEMPERATURE: 97.8 F

## 2021-02-09 VITALS — DIASTOLIC BLOOD PRESSURE: 70 MMHG | SYSTOLIC BLOOD PRESSURE: 120 MMHG | HEART RATE: 60 BPM

## 2021-02-09 PROCEDURE — 36415 COLL VENOUS BLD VENIPUNCTURE: CPT

## 2021-02-09 PROCEDURE — 99072 ADDL SUPL MATRL&STAF TM PHE: CPT

## 2021-02-09 PROCEDURE — 93000 ELECTROCARDIOGRAM COMPLETE: CPT

## 2021-02-09 PROCEDURE — 99214 OFFICE O/P EST MOD 30 MIN: CPT

## 2021-02-09 RX ORDER — COLCHICINE 0.6 MG/1
0.6 TABLET ORAL
Qty: 60 | Refills: 0 | Status: DISCONTINUED | COMMUNITY
Start: 2020-12-10 | End: 2021-02-09

## 2021-02-09 NOTE — HISTORY OF PRESENT ILLNESS
[FreeTextEntry1] : August 30, 2016. Patient is a 51-year-old man, who was finally diagnosed with rapid atrial fibrillation about one month ago. On July 24 related to drinking and smoking synthetic weed, etc. he was hospitalized at [facility]. He had severe palpitations and "his whole system went into shock". He had some vomitting and nauseousness, diaphoresis, and an uncomfortable feeling, but no chest pain. He was in rapid atrial fibrillation. He did make positive troponin enzymes, but was not recommended to have cardiac catheterization or even a stress test. He was hospitalized at [facility] for 6 days and echocardiogram supposedly showed no scar. He was discharged on cholesterol medications, but then he followed up with a cardiologist in [place] after being hospitalized there for 48 hours with a recurrence of his A. fib. He was placed on sotalol and did well for a while. On 26 August on 120 mg of sotalol, he broke through with an episode of A.fib. The cardiologist wanted to increase Sotalol to 160 mg, but the patient stayed at 120 and then came back to [place] and is here to see me. He has not had a stress test. He has no risk factors for coronary artery disease or a family history of coronary disease or arrhythmias. He claims he's been having symptoms for 20 years, and the episodes have lasted as long as an hour, and it is only with this episode in July, that he was finally diagnosed with atrial fibrillation. He has some shortness of breath going up stairs, but he says he has had this for years, and it has not progressed. Otherwise, with normal activity, and swimming, he does not get chest pain. While he smokes a lot of weed, he does not smoke cigarettes. He thinks he was told of a murmur recently and once as a kid, although I did not hear a murmur on exam. Has not had any syncope recently. He did have a duodenal ulcer about 10 years ago, not related to medications or NSAIDs, and not bleeding. It has not been an issue since. He is currently on sotalol 120 b.i.d., BuSpar, 5 mg b.i.d., and aspirin 81 mg q.d. However, in [facility] Electronic medical record, there are no other notes, but there is a list of medicines to be verified that includes diltiazem, calculus, Eliquis, as well as atorvastatin, and Crestor.\par September 9, 2016. The patient came for stress test and had to stop for shortness of breath at 6 minutes with a low heart rate on carvedilol. It looked like ST s were starting to go down, but did not meet criteria. APCs noted, but no atrial fibrillation. Recommended nuclear stress test and use that to help guide medicine versus statin for his hyperlipidemia.\par November 2, 2016. Patient here in followup. He did not have nuclear stress test. He is here in followup because he is running out of sotalol, but also he had 2 episodes of atrial fibrillation, which were severe and accompanied with chest pain. He admits he has not been taking it twice a day and occasionally will skip. He has not worked on his diet and in fact has gained weight. He does complain of some fatigue and shortness of breath. No exertional chest pain, however; he does not get the same chest pain with exertion that he gets during the atrial fibrillation. His EKG is sinus rhythm with nonspecific ST changes. After a long discussion with the patient and his sister, he promises to be rigid with his sotalol every 12 hours, and he is scheduling a nuclear stress test, and we will review his labs.\par November 18, 2016. Yesterday, the patient came for his nuclear stress test. He was in atrial flutter or fibrillation. His heart rate went as high as 199, and there were definite abnormal ST changes, but no chest pain. Blood pressure response was normal. The nuclear scan showed medium sized, mild defects, apical, inferior, mid to distal inferolateral that were reversible, and there was also transient ischemic dilatation of the LV with a ratio of 1.45. LVEF was 78% with normal wall motion post stress however. Patient returns today to review the findings and for reevaluation. I recommended coronary angiography and the patient and his sister are thinking about it. He is back in sinus rhythm.\par December 8, 2016. Patient had coronary angiography, which only showed a 30% lesion in the proximal circumflex. The other arteries had no obstruction and LVEF was normal at 70%. I had the patient switch over to flecainide 50 mg q.12 h.\par December 30, 2016. The patient is in sinus rhythm, however, he claims he has had a lot of breakthroughs and does not feel well on the flecainide. He could not be more specific and wanted to go back to the sotalol, although he has broken through 120 mg many times. After a long extensive discussion we agreed to try Rythmol  mg q.12 h.\par February 7, 2017. Patient called. He had never started the Rythmol. After much discussion he agreed to start the Rythmol and come in in 2 weeks.\par February 23, 2017. The patient is here in followup and is in rapid atrial fibrillation/flutter. He is tolerating the Rythmol well and thinks that most of the time he is doing okay. He was not aware that he was in atrial fibrillation today, although he did think he was yesterday. After long discussion about ablation, changing medications, increasing the dose, or using home telemetry to determine how often he truly is in atrial fibrillation he elected to go up on the Rythmol first. He is still not eager for invasive procedures.\par April 13, 2017. Patient finally returns as I would not renew his medication. He is in sinus rhythm. He says he had one or two bad days, but otherwise thinks he was in a normal rhythm most of the time. He is not  the most compliant when it comes to sticking to his q.12 h. regimen. For the last 3 days has been taking 325 mg twice a day instead of 425 as he ran out of pills.\par May 10, 2017. Patient is here because of cough and sputum. However, he admitted that after a while he decided once again to stop his Rythmol to "see what would happen" and sure enough on Saturday he could not walk to [place] because he was out of breath and his heart was racing fast. He went back on the Rythmol and had another episode Sunday, but since then has been back in normal rhythm. He's been coughing with dark sputum for 2 days now, but no fever or chills. His EKG shows sinus rhythm with a normal QRS and QT. He is here with his wife so we had an extensive discussion about considering an ablation and about compliance with medication and he will be calling Dr. Get Hardin of [facility].\par \par July 24, 2019. First visit in over 2 years. He had an ablation with Dr. Hardin in 2017 but never continued the medications, even for just a few months and never followed up with Dr. Hardin. In January of this year was hospitalized at [facility] and it sounds like he again had substance abuse problems, mostly marijuana, but probably Ativan, and possibly other medications. He initially presented with lactic acidosis. At some point he did seem to be in sinus rhythm, but with a very bizarre EKG, and prolonged QT, which I believe, was thought to be from toxins. Eventually, was back in rapid A. Flutter and was discharged on Cardizem CD. His CHADS2-VASC score was 0 so no anticoag. No followup after that and he claims now that he had no insurance, but now that he does he came back. He has been having severe GI symptoms whenever he eats fatty, greasy foods, mostly, vomiting, and chest pain, and rapid heartbeat. Reportedly, he has an appointment with gastroenterology tomorrow. He is on absolutely no medications and is here in rapid atrial flutter with a heart rate of around 140. Seems to be tolerating it well, with no ischemic changes, no heart failure on exam, and blood pressure acceptable. I reviewed his notes from the hospitalization in January and reached out to Dr. Get Hardin for further information. In the meantime, I am placing him on bisoprolol 10 mg for additional rate control while he has his GI workup. Long discussion about compliance and self-destructive behavior.\par July 31, 2019. Patient returns in followup on bisoprolol. I spoke with Dr. Hardin after his last visit, who said that even though his CHADS2-VASC score was zero, normally he likes to anticoagulate these people after ablation, but because the patient was so erratic in his behavior and there was concern about drugs or drinking, he did not. He would be willing to do another ablation if necessary. Today he is back in sinus rhythm at 51. Labs from last week were within normal limits, except for his lipid profile, which we reviewed today.\par December 6, 2019.  Since last visit patient was hospitalized I believe twice once at [facility] and once at [place] with marijuana intoxication.  He remains in sinus rhythm as long as he was back on his bisoprolol.  He returns now run out of the bisoprolol and is back in A. fib at 130 but totally asymptomatic.  He is not on Xarelto but his CHADS2- VASC score is 0.  Still using marijuana.  Long discussion about compliance and advised him to set up another appointment with Dr. Hardin to discuss a second ablation.  (Last time after the ablation he stopped the beta-blocker right away rather than waiting 3 months.)\par April 22, 2020.  Telehealth visit. Narrative: On the whole patient has been doing well, remaining on the beta-blocker with rare atrial fibrillation. There was a lot of stress as his wife and father-in-law and mother-in-law had COVID-19 with his father-in-law being hospitalized. He was left on his own for some time and had what he thinks was just a panic attack. He still is smoking marijuana at least daily but he tries to keep it down to once a day. His wife spoke to me about what to do when he has some of these episodes because he gets very nauseous and he gets very anxious and she almost wants to call EMS. I explained to have him breathe in and out of a paper bag as the first step otherwise we can try a little bit of alprazolam or a little bit of Zofran. (At the end the patient left and I spoke with the wife because he did not want to hear about his anxiety attacks). We also discussed follow-up with Dr. Hardin of [facility] for possible ablation given his recurrence of atrial fibrillation although most of the time if he is compliant with his beta-blocker he is in sinus rhythm. Currently he is on no anticoagulation because of his low CHADS2-VASC score.\par Assessment: For the most part stable in terms of his atrial fibrillation. Marijuana abuse still somewhat of a problem along with anxiety but on the whole functioning better than he had previously.\par Continue beta-blocker. Alprazolam 0.5 mg as needed and Zofran 4 mg as needed.\par Follow-up: 2 months\par July 2, 2020.Issues with nauseousness and vomiting and going to the emergency room at [facility].\par July 5, 2020. Once again diagnosed with marijuana intoxication. Remained in sinus rhythm throughout. Was discharged on July 3. \par December 2, 2020.Patient had issues again with marijuana overdose with nausea and vomiting and lethargy and finally agreed to go to rehab.  However at rehab was found to be in rapid atrial fibrillation and was sent to [facility].  He was there for a few days and I kept in contact with the physicians there.  They were having trouble controlling his rate and he was placed on IV amiodarone as CT angio showed a large pericardial effusion although by echo it was more mild to moderate and no hemodynamic compromise.  Sed rate was 86.  Initial plan was to have a thoracic surgery just to a pericardial window and send off the fluid for labs and serologies and cytology etc.  However he had been placed on 650 mg of aspirin along with the colchicine so the surgery was canceled.  In the interim he was transferred to [facility]. \par December 10, 2020.  Patient here now with wife.  I reviewed the records from [facility] on the patient's wife's cell phone.  He had an extensive collagen vascular work-up that was totally negative looking for a cause for his pericarditis.  Also cardiolipin antibody negative.  He did have abnormal liver function tests that persisted.  A HIDA scan was negative.  His echo on December 2 showed a small to medium pleural effusion pericardial effusion and a small left pleural effusion.  A follow-up echo the next day showed a small to mild effusion and bilateral pleural effusions but that was a limited study.  Patient was placed on amiodarone Cardizem metoprolol along with Eliquis and colchicine, and the patient left in sinus rhythm.  Here he is in sinus rhythm at 89 with abnormal ST-T changes diffusely.  He swears no more marijuana but he has done this before.\par Patient return for echocardiogram at the end of the day.  Pericardial effusion gone.  No significant pleural effusion.  Otherwise mild S.A.M. but no gradient.  Mild to moderate MR.  Moderate LAE.  Normal LV systolic function normal RV systolic function.  Mild TR with RVSP 35.  Stopped Cardizem but continued bisoprolol amiodarone and colchicine along with his Eliquis.\par February 9, 2021.  Patient returns in follow-up.  EKG with sinus bradycardia at 53 with slight ST scooping and borderline QT prolongation.  Off colchicine but still on amiodarone, bisoprolol, and Eliquis.  Claims he has been a good boy, no marijuana etc.  Is willing to consider nerve ablation especially if that will help get him off the amiodarone.

## 2021-02-09 NOTE — REVIEW OF SYSTEMS
[Recent Weight Gain (___ Lbs)] : recent [unfilled] ~Ulb weight gain [Feeling Fatigued] : not feeling fatigued [Recent Weight Loss (___ Lbs)] : no recent weight loss [Shortness Of Breath] : no shortness of breath [Dyspnea on exertion] : not dyspnea during exertion [Chest  Pressure] : no chest pressure [Chest Pain] : no chest pain [Lower Ext Edema] : no extremity edema [Leg Claudication] : no intermittent leg claudication [Palpitations] : no palpitations [Cough] : no cough [Wheezing] : no wheezing [Coughing Up Blood] : no hemoptysis [Abdominal Pain] : no abdominal pain [Nausea] : no nausea [Vomiting] : no vomiting [Heartburn] : no heartburn [Change in Appetite] : no change in appetite [Dysphagia] : no dysphagia [see HPI] : see HPI [Dizziness] : no dizziness [Confusion] : no confusion was observed [Depression] : no depression [Anxiety] : no anxiety [Under Stress] : not under stress [Suicidal] : not suicidal [Negative] : Heme/Lymph

## 2021-02-09 NOTE — PHYSICAL EXAM
[General Appearance - Well Developed] : well developed [Normal Appearance] : normal appearance [Well Groomed] : well groomed [General Appearance - Well Nourished] : well nourished [No Deformities] : no deformities [General Appearance - In No Acute Distress] : no acute distress [Normal Conjunctiva] : the conjunctiva exhibited no abnormalities [Eyelids - No Xanthelasma] : the eyelids demonstrated no xanthelasmas [Normal Oral Mucosa] : normal oral mucosa [No Oral Pallor] : no oral pallor [No Oral Cyanosis] : no oral cyanosis [Normal Jugular Venous A Waves Present] : normal jugular venous A waves present [Normal Jugular Venous V Waves Present] : normal jugular venous V waves present [No Jugular Venous Ortega A Waves] : no jugular venous ortega A waves [Respiration, Rhythm And Depth] : normal respiratory rhythm and effort [Exaggerated Use Of Accessory Muscles For Inspiration] : no accessory muscle use [Auscultation Breath Sounds / Voice Sounds] : lungs were clear to auscultation bilaterally [Heart Rate And Rhythm] : heart rate and rhythm were normal [Heart Sounds] : normal S1 and S2 [Murmurs] : no murmurs present [Abdomen Soft] : soft [Abdomen Tenderness] : non-tender [Abdomen Mass (___ Cm)] : no abdominal mass palpated [Abnormal Walk] : normal gait [Gait - Sufficient For Exercise Testing] : the gait was sufficient for exercise testing [Nail Clubbing] : no clubbing of the fingernails [Cyanosis, Localized] : no localized cyanosis [Petechial Hemorrhages (___cm)] : no petechial hemorrhages [] : no rash [Skin Color & Pigmentation] : normal skin color and pigmentation [No Venous Stasis] : no venous stasis [Skin Lesions] : no skin lesions [No Skin Ulcers] : no skin ulcer [No Xanthoma] : no  xanthoma was observed [Oriented To Time, Place, And Person] : oriented to person, place, and time [FreeTextEntry1] : No rashes. No cyanosis [Affect] : the affect was normal [Mood] : the mood was normal [No Anxiety] : not feeling anxious

## 2021-02-10 LAB
ALBUMIN SERPL ELPH-MCNC: 4 G/DL
ALP BLD-CCNC: 93 U/L
ALT SERPL-CCNC: 12 U/L
ANION GAP SERPL CALC-SCNC: 12 MMOL/L
AST SERPL-CCNC: 12 U/L
BASOPHILS # BLD AUTO: 0.04 K/UL
BASOPHILS NFR BLD AUTO: 0.5 %
BILIRUB SERPL-MCNC: 0.2 MG/DL
BUN SERPL-MCNC: 16 MG/DL
CALCIUM SERPL-MCNC: 9.1 MG/DL
CHLORIDE SERPL-SCNC: 102 MMOL/L
CO2 SERPL-SCNC: 26 MMOL/L
CREAT SERPL-MCNC: 1.48 MG/DL
EOSINOPHIL # BLD AUTO: 0.21 K/UL
EOSINOPHIL NFR BLD AUTO: 2.8 %
GLUCOSE SERPL-MCNC: 81 MG/DL
HCT VFR BLD CALC: 42.6 %
HGB BLD-MCNC: 13.5 G/DL
IMM GRANULOCYTES NFR BLD AUTO: 0.4 %
LYMPHOCYTES # BLD AUTO: 2.62 K/UL
LYMPHOCYTES NFR BLD AUTO: 35.1 %
MAN DIFF?: NORMAL
MCHC RBC-ENTMCNC: 27.9 PG
MCHC RBC-ENTMCNC: 31.7 GM/DL
MCV RBC AUTO: 88 FL
MONOCYTES # BLD AUTO: 0.36 K/UL
MONOCYTES NFR BLD AUTO: 4.8 %
NEUTROPHILS # BLD AUTO: 4.21 K/UL
NEUTROPHILS NFR BLD AUTO: 56.4 %
NT-PROBNP SERPL-MCNC: 253 PG/ML
PLATELET # BLD AUTO: 321 K/UL
POTASSIUM SERPL-SCNC: 4.5 MMOL/L
PROT SERPL-MCNC: 6.9 G/DL
RBC # BLD: 4.84 M/UL
RBC # FLD: 15 %
SODIUM SERPL-SCNC: 140 MMOL/L
TSH SERPL-ACNC: 5.2 UIU/ML
WBC # FLD AUTO: 7.47 K/UL

## 2021-02-16 NOTE — SBIRT NOTE ADULT - NSSBIRTDRGMEDPROB_GEN_A_CORE
Nakia Fletcher (: 1959) is a 64 [facility])  I52.0 Edith Thomas MD   mupirocin [facility]) LOIS PLUS METER) Medical Center of Southeastern OK – Durant Nick Burgess MD   glucose blood VI test strips (ACCU-CHEK LOIS) [address] [place] [address] authority and the [employer] and [employer] Act, this Virtual Visit was conducted with patient's (and/or legal guardian's) consent, to reduce the patient's risk of exposure to COVID-19 and provide necessary medical care. The patient (and/or legal guardian) has also been advised to contact this office for worsening conditions or problems, and seek emergency medical treatment and/or call 911 if deemed necessary. Patient identification was verified at the start of the visit: YES.     Services were provided through a video synchronous discussion virtually to substitute for in-person clinic visit. Patient was located at their individual home (or other location as per patient preference). Provider was located in medical office. An electronic signature was used to authenticate this note.   -- Robert Sequeira MD Yes

## 2021-04-02 ENCOUNTER — APPOINTMENT (OUTPATIENT)
Dept: ELECTROPHYSIOLOGY | Facility: CLINIC | Age: 56
End: 2021-04-02
Payer: MEDICAID

## 2021-04-02 VITALS
SYSTOLIC BLOOD PRESSURE: 130 MMHG | RESPIRATION RATE: 16 BRPM | HEIGHT: 73 IN | OXYGEN SATURATION: 100 % | BODY MASS INDEX: 28.49 KG/M2 | TEMPERATURE: 96.2 F | WEIGHT: 215 LBS | HEART RATE: 50 BPM | DIASTOLIC BLOOD PRESSURE: 73 MMHG

## 2021-04-02 PROCEDURE — 99072 ADDL SUPL MATRL&STAF TM PHE: CPT

## 2021-04-02 PROCEDURE — 99215 OFFICE O/P EST HI 40 MIN: CPT

## 2021-04-02 PROCEDURE — 93000 ELECTROCARDIOGRAM COMPLETE: CPT

## 2021-04-02 RX ORDER — ONDANSETRON 4 MG/1
4 TABLET ORAL
Qty: 10 | Refills: 4 | Status: DISCONTINUED | COMMUNITY
Start: 2020-04-22 | End: 2021-04-02

## 2021-04-02 RX ORDER — SUCRALFATE 1 G/1
1 TABLET ORAL 4 TIMES DAILY
Refills: 0 | Status: DISCONTINUED | COMMUNITY
Start: 2017-09-07 | End: 2021-04-02

## 2021-04-02 RX ORDER — PANTOPRAZOLE 40 MG/1
40 TABLET, DELAYED RELEASE ORAL
Qty: 30 | Refills: 0 | Status: DISCONTINUED | COMMUNITY
Start: 2017-09-07 | End: 2021-04-02

## 2021-04-02 NOTE — HISTORY OF PRESENT ILLNESS
[FreeTextEntry1] : Gianni Ansari MD\par \par Vitor Caraballo is a 56y/o man with Hx of HTN, HLD, anxiety, and paroxysmal afib/flutter s/p afib ablation on 9/6/2017 and recent hospitalization for afib, now on Amiodarone and Eliquis, who presents today for routine f/u. Presented to [place] ER for nausea/vomiting and diagnosed with hyperemesis cannabinoid syndrome. Hospital course complicated by afib with RVR, now on Amiodarone. Since hospitalization, feeling fatigue but improving daily. Wants to get off Amiodarone. Has been spending a lot of time outside in the sun and noting his skin to get itchy and red. Denies chest pain, palpitations, SOB, syncope or near syncope. Of note, had b/l pleural effusions in hospital in 12/2020.

## 2021-04-02 NOTE — DISCUSSION/SUMMARY
[FreeTextEntry1] : Vitor Caraballo is a 54y/o man with Hx of HTN, HLD, anxiety, and paroxysmal afib/flutter s/p afib ablation on 9/6/2017 and recent hospitalization for afib, now on Amiodarone and Eliquis, who presents today for routine f/u. \par \par Impression:\par \par 1. Paroxysmal afib: EKG performed today to assess for presence of afib and reveals sinus bradycardia. Recent hospitalization with afib with RVR requiring Amiodarone. Now on Amiodarone 200mg dialy. Discussed treatment options for afib including rate control vs antiarrhythmics vs possible ablation. Given recurrent symptomatic afib despite rate control management and young age/preference to avoid long term antiarrhythmics, recommend undergoing possible afib ablation. Risks, benefits, and alternatives to procedure discussed at length. Risks including that of bleeding, infection, stroke, and cardiac tamponade discussed and he verbalizes understanding of all. Will hold all medications the morning of the ablation. May take all regularly scheduled medications the night before. Rediscussed adverse effect profile of Amiodarone including need for frequent monitoring of TFTs, LFTs, Opthalmology examination and PFTs. Needs to take extra precaution in the sun including using sunblock and wearing hats/umbrella use to avoid irritation to skin/sun burn while on Amiodarone. Will plan to discontinue Amiodarone 3 mo post ablation if not sooner. \par \par 2. HTN: resume oral antihypertensives as prescribed. Encouraged heart healthy diet, sodium restriction, and weight loss. Continue regular f/u with Cardiologist for further HTN management.\par \par 3. HLD: resume regular f/u with Cardiologist for routine lipid monitoring and management.\par \par Plan for afib ablation. \par \par Sincerely,\par \par Get Hardin MD

## 2021-04-02 NOTE — PHYSICAL EXAM
[General Appearance - Well Developed] : well developed [Normal Appearance] : normal appearance [Well Groomed] : well groomed [General Appearance - Well Nourished] : well nourished [No Deformities] : no deformities [General Appearance - In No Acute Distress] : no acute distress [Normal Conjunctiva] : the conjunctiva exhibited no abnormalities [Eyelids - No Xanthelasma] : the eyelids demonstrated no xanthelasmas [Normal Oral Mucosa] : normal oral mucosa [No Oral Pallor] : no oral pallor [No Oral Cyanosis] : no oral cyanosis [Normal Jugular Venous A Waves Present] : normal jugular venous A waves present [Normal Jugular Venous V Waves Present] : normal jugular venous V waves present [No Jugular Venous Ortega A Waves] : no jugular venous ortega A waves [Respiration, Rhythm And Depth] : normal respiratory rhythm and effort [Exaggerated Use Of Accessory Muscles For Inspiration] : no accessory muscle use [Auscultation Breath Sounds / Voice Sounds] : lungs were clear to auscultation bilaterally [Heart Rate And Rhythm] : heart rate and rhythm were normal [Heart Sounds] : normal S1 and S2 [Murmurs] : no murmurs present [Abdomen Soft] : soft [Abdomen Tenderness] : non-tender [Abdomen Mass (___ Cm)] : no abdominal mass palpated [Abnormal Walk] : normal gait [Gait - Sufficient For Exercise Testing] : the gait was sufficient for exercise testing [Nail Clubbing] : no clubbing of the fingernails [Cyanosis, Localized] : no localized cyanosis [Petechial Hemorrhages (___cm)] : no petechial hemorrhages [Skin Color & Pigmentation] : normal skin color and pigmentation [] : no rash [No Venous Stasis] : no venous stasis [Skin Lesions] : no skin lesions [No Skin Ulcers] : no skin ulcer [No Xanthoma] : no  xanthoma was observed [Oriented To Time, Place, And Person] : oriented to person, place, and time [Affect] : the affect was normal [Mood] : the mood was normal [No Anxiety] : not feeling anxious [FreeTextEntry1] : B/L diminished

## 2021-05-11 ENCOUNTER — OUTPATIENT (OUTPATIENT)
Dept: OUTPATIENT SERVICES | Facility: HOSPITAL | Age: 56
LOS: 1 days | End: 2021-05-11

## 2021-05-11 VITALS
HEART RATE: 50 BPM | WEIGHT: 229.94 LBS | RESPIRATION RATE: 16 BRPM | HEIGHT: 70 IN | TEMPERATURE: 97 F | SYSTOLIC BLOOD PRESSURE: 120 MMHG | OXYGEN SATURATION: 98 % | DIASTOLIC BLOOD PRESSURE: 72 MMHG

## 2021-05-11 DIAGNOSIS — I48.0 PAROXYSMAL ATRIAL FIBRILLATION: ICD-10-CM

## 2021-05-11 DIAGNOSIS — Z98.890 OTHER SPECIFIED POSTPROCEDURAL STATES: Chronic | ICD-10-CM

## 2021-05-11 LAB
ALBUMIN SERPL ELPH-MCNC: 3.9 G/DL — SIGNIFICANT CHANGE UP (ref 3.3–5)
ALP SERPL-CCNC: 79 U/L — SIGNIFICANT CHANGE UP (ref 40–120)
ALT FLD-CCNC: 19 U/L — SIGNIFICANT CHANGE UP (ref 4–41)
ANION GAP SERPL CALC-SCNC: 11 MMOL/L — SIGNIFICANT CHANGE UP (ref 7–14)
AST SERPL-CCNC: 21 U/L — SIGNIFICANT CHANGE UP (ref 4–40)
BILIRUB SERPL-MCNC: 0.5 MG/DL — SIGNIFICANT CHANGE UP (ref 0.2–1.2)
BLD GP AB SCN SERPL QL: NEGATIVE — SIGNIFICANT CHANGE UP
BUN SERPL-MCNC: 15 MG/DL — SIGNIFICANT CHANGE UP (ref 7–23)
CALCIUM SERPL-MCNC: 9.1 MG/DL — SIGNIFICANT CHANGE UP (ref 8.4–10.5)
CHLORIDE SERPL-SCNC: 106 MMOL/L — SIGNIFICANT CHANGE UP (ref 98–107)
CO2 SERPL-SCNC: 22 MMOL/L — SIGNIFICANT CHANGE UP (ref 22–31)
CREAT SERPL-MCNC: 0.89 MG/DL — SIGNIFICANT CHANGE UP (ref 0.5–1.3)
GLUCOSE SERPL-MCNC: 97 MG/DL — SIGNIFICANT CHANGE UP (ref 70–99)
HCT VFR BLD CALC: 45.3 % — SIGNIFICANT CHANGE UP (ref 39–50)
HGB BLD-MCNC: 14.9 G/DL — SIGNIFICANT CHANGE UP (ref 13–17)
MCHC RBC-ENTMCNC: 27.8 PG — SIGNIFICANT CHANGE UP (ref 27–34)
MCHC RBC-ENTMCNC: 32.9 GM/DL — SIGNIFICANT CHANGE UP (ref 32–36)
MCV RBC AUTO: 84.5 FL — SIGNIFICANT CHANGE UP (ref 80–100)
NRBC # BLD: 0 /100 WBCS — SIGNIFICANT CHANGE UP
NRBC # FLD: 0 K/UL — SIGNIFICANT CHANGE UP
PLATELET # BLD AUTO: 264 K/UL — SIGNIFICANT CHANGE UP (ref 150–400)
POTASSIUM SERPL-MCNC: 4.2 MMOL/L — SIGNIFICANT CHANGE UP (ref 3.5–5.3)
POTASSIUM SERPL-SCNC: 4.2 MMOL/L — SIGNIFICANT CHANGE UP (ref 3.5–5.3)
PROT SERPL-MCNC: 7.2 G/DL — SIGNIFICANT CHANGE UP (ref 6–8.3)
RBC # BLD: 5.36 M/UL — SIGNIFICANT CHANGE UP (ref 4.2–5.8)
RBC # FLD: 13.6 % — SIGNIFICANT CHANGE UP (ref 10.3–14.5)
RH IG SCN BLD-IMP: POSITIVE — SIGNIFICANT CHANGE UP
SODIUM SERPL-SCNC: 139 MMOL/L — SIGNIFICANT CHANGE UP (ref 135–145)
WBC # BLD: 7.46 K/UL — SIGNIFICANT CHANGE UP (ref 3.8–10.5)
WBC # FLD AUTO: 7.46 K/UL — SIGNIFICANT CHANGE UP (ref 3.8–10.5)

## 2021-05-11 RX ORDER — AMIODARONE HYDROCHLORIDE 200 MG/1
200 TABLET ORAL DAILY
Qty: 90 | Refills: 1 | Status: DISCONTINUED | COMMUNITY
Start: 2020-12-10 | End: 2021-05-11

## 2021-05-11 NOTE — H&P PST ADULT - ASSESSMENT
paroxysmal atrial fibrillation  Problem: paroxysmal atrial fibrillation   Assessment and Plan: Pt is tentatively scheduled for complex ablation for 5/26/21. Pre-op instructions provided. Pt instructed to follow Dr Hardin's preop and medication instructions. Pt given verbal and written instructions with teach back. Pt has a scheduled preop COVID test. Hospital visitation policy provided. Pt verbalized understanding with return demonstration.     ELSY precautions, OR booking notified    Pt states he stopped taking eliquis and amiodarone about 2 weeks ago, only takes bisoprolol right now. Notified surgeon's office. Nurse practitioner to call the patient to follow up.     copy of ekg and echo in chart.

## 2021-05-11 NOTE — H&P PST ADULT - ATTENDING COMMENTS
55 year old male presents to presurgical testing with diagnosis of paroxysmal atrial fibrillation scheduled for complex ablation. Pt with history of A fib s/p cardiac ablation in 2017 and recent hospitalization with A fib.

## 2021-05-11 NOTE — H&P PST ADULT - NSICDXPASTMEDICALHX_GEN_ALL_CORE_FT
PAST MEDICAL HISTORY:  Anxiety     Marijuana use     Paroxysmal atrial fibrillation     PUD (peptic ulcer disease)

## 2021-05-11 NOTE — H&P PST ADULT - NSICDXPASTSURGICALHX_GEN_ALL_CORE_FT
PAST SURGICAL HISTORY:  H/O cardiac catheterization 2016    H/O prior ablation treatment 10/2017, for A-fib

## 2021-05-23 ENCOUNTER — APPOINTMENT (OUTPATIENT)
Dept: DISASTER EMERGENCY | Facility: CLINIC | Age: 56
End: 2021-05-23

## 2021-05-24 ENCOUNTER — APPOINTMENT (OUTPATIENT)
Dept: DISASTER EMERGENCY | Facility: CLINIC | Age: 56
End: 2021-05-24

## 2021-05-24 PROBLEM — I48.0 PAROXYSMAL ATRIAL FIBRILLATION: Chronic | Status: ACTIVE | Noted: 2021-05-11

## 2021-05-26 ENCOUNTER — INPATIENT (INPATIENT)
Facility: HOSPITAL | Age: 56
LOS: 0 days | Discharge: HOME | End: 2021-05-27
Attending: INTERNAL MEDICINE | Admitting: INTERNAL MEDICINE
Payer: MEDICAID

## 2021-05-26 ENCOUNTER — TRANSCRIPTION ENCOUNTER (OUTPATIENT)
Age: 56
End: 2021-05-26

## 2021-05-26 VITALS
SYSTOLIC BLOOD PRESSURE: 115 MMHG | HEIGHT: 72 IN | DIASTOLIC BLOOD PRESSURE: 60 MMHG | RESPIRATION RATE: 17 BRPM | HEART RATE: 56 BPM | WEIGHT: 227.08 LBS | OXYGEN SATURATION: 100 % | TEMPERATURE: 98 F

## 2021-05-26 DIAGNOSIS — Z98.890 OTHER SPECIFIED POSTPROCEDURAL STATES: Chronic | ICD-10-CM

## 2021-05-26 DIAGNOSIS — I48.0 PAROXYSMAL ATRIAL FIBRILLATION: ICD-10-CM

## 2021-05-26 PROCEDURE — 93656 COMPRE EP EVAL ABLTJ ATR FIB: CPT

## 2021-05-26 PROCEDURE — 93613 INTRACARDIAC EPHYS 3D MAPG: CPT

## 2021-05-26 PROCEDURE — 93312 ECHO TRANSESOPHAGEAL: CPT | Mod: 26

## 2021-05-26 PROCEDURE — 76376 3D RENDER W/INTRP POSTPROCES: CPT | Mod: 26

## 2021-05-26 PROCEDURE — 93320 DOPPLER ECHO COMPLETE: CPT | Mod: 26,GC

## 2021-05-26 PROCEDURE — 93623 PRGRMD STIMJ&PACG IV RX NFS: CPT | Mod: 26

## 2021-05-26 PROCEDURE — 93010 ELECTROCARDIOGRAM REPORT: CPT

## 2021-05-26 PROCEDURE — 93662 INTRACARDIAC ECG (ICE): CPT | Mod: 26

## 2021-05-26 PROCEDURE — 93325 DOPPLER ECHO COLOR FLOW MAPG: CPT | Mod: 26,GC

## 2021-05-26 PROCEDURE — 93010 ELECTROCARDIOGRAM REPORT: CPT | Mod: 76,77

## 2021-05-26 PROCEDURE — 93655 ICAR CATH ABLTJ DSCRT ARRHYT: CPT

## 2021-05-26 RX ORDER — METOPROLOL TARTRATE 50 MG
100 TABLET ORAL
Refills: 0 | Status: DISCONTINUED | OUTPATIENT
Start: 2021-05-26 | End: 2021-05-27

## 2021-05-26 RX ORDER — APIXABAN 2.5 MG/1
1 TABLET, FILM COATED ORAL
Qty: 0 | Refills: 0 | DISCHARGE

## 2021-05-26 RX ORDER — PROTHROMBIN COMPLEX CONCENTRATE (HUMAN) 25.5; 16.5; 24; 22; 22; 26 [IU]/ML; [IU]/ML; [IU]/ML; [IU]/ML; [IU]/ML; [IU]/ML
5000 POWDER, FOR SOLUTION INTRAVENOUS ONCE
Refills: 0 | Status: DISCONTINUED | OUTPATIENT
Start: 2021-05-26 | End: 2021-05-26

## 2021-05-26 RX ORDER — OMEPRAZOLE 10 MG/1
1 CAPSULE, DELAYED RELEASE ORAL
Qty: 30 | Refills: 0
Start: 2021-05-26 | End: 2021-06-24

## 2021-05-26 RX ORDER — PANTOPRAZOLE SODIUM 20 MG/1
1 TABLET, DELAYED RELEASE ORAL
Qty: 30 | Refills: 0
Start: 2021-05-26 | End: 2021-06-24

## 2021-05-26 RX ORDER — APIXABAN 2.5 MG/1
5 TABLET, FILM COATED ORAL
Refills: 0 | Status: DISCONTINUED | OUTPATIENT
Start: 2021-05-26 | End: 2021-05-27

## 2021-05-26 RX ORDER — SODIUM CHLORIDE 9 MG/ML
3 INJECTION INTRAMUSCULAR; INTRAVENOUS; SUBCUTANEOUS EVERY 8 HOURS
Refills: 0 | Status: DISCONTINUED | OUTPATIENT
Start: 2021-05-26 | End: 2021-05-27

## 2021-05-26 RX ORDER — PANTOPRAZOLE SODIUM 20 MG/1
40 TABLET, DELAYED RELEASE ORAL
Refills: 0 | Status: DISCONTINUED | OUTPATIENT
Start: 2021-05-26 | End: 2021-05-27

## 2021-05-26 RX ORDER — ONDANSETRON 8 MG/1
4 TABLET, FILM COATED ORAL ONCE
Refills: 0 | Status: COMPLETED | OUTPATIENT
Start: 2021-05-26 | End: 2021-05-26

## 2021-05-26 RX ADMIN — Medication 100 MILLIGRAM(S): at 18:41

## 2021-05-26 RX ADMIN — SODIUM CHLORIDE 3 MILLILITER(S): 9 INJECTION INTRAMUSCULAR; INTRAVENOUS; SUBCUTANEOUS at 22:00

## 2021-05-26 RX ADMIN — SODIUM CHLORIDE 3 MILLILITER(S): 9 INJECTION INTRAMUSCULAR; INTRAVENOUS; SUBCUTANEOUS at 18:35

## 2021-05-26 RX ADMIN — ONDANSETRON 4 MILLIGRAM(S): 8 TABLET, FILM COATED ORAL at 18:35

## 2021-05-26 RX ADMIN — APIXABAN 5 MILLIGRAM(S): 2.5 TABLET, FILM COATED ORAL at 18:41

## 2021-05-26 NOTE — CHART NOTE - NSCHARTNOTEFT_GEN_A_CORE
Type of procedure: PVI + PWI  Licensed independent practitioner: Get Hardin MD  Assistant: None  Description of procedure: After informed consent was obtained, the patient was brought to the Electrophysiology laboratory in the postabsorptive state, and was prepped and draped in the usual sterile fashion. The patient was electively intubated by an anesthesiologist, who provided general anesthesia throughout the case. In addition an esophageal temperature probe was placed into the esophagus to allow temperate monitoring during ablation. Under sterile conditions, femoral venous access was obtained and catheters advanced to the right atrium under fluoroscopic guidance without complications.  Heparin 14,000 units followed by 2400 unit/hour drip was administered to maintain an ACT of 300-400 seconds throughout the case. An endocardial shell of the left atrium was created using ICE guidance and the pulmonary veins, left atrial appendage and esophagus were tagged.  Transeptal access was achieved using fluoroscopic and ICE guidance using an 8.5 Vizigo Sheath and C1 [place] needle. The mean left atrial pressure was 19 mm Hg.  A PentaRay Bj F curve catheter and a 3.5 mm irrigated-tip [employer] ThermoCool ST SF DF-curve ablation catheter were used for mapping and ablation. A FAM and voltage map of the LA were created using a Carto 3D mapping system.  Pacing at 20 ma and 2.0 msec was performed inside and around the anterior portion of the right superior pulmonary vein to exclude phrenic nerve capture and there was none. Circumferential ablation was carried out at the PV antra with careful attention to avoid ablation within the pulmonary veins. Esophageal temperatures nii from a baseline of 35.5 degrees Celsius to a peak of 36.0 degrees Celsius.  The posterior wall was also isolated. Entrance and exit block of the pulmonary veins remained for >30 minutes, without evidence of acute reconnection. AF occurred spontaneously and the right PVs had reconnected and ablation at the vy terminated AF. An EP study was performed with and without the use of dobutamine and no arrhythmias were induced. The catheters were removed from the left atrium, and heparin was discontinued and reversed with protamine 100 mg. Repeat ICE imaging revealed no significant pericardial effusion. All catheters and sheaths were removed and hemostasis achieved using a closure device. The final left atrial pressure was 10 mmHg. The patient tolerated the procedure well and was successfully extubated and transferred to the recovery in stable condition.  A representative from [employer] was present to help operate a sophisticated mapping system.   Findings of procedure: Successful isolation of all four pulmonary veins.  Estimated blood loss: <10 cc  Specimen removed: none  Preoperative Dx: Afib  Postoperative Dx: Afib  Complications: None  Anesthesia type: General    Get Hardin MD

## 2021-05-26 NOTE — CHART NOTE - NSCHARTNOTEFT_GEN_A_CORE
The patient is s/p A. Fib Ablation, was noted to have ST depression in V2-V4, asymptomatic. As per [employer] note by Dr. Ansari, the patient had cardiac work up.    Stress Test: 9/9/2016, Exercise 6 minutes and 46 seconds to a heart rate of 106 and had to stop for shortness of breath. No chest pain. Suspicious ST depression, but not enough to meet criteria for ischemia. VPCs and one atrial couplet, but no atrial fibrillation. On sotalol.  ; 11/17/2016  Was in atrial fibrillation again. Exercised 6 minutes to a heart rate of 199 and a blood pressure of 180/70. Short of breath, but no chest pain. 2 mm ST depressions diffusely; return to baseline fairly early in recovery. Nuclear scan with medium sized, mild defects in the apical, inferior mid to distal inferolateral walls that are reversible. Transient ischemic dilatation of the LV noted with a ratio of 1.45. LVEF 78% with normal motion post stress     Echo: 1/28/2019, While hospitalized at [facility]. Normal mitral valve with mild to moderate MR, eccentric jet. Normal trileaflet aortic valve with minimal AI. Severely dilated left atrium. Hyperdynamic left ventricle. LVEF 65-70%. Concentric remodeling. Normal diastolic function. Normal RV size and function with mild TR. RVSP 42. Normal pericardium with no effusion ; (see hospital echoes with moderate pericardial effusion.) 12/10/2020 MAC.  Mild S.A.M. with no LVOT gradient.  Mild to moderate MR.  Sclerotic aortic valve leaflets but normal opening and minimal AI.  Moderate LAE.  Normal LV size and function with LVEF 60%.  Proximal septal hypertrophy measuring 1.4 cm but again no LVOT gradient..  RVSP 35.  Normal pericardium with no pericardial effusion.  No pleural effusion noted.  Normal RV size and function with mild TR     Cardiac Cath: 12/8/2016, 30% lesion in the proximal circumflex. No obstructive disease in the LAD or RCA., or left main. LVEF 70%. No WMA.    As per Dr. Hardin, will admit the patient for observation overnight and Stress test in the morning. The patient is s/p A. Fib Ablation, was noted to have ST depression in V2-V4, asymptomatic.   As per Allscripts note by Dr. Ansari, the patient had cardiac work up.    Stress Test: 9/9/2016, Exercise 6 minutes and 46 seconds to a heart rate of 106 and had to stop for shortness of breath. No chest pain. Suspicious ST depression, but not enough to meet criteria for ischemia. VPCs and one atrial couplet, but no atrial fibrillation. On sotalol.  ; 11/17/2016  Was in atrial fibrillation again. Exercised 6 minutes to a heart rate of 199 and a blood pressure of 180/70. Short of breath, but no chest pain. 2 mm ST depressions diffusely; return to baseline fairly early in recovery. Nuclear scan with medium sized, mild defects in the apical, inferior mid to distal inferolateral walls that are reversible. Transient ischemic dilatation of the LV noted with a ratio of 1.45. LVEF 78% with normal motion post stress     Echo: 1/28/2019, While hospitalized at [facility]. Normal mitral valve with mild to moderate MR, eccentric jet. Normal trileaflet aortic valve with minimal AI. Severely dilated left atrium. Hyperdynamic left ventricle. LVEF 65-70%. Concentric remodeling. Normal diastolic function. Normal RV size and function with mild TR. RVSP 42. Normal pericardium with no effusion ; (see hospital echoes with moderate pericardial effusion.) 12/10/2020 MAC.  Mild S.A.M. with no LVOT gradient.  Mild to moderate MR.  Sclerotic aortic valve leaflets but normal opening and minimal AI.  Moderate LAE.  Normal LV size and function with LVEF 60%.  Proximal septal hypertrophy measuring 1.4 cm but again no LVOT gradient..  RVSP 35.  Normal pericardium with no pericardial effusion.  No pleural effusion noted.  Normal RV size and function with mild TR     Cardiac Cath: 12/8/2016, 30% lesion in the proximal circumflex. No obstructive disease in the LAD or RCA., or left main. LVEF 70%. No WMA.    Dr. Hardin was made aware, he reviewed the post procedure ECGs, recommended to admit the patient for observation overnight and Stress test in the morning, continue present medications.

## 2021-05-26 NOTE — CHART NOTE - NSCHARTNOTEFT_GEN_A_CORE
Status post Cath, Right femoral and left radial site without bleeding or hematoma.  Dry scanty serous sanguinous fluid on the right femoral dressing.  Positive Pulses, Capillary refill less than two seconds.  Will continue to monitor.                                                                                                                                                  SILVIO Tejada, RPA-C [id number]

## 2021-05-26 NOTE — CHART NOTE - NSCHARTNOTEFT_GEN_A_CORE
55 y.o. male presents today for elective A. Fib. Ablation.  see hard copy of H&P from [employer] and from PST in patient's chart.   The patient denies chest pain, SOB, palpitations, dizziness, presyncope, syncope,  headache, visual disturbances, CVA, PE, DVT, ELSY, abdominal pain, N/V/D/C, hematochezia, melena, dysuria, hematuria, fever, chills.  Medications reviewed. 55 y.o. male presents today for elective A. Fib. Ablation.  see hard copy of H&P from [employer]scripts and from PST in patient's chart.   The patient denies chest pain, SOB, palpitations, dizziness, presyncope, syncope,  headache, visual disturbances, CVA, PE, DVT, ELSY, abdominal pain, N/V/D/C, hematochezia, melena, dysuria, hematuria, fever, chills.  Medications reviewed. The patient at present is on Bisoprolol and Eliquis. Amiodarone was stopped 40 days ago due to corneal deposits, Dr. Hardin is aware.

## 2021-05-26 NOTE — DISCHARGE NOTE PROVIDER - CARE PROVIDER_API CALL
Get Hardin (MD)  Cardiac Electrophysiology; Cardiovascular Disease; Internal Medicine  [phone] [place] [address], [id number]  [place] [address]  Phone: [phone]  Fax: [phone]  Scheduled Appointment: 06/25/2021 01:30 PM

## 2021-05-26 NOTE — DISCHARGE NOTE PROVIDER - NSDCFUADDAPPT_GEN_ALL_CORE_FT
Please follow up with your primary care provider in 1 to 2 weeks for further care. If you don't have a primary care provider please follow up at our Medicine Clinic at  [facility] [place] [address] [phone] or [phone]  (please call to make appointment)

## 2021-05-26 NOTE — DISCHARGE NOTE PROVIDER - NSDCCPCAREPLAN_GEN_ALL_CORE_FT
PRINCIPAL DISCHARGE DIAGNOSIS  Diagnosis: S/P ablation of atrial flutter  Assessment and Plan of Treatment: continue present medications  f/u with DR. Hardin as out patient

## 2021-05-26 NOTE — DISCHARGE NOTE PROVIDER - HOSPITAL COURSE
55 y.o. male presents today for elective A. Fib. Ablation.  see hard copy of H&P from [employer] and from PST in patient's chart.   The patient denies chest pain, SOB, palpitations, dizziness, presyncope, syncope,  headache, visual disturbances, CVA, PE, DVT, ELSY, abdominal pain, N/V/D/C, hematochezia, melena, dysuria, hematuria, fever, chills.  Medications reviewed. The patient at present is on Bisoprolol and Eliquis. Amiodarone was stopped 40 days ago due to corneal deposits, Dr. Hardin is aware.     On ___ this case was reviewed with . ____, the patient is medically stable and optimized for discharge. All medications were reviewed and prescriptions were sent to mutually agreed upon pharmacy. The patient is s/p A. Fib Ablation, was noted to have ST depression in V2-V4, asymptomatic.   As per Allscripts note by Dr. Ansari, the patient had cardiac work up.    Stress Test: 9/9/2016, Exercise 6 minutes and 46 seconds to a heart rate of 106 and had to stop for shortness of breath. No chest pain. Suspicious ST depression, but not enough to meet criteria for ischemia. VPCs and one atrial couplet, but no atrial fibrillation. On sotalol.  ; 11/17/2016  Was in atrial fibrillation again. Exercised 6 minutes to a heart rate of 199 and a blood pressure of 180/70. Short of breath, but no chest pain. 2 mm ST depressions diffusely; return to baseline fairly early in recovery. Nuclear scan with medium sized, mild defects in the apical, inferior mid to distal inferolateral walls that are reversible. Transient ischemic dilatation of the LV noted with a ratio of 1.45. LVEF 78% with normal motion post stress     Echo: 1/28/2019, While hospitalized at [facility]. Normal mitral valve with mild to moderate MR, eccentric jet. Normal trileaflet aortic valve with minimal AI. Severely dilated left atrium. Hyperdynamic left ventricle. LVEF 65-70%. Concentric remodeling. Normal diastolic function. Normal RV size and function with mild TR. RVSP 42. Normal pericardium with no effusion ; (see hospital echoes with moderate pericardial effusion.) 12/10/2020 MAC.  Mild S.A.M. with no LVOT gradient.  Mild to moderate MR.  Sclerotic aortic valve leaflets but normal opening and minimal AI.  Moderate LAE.  Normal LV size and function with LVEF 60%.  Proximal septal hypertrophy measuring 1.4 cm but again no LVOT gradient..  RVSP 35.  Normal pericardium with no pericardial effusion.  No pleural effusion noted.  Normal RV size and function with mild TR     Cardiac Cath: 12/8/2016, 30% lesion in the proximal circumflex. No obstructive disease in the LAD or RCA., or left main. LVEF 70%. No WMA.    Dr. Hardin was made aware, he reviewed the post procedure ECGs, recommended to admit the patient for observation overnight and Stress test in the morning, continue present medications.    On ___ this case was reviewed with  ____, the patient is medically stable and optimized for discharge. All medications were reviewed and prescriptions were sent to mutually agreed upon pharmacy. The patient is s/p A. Fib Ablation, was noted to have ST depression in V2-V4, asymptomatic.   As per Allscripts note by Dr. Ansari, the patient had cardiac work up.    Stress Test: 9/9/2016, Exercise 6 minutes and 46 seconds to a heart rate of 106 and had to stop for shortness of breath. No chest pain. Suspicious ST depression, but not enough to meet criteria for ischemia. VPCs and one atrial couplet, but no atrial fibrillation. On sotalol.  ; 11/17/2016  Was in atrial fibrillation again. Exercised 6 minutes to a heart rate of 199 and a blood pressure of 180/70. Short of breath, but no chest pain. 2 mm ST depressions diffusely; return to baseline fairly early in recovery. Nuclear scan with medium sized, mild defects in the apical, inferior mid to distal inferolateral walls that are reversible. Transient ischemic dilatation of the LV noted with a ratio of 1.45. LVEF 78% with normal motion post stress     Echo: 1/28/2019, While hospitalized at [facility]. Normal mitral valve with mild to moderate MR, eccentric jet. Normal trileaflet aortic valve with minimal AI. Severely dilated left atrium. Hyperdynamic left ventricle. LVEF 65-70%. Concentric remodeling. Normal diastolic function. Normal RV size and function with mild TR. RVSP 42. Normal pericardium with no effusion ; (see hospital echoes with moderate pericardial effusion.) 12/10/2020 MAC.  Mild S.A.M. with no LVOT gradient.  Mild to moderate MR.  Sclerotic aortic valve leaflets but normal opening and minimal AI.  Moderate LAE.  Normal LV size and function with LVEF 60%.  Proximal septal hypertrophy measuring 1.4 cm but again no LVOT gradient..  RVSP 35.  Normal pericardium with no pericardial effusion.  No pleural effusion noted.  Normal RV size and function with mild TR     Cardiac Cath: 12/8/2016, 30% lesion in the proximal circumflex. No obstructive disease in the LAD or RCA., or left main. LVEF 70%. No WMA.    Dr. Hardin was made aware, he reviewed the post procedure ECGs, recommended to admit the patient for observation overnight and Stress test in the morning, continue present medications.  5/27 pt wants to get stress test done as out pt. ok as per Dr. Hardin. Stable for d/c home today.     On 5/27/21 this case was reviewed with Dr. Hardin, the patient is medically stable and optimized for discharge. All medications were reviewed and prescriptions were sent to mutually agreed upon pharmacy.

## 2021-05-26 NOTE — DISCHARGE NOTE PROVIDER - NSDCMRMEDTOKEN_GEN_ALL_CORE_FT
bisoprolol 10 mg oral tablet: 1 tab(s) orally once a day  Discharge instructions: Follow up with Dr Hardin as directed or sooner if needed  Start Protonix 40mg oral daily for 30days  Resume Eliquis tonight, 5/26 for evening dose around 8pm  Take Tylenol as needed for site discomfort, do not take your first dose until after 6:40pm as you got a dose of Tylenol during your procedure  Eliquis 5 mg oral tablet: 1 tab(s) orally 2 times a day  omeprazole 40 mg oral delayed release capsule: 1 cap(s) orally once a day x 30 days    Eliquis 5 mg oral tablet: 1 tab(s) orally 2 times a day  metoprolol tartrate 100 mg oral tablet: 1 tab(s) orally 2 times a day  omeprazole 40 mg oral delayed release capsule: 1 cap(s) orally once a day x 30 days

## 2021-05-27 ENCOUNTER — TRANSCRIPTION ENCOUNTER (OUTPATIENT)
Age: 56
End: 2021-05-27

## 2021-05-27 VITALS
TEMPERATURE: 99 F | OXYGEN SATURATION: 99 % | SYSTOLIC BLOOD PRESSURE: 112 MMHG | DIASTOLIC BLOOD PRESSURE: 68 MMHG | HEART RATE: 62 BPM | RESPIRATION RATE: 18 BRPM

## 2021-05-27 LAB
ANION GAP SERPL CALC-SCNC: 13 MMOL/L — SIGNIFICANT CHANGE UP (ref 7–14)
BASOPHILS # BLD AUTO: 0.02 K/UL — SIGNIFICANT CHANGE UP (ref 0–0.2)
BASOPHILS NFR BLD AUTO: 0.2 % — SIGNIFICANT CHANGE UP (ref 0–2)
BUN SERPL-MCNC: 14 MG/DL — SIGNIFICANT CHANGE UP (ref 7–23)
CALCIUM SERPL-MCNC: 8.1 MG/DL — LOW (ref 8.4–10.5)
CHLORIDE SERPL-SCNC: 105 MMOL/L — SIGNIFICANT CHANGE UP (ref 98–107)
CO2 SERPL-SCNC: 21 MMOL/L — LOW (ref 22–31)
COVID-19 SPIKE DOMAIN AB INTERP: NEGATIVE — SIGNIFICANT CHANGE UP
COVID-19 SPIKE DOMAIN ANTIBODY RESULT: 0.4 U/ML — SIGNIFICANT CHANGE UP
CREAT SERPL-MCNC: 0.97 MG/DL — SIGNIFICANT CHANGE UP (ref 0.5–1.3)
EOSINOPHIL # BLD AUTO: 0.08 K/UL — SIGNIFICANT CHANGE UP (ref 0–0.5)
EOSINOPHIL NFR BLD AUTO: 0.9 % — SIGNIFICANT CHANGE UP (ref 0–6)
GLUCOSE SERPL-MCNC: 91 MG/DL — SIGNIFICANT CHANGE UP (ref 70–99)
HCT VFR BLD CALC: 39.4 % — SIGNIFICANT CHANGE UP (ref 39–50)
HGB BLD-MCNC: 12.8 G/DL — LOW (ref 13–17)
IANC: 6.37 K/UL — SIGNIFICANT CHANGE UP (ref 1.5–8.5)
IMM GRANULOCYTES NFR BLD AUTO: 0.7 % — SIGNIFICANT CHANGE UP (ref 0–1.5)
LYMPHOCYTES # BLD AUTO: 1.81 K/UL — SIGNIFICANT CHANGE UP (ref 1–3.3)
LYMPHOCYTES # BLD AUTO: 20.4 % — SIGNIFICANT CHANGE UP (ref 13–44)
MAGNESIUM SERPL-MCNC: 1.9 MG/DL — SIGNIFICANT CHANGE UP (ref 1.6–2.6)
MCHC RBC-ENTMCNC: 27.8 PG — SIGNIFICANT CHANGE UP (ref 27–34)
MCHC RBC-ENTMCNC: 32.5 GM/DL — SIGNIFICANT CHANGE UP (ref 32–36)
MCV RBC AUTO: 85.5 FL — SIGNIFICANT CHANGE UP (ref 80–100)
MONOCYTES # BLD AUTO: 0.55 K/UL — SIGNIFICANT CHANGE UP (ref 0–0.9)
MONOCYTES NFR BLD AUTO: 6.2 % — SIGNIFICANT CHANGE UP (ref 2–14)
NEUTROPHILS # BLD AUTO: 6.37 K/UL — SIGNIFICANT CHANGE UP (ref 1.8–7.4)
NEUTROPHILS NFR BLD AUTO: 71.6 % — SIGNIFICANT CHANGE UP (ref 43–77)
NRBC # BLD: 0 /100 WBCS — SIGNIFICANT CHANGE UP
NRBC # FLD: 0 K/UL — SIGNIFICANT CHANGE UP
PHOSPHATE SERPL-MCNC: 3.5 MG/DL — SIGNIFICANT CHANGE UP (ref 2.5–4.5)
PLATELET # BLD AUTO: 189 K/UL — SIGNIFICANT CHANGE UP (ref 150–400)
POTASSIUM SERPL-MCNC: 3.6 MMOL/L — SIGNIFICANT CHANGE UP (ref 3.5–5.3)
POTASSIUM SERPL-SCNC: 3.6 MMOL/L — SIGNIFICANT CHANGE UP (ref 3.5–5.3)
RBC # BLD: 4.61 M/UL — SIGNIFICANT CHANGE UP (ref 4.2–5.8)
RBC # FLD: 14.1 % — SIGNIFICANT CHANGE UP (ref 10.3–14.5)
SARS-COV-2 IGG+IGM SERPL QL IA: 0.4 U/ML — SIGNIFICANT CHANGE UP
SARS-COV-2 IGG+IGM SERPL QL IA: NEGATIVE — SIGNIFICANT CHANGE UP
SODIUM SERPL-SCNC: 139 MMOL/L — SIGNIFICANT CHANGE UP (ref 135–145)
WBC # BLD: 8.89 K/UL — SIGNIFICANT CHANGE UP (ref 3.8–10.5)
WBC # FLD AUTO: 8.89 K/UL — SIGNIFICANT CHANGE UP (ref 3.8–10.5)

## 2021-05-27 RX ORDER — BISOPROLOL FUMARATE 10 MG/1
1 TABLET, FILM COATED ORAL
Qty: 0 | Refills: 0 | DISCHARGE

## 2021-05-27 RX ORDER — MAGNESIUM OXIDE 400 MG ORAL TABLET 241.3 MG
400 TABLET ORAL
Refills: 0 | Status: DISCONTINUED | OUTPATIENT
Start: 2021-05-27 | End: 2021-05-27

## 2021-05-27 RX ORDER — POTASSIUM CHLORIDE 20 MEQ
40 PACKET (EA) ORAL ONCE
Refills: 0 | Status: COMPLETED | OUTPATIENT
Start: 2021-05-27 | End: 2021-05-27

## 2021-05-27 RX ORDER — METOPROLOL TARTRATE 50 MG
1 TABLET ORAL
Qty: 60 | Refills: 0
Start: 2021-05-27 | End: 2021-06-25

## 2021-05-27 RX ADMIN — Medication 100 MILLIGRAM(S): at 06:40

## 2021-05-27 RX ADMIN — MAGNESIUM OXIDE 400 MG ORAL TABLET 400 MILLIGRAM(S): 241.3 TABLET ORAL at 13:37

## 2021-05-27 RX ADMIN — SODIUM CHLORIDE 3 MILLILITER(S): 9 INJECTION INTRAMUSCULAR; INTRAVENOUS; SUBCUTANEOUS at 13:37

## 2021-05-27 RX ADMIN — PANTOPRAZOLE SODIUM 40 MILLIGRAM(S): 20 TABLET, DELAYED RELEASE ORAL at 06:40

## 2021-05-27 RX ADMIN — Medication 40 MILLIEQUIVALENT(S): at 09:47

## 2021-05-27 RX ADMIN — MAGNESIUM OXIDE 400 MG ORAL TABLET 400 MILLIGRAM(S): 241.3 TABLET ORAL at 09:47

## 2021-05-27 RX ADMIN — APIXABAN 5 MILLIGRAM(S): 2.5 TABLET, FILM COATED ORAL at 06:40

## 2021-05-27 RX ADMIN — SODIUM CHLORIDE 3 MILLILITER(S): 9 INJECTION INTRAMUSCULAR; INTRAVENOUS; SUBCUTANEOUS at 06:57

## 2021-05-27 NOTE — PROGRESS NOTE ADULT - SUBJECTIVE AND OBJECTIVE BOX
Patient is seen and examined. Denies any chest pain, SOB, palpitations or dizziness. Patient is s/p afib ablation    PAST MEDICAL & SURGICAL HISTORY:  GERD (gastroesophageal reflux disease)  Marijuana abuse  Atrial fibrillation, unspecified type  Anxiety  Peptic ulcer disease  Alcohol use  Marijuana use  Hyperlipidemia  PUD (peptic ulcer disease)  Paroxysmal atrial fibrillation    No significant past surgical history    H/O prior ablation treatment  10/2017, for A-fib  H/O cardiac catheterization  2016    MEDICATIONS  (STANDING):  apixaban 5 milliGRAM(s) Oral two times a day  magnesium oxide 400 milliGRAM(s) Oral three times a day with meals  metoprolol tartrate 100 milliGRAM(s) Oral two times a day  pantoprazole    Tablet 40 milliGRAM(s) Oral before breakfast  sodium chloride 0.9% lock flush 3 milliLiter(s) IV Push every 8 hours    MEDICATIONS  (PRN):            Vital Signs Last 24 Hrs  T(C): 36.5 (27 May 2021 06:26), Max: 36.5 (27 May 2021 06:26)  T(F): 97.7 (27 May 2021 06:26), Max: 97.7 (27 May 2021 06:26)  HR: 64 (27 May 2021 06:26) (56 - 64)  BP: 116/59 (27 May 2021 06:26) (115/60 - 122/64)  BP(mean): --  RR: 19 (27 May 2021 06:26) (17 - 19)  SpO2: 100% (27 May 2021 06:26) (100% - 100%)      INTERPRETATION OF TELEMETRY: Sinus rhythm with HR 50s-60s    LABS:                        12.8   8.89  )-----------( 189      ( 27 May 2021 05:15 )             39.4     05-27    139  |  105  |  14  ----------------------------<  91  3.6   |  21<L>  |  0.97    Ca    8.1<L>      27 May 2021 05:15  Phos  3.5     05-27  Mg     1.9     05-27      PHYSICAL EXAM:    GENERAL: In no apparent distress, well nourished, and hydrated.  HEART: Regular rate and rhythm; No murmurs, rubs, or gallops.  PULMONARY: Clear to auscultation and percussion.  No rales, wheezing, or rhonchi bilaterally.  ABDOMEN: Soft, Nontender, Nondistended; Bowel sounds present  EXTREMITIES:  2+ Peripheral Pulses, No clubbing, cyanosis, or edema

## 2021-05-27 NOTE — PROGRESS NOTE ADULT - SUBJECTIVE AND OBJECTIVE BOX
ANESTHESIA POSTOP CHECK    55y Male POSTOP DAY 1      Vital Signs Last 24 Hrs  T(C): 36.5 (27 May 2021 06:26), Max: 36.5 (27 May 2021 06:26)  T(F): 97.7 (27 May 2021 06:26), Max: 97.7 (27 May 2021 06:26)  HR: 64 (27 May 2021 06:26) (56 - 64)  BP: 116/59 (27 May 2021 06:26) (115/60 - 122/64)    RR: 19 (27 May 2021 06:26) (17 - 19)  SpO2: 100% (27 May 2021 06:26) (100% - 100%)  I&O's Summary      [x ] NO APPARENT ANESTHESIA COMPLICATIONS      Comments:

## 2021-05-27 NOTE — PHYSICAL THERAPY INITIAL EVALUATION ADULT - PERTINENT HX OF CURRENT PROBLEM, REHAB EVAL
This is a 55 year old with history of A fib s/p cardiac ablation in 2017 and recent hospitalization with A fib presented for afib ablation. S/P afib ablation on 5/26/21.

## 2021-05-27 NOTE — DISCHARGE NOTE NURSING/CASE MANAGEMENT/SOCIAL WORK - PATIENT PORTAL LINK FT
You can access the FollowMyHealth Patient Portal offered by [facility] by registering at the following website: [email or web address]. By joining [facility]’s FollowMyHealth portal, you will also be able to view your health information using other applications (apps) compatible with our system.

## 2021-05-27 NOTE — DISCHARGE NOTE NURSING/CASE MANAGEMENT/SOCIAL WORK - NSDCFUADDAPPT_GEN_ALL_CORE_FT
Please follow up with your primary care provider in 1 to 2 weeks for further care. If you don't have a primary care provider please follow up at our Medicine Clinic at  [facility] [place] [address] [phone] or [phone]  (please call to make appointment)

## 2021-05-27 NOTE — PHYSICAL THERAPY INITIAL EVALUATION ADULT - ADDITIONAL COMMENTS
PT offered to perform stair training with the patient, however patient declined to perform stairs and is not concerned of stairs.

## 2021-05-27 NOTE — PROGRESS NOTE ADULT - ASSESSMENT
55 year old with history of A fib s/p cardiac ablation in 2017 and recent hospitalization with A fib.    55 year old with history of A fib s/p cardiac ablation in 2017 and recent hospitalization with A fib presented for afib ablation. S/P afib ablation yesterday.    Teaching provided to patient regarding right groin site care. Right groin without hematoma, ecchymosis, drainage, pain, or bleeding.  Telemetry review demonstrates sinus rhythm. patient was noted to have ST depression in V2-V4, asymptomatic. As per Allscripts note by Dr. Ansari, the patient had cardiac work up.   Dr. Hardin was made aware, he reviewed the post procedure ECGs, recommended to admit the patient for observation overnight and Stress test in the morning. However patient refused stress test     - may shower upon arrival to home, otherwise keep groin incision sites dry and clean.    - Avoid activities such as jogging/excessive stair climbing/weight lifting for the next 7 days    - Take Acetaminophen (Tylenol) 500mg, one to two tablets every 6 hours as needed for pain relief.    - Pt was instructed to call [phone] if the following occurs:      - fever with temperature > 101      - swelling or bleeding at the groin incision site(s)  - Continue current meds including apixaban  - Outpatient F/U is scheduled with Dr. Hardin on 6/25/21 @1:30pm  - May d/c home today    All questions answered to patient's satisfaction.  Follow up letter and instructions left in the care of the patient.

## 2021-06-05 LAB — SARS-COV-2 N GENE NPH QL NAA+PROBE: NOT DETECTED

## 2021-06-17 ENCOUNTER — APPOINTMENT (OUTPATIENT)
Dept: CARDIOLOGY | Facility: CLINIC | Age: 56
End: 2021-06-17
Payer: MEDICAID

## 2021-06-17 ENCOUNTER — NON-APPOINTMENT (OUTPATIENT)
Age: 56
End: 2021-06-17

## 2021-06-17 VITALS
OXYGEN SATURATION: 97 % | HEART RATE: 52 BPM | HEIGHT: 73 IN | DIASTOLIC BLOOD PRESSURE: 74 MMHG | SYSTOLIC BLOOD PRESSURE: 125 MMHG | BODY MASS INDEX: 31.01 KG/M2 | WEIGHT: 234 LBS

## 2021-06-17 PROCEDURE — 36415 COLL VENOUS BLD VENIPUNCTURE: CPT

## 2021-06-17 PROCEDURE — 99214 OFFICE O/P EST MOD 30 MIN: CPT

## 2021-06-17 PROCEDURE — 93000 ELECTROCARDIOGRAM COMPLETE: CPT

## 2021-06-17 NOTE — PATIENT PROFILE ADULT. - PRO ANTICIPATED DISCH DISP
Follow up with your gastroenterologist as soon as possible  Return to the ER with any new, worsening or persistent symptoms. home

## 2021-06-17 NOTE — REASON FOR VISIT
[FreeTextEntry1] : 56-year-old man with atrial fibrillation and abnormal stress test, s/p atrial flutter ablation 2017, then back in rapid Afib.\par Multiple admissions for marijuana toxicity and on the latest one found to have moderate pericardial effusion as well.\par Last admission was treated with amiodarone and remained in sinus rhythm and was scheduled with Dr. Hardin for follow-up ablation again. Now here after second ablation.

## 2021-06-17 NOTE — HISTORY OF PRESENT ILLNESS
[FreeTextEntry1] : August 30, 2016. Patient is a 51-year-old man, who was finally diagnosed with rapid atrial fibrillation about one month ago. On July 24 related to drinking and smoking synthetic weed, etc. he was hospitalized at [facility]. He had severe palpitations and "his whole system went into shock". He had some vomitting and nauseousness, diaphoresis, and an uncomfortable feeling, but no chest pain. He was in rapid atrial fibrillation. He did make positive troponin enzymes, but was not recommended to have cardiac catheterization or even a stress test. He was hospitalized at [facility] for 6 days and echocardiogram supposedly showed no scar. He was discharged on cholesterol medications, but then he followed up with a cardiologist in [place] after being hospitalized there for 48 hours with a recurrence of his A. fib. He was placed on sotalol and did well for a while. On 26 August on 120 mg of sotalol, he broke through with an episode of A.fib. The cardiologist wanted to increase Sotalol to 160 mg, but the patient stayed at 120 and then came back to [place] and is here to see me. He has not had a stress test. He has no risk factors for coronary artery disease or a family history of coronary disease or arrhythmias. He claims he's been having symptoms for 20 years, and the episodes have lasted as long as an hour, and it is only with this episode in July, that he was finally diagnosed with atrial fibrillation. He has some shortness of breath going up stairs, but he says he has had this for years, and it has not progressed. Otherwise, with normal activity, and swimming, he does not get chest pain. While he smokes a lot of weed, he does not smoke cigarettes. He thinks he was told of a murmur recently and once as a kid, although I did not hear a murmur on exam. Has not had any syncope recently. He did have a duodenal ulcer about 10 years ago, not related to medications or NSAIDs, and not bleeding. It has not been an issue since. He is currently on sotalol 120 b.i.d., BuSpar, 5 mg b.i.d., and aspirin 81 mg q.d. However, in [facility] Electronic medical record, there are no other notes, but there is a list of medicines to be verified that includes diltiazem, calculus, Eliquis, as well as atorvastatin, and Crestor.\par September 9, 2016. The patient came for stress test and had to stop for shortness of breath at 6 minutes with a low heart rate on carvedilol. It looked like ST s were starting to go down, but did not meet criteria. APCs noted, but no atrial fibrillation. Recommended nuclear stress test and use that to help guide medicine versus statin for his hyperlipidemia.\par November 2, 2016. Patient here in followup. He did not have nuclear stress test. He is here in followup because he is running out of sotalol, but also he had 2 episodes of atrial fibrillation, which were severe and accompanied with chest pain. He admits he has not been taking it twice a day and occasionally will skip. He has not worked on his diet and in fact has gained weight. He does complain of some fatigue and shortness of breath. No exertional chest pain, however; he does not get the same chest pain with exertion that he gets during the atrial fibrillation. His EKG is sinus rhythm with nonspecific ST changes. After a long discussion with the patient and his sister, he promises to be rigid with his sotalol every 12 hours, and he is scheduling a nuclear stress test, and we will review his labs.\par November 18, 2016. Yesterday, the patient came for his nuclear stress test. He was in atrial flutter or fibrillation. His heart rate went as high as 199, and there were definite abnormal ST changes, but no chest pain. Blood pressure response was normal. The nuclear scan showed medium sized, mild defects, apical, inferior, mid to distal inferolateral that were reversible, and there was also transient ischemic dilatation of the LV with a ratio of 1.45. LVEF was 78% with normal wall motion post stress however. Patient returns today to review the findings and for reevaluation. I recommended coronary angiography and the patient and his sister are thinking about it. He is back in sinus rhythm.\par December 8, 2016. Patient had coronary angiography, which only showed a 30% lesion in the proximal circumflex. The other arteries had no obstruction and LVEF was normal at 70%. I had the patient switch over to flecainide 50 mg q.12 h.\par December 30, 2016. The patient is in sinus rhythm, however, he claims he has had a lot of breakthroughs and does not feel well on the flecainide. He could not be more specific and wanted to go back to the sotalol, although he has broken through 120 mg many times. After a long extensive discussion we agreed to try Rythmol  mg q.12 h.\par February 7, 2017. Patient called. He had never started the Rythmol. After much discussion he agreed to start the Rythmol and come in in 2 weeks.\par February 23, 2017. The patient is here in followup and is in rapid atrial fibrillation/flutter. He is tolerating the Rythmol well and thinks that most of the time he is doing okay. He was not aware that he was in atrial fibrillation today, although he did think he was yesterday. After long discussion about ablation, changing medications, increasing the dose, or using home telemetry to determine how often he truly is in atrial fibrillation he elected to go up on the Rythmol first. He is still not eager for invasive procedures.\par April 13, 2017. Patient finally returns as I would not renew his medication. He is in sinus rhythm. He says he had one or two bad days, but otherwise thinks he was in a normal rhythm most of the time. He is not  the most compliant when it comes to sticking to his q.12 h. regimen. For the last 3 days has been taking 325 mg twice a day instead of 425 as he ran out of pills.\par May 10, 2017. Patient is here because of cough and sputum. However, he admitted that after a while he decided once again to stop his Rythmol to "see what would happen" and sure enough on Saturday he could not walk to [facility] because he was out of breath and his heart was racing fast. He went back on the Rythmol and had another episode Sunday, but since then has been back in normal rhythm. He's been coughing with dark sputum for 2 days now, but no fever or chills. His EKG shows sinus rhythm with a normal QRS and QT. He is here with his wife so we had an extensive discussion about considering an ablation and about compliance with medication and he will be calling Dr. Get Hardin of [facility].\par \par July 24, 2019. First visit in over 2 years. He had an ablation with Dr. Hardin in 2017 but never continued the medications, even for just a few months and never followed up with Dr. Hardin. In January of this year was hospitalized at [facility] and it sounds like he again had substance abuse problems, mostly marijuana, but probably Ativan, and possibly other medications. He initially presented with lactic acidosis. At some point he did seem to be in sinus rhythm, but with a very bizarre EKG, and prolonged QT, which I believe, was thought to be from toxins. Eventually, was back in rapid A. Flutter and was discharged on Cardizem CD. His CHADS2-VASC score was 0 so no anticoag. No followup after that and he claims now that he had no insurance, but now that he does he came back. He has been having severe GI symptoms whenever he eats fatty, greasy foods, mostly, vomiting, and chest pain, and rapid heartbeat. Reportedly, he has an appointment with gastroenterology tomorrow. He is on absolutely no medications and is here in rapid atrial flutter with a heart rate of around 140. Seems to be tolerating it well, with no ischemic changes, no heart failure on exam, and blood pressure acceptable. I reviewed his notes from the hospitalization in January and reached out to Dr. Get Hardin for further information. In the meantime, I am placing him on bisoprolol 10 mg for additional rate control while he has his GI workup. Long discussion about compliance and self-destructive behavior.\par July 31, 2019. Patient returns in followup on bisoprolol. I spoke with Dr. Hardin after his last visit, who said that even though his CHADS2-VASC score was zero, normally he likes to anticoagulate these people after ablation, but because the patient was so erratic in his behavior and there was concern about drugs or drinking, he did not. He would be willing to do another ablation if necessary. Today he is back in sinus rhythm at 51. Labs from last week were within normal limits, except for his lipid profile, which we reviewed today.\par December 6, 2019.  Since last visit patient was hospitalized I believe twice once at [facility] and once at [place] with marijuana intoxication.  He remains in sinus rhythm as long as he was back on his bisoprolol.  He returns now run out of the bisoprolol and is back in A. fib at 130 but totally asymptomatic.  He is not on Xarelto but his CHADS2- VASC score is 0.  Still using marijuana.  Long discussion about compliance and advised him to set up another appointment with Dr. Hardin to discuss a second ablation.  (Last time after the ablation he stopped the beta-blocker right away rather than waiting 3 months.)\par April 22, 2020.  Telehealth visit. Narrative: On the whole patient has been doing well, remaining on the beta-blocker with rare atrial fibrillation. There was a lot of stress as his wife and father-in-law and mother-in-law had COVID-19 with his father-in-law being hospitalized. He was left on his own for some time and had what he thinks was just a panic attack. He still is smoking marijuana at least daily but he tries to keep it down to once a day. His wife spoke to me about what to do when he has some of these episodes because he gets very nauseous and he gets very anxious and she almost wants to call EMS. I explained to have him breathe in and out of a paper bag as the first step otherwise we can try a little bit of alprazolam or a little bit of Zofran. (At the end the patient left and I spoke with the wife because he did not want to hear about his anxiety attacks). We also discussed follow-up with Dr. Hardin of [facility] for possible ablation given his recurrence of atrial fibrillation although most of the time if he is compliant with his beta-blocker he is in sinus rhythm. Currently he is on no anticoagulation because of his low CHADS2-VASC score.\par Assessment: For the most part stable in terms of his atrial fibrillation. Marijuana abuse still somewhat of a problem along with anxiety but on the whole functioning better than he had previously.\par Continue beta-blocker. Alprazolam 0.5 mg as needed and Zofran 4 mg as needed.\par Follow-up: 2 months\par July 2, 2020.Issues with nauseousness and vomiting and going to the emergency room at [facility].\par July 5, 2020. Once again diagnosed with marijuana intoxication. Remained in sinus rhythm throughout. Was discharged on July 3. \par December 2, 2020.Patient had issues again with marijuana overdose with nausea and vomiting and lethargy and finally agreed to go to rehab.  However at rehab was found to be in rapid atrial fibrillation and was sent to [facility].  He was there for a few days and I kept in contact with the physicians there.  They were having trouble controlling his rate and he was placed on IV amiodarone as CT angio showed a large pericardial effusion although by echo it was more mild to moderate and no hemodynamic compromise.  Sed rate was 86.  Initial plan was to have a thoracic surgery just to a pericardial window and send off the fluid for labs and serologies and cytology etc.  However he had been placed on 650 mg of aspirin along with the colchicine so the surgery was canceled.  In the interim he was transferred to [facility]. \par December 10, 2020.  Patient here now with wife.  I reviewed the records from [facility] on the patient's wife's cell phone.  He had an extensive collagen vascular work-up that was totally negative looking for a cause for his pericarditis.  Also cardiolipin antibody negative.  He did have abnormal liver function tests that persisted.  A HIDA scan was negative.  His echo on December 2 showed a small to medium pleural effusion pericardial effusion and a small left pleural effusion.  A follow-up echo the next day showed a small to mild effusion and bilateral pleural effusions but that was a limited study.  Patient was placed on amiodarone Cardizem metoprolol along with Eliquis and colchicine, and the patient left in sinus rhythm.  Here he is in sinus rhythm at 89 with abnormal ST-T changes diffusely.  He swears no more marijuana but he has done this before.\par Patient returns for echocardiogram at the end of the day.  Pericardial effusion gone.  No significant pleural effusion.  Otherwise mild S.A.M. but no gradient.  Mild to moderate MR.  Moderate LAE.  Normal LV systolic function normal RV systolic function.  Mild TR with RVSP 35.  Stopped Cardizem but continued bisoprolol amiodarone and colchicine along with his Eliquis.\par February 9, 2021.  Patient returns in follow-up.  EKG with sinus bradycardia at 53 with slight ST scooping and borderline QT prolongation.  Off colchicine but still on amiodarone, bisoprolol, and Eliquis.  Claims he has been a good boy, no marijuana etc.  Is willing to consider another ablation especially if that will help get him off the amiodarone.\par July 17, 2021.  Patient here in follow-up.  He saw Dr. Hardin April 2 and was still in sinus rhythm on amiodarone.  They agreed to schedule an ablation and then hopefully stop the amiodarone 3 months after the ablation, may be sooner.  The ablation was scheduled for May 26 however as of May 11 the patient on his own had stopped both the amiodarone and the Eliquis.  Eliquis was restarted on May 11.  MANNIE was done on 26 May showing mild to moderate MR, minimal AI, no left atrium or DANNY thrombus.  Normal LV and RV function.  Mild TR and PI.  Normal pericardium with no effusion.  No PFO.  He had the atrial flutter ablation and was discharged on the 27th.  There was a question of some ST depressions and it was suggested he repeat the stress test.  He is here today, in sinus rhythm at 50, with ST scooping in leads I to aVF V3 through 6.. He is on Eliquis and metoprolol, no amiodarone, no marijuana and only concerned that he cannot stop gaining weight.  He and his wife have not been vaccinated as they do not believe in it etc. I had a long discussion trying to convince him to get vaccinated for Covid.  He will be seeing Dr. Hardin at the end of July and I would like to bring him back for a stress echo at the end of August which will be 3 months after his ablation\par

## 2021-06-17 NOTE — REVIEW OF SYSTEMS
[Weight Gain (___ Lbs)] : [unfilled] ~Ulb weight gain [Negative] : Heme/Lymph [Fever] : no fever [Feeling Fatigued] : not feeling fatigued [Dyspnea on exertion] : not dyspnea during exertion [Chest Discomfort] : no chest discomfort [Lower Ext Edema] : no extremity edema [Palpitations] : no palpitations [Syncope] : no syncope [de-identified] : no marijuana

## 2021-06-17 NOTE — PHYSICAL EXAM
[General Appearance - Well Developed] : well developed [Normal Appearance] : normal appearance [Well Groomed] : well groomed [General Appearance - Well Nourished] : well nourished [No Deformities] : no deformities [General Appearance - In No Acute Distress] : no acute distress [Normal Conjunctiva] : the conjunctiva exhibited no abnormalities [Eyelids - No Xanthelasma] : the eyelids demonstrated no xanthelasmas [Normal Oral Mucosa] : normal oral mucosa [No Oral Pallor] : no oral pallor [No Oral Cyanosis] : no oral cyanosis [Normal Jugular Venous A Waves Present] : normal jugular venous A waves present [Normal Jugular Venous V Waves Present] : normal jugular venous V waves present [No Jugular Venous Ortega A Waves] : no jugular venous ortega A waves [Respiration, Rhythm And Depth] : normal respiratory rhythm and effort [Exaggerated Use Of Accessory Muscles For Inspiration] : no accessory muscle use [Auscultation Breath Sounds / Voice Sounds] : lungs were clear to auscultation bilaterally [Heart Rate And Rhythm] : heart rate and rhythm were normal [Murmurs] : no murmurs present [Heart Sounds] : normal S1 and S2 [Abdomen Soft] : soft [Abdomen Tenderness] : non-tender [Abdomen Mass (___ Cm)] : no abdominal mass palpated [Abnormal Walk] : normal gait [Gait - Sufficient For Exercise Testing] : the gait was sufficient for exercise testing [Nail Clubbing] : no clubbing of the fingernails [Cyanosis, Localized] : no localized cyanosis [Petechial Hemorrhages (___cm)] : no petechial hemorrhages [Skin Color & Pigmentation] : normal skin color and pigmentation [] : no rash [No Venous Stasis] : no venous stasis [Skin Lesions] : no skin lesions [No Skin Ulcers] : no skin ulcer [No Xanthoma] : no  xanthoma was observed [Affect] : the affect was normal [Oriented To Time, Place, And Person] : oriented to person, place, and time [Mood] : the mood was normal [No Anxiety] : not feeling anxious [FreeTextEntry1] : No rashes. No cyanosis

## 2021-06-18 LAB
ALBUMIN SERPL ELPH-MCNC: 4.2 G/DL
ALP BLD-CCNC: 77 U/L
ALT SERPL-CCNC: 18 U/L
ANION GAP SERPL CALC-SCNC: 13 MMOL/L
AST SERPL-CCNC: 18 U/L
BASOPHILS # BLD AUTO: 0.04 K/UL
BASOPHILS NFR BLD AUTO: 0.6 %
BILIRUB SERPL-MCNC: 0.6 MG/DL
BUN SERPL-MCNC: 18 MG/DL
CALCIUM SERPL-MCNC: 9.1 MG/DL
CHLORIDE SERPL-SCNC: 104 MMOL/L
CHOLEST SERPL-MCNC: 209 MG/DL
CO2 SERPL-SCNC: 23 MMOL/L
CREAT SERPL-MCNC: 1.06 MG/DL
EOSINOPHIL # BLD AUTO: 0.18 K/UL
EOSINOPHIL NFR BLD AUTO: 2.9 %
ESTIMATED AVERAGE GLUCOSE: 103 MG/DL
GLUCOSE SERPL-MCNC: 92 MG/DL
HBA1C MFR BLD HPLC: 5.2 %
HCT VFR BLD CALC: 45.3 %
HDLC SERPL-MCNC: 40 MG/DL
HGB BLD-MCNC: 14.6 G/DL
IMM GRANULOCYTES NFR BLD AUTO: 0.3 %
LDLC SERPL CALC-MCNC: 133 MG/DL
LYMPHOCYTES # BLD AUTO: 2.05 K/UL
LYMPHOCYTES NFR BLD AUTO: 33.2 %
MAN DIFF?: NORMAL
MCHC RBC-ENTMCNC: 28.5 PG
MCHC RBC-ENTMCNC: 32.2 GM/DL
MCV RBC AUTO: 88.5 FL
MONOCYTES # BLD AUTO: 0.32 K/UL
MONOCYTES NFR BLD AUTO: 5.2 %
NEUTROPHILS # BLD AUTO: 3.56 K/UL
NEUTROPHILS NFR BLD AUTO: 57.8 %
NONHDLC SERPL-MCNC: 169 MG/DL
NT-PROBNP SERPL-MCNC: 116 PG/ML
PLATELET # BLD AUTO: 261 K/UL
POTASSIUM SERPL-SCNC: 4 MMOL/L
PROT SERPL-MCNC: 6.9 G/DL
RBC # BLD: 5.12 M/UL
RBC # FLD: 13.6 %
SODIUM SERPL-SCNC: 141 MMOL/L
TRIGL SERPL-MCNC: 177 MG/DL
TSH SERPL-ACNC: 2.87 UIU/ML
WBC # FLD AUTO: 6.17 K/UL

## 2021-06-25 ENCOUNTER — NON-APPOINTMENT (OUTPATIENT)
Age: 56
End: 2021-06-25

## 2021-06-25 ENCOUNTER — APPOINTMENT (OUTPATIENT)
Dept: ELECTROPHYSIOLOGY | Facility: CLINIC | Age: 56
End: 2021-06-25
Payer: MEDICAID

## 2021-06-25 VITALS — BODY MASS INDEX: 30.87 KG/M2 | WEIGHT: 234 LBS

## 2021-06-25 VITALS
HEIGHT: 73 IN | HEART RATE: 56 BPM | BODY MASS INDEX: 30.87 KG/M2 | DIASTOLIC BLOOD PRESSURE: 77 MMHG | OXYGEN SATURATION: 96 % | TEMPERATURE: 97.2 F | SYSTOLIC BLOOD PRESSURE: 122 MMHG

## 2021-06-25 PROCEDURE — 99213 OFFICE O/P EST LOW 20 MIN: CPT

## 2021-06-25 PROCEDURE — 93000 ELECTROCARDIOGRAM COMPLETE: CPT

## 2021-06-25 RX ORDER — BISOPROLOL FUMARATE 10 MG/1
10 TABLET, FILM COATED ORAL
Qty: 90 | Refills: 1 | Status: DISCONTINUED | COMMUNITY
Start: 2019-07-24 | End: 2021-06-25

## 2021-06-25 NOTE — PHYSICAL EXAM
[Well Developed] : well developed [Well Nourished] : well nourished [No Acute Distress] : no acute distress [Normal Venous Pressure] : normal venous pressure [No Carotid Bruit] : no carotid bruit [Normal S1, S2] : normal S1, S2 [No Murmur] : no murmur [No Rub] : no rub [No Gallop] : no gallop [Clear Lung Fields] : clear lung fields [Good Air Entry] : good air entry [No Respiratory Distress] : no respiratory distress  [Soft] : abdomen soft [Non Tender] : non-tender [No Masses/organomegaly] : no masses/organomegaly [Normal Bowel Sounds] : normal bowel sounds [Normal Gait] : normal gait [No Edema] : no edema [No Cyanosis] : no cyanosis [No Clubbing] : no clubbing [No Varicosities] : no varicosities [No Rash] : no rash [No Skin Lesions] : no skin lesions [Moves all extremities] : moves all extremities [No Focal Deficits] : no focal deficits [Normal Speech] : normal speech [Alert and Oriented] : alert and oriented [Normal memory] : normal memory [General Appearance - Well Developed] : well developed [Normal Appearance] : normal appearance [Well Groomed] : well groomed [General Appearance - Well Nourished] : well nourished [No Deformities] : no deformities [General Appearance - In No Acute Distress] : no acute distress [Normal Conjunctiva] : the conjunctiva exhibited no abnormalities [Eyelids - No Xanthelasma] : the eyelids demonstrated no xanthelasmas [Normal Oral Mucosa] : normal oral mucosa [No Oral Pallor] : no oral pallor [No Oral Cyanosis] : no oral cyanosis [Normal Jugular Venous A Waves Present] : normal jugular venous A waves present [Normal Jugular Venous V Waves Present] : normal jugular venous V waves present [No Jugular Venous Ortega A Waves] : no jugular venous ortega A waves [Respiration, Rhythm And Depth] : normal respiratory rhythm and effort [Exaggerated Use Of Accessory Muscles For Inspiration] : no accessory muscle use [Auscultation Breath Sounds / Voice Sounds] : lungs were clear to auscultation bilaterally [Heart Rate And Rhythm] : heart rate and rhythm were normal [Heart Sounds] : normal S1 and S2 [Murmurs] : no murmurs present [Abdomen Soft] : soft [Abdomen Tenderness] : non-tender [Abdomen Mass (___ Cm)] : no abdominal mass palpated [Abnormal Walk] : normal gait [Gait - Sufficient For Exercise Testing] : the gait was sufficient for exercise testing [Nail Clubbing] : no clubbing of the fingernails [Cyanosis, Localized] : no localized cyanosis [Petechial Hemorrhages (___cm)] : no petechial hemorrhages [Skin Color & Pigmentation] : normal skin color and pigmentation [] : no rash [No Venous Stasis] : no venous stasis [Skin Lesions] : no skin lesions [No Skin Ulcers] : no skin ulcer [No Xanthoma] : no  xanthoma was observed [Oriented To Time, Place, And Person] : oriented to person, place, and time [Affect] : the affect was normal [Mood] : the mood was normal [No Anxiety] : not feeling anxious [FreeTextEntry1] : B/L diminished

## 2021-06-25 NOTE — DISCUSSION/SUMMARY
[FreeTextEntry1] : Vitor Caraballo is a 55y/o man with Hx of HTN, HLD, anxiety, and paroxysmal afib/flutter s/p afib ablation on 9/6/2017 and recent hospitalization for afib, now on Amiodarone and Eliquis, with recent scheduled complex ablation on 5/26/21 s/p PVI + PWI.  Patient presents today post ablation. Since the ablation he has not had any further AF. \par \par 1. PAF and flutter. S/p ablation. Patient taking metoprolol and Eliquis which he will continue for now. Can consider d/c anticoagulation after 3 months.  Also consider ILR to monitor for AF. \par \par 2. HTN: Continue metoprolol for now. \par \par Sincerely,\par \par Get Hardin MD

## 2021-06-25 NOTE — HISTORY OF PRESENT ILLNESS
[FreeTextEntry1] : Gianni Ansari MD\par \par Vitor Caraballo is a 57y/o man with Hx of HTN, HLD, anxiety, and paroxysmal afib/flutter s/p afib ablation on 9/6/2017 and recent hospitalization for afib, now on Amiodarone and Eliquis, with recent scheduled complex ablation on 5/26/21 s/p s/p PVI + PWI with successful isolation of all four pulmonary veins.  Patient presents today post ablation.

## 2021-07-14 ENCOUNTER — NON-APPOINTMENT (OUTPATIENT)
Age: 56
End: 2021-07-14

## 2021-07-20 NOTE — PROGRESS NOTE ADULT - PROBLEM SELECTOR PLAN 1
3 episodes of hematochezia at rehab center, has resolved with no hematochezia or melena   - Chronic, h/o ablation in 10/2017  - In ED S/p Ativan 1, diltiazem 30mg (remained in rapid AF), Digoxin x1 and Lopressor 5mg x1, HR 150s after initiation of cardizem gtt by ED, increased to 10 mg and further to 15 mg, goal HR <110  - EKG showing a flutter with vent rate 135, Tw inversions in V5-6  - Cardiac enzymes x2 negative  - Monitor routine hemodynamics  - Cardio Dr. Saunders following  - Pt transferred to ICU due to uncontrolled rate. In ICU, pt on amiodarone and cardizem gtt, Lopressor 25mg PO q8h  -follow up TTE  - medical management as per ICU team - Chronic, h/o ablation in 10/2017  - In ED S/p Ativan 1, diltiazem 30mg (remained in rapid AF), Digoxin x1 and Lopressor 5mg x1, HR 150s after initiation of cardizem gtt by ED, increased to 10 mg and further to 15 mg, goal HR <110  - EKG showing a flutter with vent rate 135, Tw inversions in V5-6  - Cardiac enzymes x2 negative  - Monitor routine hemodynamics  - Cardio Dr. Saunders following  - Pt transferred to ICU due to uncontrolled rate. In ICU, pt on amiodarone and cardizem gtt, Lopressor 25mg PO q8h  - TTE: LVEF of 65%. Mild MR. Trace TR. Moderate circumferential pericardial effusion without evidence of hemodynamic compromise.   - medical management as per ICU team

## 2021-08-16 ENCOUNTER — NON-APPOINTMENT (OUTPATIENT)
Age: 56
End: 2021-08-16

## 2021-08-16 ENCOUNTER — APPOINTMENT (OUTPATIENT)
Dept: PULMONOLOGY | Facility: CLINIC | Age: 56
End: 2021-08-16
Payer: MEDICAID

## 2021-08-16 PROCEDURE — 99205 OFFICE O/P NEW HI 60 MIN: CPT | Mod: 95

## 2021-08-17 ENCOUNTER — NON-APPOINTMENT (OUTPATIENT)
Age: 56
End: 2021-08-17

## 2021-08-17 ENCOUNTER — EMERGENCY (EMERGENCY)
Facility: HOSPITAL | Age: 56
LOS: 1 days | Discharge: HOME | End: 2021-08-17
Attending: STUDENT IN AN ORGANIZED HEALTH CARE EDUCATION/TRAINING PROGRAM | Admitting: STUDENT IN AN ORGANIZED HEALTH CARE EDUCATION/TRAINING PROGRAM
Payer: MEDICAID

## 2021-08-17 ENCOUNTER — APPOINTMENT (OUTPATIENT)
Dept: PULMONOLOGY | Facility: CLINIC | Age: 56
End: 2021-08-17
Payer: MEDICAID

## 2021-08-17 VITALS
HEIGHT: 72 IN | DIASTOLIC BLOOD PRESSURE: 71 MMHG | RESPIRATION RATE: 18 BRPM | TEMPERATURE: 97 F | SYSTOLIC BLOOD PRESSURE: 117 MMHG | OXYGEN SATURATION: 94 % | HEART RATE: 73 BPM

## 2021-08-17 DIAGNOSIS — Z98.890 OTHER SPECIFIED POSTPROCEDURAL STATES: Chronic | ICD-10-CM

## 2021-08-17 PROCEDURE — 99284 EMERGENCY DEPT VISIT MOD MDM: CPT

## 2021-08-17 PROCEDURE — 99215 OFFICE O/P EST HI 40 MIN: CPT | Mod: 95

## 2021-08-17 NOTE — ED PROVIDER NOTE - ATTENDING CONTRIBUTION TO CARE
I have personally performed a face to face medical and diagnostic evaluation of the patient. I have discussed with and reviewed the Resident's note and agree with the History, ROS, Physical Exam and MDM unless otherwise indicated. A brief summary of my personal evaluation and impression can be found below.    56M hx of covid on home o2 on steroids and meds, 3l at baseline followed by dr lisker presents with a cc of c/f low o2, reports has pulse ox at home and checked and was low prompting ED visit reports that overally his sx today are improved from prior, cough and sob improving. no new LE swelling no fevers, feels well, discussed with dr lisker prior to ed arrival.     All other ROS negative, except as above and as per HPI and ROS section.    VITALS: Initial triage and subsequent vitals have been reviewed by me.  GEN APPEARANCE: WDWN, alert, non-toxic, NAD  HEAD: Atraumatic.  EYES: PERRLa, EOMI, vision grossly intact.   NECK: Supple  CV: RRR, S1S2, no c/r/m/g. Cap refill <2 seconds. No bruits.   LUNGS: CTAB. No abnormal breath sounds.  ABDOMEN: Soft, NTND. No guarding or rebound.   MSK/EXT: No spinal or extremity point tenderness. No CVA ttp. Pelvis stable. No peripheral edema.  NEURO: Alert, follows commands. Weight bearing normal. Speech normal. Sensation and motor normal x4 extremities.   SKIN: Warm, dry and intact. No rash.  PSYCH: Appropriate    Plan/MDM: 56M hx of covid on home o2 on steroids and meds, 3l at baseline followed by dr lisker presents with a cc of c/f low o2, reports has pulse ox at home and checked and was low prompting ED visit reports that overall his sx today are improved from prior, cough and sob improving. exam vss non toxic PE as above ddx c/f likely covid no new o2 requirement here at rest or w ambulation feels well discussed with dr lisker see PN as above, will check ekg, likely dc thereafter.

## 2021-08-17 NOTE — ED PROVIDER NOTE - OBJECTIVE STATEMENT
55 y/o M with COVID dx 10 days ago on home oxygen, remdesavir and decadron presenting to ED after being found to have SpO2 <90. Patient states that he is feeling well and denies any complaints. He has been feeling some SOB at home but it has improved over the last few days. Pt getting tx at home and is onhome O2. Dr. Lisker notified and will f/u with patient in 24 hours.

## 2021-08-17 NOTE — ED PROVIDER NOTE - PHYSICAL EXAMINATION
PHYSICAL EXAM:  T(C): 35.9 (08-17-21 @ 16:57), Max: 35.9 (08-17-21 @ 16:57)  HR: 73 (08-17-21 @ 16:57) (73 - 73)  BP: 117/71 (08-17-21 @ 16:57) (117/71 - 117/71)  RR: 18 (08-17-21 @ 16:57) (18 - 18)  SpO2: 94% (08-17-21 @ 16:57) (94% - 94%)    GENERAL: NAD, well-developed  HEAD:  Atraumatic, Normocephalic  EYES: EOMI, PERRLA, conjunctiva and sclera clear  NECK: Supple, No JVD  CHEST/LUNG: some rhonchi b/l   HEART: Regular rate and rhythm but difficult to assess; No murmurs, rubs, or gallops  ABDOMEN: Soft, Nontender, Nondistended; Bowel sounds present  EXTREMITIES:  2+ Peripheral Pulses, No clubbing, cyanosis, or edema  PSYCH: AAOx3  NEUROLOGY: non-focal  SKIN: No rashes or lesions

## 2021-08-17 NOTE — ED PROVIDER NOTE - PROGRESS NOTE DETAILS
Patient reassessed and doing well. Satting 98 with 3L O2 sitting down and 97 ambulating with 3L O2. Patient satting 96 without O2. Enrique HICKEY: Pt assessed at beside. Pt resting comfortably, pain controlled, pt questions answered. Vital signs stable. Discussed case with Dr Lisker agrees w plan for dc given pt not hypoxic and is at baseline O2 requirement status, dr lisker will follow up w pt tomorrow, pt feels well, clinically actually feels better today. asking for and agreeable w plan for dc.

## 2021-08-17 NOTE — ED PROVIDER NOTE - PATIENT PORTAL LINK FT
You can access the FollowMyHealth Patient Portal offered by [facility] by registering at the following website: [email or web address]. By joining [employer]’s FollowMyHealth portal, you will also be able to view your health information using other applications (apps) compatible with our system.

## 2021-08-17 NOTE — ED PROVIDER NOTE - CARE PLAN
Principal Discharge DX:	2019 novel coronavirus disease (COVID-19)  Assessment and plan of treatment:	Pt with COVID-19, getting remdesavir and decardon and O2 at home, brought in due to low sats but in ED on 3L and satting >95%. Spoke with PCP who will cont assessing patient at home. OK to d/c home with home COVID services   1

## 2021-08-17 NOTE — PLAN
[FreeTextEntry1] : Increase po fluids – 8 oz H20 x 6-8 until urine clear.\par Stop prednisone – start Dexamethasone 6mg po x 10 days today\par Albuterol inhaler – take 3x day and prn x 2 days, then prn cough\par Advair maintenance inhaler BID – start tonight.\par Try Benzonatate for cough\par \par Continue all other meds as ordered\par Obtain incentive spirometer.\par Home labs tomorrow: CBC w/ diff, CMP, CRP, ProCal, D-dimer\par Remdesivir loading dose 200 mg, followed by 100 mg x 2 daily.\par 1L 0.9% NS prior.\par RN to teach use of incentive spirometer and inhalers.\par Continue using 02, titrate up to 4L/min for 02 Sat 94-95%\par Monitor 02 Sat via pulse oximeter 3x daily, and record: on/off 02, at rest and while walking.\par Monitor and record temp off antipyretics, morning and evening.\par Can take Zofran for nausea BID prophylactically.\par Famotidine for gastric reflux.\par Call [facility] if symptoms worsen, SOB, 02 Sat below 91% or below 93% on 02.\par To ER if acute SOB, 02 Sat less than 90% continuously.\par Will check in on 8/17/21 and follow.\par \par Attending:\par case d/w NP Obed\par Agree with above\par Initiate homecare. already on steroids, 02. will start remdesivir - though unlkely to see benefit at this pt\par Escalate to hospital if can not maintain saturation >90 or if worsens clinically

## 2021-08-17 NOTE — ED ADULT NURSE NOTE - OBJECTIVE STATEMENT
Pt A&OX4, ambulatory, in NAD. covid +, on 3L NC. Pt A&OX4, ambulatory, in NAD. covid +, on 3L NC, respirations even and unlabored. Patient states that he is feeling well and denies any complaints. He has been feeling some SOB at home but it has improved over the last few days. Pt getting tx at home and is on home O2. As per MD TBDC, no labs/meds ordered.

## 2021-08-17 NOTE — ED PROVIDER NOTE - CLINICAL SUMMARY MEDICAL DECISION MAKING FREE TEXT BOX
Pt with COVID-19, getting remdesavir and decardon and O2 at home, brought in due to low sats but in ED on 3L and satting >95%. Day 10 of COVID+, pt feeling well w/o c/o SOB currently. Spoke with PCP who will cont assessing patient at home. OK to d/c home with home COVID services.

## 2021-08-17 NOTE — ED ADULT TRIAGE NOTE - CHIEF COMPLAINT QUOTE
Arrives from home, is known COVID +, has been symptomatic for 10 days has home O2 was on 3L until today when VNS arrived and on 3L patient was 86%. PMH afib on eliquis

## 2021-08-17 NOTE — ED PROVIDER NOTE - NSFOLLOWUPINSTRUCTIONS_ED_ALL_ED_FT
You were seen in the Emergency Department for COVID-19.     1) Advance activity as tolerated. Please continue to quarantine for 14 days  2) Continue all previously prescribed medications as directed.    3) Follow up with your primary care physician in 24-48 hours - take copies of your results.    4) If you have any severe increase in pain, fever, uncontrollable nausea or vomiting, or inability to tolerate eating and drinking you need to immediately return to the emergency room.

## 2021-08-17 NOTE — ED PROVIDER NOTE - NS ED ROS FT
REVIEW OF SYSTEMS:    CONSTITUTIONAL: No weakness, fevers or chills  EYES/ENT: No visual changes;  No vertigo or throat pain   NECK: No pain or stiffness  BACK: no pain or lesions   RESPIRATORY: No cough, wheezing, hemoptysis; + some SOB off of o2  CARDIOVASCULAR: No chest pain or palpitations  GASTROINTESTINAL: No abdominal or epigastric pain. No nausea, vomiting, or hematemesis; No diarrhea or constipation. No melena or hematochezia.  GENITOURINARY: No dysuria, frequency or hematuria  NEUROLOGICAL: No numbness or weakness  EXTREMITIES: no varicose veins, no pain in UE and LE  SKIN: No itching, rashes

## 2021-08-17 NOTE — ED PROVIDER NOTE - PLAN OF CARE
Pt with COVID-19, getting remdesavir and decardon and O2 at home, brought in due to low sats but in ED on 3L and satting >95%. Spoke with PCP who will cont assessing patient at home. OK to d/c home with home COVID services

## 2021-08-17 NOTE — HISTORY OF PRESENT ILLNESS
[Home] : at home, [unfilled] , at the time of the visit. [Medical Office: ([facility])___] : at the medical office located in  [Spouse] : spouse [Other:____] : [unfilled] [Verbal consent obtained from patient] : the patient, [unfilled] [FreeTextEntry1] : Follow-up telehealth visit, acute Covid infection with hypoxic respiratory failure.\par \par Patient's wife is present.  Additionally, volunteer paramedics present as well, as wife had called them because she was concerned about how he looked.\par \par She reports that when he was lying down, his oxygen saturation was 91% on 4 L/min, he was coughing and looked to be in distress.  So she called the ambulance.\par \par However, currently, patient is standing up with walking, oxygen saturation is between 94 to 96% on the 4 L/min.  He is speaking clearly, he does not appear to be in respiratory distress, no accessory muscle use on video.  He is adamantly stating that he feels better after his coughing fit and will not go to the hospital.\par \par Patient is scheduled for visiting nurse to come today, begin remdesivir infusion, check his labs.  He is already on dexamethasone, and oxygen.\par \par He also is obese and with almost certain sleep apnea, desaturation with lying flat not unexpected.  It does seem to improve when he stands up and takes deep breaths related to atelectasis\par \par For now, patient refusing hospital, and clinically is stable to stay at home given our close follow-up with visiting nurse, telehealth, pulse oximetry, home oxygen, home steroids, home labs, home remdesivir.  Additionally, patient's wife is a nurse.  However, again patient was strongly advised that if he cannot maintain his saturation above 90% despite being on oxygen, or if shortness of breath worsens he should call the ambulance again and go to the hospital.  We will follow

## 2021-08-17 NOTE — HISTORY OF PRESENT ILLNESS
[Home] : at home, [unfilled] , at the time of the visit. [Medical Office: ([facility])___] : at the medical office located in  [Spouse] : spouse [Verbal consent obtained from patient] : the patient, [unfilled] [FreeTextEntry4] : Wife, Ann Marie, participated with pt. consent. [FreeTextEntry1] : 57 y/o male.\par Lives with wife, Ann Marie, who participated in the visit at patient request.\par \par Hx a fib, post ablation x 2 – last May 8, 21, on Eliquis 5 mg BID, had decreased to daily, now back up x 2 days.\par Metoprolol  100 BID. NKDA.\par Hx multiple hospitalization for PNA – Last Dec 2020.\par Hx. Smoking, cigarettes and marijuana – none since Dec. 2020, as per wife.\par Not vaccinated for COVID-19.\par \par Developed symptoms on: 8/6, 8/7 fever to 101.\par COVID (+) on 8/10 – tested at [facility].\par Did not receive MAB rx.\par Initial sx: weak, sob, muscle pain, nausea and vomiting, some relief with Zofran.\par Evaluated and Rx. In home by friend who is Physician Assistant. Wife is nurse.\par 8/10/21: \par Started 02 @ 2L/min for 02 sat of 91%.\par Has 2 tanks at home with concentrator.\par Received 2 days IV fluids and IV decadron.\par Changed to prednisone with taper po. Now on 4 mg.\par Received Zpack, finished. Albuterol by nebulizer x 2 - doesn't want to continue.\par CXR done in home, wife not sure of results, said didn’t show PNA.\par Labs drawn, no results yet.\par \par Not sleeping well, wakes up with cough, shaking at night.\par Poor po intake, fluid and food d/t nausea. Some relief with Zofran.\par Cough persists, SOB, with body/muscle aches, lower back pain, fatigue, nausea, headache.\par Taking Tylenol and ibuprofen round the clock.  Robitussin for cough.\par Temp last evening (8/15) 100.1\par Today (8/16): 8a – 97.8\par \par Telehealth Assessment:\par Looks fatigued, sleeping on and off on couch.\par Can sit up and ambulate slowly with oxygen.\par Off antipyretics @ 3p: 98.2 F.\par 2L nc 02 93%, without 91-93\par \par 02 93% on 2L;  66 HR;  02 Sat 91 after coughing.\par Walks, goes to 95, then 94.\par Pain on inhalation.\par Wet cough with mild wheeze, occasional.\par Not taking deep breaths\par Feels congested, tired\par Urine is dark.\par \par Imp:\par COVID-19 virus, Day 11, mild hypoxia, symptomatic.

## 2021-08-18 ENCOUNTER — APPOINTMENT (OUTPATIENT)
Dept: PULMONOLOGY | Facility: CLINIC | Age: 56
End: 2021-08-18
Payer: MEDICAID

## 2021-08-18 PROCEDURE — 99215 OFFICE O/P EST HI 40 MIN: CPT | Mod: 95

## 2021-08-19 ENCOUNTER — NON-APPOINTMENT (OUTPATIENT)
Age: 56
End: 2021-08-19

## 2021-08-19 NOTE — ED PROVIDER NOTE - TEMPLATE, MLM
General For information on Fall & injury Prevention, visit [email or web address]

## 2021-08-19 NOTE — HISTORY OF PRESENT ILLNESS
[Home] : at home, [unfilled] , at the time of the visit. [Medical Office: ([facility])___] : at the medical office located in  [Spouse] : spouse [Verbal consent obtained from patient] : the patient, [unfilled] [FreeTextEntry4] : Mrs. Rashmi [FreeTextEntry1] : Follow up [facility].\par Pt. went to [facility] ER last evening for 02 Sat 90-91% on 4L 02.\par Initially Rx. w/ 6L with 02 Sat 95%.\par Sent home  on 2L, 93%.\par Wife reports episode of presumed sleep apnea when home, where Pulse 02 read 86% and HR = 33.\par Resolved. Held Metoprolol. Did not recur. Pt. wife contacted Dr. Ansari ([facility]) re: med - advised to contact prescriber. (Lashawn)\par Reviewed labs from ER and home draw on 8/17.\par Na 134, Gluc 180's will watch.\par D-Dimer normal - on Eliquis.\par Ferritin: 1803 / CRP: 28, c/w COVID inflammation.\par AST/ALT: 134/86, c/w COVID\par EGFR 100. Pro-BNP = 96.\par \par Telehealth assessment:\par Speaking in short sentences. A & O x 4.\par 02 Sat 92-93% on 2-3L/min 02 via nc.\par Improves briefly to 94% after deep breathing with incentive spirometer.\par Unable to walk around d/t falling in ER (pt. stated) when he tried to get up.\par Can move all extremities.\par Demonstrated use of incentive spirometer.\par Cough, muscle aches & H/A continue. Intermittent nausea.\par Drinking more fluids.\par \par Plan:\par Continue Remdesivir 8/18, 8/19 with IV hydration and labs.\par Evaluate for further dose.\par 02 up to 4L for 02 Sat 94-95%.\par Sleep in recliner to maintain 45 degree angle at night.  02 to 4L while sleeping.\par Monitor 02 Sat 3x  / day and record @ rest and with light activity.\par Take anti-emetic BID prophylactically.\par Continue inhalers.\par Refer for in-office pulm evaluation after COVID epidose - include sleep studies.\par Contact Dr. Get Hardin - MORGAN who prescribed Metoprolol to advise.\par Rx: Vitamin D as per pt. request.\par RN to reinforce use of I/S and inhalers.\par Consider home EKG & PT referral for deconditioning.\par Call [facility] if symptoms worsen.\par F/U TEB on 8/20/21.

## 2021-08-20 ENCOUNTER — APPOINTMENT (OUTPATIENT)
Dept: PULMONOLOGY | Facility: CLINIC | Age: 56
End: 2021-08-20
Payer: MEDICAID

## 2021-08-20 PROCEDURE — 99213 OFFICE O/P EST LOW 20 MIN: CPT | Mod: 95

## 2021-08-20 NOTE — HISTORY OF PRESENT ILLNESS
[Home] : at home, [unfilled] , at the time of the visit. [Medical Office: ([facility])___] : at the medical office located in  [Spouse] : spouse [Verbal consent obtained from patient] : the patient, [unfilled] [FreeTextEntry1] : [facility] follow up.\par Continues on 02 3L with 93% 02 Sat.\par Using incentive spirometer, needs to use more often.\par Spoke with Cardiology yesterday, Dr. Hardin, to continue Metoprolol 100mg, Daily.\par \par Labs 8/19/21:\par Na improved to 136. K= 4.2\par Gluc 132.\par CRP    6\par HbA1c  5.8\par Continue remdesivir and IV fluids x 2 days.\par Home PT referral for deconditioning next week.\par F/U TEB 8/23/21.\par \par Attending:\par case d/w NP . agree with above

## 2021-08-23 ENCOUNTER — APPOINTMENT (OUTPATIENT)
Dept: PULMONOLOGY | Facility: CLINIC | Age: 56
End: 2021-08-23
Payer: MEDICAID

## 2021-08-23 PROCEDURE — 99213 OFFICE O/P EST LOW 20 MIN: CPT | Mod: 95

## 2021-08-23 RX ORDER — AZITHROMYCIN 250 MG/1
250 TABLET, FILM COATED ORAL
Qty: 6 | Refills: 0 | Status: COMPLETED | COMMUNITY
Start: 2021-08-11 | End: 2021-08-16

## 2021-08-24 NOTE — HISTORY OF PRESENT ILLNESS
[Home] : at home, [unfilled] , at the time of the visit. [Medical Office: ([facility])___] : at the medical office located in  [Spouse] : spouse [Verbal consent obtained from patient] : the patient, [unfilled] [FreeTextEntry1] : Follow up [facility] visit.\par COVID Day 18\par Completed Remdesivir and IV fluids 8/21/21- feeling better.\par Not using 02. 02 Sat RA 94-95% reported. Dry cough, using inhaler.\par Walking in home - denies SOB, just fatigue.\par Waking up from sleep with nightmares.\par \par Telehealth Assessment;\par Looks much improved. Speaking in full sentences without breathlessness.\par Ambulating.\par 02 Sat 94% on RA. No change with ambulation.  Afebrile.\par \par Reviewed lab values from 8/20/21:\par Ferritin 1056, CRP <3, D-Dimer 209, WBC 13.64\par \par Imp:\par COVID sx improving.\par 02 Sat improved, 94% on RA.\par \par Plan:\par Monitor 02 Sat.\par Use 02 at night and during day if 02 Sat decreases.\par Inhalers.\par Increase fluid intake - 8 oz x 8.\par Will need in-office pulm visit and sleep study when fully recovered.\par Follow up TEB 8/27/21.

## 2021-08-27 ENCOUNTER — APPOINTMENT (OUTPATIENT)
Dept: PULMONOLOGY | Facility: CLINIC | Age: 56
End: 2021-08-27
Payer: MEDICAID

## 2021-08-27 DIAGNOSIS — R94.39 ABNORMAL RESULT OF OTHER CARDIOVASCULAR FUNCTION STUDY: ICD-10-CM

## 2021-08-27 DIAGNOSIS — U07.1 COVID-19: ICD-10-CM

## 2021-08-27 DIAGNOSIS — I31.3 PERICARDIAL EFFUSION (NONINFLAMMATORY): ICD-10-CM

## 2021-08-27 DIAGNOSIS — Z86.79 OTHER SPECIFIED POSTPROCEDURAL STATES: ICD-10-CM

## 2021-08-27 DIAGNOSIS — Z98.890 OTHER SPECIFIED POSTPROCEDURAL STATES: ICD-10-CM

## 2021-08-27 DIAGNOSIS — R06.83 SNORING: ICD-10-CM

## 2021-08-27 DIAGNOSIS — Z87.09 PERSONAL HISTORY OF OTHER DISEASES OF THE RESPIRATORY SYSTEM: ICD-10-CM

## 2021-08-27 DIAGNOSIS — Z87.898 PERSONAL HISTORY OF OTHER SPECIFIED CONDITIONS: ICD-10-CM

## 2021-08-27 DIAGNOSIS — I30.0 ACUTE NONSPECIFIC IDIOPATHIC PERICARDITIS: ICD-10-CM

## 2021-08-27 DIAGNOSIS — Z01.818 ENCOUNTER FOR OTHER PREPROCEDURAL EXAMINATION: ICD-10-CM

## 2021-08-27 DIAGNOSIS — I48.92 UNSPECIFIED ATRIAL FLUTTER: ICD-10-CM

## 2021-08-27 DIAGNOSIS — F12.11 CANNABIS ABUSE, IN REMISSION: ICD-10-CM

## 2021-08-27 PROCEDURE — 99203 OFFICE O/P NEW LOW 30 MIN: CPT | Mod: 95

## 2021-08-27 PROCEDURE — 99213 OFFICE O/P EST LOW 20 MIN: CPT | Mod: 95

## 2021-08-27 NOTE — HISTORY OF PRESENT ILLNESS
[Home] : at home, [unfilled] , at the time of the visit. [Medical Office: ([facility])___] : at the medical office located in  [Spouse] : spouse [Verbal consent obtained from patient] : the patient, [unfilled] [FreeTextEntry1] : Follow up [facility].\par Feels much improved.  No cough no fever.\par Not using oxygen, reports 02 sat 94-95%.\par Going outside to park, to mall, to restaurant.\par Feeling very tired after activity, sleeping a lot during day.\par Wife reports periods of tachypnea, snoring and apnea during night.\par Long history of same - no evaluation or Rx for ELSY as of yet.\par \par Telehealth Assessment:\par Resting on , arouses easily.\par Speaking in full sentences. A & O x 4. Appears tired.\par 02 Sat 97% on RA.\par Reports mild SOB, no cough, no fever.\par Urine yellow, tying to drink fluids.\par \par IMP:\par COVID-19, DAY 22 (Sx started 8/6/21), unvaccinated.\par Symptoms improving, continued fatigue, normal oxygenation.\par \par PLAN/Follow up:\par Progress activity.\par Use 02 for sleep if 02 sat declines while flat. Try sleeping with HOB elevated.\par Increase fluid intake for clear urine.\par Sleep studies - preliminary in home - Dr. Lisker will order.\par Repeat labs - can go to lab center.\par In office follow up:  3 months with Dr. Lisker for assessment, CT scan, PFTs, and full sleep study as indicated.\par F/U TEB on 8/31/21.

## 2021-08-31 ENCOUNTER — APPOINTMENT (OUTPATIENT)
Dept: PULMONOLOGY | Facility: CLINIC | Age: 56
End: 2021-08-31
Payer: MEDICAID

## 2021-08-31 DIAGNOSIS — R53.81 OTHER MALAISE: ICD-10-CM

## 2021-08-31 DIAGNOSIS — R79.89 OTHER SPECIFIED ABNORMAL FINDINGS OF BLOOD CHEMISTRY: ICD-10-CM

## 2021-08-31 DIAGNOSIS — R53.83 OTHER MALAISE: ICD-10-CM

## 2021-08-31 PROCEDURE — 99214 OFFICE O/P EST MOD 30 MIN: CPT | Mod: 95

## 2021-08-31 RX ORDER — ALBUTEROL SULFATE 2.5 MG/3ML
(2.5 MG/3ML) SOLUTION RESPIRATORY (INHALATION)
Qty: 75 | Refills: 0 | Status: COMPLETED | COMMUNITY
Start: 2021-08-11 | End: 2021-08-31

## 2021-08-31 RX ORDER — ALPRAZOLAM 0.5 MG/1
0.5 TABLET ORAL
Qty: 20 | Refills: 0 | Status: COMPLETED | COMMUNITY
Start: 2020-04-22 | End: 2020-12-31

## 2021-08-31 RX ORDER — DEXAMETHASONE 6 MG/1
6 TABLET ORAL DAILY
Qty: 10 | Refills: 0 | Status: COMPLETED | COMMUNITY
Start: 2021-08-16 | End: 2021-08-31

## 2021-08-31 RX ORDER — METHYLPREDNISOLONE 4 MG/1
4 TABLET ORAL
Qty: 21 | Refills: 0 | Status: COMPLETED | COMMUNITY
Start: 2021-08-13 | End: 2021-08-20

## 2021-08-31 NOTE — DISCUSSION/SUMMARY
[Time Spent: ___ minutes] : I have spent [unfilled] minutes with the patient on the telephone [FreeTextEntry1] : Includes assessment, education, chart and med review, coordination of care.

## 2021-08-31 NOTE — HISTORY OF PRESENT ILLNESS
[Home] : at home, [unfilled] , at the time of the visit. [Medical Office: ([facility])___] : at the medical office located in  [Spouse] : spouse [Verbal consent obtained from patient] : the patient, [unfilled] [FreeTextEntry3] : spouse [FreeTextEntry1] : [facility] follow up. / telehealth assessment\par Improving slowly.\par 02 Sat 94-95%.\par Going out for a few hours per day, returns tired.\par Feels depressed, concerned about testosterone level.\par Will go to lab tomorrow for follow up bloodwork.\par Add vitamin D, B levels.\par Has been taking supplements:\par [place], [place], with B1,  B2, B5.\par Taking RX. meds: metoprolol,  Eliquis, Vitamin D.\par Has card. f/u on 9/9j/21.\par \par Imp:\par COVID rx. improving.\par 02 St 94-95%.\par \par Plan:\par Periods of rest and activity.\par Will order outpatient PT for deconditioning.\par Will check for interactions rx. meds + herbal supplements and advise.\par Call to schedule sleep study.\par Schedule 3 month f/u with Dr. Lisker in office for exam, PFTs.\par Follow up with PCP for other labs, depression.

## 2021-09-09 ENCOUNTER — APPOINTMENT (OUTPATIENT)
Dept: CARDIOLOGY | Facility: CLINIC | Age: 56
End: 2021-09-09

## 2021-09-23 ENCOUNTER — RX RENEWAL (OUTPATIENT)
Age: 56
End: 2021-09-23

## 2021-10-21 ENCOUNTER — NON-APPOINTMENT (OUTPATIENT)
Age: 56
End: 2021-10-21

## 2021-10-21 ENCOUNTER — APPOINTMENT (OUTPATIENT)
Dept: CARDIOLOGY | Facility: CLINIC | Age: 56
End: 2021-10-21
Payer: MEDICAID

## 2021-10-21 VITALS
DIASTOLIC BLOOD PRESSURE: 76 MMHG | WEIGHT: 231 LBS | BODY MASS INDEX: 30.62 KG/M2 | OXYGEN SATURATION: 96 % | SYSTOLIC BLOOD PRESSURE: 121 MMHG | HEART RATE: 65 BPM | HEIGHT: 73 IN

## 2021-10-21 DIAGNOSIS — E78.6 HYPERLIPIDEMIA, UNSPECIFIED: ICD-10-CM

## 2021-10-21 DIAGNOSIS — E78.5 HYPERLIPIDEMIA, UNSPECIFIED: ICD-10-CM

## 2021-10-21 DIAGNOSIS — R06.02 SHORTNESS OF BREATH: ICD-10-CM

## 2021-10-21 DIAGNOSIS — I48.0 PAROXYSMAL ATRIAL FIBRILLATION: ICD-10-CM

## 2021-10-21 DIAGNOSIS — U07.1 COVID-19: ICD-10-CM

## 2021-10-21 PROCEDURE — 99214 OFFICE O/P EST MOD 30 MIN: CPT

## 2021-10-21 PROCEDURE — 93000 ELECTROCARDIOGRAM COMPLETE: CPT

## 2021-10-21 PROCEDURE — 36415 COLL VENOUS BLD VENIPUNCTURE: CPT

## 2021-10-21 NOTE — CARDIOLOGY SUMMARY
[___] : [unfilled]
Photo Preface (Leave Blank If You Do Not Want): Photographs were obtained today
Detail Level: Zone

## 2021-10-21 NOTE — REVIEW OF SYSTEMS
[Negative] : Heme/Lymph [Fever] : no fever [Weight Gain (___ Lbs)] : no recent weight gain [Feeling Fatigued] : not feeling fatigued [Weight Loss (___ Lbs)] : no recent weight loss [Dyspnea on exertion] : not dyspnea during exertion [Chest Discomfort] : no chest discomfort [Lower Ext Edema] : no extremity edema [Palpitations] : palpitations [Syncope] : no syncope [de-identified] : no marijuana

## 2021-10-22 LAB
25(OH)D3 SERPL-MCNC: 22.7 NG/ML
ALBUMIN SERPL ELPH-MCNC: 4.4 G/DL
ALP BLD-CCNC: 72 U/L
ALT SERPL-CCNC: 26 U/L
ANION GAP SERPL CALC-SCNC: 14 MMOL/L
AST SERPL-CCNC: 19 U/L
BASOPHILS # BLD AUTO: 0.03 K/UL
BASOPHILS NFR BLD AUTO: 0.5 %
BILIRUB SERPL-MCNC: 0.6 MG/DL
BUN SERPL-MCNC: 15 MG/DL
CALCIUM SERPL-MCNC: 9.6 MG/DL
CHLORIDE SERPL-SCNC: 108 MMOL/L
CHOLEST SERPL-MCNC: 229 MG/DL
CO2 SERPL-SCNC: 22 MMOL/L
CREAT SERPL-MCNC: 1.02 MG/DL
EOSINOPHIL # BLD AUTO: 0.17 K/UL
EOSINOPHIL NFR BLD AUTO: 2.8 %
ESTIMATED AVERAGE GLUCOSE: 91 MG/DL
GLUCOSE SERPL-MCNC: 82 MG/DL
HBA1C MFR BLD HPLC: 4.8 %
HCT VFR BLD CALC: 45 %
HDLC SERPL-MCNC: 41 MG/DL
HGB BLD-MCNC: 14.7 G/DL
IMM GRANULOCYTES NFR BLD AUTO: 0.3 %
LDLC SERPL CALC-MCNC: 153 MG/DL
LYMPHOCYTES # BLD AUTO: 1.75 K/UL
LYMPHOCYTES NFR BLD AUTO: 28.5 %
MAN DIFF?: NORMAL
MCHC RBC-ENTMCNC: 28.8 PG
MCHC RBC-ENTMCNC: 32.7 GM/DL
MCV RBC AUTO: 88.2 FL
MONOCYTES # BLD AUTO: 0.37 K/UL
MONOCYTES NFR BLD AUTO: 6 %
NEUTROPHILS # BLD AUTO: 3.81 K/UL
NEUTROPHILS NFR BLD AUTO: 61.9 %
NONHDLC SERPL-MCNC: 189 MG/DL
NT-PROBNP SERPL-MCNC: 51 PG/ML
PLATELET # BLD AUTO: 265 K/UL
POTASSIUM SERPL-SCNC: 4.7 MMOL/L
PROT SERPL-MCNC: 6.8 G/DL
RBC # BLD: 5.1 M/UL
RBC # FLD: 13.6 %
SODIUM SERPL-SCNC: 144 MMOL/L
TRIGL SERPL-MCNC: 180 MG/DL
TSH SERPL-ACNC: 1.48 UIU/ML
WBC # FLD AUTO: 6.15 K/UL

## 2021-11-09 ENCOUNTER — APPOINTMENT (OUTPATIENT)
Dept: PULMONOLOGY | Facility: CLINIC | Age: 56
End: 2021-11-09

## 2021-11-10 ENCOUNTER — APPOINTMENT (OUTPATIENT)
Dept: SLEEP CENTER | Facility: CLINIC | Age: 56
End: 2021-11-10

## 2021-12-16 NOTE — PROGRESS NOTE ADULT - SUBJECTIVE AND OBJECTIVE BOX
[facility] PROGRESS NOTE        ASSESSMENT and PLAN:          #1.  PFO: Discussed the MATEUSZ show PFO with right-to-left shunt.  With this multiple comorbidity, consider antiplatelet therapy if patient can tolerate it because of the thrombocytopenia.  Patient may benefit from PFO closure procedure.  #2 acute stroke, MRI shows right posterior parietal of another small infarction.  #3.  Chronic hypotension most likely orthostatic related, patient is on Florinef.  #4.  Severe pancytopenia with history of MDS  With platelet count is 42,000  #5.  Generalized weakness and fatigue    History and Physical:    Reviewed hospital course.  Not in acute distress.  Very weak and tired.  Telemetry shows stable rhythm.      Review Of Symptoms:     Review of Systems   Constitutional: Positive for fatigue. Negative for appetite change and fever.   HENT: Negative for facial swelling.    Eyes: Negative for discharge.   Respiratory: Negative for chest tightness and shortness of breath.    Cardiovascular: Negative for chest pain, palpitations and leg swelling.   Gastrointestinal: Negative for abdominal distention and abdominal pain.   Endocrine: Positive for heat intolerance. Negative for cold intolerance.   Genitourinary: Negative for dysuria.   Musculoskeletal: Negative for arthralgias.   Skin: Positive for pallor. Negative for color change and rash.   Neurological: Negative for dizziness and light-headedness.   Hematological: Does not bruise/bleed easily.   Psychiatric/Behavioral: Negative for behavioral problems.          Social History     Tobacco Use   Smoking Status Current Every Day Smoker   • Packs/day: 0.50   • Years: 50.00   • Pack years: 25.00   • Types: Cigarettes   Smokeless Tobacco Never Used       Current Facility-Administered Medications   Medication Dose Route Frequency Provider Last Rate Last Admin   • potassium CHLORIDE (KLOR-CON M) angel ER tablet 40 mEq  40 mEq Oral Once Concepcion Carpenter MD       •  Date/Time Patient Seen:  		  Referring MD:   Data Reviewed	       Patient is a 55y old  Male who presents with a chief complaint of rapid afib (30 Nov 2020 01:49)      Subjective/HPI     PAST MEDICAL & SURGICAL HISTORY:  PUD (peptic ulcer disease)    Hyperlipidemia    Marijuana use    Alcohol use    Peptic ulcer disease    Anxiety    Atrial fibrillation, unspecified type    Marijuana abuse    GERD (gastroesophageal reflux disease)    H/O prior ablation treatment  10/2017, for A-fib    No significant past surgical history          Medication list         MEDICATIONS  (STANDING):  aMIOdarone Infusion 1 mG/Min (33.3 mL/Hr) IV Continuous <Continuous>  aMIOdarone Infusion 0.5 mG/Min (16.7 mL/Hr) IV Continuous <Continuous>  chlorhexidine 4% Liquid 1 Application(s) Topical <User Schedule>  dexMEDEtomidine Infusion 0.1 MICROgram(s)/kG/Hr (2.41 mL/Hr) IV Continuous <Continuous>  diltiazem Infusion 20 mG/Hr (20 mL/Hr) IV Continuous <Continuous>  enoxaparin Injectable 40 milliGRAM(s) SubCutaneous daily  influenza   Vaccine 0.5 milliLiter(s) IntraMuscular once  metoclopramide Injectable 5 milliGRAM(s) IV Push every 8 hours  metoprolol tartrate Injectable 5 milliGRAM(s) IV Push every 6 hours  pantoprazole  Injectable 40 milliGRAM(s) IV Push daily  potassium phosphate IVPB 30 milliMole(s) IV Intermittent once  sodium chloride 0.9%. 1000 milliLiter(s) (75 mL/Hr) IV Continuous <Continuous>    MEDICATIONS  (PRN):  LORazepam   Injectable 1 milliGRAM(s) IV Push every 6 hours PRN Anxiety         Vitals log        ICU Vital Signs Last 24 Hrs  T(C): 36.8 (30 Nov 2020 05:06), Max: 38.2 (29 Nov 2020 23:23)  T(F): 98.3 (30 Nov 2020 05:06), Max: 100.8 (29 Nov 2020 23:23)  HR: 116 (30 Nov 2020 07:00) (104 - 162)  BP: 135/76 (30 Nov 2020 07:00) (108/74 - 155/91)  BP(mean): 95 (30 Nov 2020 07:00) (84 - 113)  ABP: --  ABP(mean): --  RR: 38 (30 Nov 2020 07:00) (20 - 49)  SpO2: 98% (30 Nov 2020 07:00) (94% - 100%)           Input and Output:  I&O's Detail    29 Nov 2020 07:01  -  30 Nov 2020 07:00  --------------------------------------------------------  IN:    Amiodarone: 166.5 mL    Amiodarone: 40 mL    Dexmedetomidine: 66.8 mL    Diltiazem: 200 mL    IV PiggyBack: 166.6 mL    sodium chloride 0.9%: 750 mL  Total IN: 1389.9 mL    OUT:    Voided (mL): 470 mL  Total OUT: 470 mL    Total NET: 919.9 mL          Lab Data                        12.1   18.41 )-----------( 368      ( 30 Nov 2020 05:01 )             36.2     11-30    138  |  108  |  14  ----------------------------<  138<H>  3.9   |  21<L>  |  0.89    Ca    7.8<L>      30 Nov 2020 05:01  Phos  2.3     11-30  Mg     2.1     11-30    TPro  6.8  /  Alb  2.3<L>  /  TBili  2.3<H>  /  DBili  x   /  AST  14<L>  /  ALT  25  /  AlkPhos  89  11-30      CARDIAC MARKERS ( 29 Nov 2020 01:53 )  <.015 ng/mL / x     / x     / x     / x      CARDIAC MARKERS ( 28 Nov 2020 20:36 )  <.015 ng/mL / x     / 121 U/L / x     / <1.0 ng/mL        Review of Systems	      Objective     Physical Examination    heart s1s2  lung dec BS  abd soft  anxious      Pertinent Lab findings & Imaging      Lisette:  NO   Adequate UO     I&O's Detail    29 Nov 2020 07:01  -  30 Nov 2020 07:00  --------------------------------------------------------  IN:    Amiodarone: 166.5 mL    Amiodarone: 40 mL    Dexmedetomidine: 66.8 mL    Diltiazem: 200 mL    IV PiggyBack: 166.6 mL    sodium chloride 0.9%: 750 mL  Total IN: 1389.9 mL    OUT:    Voided (mL): 470 mL  Total OUT: 470 mL    Total NET: 919.9 mL               Discussed with:     Cultures:	        Radiology                             atorvastatin (LIPITOR) tablet 10 mg  10 mg Oral Nightly Otoniel Peng MD   10 mg at 12/15/21 2000   • sodium chloride 0.9% infusion   Intravenous PRN Kathy Gonzalez CNP   Completed at 12/15/21 2034   • lidocaine (ANECREAM/LMX) 4 % cream 1 application  1 application Topical PRN Reg Turner MD       • lidocaine-sodium bicarbonate 0.9-8.4% for J-tip administration 0.5-1 mL  0.5-1 mL Subcutaneous PRN Reg Turner MD        Or   • lidocaine 1 % injection 5-10 mg  5-10 mg Subcutaneous PRN Reg Turner MD       • ondansetron (ZOFRAN) injection 4 mg  4 mg Intravenous PRN Reg Turner MD       • glycopyrrolate (ROBINUL) injection 0.2 mg  0.2 mg Intravenous PRN Reg Turner MD       • sodium chloride 0.9% infusion   Intravenous Once Reg Turner MD       • sodium chloride (PF) 0.9 % injection 10 mL  10 mL Injection PRN Concepcion Carpenter MD       • sodium chloride (PF) 0.9 % injection 10 mL  10 mL Injection 2 times per day Concepcion Carpenter MD       • ondansetron (ZOFRAN) injection 4 mg  4 mg Intravenous BID PRN Reg Turner MD       • pantoprazole (PROTONIX) EC tablet 40 mg  40 mg Oral QAM AC Dimitris Oh MD   40 mg at 12/16/21 0500   • folic acid (FOLATE) tablet 1 mg  1 mg Oral Daily Adela Campuzano CNP       • thiamine (VITAMIN B1) tablet 100 mg  100 mg Oral Daily Adela Campuazno CNP       • LORazepam (ATIVAN) injection 2 mg  2 mg Intravenous Q1H PRN Adela Campuzano CNP        Or   • LORazepam (ATIVAN) injection 3 mg  3 mg Intravenous Q1H PRN Adela Campuzano CNP        Or   • LORazepam (ATIVAN) injection 4 mg  4 mg Intravenous Q1H PRN Adela Campuzano CNP       • acetaminophen (TYLENOL) tablet 650 mg  650 mg Oral Q4H PRN Adela Campuzano CNP       • ondansetron (ZOFRAN) injection 4 mg  4 mg Intravenous Q6H PRN Adela R Depa, CNP       • sodium chloride 0.9 % flush bag 25 mL  25 mL Intravenous PRN Adela Campuzano CNP       • sodium chloride (PF) 0.9 % injection 2 mL  2 mL Intracatheter 2 times per day Adela Campuzano, CNP    2 mL at 12/14/21 2101   • sodium chloride 0.9 % flush bag 25 mL  25 mL Intravenous PRN Adela Campuzano CNP   Completed at 12/15/21 1800   • Potassium Standard Replacement Protocol   Does not apply See Admin Instructions Adela Campuzano CNP       • Magnesium Standard Replacement Protocol   Does not apply See Admin Instructions Adela Campuzano CNP       • fludrocortisone (FLORINEF) tablet 0.1 mg  0.1 mg Oral Daily Kathy Gonzalez CNP   0.1 mg at 12/14/21 1017   • polyethylene glycol (MIRALAX) packet 119 g  119 g Oral Once Gela Valiente CNP       • sodium chloride 0.9% infusion   Intravenous Continuous PRN Kathy Gonzalez CNP           Past Medical History:   Diagnosis Date   • Acquired hypothyroidism 12/05/2018   • Alcohol abuse 02/12/2021   • Atherosclerosis of both carotid arteries 05/10/2021   • BPH with obstruction/lower urinary tract symptoms 02/12/2021   • Calculus of gallbladder without cholecystitis without obstruction 03/01/2021   • Carcinoma larynx (CMS/[facility])    • Cholesteatoma of right external ear 04/13/2021   • Chronic diastolic CHF (congestive heart failure) (CMS/[facility]) 12/05/2018   • Chronic obstructive pulmonary disease (CMS/[facility]) 12/05/2018   • Cor pulmonale (CMS/[facility]) 02/12/2021   • Coronary artery disease involving native heart without angina pectoris 02/12/2021   • CVA (cerebrovascular accident) (CMS/[facility]) 02/11/2021   • Hx of adenomatous colonic polyps 02/15/2021   • Hyperglycemia 02/12/2021   • Idiopathic hypotension 02/12/2021   • Myelodysplastic syndrome (CMS/[facility])    • Obstructive sleep apnea 05/10/2021   • Pancytopenia (CMS/[facility]) 12/05/2018   • Tobacco use 03/01/2021       Physical Exam:    Visit Vitals  /56   Pulse (!) 52   Temp 96.6 °F (35.9 °C)   Resp 16   Ht 6' (1.829 m)   Wt 66.7 kg (147 lb 0.8 oz)   SpO2 99%   BMI 19.94 kg/m²       Physical Exam  Constitutional:       General: He is not in acute distress.  HENT:      Head: Atraumatic.      Mouth/Throat:      Mouth: Mucous membranes  are dry.   Eyes:      Pupils: Pupils are equal, round, and reactive to light.   Neck:      Thyroid: No thyromegaly.   Cardiovascular:      Rate and Rhythm: Regular rhythm.      Heart sounds: No S4 sounds.    Pulmonary:      Effort: Pulmonary effort is normal.      Breath sounds: No wheezing.   Abdominal:      Palpations: Abdomen is soft. There is no hepatomegaly.   Musculoskeletal:      Cervical back: Normal range of motion and neck supple.      Right lower leg: No edema.      Left lower leg: No edema.   Skin:     General: Skin is warm and dry.   Neurological:      Mental Status: He is oriented to person, place, and time.   Psychiatric:         Mood and Affect: Mood and affect normal.            YULISA LARSEN MD [facility]

## 2022-01-18 ENCOUNTER — RX RENEWAL (OUTPATIENT)
Age: 57
End: 2022-01-18

## 2022-02-05 NOTE — PROGRESS NOTE BEHAVIORAL HEALTH - IMPULSE CONTROL
Patient had a large bout of vomited is after he had his dinner with sandwich and soda and Jell-O  The contents were Jell-O and foot contents appeared red but no evidence of hematemesis or blood in the vomitus  Vitals are stable  Will give Zofran and monitor  Will give clear liquid diet 
Impaired
Normal
Normal

## 2022-09-19 NOTE — PROGRESS NOTE BEHAVIORAL HEALTH - RISK ASSESSMENT
Niacinamide Counseling: I recommended taking niacin or niacinamide, also know as vitamin B3, twice daily. Recent evidence suggests that taking vitamin B3 (500 mg twice daily) can reduce the risk of actinic keratoses and non-melanoma skin cancers. Side effects of vitamin B3 include flushing and headache. Risk factors: substance abuse, acute medical issue, anxiety, unemployed    Protective factors: no current SIIP/HIIP, no h/o SA/SIB, no h/o psych admissions, domiciled, intact marriage, engaged in gamblers anonymous, help-seeking behaviors    Pt with chronically elevated risk in the setting of substance abuse mitigated by multiple protective factors; does not meet criteria for psychiatric admission.

## 2022-12-05 ENCOUNTER — NON-APPOINTMENT (OUTPATIENT)
Age: 57
End: 2022-12-05

## 2022-12-05 ENCOUNTER — APPOINTMENT (OUTPATIENT)
Dept: CARDIOLOGY | Facility: CLINIC | Age: 57
End: 2022-12-05

## 2022-12-05 VITALS
WEIGHT: 211 LBS | SYSTOLIC BLOOD PRESSURE: 122 MMHG | DIASTOLIC BLOOD PRESSURE: 76 MMHG | HEIGHT: 73 IN | HEART RATE: 66 BPM | BODY MASS INDEX: 27.96 KG/M2 | OXYGEN SATURATION: 100 %

## 2022-12-05 DIAGNOSIS — Z00.00 ENCOUNTER FOR GENERAL ADULT MEDICAL EXAMINATION W/OUT ABNORMAL FINDINGS: ICD-10-CM

## 2022-12-05 PROCEDURE — 99215 OFFICE O/P EST HI 40 MIN: CPT | Mod: 25

## 2022-12-05 PROCEDURE — 36415 COLL VENOUS BLD VENIPUNCTURE: CPT

## 2022-12-05 PROCEDURE — 93000 ELECTROCARDIOGRAM COMPLETE: CPT

## 2022-12-05 NOTE — HISTORY OF PRESENT ILLNESS
[FreeTextEntry1] : August 30, 2016. Patient is a 51-year-old man, who was finally diagnosed with rapid atrial fibrillation about one month ago. On July 24 related to drinking and smoking synthetic weed, etc. he was hospitalized at [facility]. He had severe palpitations and "his whole system went into shock". He had some vomitting and nauseousness, diaphoresis, and an uncomfortable feeling, but no chest pain. He was in rapid atrial fibrillation. He did make positive troponin enzymes, but was not recommended to have cardiac catheterization or even a stress test. He was hospitalized at [facility] for 6 days and echocardiogram supposedly showed no scar. He was discharged on cholesterol medications, but then he followed up with a cardiologist in [place] after being hospitalized there for 48 hours with a recurrence of his A. fib. He was placed on sotalol and did well for a while. On 26 August on 120 mg of sotalol, he broke through with an episode of A.fib. The cardiologist wanted to increase Sotalol to 160 mg, but the patient stayed at 120 and then came back to [place] and is here to see me. He has not had a stress test. He has no risk factors for coronary artery disease or a family history of coronary disease or arrhythmias. He claims he's been having symptoms for 20 years, and the episodes have lasted as long as an hour, and it is only with this episode in July, that he was finally diagnosed with atrial fibrillation. He has some shortness of breath going up stairs, but he says he has had this for years, and it has not progressed. Otherwise, with normal activity, and swimming, he does not get chest pain. While he smokes a lot of weed, he does not smoke cigarettes. He thinks he was told of a murmur recently and once as a kid, although I did not hear a murmur on exam. Has not had any syncope recently. He did have a duodenal ulcer about 10 years ago, not related to medications or NSAIDs, and not bleeding. It has not been an issue since. He is currently on sotalol 120 b.i.d., BuSpar, 5 mg b.i.d., and aspirin 81 mg q.d. However, in [facility] Electronic medical record, there are no other notes, but there is a list of medicines to be verified that includes diltiazem, calculus, Eliquis, as well as atorvastatin, and Crestor.\par September 9, 2016. The patient came for stress test and had to stop for shortness of breath at 6 minutes with a low heart rate on carvedilol. It looked like ST s were starting to go down, but did not meet criteria. APCs noted, but no atrial fibrillation. Recommended nuclear stress test and use that to help guide medicine versus statin for his hyperlipidemia.\par November 2, 2016. Patient here in followup. He did not have nuclear stress test. He is here in followup because he is running out of sotalol, but also he had 2 episodes of atrial fibrillation, which were severe and accompanied with chest pain. He admits he has not been taking it twice a day and occasionally will skip. He has not worked on his diet and in fact has gained weight. He does complain of some fatigue and shortness of breath. No exertional chest pain, however; he does not get the same chest pain with exertion that he gets during the atrial fibrillation. His EKG is sinus rhythm with nonspecific ST changes. After a long discussion with the patient and his sister, he promises to be rigid with his sotalol every 12 hours, and he is scheduling a nuclear stress test, and we will review his labs.\par November 18, 2016. Yesterday, the patient came for his nuclear stress test. He was in atrial flutter or fibrillation. His heart rate went as high as 199, and there were definite abnormal ST changes, but no chest pain. Blood pressure response was normal. The nuclear scan showed medium sized, mild defects, apical, inferior, mid to distal inferolateral that were reversible, and there was also transient ischemic dilatation of the LV with a ratio of 1.45. LVEF was 78% with normal wall motion post stress however. Patient returns today to review the findings and for reevaluation. I recommended coronary angiography and the patient and his sister are thinking about it. He is back in sinus rhythm.\par December 8, 2016. Patient had coronary angiography, which only showed a 30% lesion in the proximal circumflex. The other arteries had no obstruction and LVEF was normal at 70%. I had the patient switch over to flecainide 50 mg q.12 h.\par December 30, 2016. The patient is in sinus rhythm, however, he claims he has had a lot of breakthroughs and does not feel well on the flecainide. He could not be more specific and wanted to go back to the sotalol, although he has broken through 120 mg many times. After a long extensive discussion we agreed to try Rythmol  mg q.12 h.\par February 7, 2017. Patient called. He had never started the Rythmol. After much discussion he agreed to start the Rythmol and come in in 2 weeks.\par February 23, 2017. The patient is here in followup and is in rapid atrial fibrillation/flutter. He is tolerating the Rythmol well and thinks that most of the time he is doing okay. He was not aware that he was in atrial fibrillation today, although he did think he was yesterday. After long discussion about ablation, changing medications, increasing the dose, or using home telemetry to determine how often he truly is in atrial fibrillation he elected to go up on the Rythmol first. He is still not eager for invasive procedures.\par April 13, 2017. Patient finally returns as I would not renew his medication. He is in sinus rhythm. He says he had one or two bad days, but otherwise thinks he was in a normal rhythm most of the time. He is not  the most compliant when it comes to sticking to his q.12 h. regimen. For the last 3 days has been taking 325 mg twice a day instead of 425 as he ran out of pills.\par May 10, 2017. Patient is here because of cough and sputum. However, he admitted that after a while he decided once again to stop his Rythmol to "see what would happen" and sure enough on Saturday he could not walk to [facility] because he was out of breath and his heart was racing fast. He went back on the Rythmol and had another episode Sunday, but since then has been back in normal rhythm. He's been coughing with dark sputum for 2 days now, but no fever or chills. His EKG shows sinus rhythm with a normal QRS and QT. He is here with his wife so we had an extensive discussion about considering an ablation and about compliance with medication and he will be calling Dr. Get Hardin of [facility].\par \par July 24, 2019. First visit in over 2 years. He had an ablation with Dr. Hardin in 2017 but never continued the medications, even for just a few months and never followed up with Dr. Hardin. In January of this year was hospitalized at [facility] and it sounds like he again had substance abuse problems, mostly marijuana, but probably Ativan, and possibly other medications. He initially presented with lactic acidosis. At some point he did seem to be in sinus rhythm, but with a very bizarre EKG, and prolonged QT, which I believe, was thought to be from toxins. Eventually, was back in rapid A. Flutter and was discharged on Cardizem CD. His CHADS2-VASC score was 0 so no anticoag. No followup after that and he claims now that he had no insurance, but now that he does he came back. He has been having severe GI symptoms whenever he eats fatty, greasy foods, mostly, vomiting, and chest pain, and rapid heartbeat. Reportedly, he has an appointment with gastroenterology tomorrow. He is on absolutely no medications and is here in rapid atrial flutter with a heart rate of around 140. Seems to be tolerating it well, with no ischemic changes, no heart failure on exam, and blood pressure acceptable. I reviewed his notes from the hospitalization in January and reached out to Dr. Get Hardin for further information. In the meantime, I am placing him on bisoprolol 10 mg for additional rate control while he has his GI workup. Long discussion about compliance and self-destructive behavior.\par July 31, 2019. Patient returns in followup on bisoprolol. I spoke with Dr. Hardin after his last visit, who said that even though his CHADS2-VASC score was zero, normally he likes to anticoagulate these people after ablation, but because the patient was so erratic in his behavior and there was concern about drugs or drinking, he did not. He would be willing to do another ablation if necessary. Today he is back in sinus rhythm at 51. Labs from last week were within normal limits, except for his lipid profile, which we reviewed today.\par December 6, 2019.  Since last visit patient was hospitalized I believe twice once at [facility] and once at [place] with marijuana intoxication.  He remains in sinus rhythm as long as he was back on his bisoprolol.  He returns now run out of the bisoprolol and is back in A. fib at 130 but totally asymptomatic.  He is not on Xarelto but his CHADS2- VASC score is 0.  Still using marijuana.  Long discussion about compliance and advised him to set up another appointment with Dr. Hardin to discuss a second ablation.  (Last time after the ablation he stopped the beta-blocker right away rather than waiting 3 months.)\par April 22, 2020.  Telehealth visit. Narrative: On the whole patient has been doing well, remaining on the beta-blocker with rare atrial fibrillation. There was a lot of stress as his wife and father-in-law and mother-in-law had COVID-19 with his father-in-law being hospitalized. He was left on his own for some time and had what he thinks was just a panic attack. He still is smoking marijuana at least daily but he tries to keep it down to once a day. His wife spoke to me about what to do when he has some of these episodes because he gets very nauseous and he gets very anxious and she almost wants to call EMS. I explained to have him breathe in and out of a paper bag as the first step otherwise we can try a little bit of alprazolam or a little bit of Zofran. (At the end the patient left and I spoke with the wife because he did not want to hear about his anxiety attacks). We also discussed follow-up with Dr. Hardin of [facility] for possible ablation given his recurrence of atrial fibrillation although most of the time if he is compliant with his beta-blocker he is in sinus rhythm. Currently he is on no anticoagulation because of his low CHADS2-VASC score.\par Assessment: For the most part stable in terms of his atrial fibrillation. Marijuana abuse still somewhat of a problem along with anxiety but on the whole functioning better than he had previously.\par Continue beta-blocker. Alprazolam 0.5 mg as needed and Zofran 4 mg as needed.\par Follow-up: 2 months\par July 2, 2020.Issues with nauseousness and vomiting and going to the emergency room at [facility].\par July 5, 2020. Once again diagnosed with marijuana intoxication. Remained in sinus rhythm throughout. Was discharged on July 3. \par December 2, 2020.Patient had issues again with marijuana overdose with nausea and vomiting and lethargy and finally agreed to go to rehab.  However at rehab was found to be in rapid atrial fibrillation and was sent to [facility].  He was there for a few days and I kept in contact with the physicians there.  They were having trouble controlling his rate and he was placed on IV amiodarone as CT angio showed a large pericardial effusion although by echo it was more mild to moderate and no hemodynamic compromise.  Sed rate was 86.  Initial plan was to have a thoracic surgery just to a pericardial window and send off the fluid for labs and serologies and cytology etc.  However he had been placed on 650 mg of aspirin along with the colchicine so the surgery was canceled.  In the interim he was transferred to [facility]. \par December 10, 2020.  Patient here now with wife.  I reviewed the records from [facility] on the patient's wife's cell phone.  He had an extensive collagen vascular work-up that was totally negative looking for a cause for his pericarditis.  Also cardiolipin antibody negative.  He did have abnormal liver function tests that persisted.  A HIDA scan was negative.  His echo on December 2 showed a small to medium pleural effusion pericardial effusion and a small left pleural effusion.  A follow-up echo the next day showed a small to mild effusion and bilateral pleural effusions but that was a limited study.  Patient was placed on amiodarone Cardizem metoprolol along with Eliquis and colchicine, and the patient left in sinus rhythm.  Here he is in sinus rhythm at 89 with abnormal ST-T changes diffusely.  He swears no more marijuana but he has done this before.\par Patient returns for echocardiogram at the end of the day.  Pericardial effusion gone.  No significant pleural effusion.  Otherwise mild S.A.M. but no gradient.  Mild to moderate MR.  Moderate LAE.  Normal LV systolic function normal RV systolic function.  Mild TR with RVSP 35.  Stopped Cardizem but continued bisoprolol amiodarone and colchicine along with his Eliquis.\par February 9, 2021.  Patient returns in follow-up.  EKG with sinus bradycardia at 53 with slight ST scooping and borderline QT prolongation.  Off colchicine but still on amiodarone, bisoprolol, and Eliquis.  Claims he has been a good boy, no marijuana etc.  Is willing to consider another ablation especially if that will help get him off the amiodarone.\par July 17, 2021.  Patient here in follow-up.  He saw Dr. Hardin April 2 and was still in sinus rhythm on amiodarone.  They agreed to schedule an ablation and then hopefully stop the amiodarone 3 months after the ablation, may be sooner.  The ablation was scheduled for May 26 however as of May 11 the patient on his own had stopped both the amiodarone and the Eliquis.  Eliquis was restarted on May 11.  MANNIE was done on 26 May showing mild to moderate MR, minimal AI, no left atrium or DANNY thrombus.  Normal LV and RV function.  Mild TR and PI.  Normal pericardium with no effusion.  No PFO.  He had the atrial flutter ablation and was discharged on the 27th.  There was a question of some ST depressions and it was suggested he repeat the stress test.  He is here today, in sinus rhythm at 50, with ST scooping in leads I to aVF V3 through 6.. He is on Eliquis and metoprolol, no amiodarone, no marijuana and only concerned that he cannot stop gaining weight.  He and his wife have not been vaccinated as they do not believe in it etc. I had a long discussion trying to convince him to get vaccinated for Covid.  He will be seeing Dr. Hardin at the end of July and I would like to bring him back for a stress echo at the end of August which will be 3 months after his ablation\par He saw Dr. Hardin on June 25 and then spoke with him in August.  Was supposed to get sleep studies for sleep apnea as he was found to have O2 sat of 86 and heart rate of 33 during sleep I believe after his Covid episode.  Was told to remain on metoprolol   daily.\par October 21, 2021.  Patient is now here in follow-up.  He is in sinus rhythm at 72 with in for lateral ST and T changes II, III, aVF V3 through 6, no significant change.  He survived Covid without having to be in the hospital, was treated with the [facility] program by Dr. Gita Lisker.  Has not had a full work-up for sleep apnea yet, probably because of insurance covering the sleep study.  He claims no marijuana use.  No palpitations occasional palpitations but no sustained arrhythmias.  Denies chest pain shortness of breath etc.  Weight is the same.  Discussed whether or not he can come off Eliquis; I decided to reevaluate in 4 months when it will be a year from his second ablation and then consider a 4-week ZIO monitor before stopping.  He will be having 1 more follow-up with Dr. Hardin and we will get his opinion as well.  He remains on metoprolol  daily\par December 5, 2022.  First visit in over a year.  Is very excited and happy because he has to engaged children now 1 is 21 and 1 is 28 and the first wedding will be around August.  He has been very good and compliant and has lost 20 pounds although at the end of the exam sitting in my consult room he did say "I am smoking a little bit again".  He denies chest pain shortness of breath or palpitations and no syncope.  He denies interval hospitalizations or emergency room visits or COVID issues.  He does some breathing exercises all the time.

## 2022-12-05 NOTE — REASON FOR VISIT
[FreeTextEntry1] : 57-year-old man with atrial fibrillation and abnormal stress test, s/p atrial flutter ablation 2017, then back in rapid Afib.\par Multiple admissions for marijuana toxicity and on the latest one found to have moderate pericardial effusion as well.\par Last admission was treated with amiodarone and remained in sinus rhythm and was scheduled with Dr. Hardin for follow-up ablation again. Now here after second ablation.

## 2022-12-05 NOTE — REVIEW OF SYSTEMS
[Fever] : no fever [Weight Gain (___ Lbs)] : no recent weight gain [Feeling Fatigued] : not feeling fatigued [Weight Loss (___ Lbs)] : [unfilled] ~Ulb weight loss [Dyspnea on exertion] : not dyspnea during exertion [Chest Discomfort] : no chest discomfort [Lower Ext Edema] : no extremity edema [Palpitations] : no palpitations [Syncope] : no syncope [Negative] : Heme/Lymph [de-identified] : no marijuana

## 2022-12-05 NOTE — PHYSICAL EXAM
[Well Developed] : well developed [Well Nourished] : well nourished [No Acute Distress] : no acute distress [Normal Venous Pressure] : normal venous pressure [No Carotid Bruit] : no carotid bruit [Normal S1, S2] : normal S1, S2 [No Rub] : no rub [No Gallop] : no gallop [Clear Lung Fields] : clear lung fields [Good Air Entry] : good air entry [No Respiratory Distress] : no respiratory distress  [Soft] : abdomen soft [Non Tender] : non-tender [No Masses/organomegaly] : no masses/organomegaly [Normal Bowel Sounds] : normal bowel sounds [Normal Gait] : normal gait [No Edema] : no edema [No Cyanosis] : no cyanosis [No Clubbing] : no clubbing [No Varicosities] : no varicosities [Normal Radial B/L] : normal radial B/L [Normal PT B/L] : normal PT B/L [Normal DP B/L] : normal DP B/L [No Rash] : no rash [No Skin Lesions] : no skin lesions [Moves all extremities] : moves all extremities [No Focal Deficits] : no focal deficits [Normal Speech] : normal speech [Alert and Oriented] : alert and oriented [Normal memory] : normal memory [de-identified] : Possible soft diastolic murmur at the upper sternal border. [General Appearance - Well Developed] : well developed [Normal Appearance] : normal appearance [Well Groomed] : well groomed [General Appearance - Well Nourished] : well nourished [No Deformities] : no deformities [General Appearance - In No Acute Distress] : no acute distress [Normal Conjunctiva] : the conjunctiva exhibited no abnormalities [Eyelids - No Xanthelasma] : the eyelids demonstrated no xanthelasmas [Normal Oral Mucosa] : normal oral mucosa [No Oral Pallor] : no oral pallor [No Oral Cyanosis] : no oral cyanosis [Normal Jugular Venous A Waves Present] : normal jugular venous A waves present [Normal Jugular Venous V Waves Present] : normal jugular venous V waves present [No Jugular Venous Ortega A Waves] : no jugular venous ortega A waves [Respiration, Rhythm And Depth] : normal respiratory rhythm and effort [Exaggerated Use Of Accessory Muscles For Inspiration] : no accessory muscle use [Auscultation Breath Sounds / Voice Sounds] : lungs were clear to auscultation bilaterally [Heart Rate And Rhythm] : heart rate and rhythm were normal [Heart Sounds] : normal S1 and S2 [Abdomen Soft] : soft [Abdomen Tenderness] : non-tender [Abdomen Mass (___ Cm)] : no abdominal mass palpated [Abnormal Walk] : normal gait [Gait - Sufficient For Exercise Testing] : the gait was sufficient for exercise testing [Nail Clubbing] : no clubbing of the fingernails [Cyanosis, Localized] : no localized cyanosis [Petechial Hemorrhages (___cm)] : no petechial hemorrhages [Skin Color & Pigmentation] : normal skin color and pigmentation [] : no rash [No Venous Stasis] : no venous stasis [Skin Lesions] : no skin lesions [No Skin Ulcers] : no skin ulcer [No Xanthoma] : no  xanthoma was observed [FreeTextEntry1] : No rashes. No cyanosis [Oriented To Time, Place, And Person] : oriented to person, place, and time [Affect] : the affect was normal [Mood] : the mood was normal [No Anxiety] : not feeling anxious

## 2022-12-06 LAB
ALBUMIN SERPL ELPH-MCNC: 4.4 G/DL
ALP BLD-CCNC: 94 U/L
ALT SERPL-CCNC: 21 U/L
ANION GAP SERPL CALC-SCNC: 10 MMOL/L
AST SERPL-CCNC: 17 U/L
BASOPHILS # BLD AUTO: 0.02 K/UL
BASOPHILS NFR BLD AUTO: 0.3 %
BILIRUB SERPL-MCNC: 0.4 MG/DL
BUN SERPL-MCNC: 13 MG/DL
CALCIUM SERPL-MCNC: 9.7 MG/DL
CHLORIDE SERPL-SCNC: 106 MMOL/L
CHOLEST SERPL-MCNC: 181 MG/DL
CO2 SERPL-SCNC: 26 MMOL/L
CREAT SERPL-MCNC: 0.93 MG/DL
EGFR: 96 ML/MIN/1.73M2
EOSINOPHIL # BLD AUTO: 0.2 K/UL
EOSINOPHIL NFR BLD AUTO: 3.3 %
ESTIMATED AVERAGE GLUCOSE: 108 MG/DL
GLUCOSE SERPL-MCNC: 91 MG/DL
HBA1C MFR BLD HPLC: 5.4 %
HCT VFR BLD CALC: 44.5 %
HDLC SERPL-MCNC: 38 MG/DL
HGB BLD-MCNC: 14.4 G/DL
IMM GRANULOCYTES NFR BLD AUTO: 0.2 %
LDLC SERPL CALC-MCNC: 114 MG/DL
LYMPHOCYTES # BLD AUTO: 2.17 K/UL
LYMPHOCYTES NFR BLD AUTO: 36.1 %
MAN DIFF?: NORMAL
MCHC RBC-ENTMCNC: 28 PG
MCHC RBC-ENTMCNC: 32.4 GM/DL
MCV RBC AUTO: 86.4 FL
MONOCYTES # BLD AUTO: 0.32 K/UL
MONOCYTES NFR BLD AUTO: 5.3 %
NEUTROPHILS # BLD AUTO: 3.29 K/UL
NEUTROPHILS NFR BLD AUTO: 54.8 %
NONHDLC SERPL-MCNC: 143 MG/DL
NT-PROBNP SERPL-MCNC: 58 PG/ML
PLATELET # BLD AUTO: 253 K/UL
POTASSIUM SERPL-SCNC: 5.1 MMOL/L
PROT SERPL-MCNC: 6.7 G/DL
PSA SERPL-MCNC: 14.6 NG/ML
RBC # BLD: 5.15 M/UL
RBC # FLD: 14.1 %
SODIUM SERPL-SCNC: 142 MMOL/L
TRIGL SERPL-MCNC: 149 MG/DL
TSH SERPL-ACNC: 0.87 UIU/ML
WBC # FLD AUTO: 6.01 K/UL

## 2023-03-01 ENCOUNTER — APPOINTMENT (OUTPATIENT)
Dept: UROLOGY | Facility: CLINIC | Age: 58
End: 2023-03-01
Payer: MEDICAID

## 2023-03-01 VITALS
OXYGEN SATURATION: 98 % | WEIGHT: 215 LBS | BODY MASS INDEX: 28.49 KG/M2 | HEART RATE: 55 BPM | TEMPERATURE: 98 F | RESPIRATION RATE: 16 BRPM | DIASTOLIC BLOOD PRESSURE: 78 MMHG | SYSTOLIC BLOOD PRESSURE: 129 MMHG | HEIGHT: 73 IN

## 2023-03-01 DIAGNOSIS — N13.8 BENIGN PROSTATIC HYPERPLASIA WITH LOWER URINARY TRACT SYMPMS: ICD-10-CM

## 2023-03-01 DIAGNOSIS — N40.1 BENIGN PROSTATIC HYPERPLASIA WITH LOWER URINARY TRACT SYMPMS: ICD-10-CM

## 2023-03-01 PROCEDURE — 99204 OFFICE O/P NEW MOD 45 MIN: CPT

## 2023-03-02 PROBLEM — N40.1 BPH WITH OBSTRUCTION/LOWER URINARY TRACT SYMPTOMS: Status: ACTIVE | Noted: 2023-03-02

## 2023-03-02 LAB
APPEARANCE: CLEAR
BACTERIA: NEGATIVE
BILIRUBIN URINE: NEGATIVE
BLOOD URINE: NEGATIVE
COLOR: NORMAL
GLUCOSE QUALITATIVE U: NEGATIVE
HYALINE CASTS: 0 /LPF
KETONES URINE: NEGATIVE
LEUKOCYTE ESTERASE URINE: NEGATIVE
MICROSCOPIC-UA: NORMAL
NITRITE URINE: NEGATIVE
PH URINE: 5.5
PROTEIN URINE: NEGATIVE
PSA FREE FLD-MCNC: 9 %
PSA FREE SERPL-MCNC: 1.15 NG/ML
PSA SERPL-MCNC: 13.3 NG/ML
RED BLOOD CELLS URINE: 1 /HPF
SPECIFIC GRAVITY URINE: 1.02
SQUAMOUS EPITHELIAL CELLS: 0 /HPF
UROBILINOGEN URINE: NORMAL
WHITE BLOOD CELLS URINE: 0 /HPF

## 2023-03-02 NOTE — HISTORY OF PRESENT ILLNESS
[FreeTextEntry1] : here for evaluation of elevated PSA.\par noted to be 14.6 - np prior.\par no UTI symptoms at time.\par Father diagnosed with Stage 4 prostate cancer and  from it.\par Does have some LUTs: slower FOS with occasional hesitancy and intermittency. Some degree of frequency but no urgency or incontinence with nocturia 4 times. N enuresis.\par No h/o UTIs, hematuria or retention. \par \par PVR 80cc\par

## 2023-03-02 NOTE — PHYSICAL EXAM
[General Appearance - Well Developed] : well developed [General Appearance - Well Nourished] : well nourished [Edema] : no peripheral edema [Exaggerated Use Of Accessory Muscles For Inspiration] : no accessory muscle use [Abdomen Soft] : soft [Abdomen Tenderness] : non-tender [Abdomen Mass (___ Cm)] : no abdominal mass palpated [Abdomen Hernia] : no hernia was discovered [Size ___ (gms)] : size was estimated to be [unfilled] g [Prostate Hard Area Or Nodule Bilaterally] : had no palpable nodules [Rectal Exam - Prostate] : was not indurated [Nl Inspection] : the anus was normal on inspection. [Nl Sphincter Tone] : normal sphincter tone [No Lesions] : no lesions [Meatal Stenosis] : no meatal stenosis [Circumcised] : the penis was circumcised [Normal] : normal [Scrotum Hydrocele On The Right] : no hydrocele [Scrotum Hydrocele On The Left] : no hydrocele [Testes] : normal [Epididymis] : was normal [Vas Deferens / Spermatic Cord] : was normal [Normal Station and Gait] : the gait and station were normal for the patient's age [] : no rash [No Focal Deficits] : no focal deficits [Oriented To Time, Place, And Person] : oriented to person, place, and time

## 2023-03-02 NOTE — ASSESSMENT
[FreeTextEntry1] : discussed risks benefits and controversies of PSA screening.\par Will repeat and check urine studies.\par given elevation, FH and DREplan for mpMRI - may need biopsy which we discussed.\par Will address LUTs after

## 2023-03-03 LAB — BACTERIA UR CULT: NORMAL

## 2023-03-06 ENCOUNTER — APPOINTMENT (OUTPATIENT)
Dept: CARDIOLOGY | Facility: CLINIC | Age: 58
End: 2023-03-06

## 2023-03-12 ENCOUNTER — APPOINTMENT (OUTPATIENT)
Dept: MRI IMAGING | Facility: IMAGING CENTER | Age: 58
End: 2023-03-12

## 2023-03-26 NOTE — ED PROVIDER NOTE - CRITICAL CARE ATTESTATION
I have personally provided the amount of critical care time documented below excluding time spent on separate procedures
ANSON (generalized anxiety disorder)

## 2023-08-25 ENCOUNTER — NON-APPOINTMENT (OUTPATIENT)
Age: 58
End: 2023-08-25

## 2023-08-25 ENCOUNTER — EMERGENCY (EMERGENCY)
Facility: HOSPITAL | Age: 58
LOS: 1 days | Discharge: HOME | End: 2023-08-25
Attending: EMERGENCY MEDICINE | Admitting: EMERGENCY MEDICINE
Payer: MEDICAID

## 2023-08-25 ENCOUNTER — EMERGENCY (EMERGENCY)
Facility: HOSPITAL | Age: 58
LOS: 1 days | Discharge: HOME | End: 2023-08-25
Attending: STUDENT IN AN ORGANIZED HEALTH CARE EDUCATION/TRAINING PROGRAM | Admitting: STUDENT IN AN ORGANIZED HEALTH CARE EDUCATION/TRAINING PROGRAM
Payer: MEDICAID

## 2023-08-25 VITALS
SYSTOLIC BLOOD PRESSURE: 133 MMHG | RESPIRATION RATE: 18 BRPM | TEMPERATURE: 98 F | DIASTOLIC BLOOD PRESSURE: 71 MMHG | HEART RATE: 68 BPM | OXYGEN SATURATION: 97 %

## 2023-08-25 VITALS
RESPIRATION RATE: 16 BRPM | OXYGEN SATURATION: 99 % | HEART RATE: 88 BPM | TEMPERATURE: 98 F | SYSTOLIC BLOOD PRESSURE: 128 MMHG | DIASTOLIC BLOOD PRESSURE: 68 MMHG

## 2023-08-25 VITALS
OXYGEN SATURATION: 100 % | RESPIRATION RATE: 16 BRPM | SYSTOLIC BLOOD PRESSURE: 135 MMHG | HEART RATE: 76 BPM | TEMPERATURE: 98 F | DIASTOLIC BLOOD PRESSURE: 80 MMHG

## 2023-08-25 DIAGNOSIS — Z98.890 OTHER SPECIFIED POSTPROCEDURAL STATES: Chronic | ICD-10-CM

## 2023-08-25 LAB
ALBUMIN SERPL ELPH-MCNC: 4.8 G/DL — SIGNIFICANT CHANGE UP (ref 3.3–5)
ALP SERPL-CCNC: 95 U/L — SIGNIFICANT CHANGE UP (ref 40–120)
ALT FLD-CCNC: 19 U/L — SIGNIFICANT CHANGE UP (ref 4–41)
AMPHET UR-MCNC: NEGATIVE — SIGNIFICANT CHANGE UP
ANION GAP SERPL CALC-SCNC: 21 MMOL/L — HIGH (ref 7–14)
APAP SERPL-MCNC: <10 UG/ML — LOW (ref 15–25)
APPEARANCE UR: CLEAR — SIGNIFICANT CHANGE UP
AST SERPL-CCNC: 23 U/L — SIGNIFICANT CHANGE UP (ref 4–40)
BACTERIA # UR AUTO: NEGATIVE /HPF — SIGNIFICANT CHANGE UP
BARBITURATES UR SCN-MCNC: NEGATIVE — SIGNIFICANT CHANGE UP
BASE EXCESS BLDV CALC-SCNC: -2.3 MMOL/L — LOW (ref -2–3)
BASE EXCESS BLDV CALC-SCNC: -2.4 MMOL/L — LOW (ref -2–3)
BASE EXCESS BLDV CALC-SCNC: -2.5 MMOL/L — LOW (ref -2–3)
BASOPHILS # BLD AUTO: 0.02 K/UL — SIGNIFICANT CHANGE UP (ref 0–0.2)
BASOPHILS NFR BLD AUTO: 0.1 % — SIGNIFICANT CHANGE UP (ref 0–2)
BENZODIAZ UR-MCNC: NEGATIVE — SIGNIFICANT CHANGE UP
BILIRUB SERPL-MCNC: 0.8 MG/DL — SIGNIFICANT CHANGE UP (ref 0.2–1.2)
BILIRUB UR-MCNC: NEGATIVE — SIGNIFICANT CHANGE UP
BLOOD GAS VENOUS COMPREHENSIVE RESULT: SIGNIFICANT CHANGE UP
BUN SERPL-MCNC: 17 MG/DL — SIGNIFICANT CHANGE UP (ref 7–23)
CALCIUM SERPL-MCNC: 10 MG/DL — SIGNIFICANT CHANGE UP (ref 8.4–10.5)
CAST: 0 /LPF — SIGNIFICANT CHANGE UP (ref 0–4)
CHLORIDE BLDV-SCNC: 106 MMOL/L — SIGNIFICANT CHANGE UP (ref 96–108)
CHLORIDE BLDV-SCNC: 108 MMOL/L — SIGNIFICANT CHANGE UP (ref 96–108)
CHLORIDE BLDV-SCNC: 110 MMOL/L — HIGH (ref 96–108)
CHLORIDE SERPL-SCNC: 105 MMOL/L — SIGNIFICANT CHANGE UP (ref 98–107)
CO2 BLDV-SCNC: 21.3 MMOL/L — LOW (ref 22–26)
CO2 BLDV-SCNC: 22.8 MMOL/L — SIGNIFICANT CHANGE UP (ref 22–26)
CO2 BLDV-SCNC: 23.7 MMOL/L — SIGNIFICANT CHANGE UP (ref 22–26)
CO2 SERPL-SCNC: 17 MMOL/L — LOW (ref 22–31)
COCAINE METAB.OTHER UR-MCNC: NEGATIVE — SIGNIFICANT CHANGE UP
COLOR SPEC: YELLOW — SIGNIFICANT CHANGE UP
CREAT SERPL-MCNC: 0.98 MG/DL — SIGNIFICANT CHANGE UP (ref 0.5–1.3)
CREATININE URINE RESULT, DAU: 105 MG/DL — SIGNIFICANT CHANGE UP
DIFF PNL FLD: NEGATIVE — SIGNIFICANT CHANGE UP
EGFR: 89 ML/MIN/1.73M2 — SIGNIFICANT CHANGE UP
EOSINOPHIL # BLD AUTO: 0.02 K/UL — SIGNIFICANT CHANGE UP (ref 0–0.5)
EOSINOPHIL NFR BLD AUTO: 0.1 % — SIGNIFICANT CHANGE UP (ref 0–6)
ETHANOL SERPL-MCNC: <10 MG/DL — SIGNIFICANT CHANGE UP
GAS PNL BLDV: 138 MMOL/L — SIGNIFICANT CHANGE UP (ref 136–145)
GAS PNL BLDV: 139 MMOL/L — SIGNIFICANT CHANGE UP (ref 136–145)
GAS PNL BLDV: 139 MMOL/L — SIGNIFICANT CHANGE UP (ref 136–145)
GLUCOSE BLDV-MCNC: 113 MG/DL — HIGH (ref 70–99)
GLUCOSE BLDV-MCNC: 144 MG/DL — HIGH (ref 70–99)
GLUCOSE BLDV-MCNC: 145 MG/DL — HIGH (ref 70–99)
GLUCOSE SERPL-MCNC: 162 MG/DL — HIGH (ref 70–99)
GLUCOSE UR QL: NEGATIVE MG/DL — SIGNIFICANT CHANGE UP
HCO3 BLDV-SCNC: 20 MMOL/L — LOW (ref 22–29)
HCO3 BLDV-SCNC: 22 MMOL/L — SIGNIFICANT CHANGE UP (ref 22–29)
HCO3 BLDV-SCNC: 22 MMOL/L — SIGNIFICANT CHANGE UP (ref 22–29)
HCT VFR BLD CALC: 45.4 % — SIGNIFICANT CHANGE UP (ref 39–50)
HCT VFR BLDA CALC: 44 % — SIGNIFICANT CHANGE UP (ref 39–51)
HCT VFR BLDA CALC: 46 % — SIGNIFICANT CHANGE UP (ref 39–51)
HCT VFR BLDA CALC: 47 % — SIGNIFICANT CHANGE UP (ref 39–51)
HGB BLD CALC-MCNC: 14.8 G/DL — SIGNIFICANT CHANGE UP (ref 12.6–17.4)
HGB BLD CALC-MCNC: 15.2 G/DL — SIGNIFICANT CHANGE UP (ref 12.6–17.4)
HGB BLD CALC-MCNC: 15.5 G/DL — SIGNIFICANT CHANGE UP (ref 12.6–17.4)
HGB BLD-MCNC: 15.6 G/DL — SIGNIFICANT CHANGE UP (ref 13–17)
IANC: 16.51 K/UL — HIGH (ref 1.8–7.4)
IMM GRANULOCYTES NFR BLD AUTO: 0.5 % — SIGNIFICANT CHANGE UP (ref 0–0.9)
KETONES UR-MCNC: 15 MG/DL
LACTATE BLDV-MCNC: 3 MMOL/L — HIGH (ref 0.5–2)
LACTATE BLDV-MCNC: 3.4 MMOL/L — HIGH (ref 0.5–2)
LACTATE BLDV-MCNC: 3.4 MMOL/L — HIGH (ref 0.5–2)
LEUKOCYTE ESTERASE UR-ACNC: NEGATIVE — SIGNIFICANT CHANGE UP
LIDOCAIN IGE QN: 16 U/L — SIGNIFICANT CHANGE UP (ref 7–60)
LYMPHOCYTES # BLD AUTO: 1.43 K/UL — SIGNIFICANT CHANGE UP (ref 1–3.3)
LYMPHOCYTES # BLD AUTO: 7.7 % — LOW (ref 13–44)
MCHC RBC-ENTMCNC: 28.4 PG — SIGNIFICANT CHANGE UP (ref 27–34)
MCHC RBC-ENTMCNC: 34.4 GM/DL — SIGNIFICANT CHANGE UP (ref 32–36)
MCV RBC AUTO: 82.7 FL — SIGNIFICANT CHANGE UP (ref 80–100)
METHADONE UR-MCNC: NEGATIVE — SIGNIFICANT CHANGE UP
MONOCYTES # BLD AUTO: 0.47 K/UL — SIGNIFICANT CHANGE UP (ref 0–0.9)
MONOCYTES NFR BLD AUTO: 2.5 % — SIGNIFICANT CHANGE UP (ref 2–14)
NEUTROPHILS # BLD AUTO: 16.51 K/UL — HIGH (ref 1.8–7.4)
NEUTROPHILS NFR BLD AUTO: 89.1 % — HIGH (ref 43–77)
NITRITE UR-MCNC: NEGATIVE — SIGNIFICANT CHANGE UP
NRBC # BLD: 0 /100 WBCS — SIGNIFICANT CHANGE UP (ref 0–0)
NRBC # FLD: 0 K/UL — SIGNIFICANT CHANGE UP (ref 0–0)
OPIATES UR-MCNC: NEGATIVE — SIGNIFICANT CHANGE UP
OXYCODONE UR-MCNC: NEGATIVE — SIGNIFICANT CHANGE UP
PCO2 BLDV: 30 MMHG — LOW (ref 42–55)
PCO2 BLDV: 35 MMHG — LOW (ref 42–55)
PCO2 BLDV: 38 MMHG — LOW (ref 42–55)
PCP SPEC-MCNC: SIGNIFICANT CHANGE UP
PCP UR-MCNC: NEGATIVE — SIGNIFICANT CHANGE UP
PH BLDV: 7.38 — SIGNIFICANT CHANGE UP (ref 7.32–7.43)
PH BLDV: 7.4 — SIGNIFICANT CHANGE UP (ref 7.32–7.43)
PH BLDV: 7.44 — HIGH (ref 7.32–7.43)
PH UR: 6 — SIGNIFICANT CHANGE UP (ref 5–8)
PLATELET # BLD AUTO: 316 K/UL — SIGNIFICANT CHANGE UP (ref 150–400)
PO2 BLDV: 39 MMHG — SIGNIFICANT CHANGE UP (ref 25–45)
PO2 BLDV: 48 MMHG — HIGH (ref 25–45)
PO2 BLDV: 80 MMHG — HIGH (ref 25–45)
POTASSIUM BLDV-SCNC: 4 MMOL/L — SIGNIFICANT CHANGE UP (ref 3.5–5.1)
POTASSIUM BLDV-SCNC: 4.2 MMOL/L — SIGNIFICANT CHANGE UP (ref 3.5–5.1)
POTASSIUM BLDV-SCNC: 4.4 MMOL/L — SIGNIFICANT CHANGE UP (ref 3.5–5.1)
POTASSIUM SERPL-MCNC: 4.3 MMOL/L — SIGNIFICANT CHANGE UP (ref 3.5–5.3)
POTASSIUM SERPL-SCNC: 4.3 MMOL/L — SIGNIFICANT CHANGE UP (ref 3.5–5.3)
PROT SERPL-MCNC: 8.1 G/DL — SIGNIFICANT CHANGE UP (ref 6–8.3)
PROT UR-MCNC: SIGNIFICANT CHANGE UP MG/DL
RBC # BLD: 5.49 M/UL — SIGNIFICANT CHANGE UP (ref 4.2–5.8)
RBC # FLD: 13.6 % — SIGNIFICANT CHANGE UP (ref 10.3–14.5)
RBC CASTS # UR COMP ASSIST: 0 /HPF — SIGNIFICANT CHANGE UP (ref 0–4)
SALICYLATES SERPL-MCNC: <0.3 MG/DL — LOW (ref 15–30)
SAO2 % BLDV: 66.1 % — LOW (ref 67–88)
SAO2 % BLDV: 75.3 % — SIGNIFICANT CHANGE UP (ref 67–88)
SAO2 % BLDV: 96.6 % — HIGH (ref 67–88)
SODIUM SERPL-SCNC: 143 MMOL/L — SIGNIFICANT CHANGE UP (ref 135–145)
SP GR SPEC: 1.04 — HIGH (ref 1–1.03)
SQUAMOUS # UR AUTO: 0 /HPF — SIGNIFICANT CHANGE UP (ref 0–5)
THC UR QL: POSITIVE
TOXICOLOGY SCREEN, DRUGS OF ABUSE, SERUM RESULT: SIGNIFICANT CHANGE UP
TRIGL SERPL-MCNC: 75 MG/DL — SIGNIFICANT CHANGE UP
TROPONIN T, HIGH SENSITIVITY RESULT: 7 NG/L — SIGNIFICANT CHANGE UP
TROPONIN T, HIGH SENSITIVITY RESULT: <6 NG/L — SIGNIFICANT CHANGE UP
UROBILINOGEN FLD QL: 0.2 MG/DL — SIGNIFICANT CHANGE UP (ref 0.2–1)
WBC # BLD: 18.55 K/UL — HIGH (ref 3.8–10.5)
WBC # FLD AUTO: 18.55 K/UL — HIGH (ref 3.8–10.5)
WBC UR QL: 0 /HPF — SIGNIFICANT CHANGE UP (ref 0–5)

## 2023-08-25 PROCEDURE — 93010 ELECTROCARDIOGRAM REPORT: CPT | Mod: 76

## 2023-08-25 PROCEDURE — 99285 EMERGENCY DEPT VISIT HI MDM: CPT

## 2023-08-25 PROCEDURE — 74177 CT ABD & PELVIS W/CONTRAST: CPT | Mod: 26,MA

## 2023-08-25 PROCEDURE — 71045 X-RAY EXAM CHEST 1 VIEW: CPT | Mod: 26

## 2023-08-25 RX ORDER — SODIUM CHLORIDE 9 MG/ML
1000 INJECTION, SOLUTION INTRAVENOUS ONCE
Refills: 0 | Status: COMPLETED | OUTPATIENT
Start: 2023-08-25 | End: 2023-08-25

## 2023-08-25 RX ORDER — HALOPERIDOL DECANOATE 100 MG/ML
5 INJECTION INTRAMUSCULAR ONCE
Refills: 0 | Status: COMPLETED | OUTPATIENT
Start: 2023-08-25 | End: 2023-08-25

## 2023-08-25 RX ORDER — ONDANSETRON 8 MG/1
1 TABLET, FILM COATED ORAL
Qty: 30 | Refills: 0
Start: 2023-08-25 | End: 2023-09-03

## 2023-08-25 RX ORDER — ONDANSETRON 8 MG/1
4 TABLET, FILM COATED ORAL ONCE
Refills: 0 | Status: COMPLETED | OUTPATIENT
Start: 2023-08-25 | End: 2023-08-25

## 2023-08-25 RX ORDER — SODIUM CHLORIDE 9 MG/ML
1000 INJECTION, SOLUTION INTRAVENOUS ONCE
Refills: 0 | Status: DISCONTINUED | OUTPATIENT
Start: 2023-08-25 | End: 2023-08-25

## 2023-08-25 RX ORDER — SODIUM CHLORIDE 9 MG/ML
1000 INJECTION INTRAMUSCULAR; INTRAVENOUS; SUBCUTANEOUS ONCE
Refills: 0 | Status: COMPLETED | OUTPATIENT
Start: 2023-08-25 | End: 2023-08-25

## 2023-08-25 RX ORDER — METOCLOPRAMIDE HCL 10 MG
10 TABLET ORAL ONCE
Refills: 0 | Status: COMPLETED | OUTPATIENT
Start: 2023-08-25 | End: 2023-08-25

## 2023-08-25 RX ADMIN — SODIUM CHLORIDE 1000 MILLILITER(S): 9 INJECTION, SOLUTION INTRAVENOUS at 06:52

## 2023-08-25 RX ADMIN — HALOPERIDOL DECANOATE 5 MILLIGRAM(S): 100 INJECTION INTRAMUSCULAR at 03:18

## 2023-08-25 RX ADMIN — ONDANSETRON 4 MILLIGRAM(S): 8 TABLET, FILM COATED ORAL at 04:11

## 2023-08-25 RX ADMIN — Medication 1 MILLIGRAM(S): at 04:11

## 2023-08-25 RX ADMIN — Medication 1 MILLIGRAM(S): at 06:52

## 2023-08-25 RX ADMIN — SODIUM CHLORIDE 1000 MILLILITER(S): 9 INJECTION, SOLUTION INTRAVENOUS at 13:56

## 2023-08-25 RX ADMIN — Medication 10 MILLIGRAM(S): at 15:51

## 2023-08-25 RX ADMIN — Medication 1 MILLIGRAM(S): at 08:40

## 2023-08-25 RX ADMIN — SODIUM CHLORIDE 1000 MILLILITER(S): 9 INJECTION INTRAMUSCULAR; INTRAVENOUS; SUBCUTANEOUS at 03:18

## 2023-08-25 NOTE — ED PROVIDER NOTE - ATTENDING CONTRIBUTION TO CARE
57 yo M hx afib s/p ablation, not on AC, PUD, pancreatitis and hx marijuana use, presenting with complaints of nausea and nb/nb emesis. Per wife, pt has had similar episodes in the past. Pt denies chest pain, shortness of breath, diaphoresis, abdominal pain, black/bloody stools, urinary symptoms. Pt is uncomfortable appearing 2/2 nausea. Plan for meds, labs, fluids, and reassess. ddx: pancreatitis, cyclic vomiting syndrome, gastritis. Pt denies abdominal pain, no tenderness on exam, no need for CT abd/pelv at this time, plan for reassess, consider CT abd/pelv if pt develops abdominal symptoms

## 2023-08-25 NOTE — ED ADULT NURSE REASSESSMENT NOTE - NS ED NURSE REASSESS COMMENT FT1
Received patient from [facility]. He is AA&Ox4, restless, appears uncomfortable. Denies pain, no abdominal tenderness. VS stable. MD evaluated patient at bedside. Medicated as ordered.

## 2023-08-25 NOTE — ED ADULT TRIAGE NOTE - CHIEF COMPLAINT QUOTE
Pt. c/o nausea vomiting x3 hours. as per wife, patient had similar episodes in the past due to smoking marijuana. PHx Afib, acute pancreatitis, anxiety

## 2023-08-25 NOTE — ED PROVIDER NOTE - CLINICAL SUMMARY MEDICAL DECISION MAKING FREE TEXT BOX
58y Male pmhx of afib no A/C use, peptic ulcer disease, pancreatitis, marijuana use complaining of vomiting. VSS, patient actively vomiting NBNB, appears diaphoretic and hyperventilating. Abdomen non-tender. Will order CBC, CMP, lipase, Troponin, urinalysis, CXR. DDX includes but not limited to cyclic hyperemesis syndrome, pancreatitis, mesenteric ischemia, gastroenteritis. Low suspicion for cardiac etiology, heart score of 3, but will obtain troponin and ekg. Haldol given for intractable vomiting and reassess after.

## 2023-08-25 NOTE — ED ADULT TRIAGE NOTE - CHIEF COMPLAINT QUOTE
c/o dizziness and nausea. Discharged earlier this evening after being dx with hyperemesis. Patient non cooperative with answering questions. PHx afib, non compliant with med.

## 2023-08-25 NOTE — ED PROVIDER NOTE - PROGRESS NOTE DETAILS
Received patient as a signout -as per the signout patient is here for vomiting after marijuana use.  Upon my assessment in the morning around 9:00 patient denied having nausea however reported feeling anxious.  Patient denied any headache, dizziness, weakness in his extremities, chest pain, shortness of breath, abdominal pain, nausea.  Stated he vomited when he came in but did not vomit since then.  Patient did not have any tongue fasciculations or hand tremors.  Talk screen is sent.  Patient is noted to be positive for marijuana use which patient admitted to himself.  Patient is also noted to have elevated lactate on repeat VBG as well -abdomen is soft nontender however given history of pancreatitis CT is obtained which is unremarkable.  Patient is reassessed again around 3 PM -patient reports he is still feeling anxious.  Continues to deny any other symptoms.  Denies falling or hitting his head.  Patient is not on any blood thinners as per the patient.  Patient reports he generally feels this way after smoking weed.  Plan to obtain CT head -unsure if patient is reliable in terms of falling this patient continues to feel agitated.  Patient has been afebrile.  ANO x3.  Equal strength and sensation in all extremities.  Patient walked to the bathroom with 1 person assist -the minimal assistance however..  Smooth gait. Kevin PGY 3 Patient was amatory when son arrived to the ED will cancel CAT scan given that patient confirms that the patient is at his baseline as has happened to him multiple times in the past.  pt Ambulated without assistance

## 2023-08-25 NOTE — ED PROVIDER NOTE - NSFOLLOWUPINSTRUCTIONS_ED_ALL_ED_FT
You were sent Zofran to your pharmacy please use for nausea and vomiting up to every 8 hours as needed  Vomiting is when stomach contents forcefully come out of the mouth. Many people notice nausea before vomiting. Vomiting can make you feel weak and cause you to become dehydrated.    Dehydration can make you feel tired and thirsty, cause you to have a dry mouth, and decrease how often you urinate. Older adults and people who have other diseases or a weak body defense system (immune system) are at higher risk for dehydration. It is important to treat vomiting as told by your health care provider.    Follow these instructions at home:  Washing hands with soap and water.  Watch your symptoms for any changes. Tell your health care provider about them.    Eating and drinking    Bananas next to a bowl of applesauce.  A bottle of clear fruit juice and glass of water.  Follow these recommendations as told by your health care provider:  Take an oral rehydration solution (ORS). This is a drink that is sold at pharmacies and retail stores.  Eat bland, easy-to-digest foods in small amounts as you are able. These foods include bananas, applesauce, rice, lean meats, toast, and crackers.  Drink clear fluids slowly and in small amounts as you are able. Clear fluids include water, ice chips, low-calorie sports drinks, and fruit juice that has water added (diluted fruit juice).  Avoid drinking fluids that contain a lot of sugar or caffeine, such as energy drinks, sports drinks, and soda.  Avoid alcohol.  Avoid spicy or fatty foods.  General instructions    Wash your hands often using soap and water for at least 20 seconds. If soap and water are not available, use hand .  Make sure that everyone in your household washes their hands frequently.  Take over-the-counter and prescription medicines only as told by your health care provider.  Rest at home while you recover.  Watch your condition for any changes.  Keep all follow-up visits. This is important.  Contact a health care provider if:  Your vomiting gets worse.  You have new symptoms.  You have a fever.  You cannot drink fluids without vomiting.  You feel light-headed or dizzy.  You have a headache.  You have muscle cramps.  You have a rash.  You have pain while urinating.  Get help right away if:  You have pain in your chest, neck, arm, or jaw.  Your heart is beating very quickly.  You have trouble breathing or you are breathing very quickly.  You feel extremely weak or you faint.  Your skin feels cold and clammy.  You feel confused.  You have persistent vomiting.  You have vomit that is bright red or looks like black coffee grounds.  You have stools (feces) that are bloody or black, or stools that look like tar.  You have a severe headache, a stiff neck, or both.  You have severe pain, cramping, or bloating in your abdomen.  You have signs of dehydration, such as:  Dark urine, very little urine, or no urine.  Cracked lips.  Dry mouth.  Sunken eyes.  Sleepiness.  Weakness.  These symptoms may be an emergency. Get help right away. Call 911.  Do not wait to see if the symptoms will go away.  Do not drive yourself to the hospital.  Summary  Vomiting is when stomach contents forcefully come out of the mouth. Vomiting can cause you to become dehydrated.  It is important to treat vomiting as told by your health care provider. Follow your health care provider's instructions about eating and drinking.  Wash your hands often using soap and water for at least 20 seconds. If soap and water are not available, use hand .  Watch your condition for any changes and for signs of dehydration.  Keep all follow-up visits. This is important.  This information is not intended to replace advice given to you by your health care provider. Make sure you discuss any questions you have with your health care provider.

## 2023-08-25 NOTE — ED PROVIDER NOTE - CPE EDP GASTRO NORM
normal...
Tolerates Diet Consistency Well  Tolerates Fluid Consistency Well  No Chewing/Swallowing Difficulties (Per Patient)

## 2023-08-25 NOTE — ED PROVIDER NOTE - OBJECTIVE STATEMENT
58y Male pmhx of afib s/p ablation and no A/C use, peptic ulcer disease, pancreatitis, marijuana use complaining of vomiting. NBNB vomiting began 7 hours ago [8pm]. Marijuana use prior to onset of symptoms. Wife reports similar episode several months ago after marijuana use and diagnosed with pancreatitis.  No fevers, CP, SOB, cough, abdominal pain.

## 2023-08-25 NOTE — ED ADULT NURSE NOTE - OBJECTIVE STATEMENT
Pt presents to the ED for c/o nausea and vomiting. Wife states recent marijuana use. Pt appears uncomfortable; not able to identify abdominal pain. Respirations even and unlabored. Pt in no acute distress. Specimen obtained and sent to lab. IV placed to left FA; patent and intact. Wife at the bedside. Pt breathing room air. Safety initiated and maintained. Pt presents to the ED for c/o nausea and vomiting. Wife states recent marijuana use. Pt appears uncomfortable and agitated, not able to identify abdominal pain. Respirations even and unlabored. Pt in no acute distress. Specimen obtained and sent to lab. IV placed to left FA; patent and intact. Wife at the bedside. Pt breathing room air. Safety initiated and maintained.

## 2023-08-25 NOTE — ED PROVIDER NOTE - PATIENT PORTAL LINK FT
You can access the FollowMyHealth Patient Portal offered by [facility] by registering at the following website: [email or web address]. By joining [employer]’s FollowMyHealth portal, you will also be able to view your health information using other applications (apps) compatible with our system.

## 2023-08-26 ENCOUNTER — EMERGENCY (EMERGENCY)
Facility: HOSPITAL | Age: 58
LOS: 1 days | Discharge: HOME | End: 2023-08-26
Admitting: EMERGENCY MEDICINE
Payer: MEDICAID

## 2023-08-26 VITALS
TEMPERATURE: 98 F | SYSTOLIC BLOOD PRESSURE: 119 MMHG | DIASTOLIC BLOOD PRESSURE: 63 MMHG | RESPIRATION RATE: 16 BRPM | HEART RATE: 51 BPM | OXYGEN SATURATION: 99 %

## 2023-08-26 VITALS
SYSTOLIC BLOOD PRESSURE: 121 MMHG | RESPIRATION RATE: 18 BRPM | OXYGEN SATURATION: 100 % | DIASTOLIC BLOOD PRESSURE: 70 MMHG | TEMPERATURE: 98 F | HEART RATE: 60 BPM

## 2023-08-26 DIAGNOSIS — Z98.890 OTHER SPECIFIED POSTPROCEDURAL STATES: Chronic | ICD-10-CM

## 2023-08-26 LAB
ALBUMIN SERPL ELPH-MCNC: 4.5 G/DL — SIGNIFICANT CHANGE UP (ref 3.3–5)
ALP SERPL-CCNC: 79 U/L — SIGNIFICANT CHANGE UP (ref 40–120)
ALT FLD-CCNC: 22 U/L — SIGNIFICANT CHANGE UP (ref 4–41)
ANION GAP SERPL CALC-SCNC: 16 MMOL/L — HIGH (ref 7–14)
AST SERPL-CCNC: 43 U/L — HIGH (ref 4–40)
BASE EXCESS BLDV CALC-SCNC: -3.3 MMOL/L — LOW (ref -2–3)
BASE EXCESS BLDV CALC-SCNC: 1.2 MMOL/L — SIGNIFICANT CHANGE UP (ref -2–3)
BASOPHILS # BLD AUTO: 0.02 K/UL — SIGNIFICANT CHANGE UP (ref 0–0.2)
BASOPHILS NFR BLD AUTO: 0.1 % — SIGNIFICANT CHANGE UP (ref 0–2)
BILIRUB SERPL-MCNC: 1 MG/DL — SIGNIFICANT CHANGE UP (ref 0.2–1.2)
BLOOD GAS VENOUS COMPREHENSIVE RESULT: SIGNIFICANT CHANGE UP
BLOOD GAS VENOUS COMPREHENSIVE RESULT: SIGNIFICANT CHANGE UP
BUN SERPL-MCNC: 20 MG/DL — SIGNIFICANT CHANGE UP (ref 7–23)
CALCIUM SERPL-MCNC: 9.3 MG/DL — SIGNIFICANT CHANGE UP (ref 8.4–10.5)
CHLORIDE BLDV-SCNC: 106 MMOL/L — SIGNIFICANT CHANGE UP (ref 96–108)
CHLORIDE BLDV-SCNC: 109 MMOL/L — HIGH (ref 96–108)
CHLORIDE SERPL-SCNC: 105 MMOL/L — SIGNIFICANT CHANGE UP (ref 98–107)
CO2 BLDV-SCNC: 22.4 MMOL/L — SIGNIFICANT CHANGE UP (ref 22–26)
CO2 BLDV-SCNC: 24.3 MMOL/L — SIGNIFICANT CHANGE UP (ref 22–26)
CO2 SERPL-SCNC: 21 MMOL/L — LOW (ref 22–31)
CREAT SERPL-MCNC: 1.08 MG/DL — SIGNIFICANT CHANGE UP (ref 0.5–1.3)
EGFR: 80 ML/MIN/1.73M2 — SIGNIFICANT CHANGE UP
EOSINOPHIL # BLD AUTO: 0.01 K/UL — SIGNIFICANT CHANGE UP (ref 0–0.5)
EOSINOPHIL NFR BLD AUTO: 0.1 % — SIGNIFICANT CHANGE UP (ref 0–6)
GAS PNL BLDV: 137 MMOL/L — SIGNIFICANT CHANGE UP (ref 136–145)
GAS PNL BLDV: 138 MMOL/L — SIGNIFICANT CHANGE UP (ref 136–145)
GLUCOSE BLDV-MCNC: 103 MG/DL — HIGH (ref 70–99)
GLUCOSE BLDV-MCNC: 132 MG/DL — HIGH (ref 70–99)
GLUCOSE SERPL-MCNC: 129 MG/DL — HIGH (ref 70–99)
HCO3 BLDV-SCNC: 21 MMOL/L — LOW (ref 22–29)
HCO3 BLDV-SCNC: 23 MMOL/L — SIGNIFICANT CHANGE UP (ref 22–29)
HCT VFR BLD CALC: 37 % — LOW (ref 39–50)
HCT VFR BLD CALC: 41.9 % — SIGNIFICANT CHANGE UP (ref 39–50)
HCT VFR BLDA CALC: 39 % — SIGNIFICANT CHANGE UP (ref 39–51)
HCT VFR BLDA CALC: 44 % — SIGNIFICANT CHANGE UP (ref 39–51)
HGB BLD CALC-MCNC: 12.9 G/DL — SIGNIFICANT CHANGE UP (ref 12.6–17.4)
HGB BLD CALC-MCNC: 14.7 G/DL — SIGNIFICANT CHANGE UP (ref 12.6–17.4)
HGB BLD-MCNC: 12.6 G/DL — LOW (ref 13–17)
HGB BLD-MCNC: 14.5 G/DL — SIGNIFICANT CHANGE UP (ref 13–17)
IANC: 12.72 K/UL — HIGH (ref 1.8–7.4)
IMM GRANULOCYTES NFR BLD AUTO: 0.4 % — SIGNIFICANT CHANGE UP (ref 0–0.9)
LACTATE BLDV-MCNC: 1.2 MMOL/L — SIGNIFICANT CHANGE UP (ref 0.5–2)
LACTATE BLDV-MCNC: 2.5 MMOL/L — HIGH (ref 0.5–2)
LYMPHOCYTES # BLD AUTO: 14 % — SIGNIFICANT CHANGE UP (ref 13–44)
LYMPHOCYTES # BLD AUTO: 2.21 K/UL — SIGNIFICANT CHANGE UP (ref 1–3.3)
MCHC RBC-ENTMCNC: 28.7 PG — SIGNIFICANT CHANGE UP (ref 27–34)
MCHC RBC-ENTMCNC: 28.9 PG — SIGNIFICANT CHANGE UP (ref 27–34)
MCHC RBC-ENTMCNC: 34.1 GM/DL — SIGNIFICANT CHANGE UP (ref 32–36)
MCHC RBC-ENTMCNC: 34.6 GM/DL — SIGNIFICANT CHANGE UP (ref 32–36)
MCV RBC AUTO: 83.6 FL — SIGNIFICANT CHANGE UP (ref 80–100)
MCV RBC AUTO: 84.3 FL — SIGNIFICANT CHANGE UP (ref 80–100)
MONOCYTES # BLD AUTO: 0.78 K/UL — SIGNIFICANT CHANGE UP (ref 0–0.9)
MONOCYTES NFR BLD AUTO: 4.9 % — SIGNIFICANT CHANGE UP (ref 2–14)
NEUTROPHILS # BLD AUTO: 12.72 K/UL — HIGH (ref 1.8–7.4)
NEUTROPHILS NFR BLD AUTO: 80.5 % — HIGH (ref 43–77)
NRBC # BLD: 0 /100 WBCS — SIGNIFICANT CHANGE UP (ref 0–0)
NRBC # BLD: 0 /100 WBCS — SIGNIFICANT CHANGE UP (ref 0–0)
NRBC # FLD: 0 K/UL — SIGNIFICANT CHANGE UP (ref 0–0)
NRBC # FLD: 0 K/UL — SIGNIFICANT CHANGE UP (ref 0–0)
PCO2 BLDV: 30 MMHG — LOW (ref 42–55)
PCO2 BLDV: 36 MMHG — LOW (ref 42–55)
PH BLDV: 7.38 — SIGNIFICANT CHANGE UP (ref 7.32–7.43)
PH BLDV: 7.5 — HIGH (ref 7.32–7.43)
PLATELET # BLD AUTO: 239 K/UL — SIGNIFICANT CHANGE UP (ref 150–400)
PLATELET # BLD AUTO: 288 K/UL — SIGNIFICANT CHANGE UP (ref 150–400)
PO2 BLDV: 27 MMHG — SIGNIFICANT CHANGE UP (ref 25–45)
PO2 BLDV: 66 MMHG — HIGH (ref 25–45)
POTASSIUM BLDV-SCNC: 3.7 MMOL/L — SIGNIFICANT CHANGE UP (ref 3.5–5.1)
POTASSIUM BLDV-SCNC: 4 MMOL/L — SIGNIFICANT CHANGE UP (ref 3.5–5.1)
POTASSIUM SERPL-MCNC: 4 MMOL/L — SIGNIFICANT CHANGE UP (ref 3.5–5.3)
POTASSIUM SERPL-SCNC: 4 MMOL/L — SIGNIFICANT CHANGE UP (ref 3.5–5.3)
PROT SERPL-MCNC: 7.4 G/DL — SIGNIFICANT CHANGE UP (ref 6–8.3)
RBC # BLD: 4.39 M/UL — SIGNIFICANT CHANGE UP (ref 4.2–5.8)
RBC # BLD: 5.01 M/UL — SIGNIFICANT CHANGE UP (ref 4.2–5.8)
RBC # FLD: 13.7 % — SIGNIFICANT CHANGE UP (ref 10.3–14.5)
RBC # FLD: 13.9 % — SIGNIFICANT CHANGE UP (ref 10.3–14.5)
SAO2 % BLDV: 42 % — LOW (ref 67–88)
SAO2 % BLDV: 92.7 % — HIGH (ref 67–88)
SODIUM SERPL-SCNC: 142 MMOL/L — SIGNIFICANT CHANGE UP (ref 135–145)
TROPONIN T, HIGH SENSITIVITY RESULT: 15 NG/L — SIGNIFICANT CHANGE UP
WBC # BLD: 13.44 K/UL — HIGH (ref 3.8–10.5)
WBC # BLD: 15.8 K/UL — HIGH (ref 3.8–10.5)
WBC # FLD AUTO: 13.44 K/UL — HIGH (ref 3.8–10.5)
WBC # FLD AUTO: 15.8 K/UL — HIGH (ref 3.8–10.5)

## 2023-08-26 PROCEDURE — 99284 EMERGENCY DEPT VISIT MOD MDM: CPT

## 2023-08-26 PROCEDURE — 93010 ELECTROCARDIOGRAM REPORT: CPT | Mod: 76

## 2023-08-26 PROCEDURE — 93010 ELECTROCARDIOGRAM REPORT: CPT

## 2023-08-26 PROCEDURE — 71045 X-RAY EXAM CHEST 1 VIEW: CPT | Mod: 26

## 2023-08-26 RX ORDER — HYDROXYZINE HCL 10 MG
1 TABLET ORAL
Qty: 15 | Refills: 0
Start: 2023-08-26 | End: 2023-08-30

## 2023-08-26 RX ORDER — ONDANSETRON 8 MG/1
4 TABLET, FILM COATED ORAL ONCE
Refills: 0 | Status: DISCONTINUED | OUTPATIENT
Start: 2023-08-26 | End: 2023-08-26

## 2023-08-26 RX ORDER — ONDANSETRON 8 MG/1
4 TABLET, FILM COATED ORAL ONCE
Refills: 0 | Status: COMPLETED | OUTPATIENT
Start: 2023-08-26 | End: 2023-08-26

## 2023-08-26 RX ORDER — FAMOTIDINE 10 MG/ML
20 INJECTION INTRAVENOUS ONCE
Refills: 0 | Status: COMPLETED | OUTPATIENT
Start: 2023-08-26 | End: 2023-08-26

## 2023-08-26 RX ORDER — SODIUM CHLORIDE 9 MG/ML
1000 INJECTION INTRAMUSCULAR; INTRAVENOUS; SUBCUTANEOUS ONCE
Refills: 0 | Status: COMPLETED | OUTPATIENT
Start: 2023-08-26 | End: 2023-08-26

## 2023-08-26 RX ORDER — HALOPERIDOL DECANOATE 100 MG/ML
5 INJECTION INTRAMUSCULAR ONCE
Refills: 0 | Status: COMPLETED | OUTPATIENT
Start: 2023-08-26 | End: 2023-08-26

## 2023-08-26 RX ADMIN — SODIUM CHLORIDE 1000 MILLILITER(S): 9 INJECTION INTRAMUSCULAR; INTRAVENOUS; SUBCUTANEOUS at 02:21

## 2023-08-26 RX ADMIN — SODIUM CHLORIDE 1000 MILLILITER(S): 9 INJECTION INTRAMUSCULAR; INTRAVENOUS; SUBCUTANEOUS at 01:19

## 2023-08-26 RX ADMIN — ONDANSETRON 4 MILLIGRAM(S): 8 TABLET, FILM COATED ORAL at 03:04

## 2023-08-26 RX ADMIN — FAMOTIDINE 20 MILLIGRAM(S): 10 INJECTION INTRAVENOUS at 01:18

## 2023-08-26 RX ADMIN — HALOPERIDOL DECANOATE 5 MILLIGRAM(S): 100 INJECTION INTRAMUSCULAR at 01:16

## 2023-08-26 RX ADMIN — Medication 2 MILLIGRAM(S): at 19:45

## 2023-08-26 NOTE — ED PROVIDER NOTE - PHYSICAL EXAMINATION
GENERAL: Actively retching  HEENT:  Atraumatic  CHEST/LUNG: Chest rise equal bilaterally  HEART: Regular rate and rhythm  ABDOMEN: Soft, Nontender, Nondistended  EXTREMITIES:  Extremities warm  PSYCH: A&Ox3  SKIN: No obvious rashes or lesions  NEUROLOGY: strength and sensation intact in all extremities

## 2023-08-26 NOTE — ED ADULT NURSE NOTE - NSFALLUNIVINTERV_ED_ALL_ED
Bed/Stretcher in lowest position, wheels locked, appropriate side rails in place/Call bell, personal items and telephone in reach/Instruct patient to call for assistance before getting out of bed/chair/stretcher/Non-slip footwear applied when patient is off stretcher/[place] to call system/Physically safe environment - no spills, clutter or unnecessary equipment/Purposeful proactive rounding/Room/bathroom lighting operational, light cord in reach

## 2023-08-26 NOTE — ED PROVIDER NOTE - ATTENDING CONTRIBUTION TO CARE
Ira Pandey MD attending physician patient is a 58-year-old male with history of A-fib status post ablation not on any AC.  He does have peptic ulcer disease history of pancreatitis marijuana use comes in complaining about nausea chest discomfort.  He was seen yesterday for the same and used marijuana yesterday but reportedly last night got Ativan which improved him labs yesterday showed a white count of 18,000 and lactates of about 3 done 3 separate times with a normal pH.  Patient is not on metformin or other medication that creates lactic acidosis his lipase yesterday was 16 tropes were tested x2 6 and 7.  His EKG here shows some mild ST depressions laterally and inferiorly that are consistent with his EKG from 2019    Here patient is awake looks uncomfortable vital signs are all reasonable.  He states he smokes marijuana most days but did not smoke today.  He denies alcohol use    Pt alert and can phonate well laying on his stomach looking uncomfortable  h at/nc  perrl, conj clear, sclera anicteric,  neck supple  abd soft no r/g/t  ext no edema no deformities  neueo awake, lucid normal gait moves all extremities with strength  psych withdrawn  vs reasonable    Concerned about the white count from yesterday we will send labs again also concerned about the lactate.  I am hydrating now giving antiemetics we will give Haldol although the rest of the evaluation yesterday was reassuring.  Could be his ulcer disease as well.  He was CAT scan yesterday and there was nothing found on CAT scan that was actionable.  We will treat with famotidine as well and antinausea meds    Ira Pandey MD attending physician.  I attest I performed a history and physical of the patient and discussed their management with the resident and or advanced care provider.  I reviewed the resident and/or ACP's note and agree with the documented findings and plan of care with the exception of my documented changes if any.  My medical decision making and observations are found above.

## 2023-08-26 NOTE — ED PROVIDER NOTE - CLINICAL SUMMARY MEDICAL DECISION MAKING FREE TEXT BOX
59 y/o Male pmhx of afib s/p ablation and no A/C use, peptic ulcer disease, pancreatitis, marijuana use (last used 1 day ago) c/o lightheadedness, nausea, and retching/vomiting. Pt presented w/ similar symptoms in the ED and was discharged today after haldol and ativan, but symptoms began when patient went home, prompting ED arrival. Pt is currently heaving, otherwise asymptomatic. Denies fevers, chills, chest pain, SOB, abdominal pain, dysuria, hematuria. Benign abdominal examination, pt is actively retching/vomiting. Will draw labs, lipase, troponin given EKG findings w/o prior for comparison. Will screen for ACS (troponin), anemia/electrolytes, and chest x ray to screen for cardiopulmonary pathology. Fluids, ativan/haldol, and reassess w/ likely d/c w/ close PCP and psychiatry f/u. 57 y/o Male pmhx of afib s/p ablation and no A/C use, peptic ulcer disease, pancreatitis, marijuana use (last used 1 day ago) c/o lightheadedness, nausea, and retching/vomiting. Pt presented w/ similar symptoms in the ED and was discharged today after haldol and ativan, but symptoms began when patient went home, prompting ED arrival. Pt is currently heaving, otherwise asymptomatic. Denies fevers, chills, chest pain, SOB, abdominal pain, dysuria, hematuria. Benign abdominal examination, pt is actively retching/vomiting. Will draw labs, lipase, troponin given EKG findings w/o prior for comparison. Will screen for ACS (troponin), anemia/electrolytes, and chest x ray to screen for cardiopulmonary pathology. Fluids, ativan/haldol, and reassess w/ likely d/c w/ close PCP and psychiatry f/u.    Ira Pandey MD attending physician patient is a 58-year-old male with history of A-fib status post ablation not on any AC.  He does have peptic ulcer disease history of pancreatitis marijuana use comes in complaining about nausea chest discomfort.  He was seen yesterday for the same and used marijuana yesterday but reportedly last night got Ativan which improved him labs yesterday showed a white count of 18,000 and lactates of about 3 done 3 separate times with a normal pH.  Patient is not on metformin or other medication that creates lactic acidosis his lipase yesterday was 16 tropes were tested x2 6 and 7.  His EKG here shows some mild ST depressions laterally and inferiorly that are consistent with his EKG from 2019    Here patient is awake looks uncomfortable vital signs are all reasonable.  He states he smokes marijuana most days but did not smoke today.  He denies alcohol use    Pt alert and can phonate well laying on his stomach looking uncomfortable  h at/nc  perrl, conj clear, sclera anicteric,  neck supple  abd soft no r/g/t  ext no edema no deformities  neueo awake, lucid normal gait moves all extremities with strength  psych withdrawn  vs reasonable    Concerned about the white count from yesterday we will send labs again also concerned about the lactate.  I am hydrating now giving antiemetics we will give Haldol although the rest of the evaluation yesterday was reassuring.  Could be his ulcer disease as well.  He was CAT scan yesterday and there was nothing found on CAT scan that was actionable.  We will treat with famotidine as well and antinausea meds

## 2023-08-26 NOTE — ED PROVIDER NOTE - PROGRESS NOTE DETAILS
Santana Kemp MD: Patient feeling much better. Spoke with wife, answered her questions. Family member to pick him up.

## 2023-08-26 NOTE — ED ADULT NURSE NOTE - OBJECTIVE STATEMENT
Break RN: Received pt to Rm 22. Pt is a 59 y/o Male, A&Ox4, ambulatory with PHx of afib. Pt presents to the ED with c/o of nausea, abdominal discomfort and dizziness. Pt discharged from hospital yesterday with diagnosis of hyperemesis. Pt not answering assessment questions at this time. Son at bedside as historian. Son states pt smokes marijuana daily. Respirations even and unlabored, chest rise equal b/l. Sinus rhythm noted on cardiac monitor. Abdomen soft, nontender, nondistended. Skin warm, dry and intact. VS as noted in flow sheets. 18g IVL placed in LAC. Labs collected and sent. Medications administered as ordered, see MAR. Safety maintained throughout. Will continue to monitor.

## 2023-08-26 NOTE — ED PROVIDER NOTE - PATIENT PORTAL LINK FT
You can access the FollowMyHealth Patient Portal offered by [facility] by registering at the following website: [email or web address]. By joining [employer]’s FollowMyHealth portal, you will also be able to view your health information using other applications (apps) compatible with our system.

## 2023-08-26 NOTE — ED PROVIDER NOTE - NSFOLLOWUPINSTRUCTIONS_ED_ALL_ED_FT
Nausea / Vomiting    Nausea is the feeling that you have to vomit. As nausea gets worse, it can lead to vomiting. Vomiting puts you at an increased risk for dehydration. Older adults and people with other diseases or a weak immune system are at higher risk for dehydration. Drink clear fluids in small but frequent amounts as tolerated. Eat bland, easy-to-digest foods in small amounts as tolerated.    SEEK IMMEDIATE MEDICAL CARE IF YOU HAVE ANY OF THE FOLLOWING SYMPTOMS: fever, inability to keep sufficient fluids down, black or bloody vomitus, black or bloody stools, lightheadedness/dizziness, chest pain, severe headache, rash, shortness of breath, cold or clammy skin, confusion, pain with urination, or any signs of dehydration.     Please stop using marijuana.    The results of any blood tests and imaging studies completed during your visit today were discussed and explained to you and a copy provided with your discharge instructions. Please follow up with your primary care doctor within 48 hours.

## 2023-08-26 NOTE — ED ADULT NURSE REASSESSMENT NOTE - NS ED NURSE REASSESS COMMENT FT1
Patient sleeping but opens eyes with verbal stimuli, appears comfortable and in no apparent distress. He states, he is feeling better. Denies any pain at this time. Awaiting completion of fluids to repeat lactate lab. Respirations equal and unlabored. Awaiting further orders from MD.

## 2023-08-26 NOTE — ED PROVIDER NOTE - PATIENT PORTAL LINK FT
You can access the FollowMyHealth Patient Portal offered by [facility] by registering at the following website: [email or web address]. By joining [employer]’s FollowMyHealth portal, you will also be able to view your health information using other applications (apps) compatible with our system.

## 2023-08-26 NOTE — ED ADULT NURSE NOTE - NSFALLUNIVINTERV_ED_ALL_ED
Bed/Stretcher in lowest position, wheels locked, appropriate side rails in place/Call bell, personal items and telephone in reach/Instruct patient to call for assistance before getting out of bed/chair/stretcher/Non-slip footwear applied when patient is off stretcher/[place] to call system/Physically safe environment - no spills, clutter or unnecessary equipment/Purposeful proactive rounding/Room/bathroom lighting operational, light cord in reach

## 2023-08-26 NOTE — ED PROVIDER NOTE - OBJECTIVE STATEMENT
59 y/o M  hx  Anxiety, A Fib BIB family secondary to increase anxiety.  Admits to feeling nervous secondary to financial issues.    Admits to multiple social stressors and inability to cope.  Appears anxious. No evidence of physical injuries, broken skin or deformities. Denies pain, SOB, fever, chills chest/ abdominal discomfort.  Denies falling, punching or kicking any objects.  Denies use of alochol or illicit drugs.

## 2023-08-26 NOTE — ED ADULT NURSE NOTE - CHIEF COMPLAINT QUOTE
Pt was discharged from [facility] ED this morning for N/V this morning. Wife brought pt back to ED for c/o feeling increased anxiety, nausea, restlessness, fidgety. Wife states he took 6 showers today.  PHx Anxiety, marijuana abuse, Afib not on any anticoagulants. Endorses last marijuana use yesterday 5pm. Wife requesting pt to be evaluated by psychiatrist. Pt constantly rubbing hand up and down his legs in triage.

## 2023-08-26 NOTE — ED ADULT NURSE NOTE - OBJECTIVE STATEMENT
Received pt in  pt c/o anxiety denies si/hi/avh presently, emotional support provided eval on going.

## 2023-08-26 NOTE — ED ADULT TRIAGE NOTE - CHIEF COMPLAINT QUOTE
Pt was discharged from [facility] ED this morning for N/V this morning. Wife brought pt back to ED for c/o feeling increased anxiety, nausea, restlessness, fidgety. Wife states he took 6 showers today.  PHx Afib not on any anticoagulants. Endorses last marijuana use yesterday 5pm. Pt was discharged from [facility] ED this morning for N/V this morning. Wife brought pt back to ED for c/o feeling increased anxiety, nausea, restlessness, fidgety. Wife states he took 6 showers today.  PHx Anxiety, marijuana abuse, Afib not on any anticoagulants. Endorses last marijuana use yesterday 5pm. Wife requesting pt to be evaluated by psychiatrist. Pt constantly rubbing hand up and down his legs in triage.

## 2023-08-26 NOTE — ED PROVIDER NOTE - CLINICAL SUMMARY MEDICAL DECISION MAKING FREE TEXT BOX
59 y/o M  hx  Anxiety, A Fib   Medication offered. Tolerated same well.  Medical evaluation performed. There is no clinical evidence of intoxication or any acute medical problem requiring immediate intervention.  Recommend following up with [facility].

## 2023-08-26 NOTE — ED ADULT NURSE REASSESSMENT NOTE - NS ED NURSE REASSESS COMMENT FT1
Patient discharged home, IV removed. MD discussed discharge instructions with patient. Patient escorted to waiting room to wait for son. Patient is awake and alert, A&O x 3, NAD, ambulatory without assistance. Respirations equal and unlabored.

## 2023-08-26 NOTE — ED PROVIDER NOTE - OBJECTIVE STATEMENT
57 y/o Male pmhx of afib s/p ablation and no A/C use, peptic ulcer disease, pancreatitis, marijuana use (last used 1 day ago) c/o lightheadedness, nausea, and retching/vomiting. Pt presented w/ similar symptoms in the ED and was discharged today after haldol and ativan, but symptoms began when patient went home, prompting ED arrival. Pt is currently heaving, otherwise asymptomatic. Denies fevers, chills, chest pain, SOB, abdominal pain, dysuria, hematuria.

## 2023-12-04 NOTE — ED PROVIDER NOTE - PMH
Rn -> pump and schedule CS   
Alcohol use    Anxiety    Atrial fibrillation, unspecified type    GERD (gastroesophageal reflux disease)    Hyperlipidemia    Marijuana use    Peptic ulcer disease

## 2024-02-08 ENCOUNTER — NON-APPOINTMENT (OUTPATIENT)
Age: 59
End: 2024-02-08

## 2024-02-08 ENCOUNTER — APPOINTMENT (OUTPATIENT)
Dept: CARDIOLOGY | Facility: CLINIC | Age: 59
End: 2024-02-08
Payer: MEDICAID

## 2024-02-08 VITALS
BODY MASS INDEX: 27.7 KG/M2 | RESPIRATION RATE: 17 BRPM | DIASTOLIC BLOOD PRESSURE: 70 MMHG | OXYGEN SATURATION: 99 % | WEIGHT: 209 LBS | SYSTOLIC BLOOD PRESSURE: 114 MMHG | HEART RATE: 62 BPM | HEIGHT: 73 IN

## 2024-02-08 DIAGNOSIS — I34.0 NONRHEUMATIC MITRAL (VALVE) INSUFFICIENCY: ICD-10-CM

## 2024-02-08 PROCEDURE — 93000 ELECTROCARDIOGRAM COMPLETE: CPT

## 2024-02-08 PROCEDURE — G2211 COMPLEX E/M VISIT ADD ON: CPT | Mod: NC,1L

## 2024-02-08 PROCEDURE — 99214 OFFICE O/P EST MOD 30 MIN: CPT | Mod: 25

## 2024-02-08 RX ORDER — APIXABAN 5 MG/1
5 TABLET, FILM COATED ORAL
Qty: 180 | Refills: 1 | Status: DISCONTINUED | COMMUNITY
Start: 2020-12-10 | End: 2024-02-08

## 2024-02-08 RX ORDER — METOPROLOL SUCCINATE 100 MG/1
100 TABLET, EXTENDED RELEASE ORAL DAILY
Qty: 90 | Refills: 4 | Status: DISCONTINUED | COMMUNITY
Start: 2021-06-25 | End: 2024-02-08

## 2024-02-08 NOTE — REVIEW OF SYSTEMS
[Fever] : no fever [Weight Gain (___ Lbs)] : no recent weight gain [Feeling Fatigued] : not feeling fatigued [Weight Loss (___ Lbs)] : no recent weight loss [Dyspnea on exertion] : not dyspnea during exertion [Chest Discomfort] : no chest discomfort [Lower Ext Edema] : no extremity edema [Palpitations] : no palpitations [Syncope] : no syncope [Negative] : Heme/Lymph [de-identified] : No change

## 2024-02-08 NOTE — PHYSICAL EXAM
[Well Developed] : well developed [Well Nourished] : well nourished [No Acute Distress] : no acute distress [Normal Venous Pressure] : normal venous pressure [No Carotid Bruit] : no carotid bruit [Normal S1, S2] : normal S1, S2 [No Murmur] : no murmur [No Rub] : no rub [No Gallop] : no gallop [Clear Lung Fields] : clear lung fields [Good Air Entry] : good air entry [No Respiratory Distress] : no respiratory distress  [Soft] : abdomen soft [Non Tender] : non-tender [No Masses/organomegaly] : no masses/organomegaly [Normal Bowel Sounds] : normal bowel sounds [Normal Gait] : normal gait [No Cyanosis] : no cyanosis [No Edema] : no edema [No Clubbing] : no clubbing [No Varicosities] : no varicosities [Normal Radial B/L] : normal radial B/L [Normal PT B/L] : normal PT B/L [Normal DP B/L] : normal DP B/L [No Rash] : no rash [No Skin Lesions] : no skin lesions [Moves all extremities] : moves all extremities [No Focal Deficits] : no focal deficits [Normal Speech] : normal speech [Alert and Oriented] : alert and oriented [Normal memory] : normal memory [de-identified] : No murmur audible today despite previous mild to moderate MR [General Appearance - Well Developed] : well developed [Normal Appearance] : normal appearance [Well Groomed] : well groomed [General Appearance - Well Nourished] : well nourished [No Deformities] : no deformities [General Appearance - In No Acute Distress] : no acute distress [Normal Conjunctiva] : the conjunctiva exhibited no abnormalities [Eyelids - No Xanthelasma] : the eyelids demonstrated no xanthelasmas [Normal Oral Mucosa] : normal oral mucosa [No Oral Pallor] : no oral pallor [No Oral Cyanosis] : no oral cyanosis [Normal Jugular Venous A Waves Present] : normal jugular venous A waves present [Normal Jugular Venous V Waves Present] : normal jugular venous V waves present [No Jugular Venous Ortega A Waves] : no jugular venous ortega A waves [Respiration, Rhythm And Depth] : normal respiratory rhythm and effort [Exaggerated Use Of Accessory Muscles For Inspiration] : no accessory muscle use [Auscultation Breath Sounds / Voice Sounds] : lungs were clear to auscultation bilaterally [Heart Rate And Rhythm] : heart rate and rhythm were normal [Heart Sounds] : normal S1 and S2 [Abdomen Soft] : soft [Abdomen Tenderness] : non-tender [Abdomen Mass (___ Cm)] : no abdominal mass palpated [Abnormal Walk] : normal gait [Gait - Sufficient For Exercise Testing] : the gait was sufficient for exercise testing [Nail Clubbing] : no clubbing of the fingernails [Cyanosis, Localized] : no localized cyanosis [Petechial Hemorrhages (___cm)] : no petechial hemorrhages [Skin Color & Pigmentation] : normal skin color and pigmentation [] : no rash [No Venous Stasis] : no venous stasis [Skin Lesions] : no skin lesions [No Skin Ulcers] : no skin ulcer [No Xanthoma] : no  xanthoma was observed [FreeTextEntry1] : No rashes. No cyanosis [Oriented To Time, Place, And Person] : oriented to person, place, and time [Affect] : the affect was normal [Mood] : the mood was normal [No Anxiety] : not feeling anxious

## 2024-02-08 NOTE — ASSESSMENT
[FreeTextEntry1] : (51-year-old man with long history of prolonged palpitations, finally demonstrating rapid atrial fibrillation in 2016. Was hospitalized for 6 days and made troponin, but no invasive testing, and no stress testing. Was told he had a normal echo with no scar and reportedly had a normal echo in [place] as well. No murmurs on exam. His prior EKG showed sinus rhythm with occasional APCs that are aberrantly conducted with a right bundle branch pattern. No Q waves. The patient was on sotalol 120 mg q.12 h and has had three breakthrough already. No risk factors for coronary artery disease except hyperlipidemia. He does get chest pain during his atrial fibrillation, but no chest pain with normal activity. On his plain stress test he got short of breath, but no chest pain, and there were suspicious ST depressions. On a follow up nuclear stress test, he was in A. fib, had definitely abnormal ST depressions, and an abnormal scan, including T.I.D. The next day he was back in sinus rhythm and his EKG is otherwise unremarkable with some nonspecific ST changes. Given all of these findings, I felt he clearly would benefit and needed coronary angiography. This showed no significant obstructive disease. I changed him to Flecainide for better control. He was in sinus rhythm, but claimed he had been breaking through frequently. I therefore wanted to increase his flecainide to 100 mg, but the patient doesn't like how he feels on it, although he could not be more specific. He thinks he felt better on sotalol, but he was clearly breaking through frequently on even 120 mg of sotalol. After long discussion back and forth we decided to try Rythmol  twice a day and see how he feels on that and if that will control his fibrillation. He returns now, Definitely feels better on the Rythmol and yet is in rapid atrial fibrillation today and not totally aware of it.) 2017. Paroxysmal A. fib with recent normal cardiac catheterization, breaking through Flecainide and Sotalol and Rythmol  b.i.d. He seems to be doing well on Rythmol to 425 q.12 h.but again has compliance issues, as he does not want to stay on medication for ever, but is afraid of an ablation. Finally had ablation but then on his own stopped the medicine right away and did not followup with Dr. Hardin. Came back almost 2 years later, claiming he had no insurance until then. Was in rapid atrial flutter. Tolerating it fairly well. Has some kind of GI illness, perhaps biliary that is being investigated as he is seeing a GI the next day---he never went. Still has CHADS2-VASC score 0 so no anticoag. (See discussion with Dr. Hardin.) On bisoprolol 10 mg daily converted to sinus. Advised the patient to avoid caffeine and alcohol, which he says is no problem as long as he can still use marijuana. Hospitalization at [facility] in 2019 still demonstrates a social issues drug possibly alcohol issues and certainly compliance issues.  Was hospitalized at [facility] in August same issues.  Was discharged in sinus rhythm but 2019 here back in rapid atrial fibrillation again because he ran out of bisoprolol.  After long discussion agrees to be compliant with bisoprolol 10 mg and will make another appointment with Dr. Hardin to consider another ablation.  If he remains compliant with followups and with his medication and remains in sinus rhythm, we can readdress the issue of whether he should be anticoagulated down the road, etc. No anticoagulation for now. He had another episode of overdosing with marijuana with emesis and volume depletion, rapid atrial fibrillation and then found to have a moderate to large pericardial effusion probably with pericarditis.  No tamponade.  Was treated at [facility] and then transferred to [facility].  Never had a procedure for his pericardial effusion and follow-up echo seem to show improvement.  Has ended up on amiodarone metoprolol Cardizem colchicine and Eliquis for his atrial fibrillation and pericarditis and is now in sinus rhythm with significant ST-T wave changes some of which are new.  Collagen-vascular work-up at [facility] was negative.  Echo is for the most part unremarkable other than the pericardial effusion.  He did have abnormal LFTs for quite some time so these will be repeated.  There was a mention of some liver issue in the past but subsequent to that he did have normal LFTs; perhaps it is related to his marijuana.  He returned for an echocardiogram to reassess his pericardial effusion; effusion is done and LV function was normal.  He went back on bisoprolol in place of the metoprolol, I held his Cardizem but continued the other medications.  He returns now, stop the colchicine but continued bisoprolol, Eliquis, and amiodarone.  I suggested again that,he should make another appointment to see Dr. Hardin about a follow-up ablation.  He agrees and I said he should continue the amiodarone until he sees Dr. Hardin.  Obviously he needs drug rehab and counseling which we discussed. As above, now status post a second a flutter ablation and is in sinus rhythm.  Returned over a year since his last visit, presumably no interval arrhythmias or medical issues and he has stayed off the marijuana and lost 20 pounds.  He is getting a little more serious because now he has 2 sons getting  next year.  Meanwhile at the end of the visit he said he is smoking a little bit because he is under stress and I told him I would rather him just be under stress and that this is a very dangerous course to take.  Remains on metoprolol and Eliquis. Survived Covid as mentioned above. ?? sleep apnea evaluation and treatment.  Is hemodynamically stable, good blood pressure, sinus rhythm with the same ST-T scooping on his ECG.  Denies exertional chest pain or shortness of breath.  (Also unfortunately refused to get vaccinated for COVID-19.  His cousin who is also my patient ended up hospitalized for over 2 months with a trach and a PEG and needs extensive rehab, almost .)  Long discussion last year about when he can come off anticoagulation.  Will be seeing Dr. Hardin to get his opinion as well.  I favored waiting 1 year from the ablation and then considering a 4-week ZIO monitor before stopping the Eliquis.  Labs were sent.  He did have moderate MR on his last echo. Check BNP.  Will repeat the echo and stress echo in 2023   Patient returns today 2024.  In August of last year he again ended up in the emergency room at [facility] with marijuana overdose and toxicity and I discussed with his wife.  He seems to be stable since then and has had no further hospitalizations, medical issues procedures etc.  He does admit to still smoking marijuana 2 joints and 1 cigarette a day split up into 4 episodes of smoking daily.  No symptoms of atrial fibrillation.  Not on any medication for his atrial fibrillation, off beta-blockers and off Eliquis.  He does have a new primary care physician and asked that I send her a copy of his labs.  He has his second wedding of his son coming up soon.  He wants me to call his wife and tell him he was here and go over the blood test with her.  He did ask for a repeat PSA which in the past has been between 13 and 16 but he has not followed up with urology in a while.  His EKG does remains sinus rhythm with left atrial abnormality and with scooping ST segments in leads II and aVF and V3 through 6 unchanged.

## 2024-02-08 NOTE — REASON FOR VISIT
[FreeTextEntry1] : 58-year-old man with atrial fibrillation and abnormal stress test, s/p atrial flutter ablation 2017, then back in rapid Afib. Multiple admissions for marijuana toxicity and on the latest one found to have moderate pericardial effusion as well. Last admission was treated with amiodarone and remained in sinus rhythm and was scheduled with Dr. Hardin for follow-up ablation again. Now here after second ablation.

## 2024-02-08 NOTE — HISTORY OF PRESENT ILLNESS
Last seen on10/11/18    Pt has an upcoming appt on 6/24/2019 at 1:30 pm.    Allergies confirmed on 06/04/2019 @ 10:47 am.     [FreeTextEntry1] : August 30, 2016. Patient is a 51-year-old man, who was finally diagnosed with rapid atrial fibrillation about one month ago. On July 24 related to drinking and smoking synthetic weed, etc. he was hospitalized at [facility]. He had severe palpitations and "his whole system went into shock". He had some vomitting and nauseousness, diaphoresis, and an uncomfortable feeling, but no chest pain. He was in rapid atrial fibrillation. He did make positive troponin enzymes, but was not recommended to have cardiac catheterization or even a stress test. He was hospitalized at [facility] for 6 days and echocardiogram supposedly showed no scar. He was discharged on cholesterol medications, but then he followed up with a cardiologist in [place] after being hospitalized there for 48 hours with a recurrence of his A. fib. He was placed on sotalol and did well for a while. On 26 August on 120 mg of sotalol, he broke through with an episode of A.fib. The cardiologist wanted to increase Sotalol to 160 mg, but the patient stayed at 120 and then came back to [place] and is here to see me. He has not had a stress test. He has no risk factors for coronary artery disease or a family history of coronary disease or arrhythmias. He claims he's been having symptoms for 20 years, and the episodes have lasted as long as an hour, and it is only with this episode in July, that he was finally diagnosed with atrial fibrillation. He has some shortness of breath going up stairs, but he says he has had this for years, and it has not progressed. Otherwise, with normal activity, and swimming, he does not get chest pain. While he smokes a lot of weed, he does not smoke cigarettes. He thinks he was told of a murmur recently and once as a kid, although I did not hear a murmur on exam. Has not had any syncope recently. He did have a duodenal ulcer about 10 years ago, not related to medications or NSAIDs, and not bleeding. It has not been an issue since. He is currently on sotalol 120 b.i.d., BuSpar, 5 mg b.i.d., and aspirin 81 mg q.d. However, in [facility] Electronic medical record, there are no other notes, but there is a list of medicines to be verified that includes diltiazem, calculus, Eliquis, as well as atorvastatin, and Crestor. September 9, 2016. The patient came for stress test and had to stop for shortness of breath at 6 minutes with a low heart rate on carvedilol. It looked like ST s were starting to go down, but did not meet criteria. APCs noted, but no atrial fibrillation. Recommended nuclear stress test and use that to help guide medicine versus statin for his hyperlipidemia. November 2, 2016. Patient here in followup. He did not have nuclear stress test. He is here in followup because he is running out of sotalol, but also he had 2 episodes of atrial fibrillation, which were severe and accompanied with chest pain. He admits he has not been taking it twice a day and occasionally will skip. He has not worked on his diet and in fact has gained weight. He does complain of some fatigue and shortness of breath. No exertional chest pain, however; he does not get the same chest pain with exertion that he gets during the atrial fibrillation. His EKG is sinus rhythm with nonspecific ST changes. After a long discussion with the patient and his sister, he promises to be rigid with his sotalol every 12 hours, and he is scheduling a nuclear stress test, and we will review his labs. November 18, 2016. Yesterday, the patient came for his nuclear stress test. He was in atrial flutter or fibrillation. His heart rate went as high as 199, and there were definite abnormal ST changes, but no chest pain. Blood pressure response was normal. The nuclear scan showed medium sized, mild defects, apical, inferior, mid to distal inferolateral that were reversible, and there was also transient ischemic dilatation of the LV with a ratio of 1.45. LVEF was 78% with normal wall motion post stress however. Patient returns today to review the findings and for reevaluation. I recommended coronary angiography and the patient and his sister are thinking about it. He is back in sinus rhythm. December 8, 2016. Patient had coronary angiography, which only showed a 30% lesion in the proximal circumflex. The other arteries had no obstruction and LVEF was normal at 70%. I had the patient switch over to flecainide 50 mg q.12 h. December 30, 2016. The patient is in sinus rhythm, however, he claims he has had a lot of breakthroughs and does not feel well on the flecainide. He could not be more specific and wanted to go back to the sotalol, although he has broken through 120 mg many times. After a long extensive discussion we agreed to try Rythmol  mg q.12 h. February 7, 2017. Patient called. He had never started the Rythmol. After much discussion he agreed to start the Rythmol and come in in 2 weeks. February 23, 2017. The patient is here in followup and is in rapid atrial fibrillation/flutter. He is tolerating the Rythmol well and thinks that most of the time he is doing okay. He was not aware that he was in atrial fibrillation today, although he did think he was yesterday. After long discussion about ablation, changing medications, increasing the dose, or using home telemetry to determine how often he truly is in atrial fibrillation he elected to go up on the Rythmol first. He is still not eager for invasive procedures. April 13, 2017. Patient finally returns as I would not renew his medication. He is in sinus rhythm. He says he had one or two bad days, but otherwise thinks he was in a normal rhythm most of the time. He is not  the most compliant when it comes to sticking to his q.12 h. regimen. For the last 3 days has been taking 325 mg twice a day instead of 425 as he ran out of pills. May 10, 2017. Patient is here because of cough and sputum. However, he admitted that after a while he decided once again to stop his Rythmol to "see what would happen" and sure enough on Saturday he could not walk to [facility] because he was out of breath and his heart was racing fast. He went back on the Rythmol and had another episode Sunday, but since then has been back in normal rhythm. He's been coughing with dark sputum for 2 days now, but no fever or chills. His EKG shows sinus rhythm with a normal QRS and QT. He is here with his wife so we had an extensive discussion about considering an ablation and about compliance with medication and he will be calling Dr. Get Hardin of [facility].  July 24, 2019. First visit in over 2 years. He had an ablation with Dr. Hardin in 2017 but never continued the medications, even for just a few months and never followed up with Dr. Hardin. In January of this year was hospitalized at [facility] and it sounds like he again had substance abuse problems, mostly marijuana, but probably Ativan, and possibly other medications. He initially presented with lactic acidosis. At some point he did seem to be in sinus rhythm, but with a very bizarre EKG, and prolonged QT, which I believe, was thought to be from toxins. Eventually, was back in rapid A. Flutter and was discharged on Cardizem CD. His CHADS2-VASC score was 0 so no anticoag. No followup after that and he claims now that he had no insurance, but now that he does he came back. He has been having severe GI symptoms whenever he eats fatty, greasy foods, mostly, vomiting, and chest pain, and rapid heartbeat. Reportedly, he has an appointment with gastroenterology tomorrow. He is on absolutely no medications and is here in rapid atrial flutter with a heart rate of around 140. Seems to be tolerating it well, with no ischemic changes, no heart failure on exam, and blood pressure acceptable. I reviewed his notes from the hospitalization in January and reached out to Dr. Get Hardin for further information. In the meantime, I am placing him on bisoprolol 10 mg for additional rate control while he has his GI workup. Long discussion about compliance and self-destructive behavior. July 31, 2019. Patient returns in followup on bisoprolol. I spoke with Dr. Hardin after his last visit, who said that even though his CHADS2-VASC score was zero, normally he likes to anticoagulate these people after ablation, but because the patient was so erratic in his behavior and there was concern about drugs or drinking, he did not. He would be willing to do another ablation if necessary. Today he is back in sinus rhythm at 51. Labs from last week were within normal limits, except for his lipid profile, which we reviewed today. December 6, 2019.  Since last visit patient was hospitalized I believe twice once at [facility] and once at [place] with marijuana intoxication.  He remains in sinus rhythm as long as he was back on his bisoprolol.  He returns now run out of the bisoprolol and is back in A. fib at 130 but totally asymptomatic.  He is not on Xarelto but his CHADS2- VASC score is 0.  Still using marijuana.  Long discussion about compliance and advised him to set up another appointment with Dr. Hardin to discuss a second ablation.  (Last time after the ablation he stopped the beta-blocker right away rather than waiting 3 months.) April 22, 2020.  Telehealth visit. Narrative: On the whole patient has been doing well, remaining on the beta-blocker with rare atrial fibrillation. There was a lot of stress as his wife and father-in-law and mother-in-law had COVID-19 with his father-in-law being hospitalized. He was left on his own for some time and had what he thinks was just a panic attack. He still is smoking marijuana at least daily but he tries to keep it down to once a day. His wife spoke to me about what to do when he has some of these episodes because he gets very nauseous and he gets very anxious and she almost wants to call EMS. I explained to have him breathe in and out of a paper bag as the first step otherwise we can try a little bit of alprazolam or a little bit of Zofran. (At the end the patient left and I spoke with the wife because he did not want to hear about his anxiety attacks). We also discussed follow-up with Dr. Hardin of [facility] for possible ablation given his recurrence of atrial fibrillation although most of the time if he is compliant with his beta-blocker he is in sinus rhythm. Currently he is on no anticoagulation because of his low CHADS2-VASC score. Assessment: For the most part stable in terms of his atrial fibrillation. Marijuana abuse still somewhat of a problem along with anxiety but on the whole functioning better than he had previously. Continue beta-blocker. Alprazolam 0.5 mg as needed and Zofran 4 mg as needed. Follow-up: 2 months July 2, 2020.Issues with nauseousness and vomiting and going to the emergency room at [facility]. July 5, 2020. Once again diagnosed with marijuana intoxication. Remained in sinus rhythm throughout. Was discharged on July 3.  December 2, 2020.Patient had issues again with marijuana overdose with nausea and vomiting and lethargy and finally agreed to go to rehab.  However at rehab was found to be in rapid atrial fibrillation and was sent to [facility].  He was there for a few days and I kept in contact with the physicians there.  They were having trouble controlling his rate and he was placed on IV amiodarone as CT angio showed a large pericardial effusion although by echo it was more mild to moderate and no hemodynamic compromise.  Sed rate was 86.  Initial plan was to have a thoracic surgery just to a pericardial window and send off the fluid for labs and serologies and cytology etc.  However he had been placed on 650 mg of aspirin along with the colchicine so the surgery was canceled.  In the interim he was transferred to [facility].  December 10, 2020.  Patient here now with wife.  I reviewed the records from [facility] on the patient's wife's cell phone.  He had an extensive collagen vascular work-up that was totally negative looking for a cause for his pericarditis.  Also cardiolipin antibody negative.  He did have abnormal liver function tests that persisted.  A HIDA scan was negative.  His echo on December 2 showed a small to medium pleural effusion pericardial effusion and a small left pleural effusion.  A follow-up echo the next day showed a small to mild effusion and bilateral pleural effusions but that was a limited study.  Patient was placed on amiodarone Cardizem metoprolol along with Eliquis and colchicine, and the patient left in sinus rhythm.  Here he is in sinus rhythm at 89 with abnormal ST-T changes diffusely.  He swears no more marijuana but he has done this before. Patient returns for echocardiogram at the end of the day.  Pericardial effusion gone.  No significant pleural effusion.  Otherwise mild S.A.M. but no gradient.  Mild to moderate MR.  Moderate LAE.  Normal LV systolic function normal RV systolic function.  Mild TR with RVSP 35.  Stopped Cardizem but continued bisoprolol amiodarone and colchicine along with his Eliquis. February 9, 2021.  Patient returns in follow-up.  EKG with sinus bradycardia at 53 with slight ST scooping and borderline QT prolongation.  Off colchicine but still on amiodarone, bisoprolol, and Eliquis.  Claims he has been a good boy, no marijuana etc.  Is willing to consider another ablation especially if that will help get him off the amiodarone. July 17, 2021.  Patient here in follow-up.  He saw Dr. Hardin April 2 and was still in sinus rhythm on amiodarone.  They agreed to schedule an ablation and then hopefully stop the amiodarone 3 months after the ablation, may be sooner.  The ablation was scheduled for May 26 however as of May 11 the patient on his own had stopped both the amiodarone and the Eliquis.  Eliquis was restarted on May 11.  MANNIE was done on 26 May showing mild to moderate MR, minimal AI, no left atrium or DANNY thrombus.  Normal LV and RV function.  Mild TR and PI.  Normal pericardium with no effusion.  No PFO.  He had the atrial flutter ablation and was discharged on the 27th.  There was a question of some ST depressions and it was suggested he repeat the stress test.  He is here today, in sinus rhythm at 50, with ST scooping in leads I to aVF V3 through 6.. He is on Eliquis and metoprolol, no amiodarone, no marijuana and only concerned that he cannot stop gaining weight.  He and his wife have not been vaccinated as they do not believe in it etc. I had a long discussion trying to convince him to get vaccinated for Covid.  He will be seeing Dr. Hardin at the end of July and I would like to bring him back for a stress echo at the end of August which will be 3 months after his ablation He saw Dr. Hardin on June 25 and then spoke with him in August.  Was supposed to get sleep studies for sleep apnea as he was found to have O2 sat of 86 and heart rate of 33 during sleep I believe after his Covid episode.  Was told to remain on metoprolol   daily. October 21, 2021.  Patient is now here in follow-up.  He is in sinus rhythm at 72 with in for lateral ST and T changes II, III, aVF V3 through 6, no significant change.  He survived Covid without having to be in the hospital, was treated with the [facility] program by Dr. Gita Lisker.  Has not had a full work-up for sleep apnea yet, probably because of insurance covering the sleep study.  He claims no marijuana use.  No palpitations occasional palpitations but no sustained arrhythmias.  Denies chest pain shortness of breath etc.  Weight is the same.  Discussed whether or not he can come off Eliquis; I decided to reevaluate in 4 months when it will be a year from his second ablation and then consider a 4-week ZIO monitor before stopping.  He will be having 1 more follow-up with Dr. Hardin and we will get his opinion as well.  He remains on metoprolol  daily December 5, 2022.  First visit in over a year.  Is very excited and happy because he has two engaged children now 1 is 21 and 1 is 28 and the first wedding will be around August.  He has been very good and compliant and has lost 20 pounds although at the end of the exam sitting in my consult room he did say "I am smoking a little bit again".  He denies chest pain shortness of breath or palpitations and no syncope.  He denies interval hospitalizations or emergency room visits or COVID issues.  He does some breathing exercises all the time. August 25, 2023. His wife called because he is back in the emergency room at [facility] again with marijuana toxicity which he swore he would never do again. No evidence of pancreatitis. No mention of any atrial fibrillation or arrhythmias. He never came for his January stress echo. He did not show up for his March appointment either so I have not seen him since last year. I advised his wife that he needs to stop the marijuana, that I am not sure he should continue to be under my cardiac care as he is noncompliant and not that interested, and that they can take care of him adequately at [facility] where I do not have privileges anyway.   February 8, 2024.  Patient made an appointment and returned here.  He claims to be doing well with no issues since the above.  He admits to smoking 4 times a day which is to joints and 1 cigarette that he does a half each time.  He has had no hospitalizations or emergency room visits or any issues.  Absolutely no alcohol.  He does have a new primary care physician and asked me to send her the results of this bloods.  He has not followed up with urology but did asked me to check the PSA which is always high but so far has been chronic.  Denies exertional chest pain shortness of breath.  Does not think he has had any palpitations or atrial fibrillation.

## 2024-02-08 NOTE — DISCUSSION/SUMMARY
[FreeTextEntry1] : Very cautious optimism, so far holding his own and I assume has managed to stay out of the hospital since last August.  Holding sinus rhythm with his second ablation being 2021, off anticoagulation currently and off beta-blocker.  Mild to moderate mitral regurgitation on his last echo, no murmur today, no symptoms of shortness of breath.  proBNP will be checked.  Did try to encourage patient about minimizing and hopefully eventually totally staying off both cigarettes and marijuana.  He does want me to call his wife with the results and tell her that he actually came to the office for his appointment.  He will probably need follow-up with urology as well. [EKG obtained to assist in diagnosis and management of assessed problem(s)] : EKG obtained to assist in diagnosis and management of assessed problem(s)

## 2024-02-09 LAB
ALBUMIN SERPL ELPH-MCNC: 4.3 G/DL
ALP BLD-CCNC: 90 U/L
ALT SERPL-CCNC: 18 U/L
ANION GAP SERPL CALC-SCNC: 15 MMOL/L
AST SERPL-CCNC: 16 U/L
BILIRUB SERPL-MCNC: 0.5 MG/DL
BUN SERPL-MCNC: 12 MG/DL
CALCIUM SERPL-MCNC: 9.5 MG/DL
CHLORIDE SERPL-SCNC: 104 MMOL/L
CHOLEST SERPL-MCNC: 216 MG/DL
CO2 SERPL-SCNC: 24 MMOL/L
CREAT SERPL-MCNC: 0.87 MG/DL
EGFR: 100 ML/MIN/1.73M2
ESTIMATED AVERAGE GLUCOSE: 103 MG/DL
GLUCOSE SERPL-MCNC: 82 MG/DL
HBA1C MFR BLD HPLC: 5.2 %
HCT VFR BLD CALC: 44.8 %
HDLC SERPL-MCNC: 41 MG/DL
HGB BLD-MCNC: 14.6 G/DL
LDLC SERPL CALC-MCNC: 143 MG/DL
MCHC RBC-ENTMCNC: 28.4 PG
MCHC RBC-ENTMCNC: 32.6 GM/DL
MCV RBC AUTO: 87.2 FL
NONHDLC SERPL-MCNC: 175 MG/DL
NT-PROBNP SERPL-MCNC: 73 PG/ML
PLATELET # BLD AUTO: 299 K/UL
POTASSIUM SERPL-SCNC: 5 MMOL/L
PROT SERPL-MCNC: 7 G/DL
PSA SERPL-MCNC: 22.8 NG/ML
RBC # BLD: 5.14 M/UL
RBC # FLD: 13.6 %
SODIUM SERPL-SCNC: 143 MMOL/L
TRIGL SERPL-MCNC: 173 MG/DL
TSH SERPL-ACNC: 1.48 UIU/ML
WBC # FLD AUTO: 7.05 K/UL

## 2024-04-03 ENCOUNTER — NON-APPOINTMENT (OUTPATIENT)
Age: 59
End: 2024-04-03

## 2024-04-03 ENCOUNTER — APPOINTMENT (OUTPATIENT)
Dept: CARDIOLOGY | Facility: CLINIC | Age: 59
End: 2024-04-03
Payer: MEDICAID

## 2024-04-03 VITALS
OXYGEN SATURATION: 100 % | SYSTOLIC BLOOD PRESSURE: 115 MMHG | HEART RATE: 76 BPM | DIASTOLIC BLOOD PRESSURE: 74 MMHG | HEIGHT: 73 IN | BODY MASS INDEX: 27.3 KG/M2 | WEIGHT: 206 LBS

## 2024-04-03 DIAGNOSIS — R94.31 ABNORMAL ELECTROCARDIOGRAM [ECG] [EKG]: ICD-10-CM

## 2024-04-03 DIAGNOSIS — R11.2 NAUSEA WITH VOMITING, UNSPECIFIED: ICD-10-CM

## 2024-04-03 DIAGNOSIS — I25.10 ATHEROSCLEROTIC HEART DISEASE OF NATIVE CORONARY ARTERY W/OUT ANGINA PECTORIS: ICD-10-CM

## 2024-04-03 DIAGNOSIS — F12.90 NAUSEA WITH VOMITING, UNSPECIFIED: ICD-10-CM

## 2024-04-03 DIAGNOSIS — Z86.79 PERSONAL HISTORY OF OTHER DISEASES OF THE CIRCULATORY SYSTEM: ICD-10-CM

## 2024-04-03 PROCEDURE — G2211 COMPLEX E/M VISIT ADD ON: CPT | Mod: NC,1L

## 2024-04-03 PROCEDURE — 99214 OFFICE O/P EST MOD 30 MIN: CPT | Mod: 25

## 2024-04-03 PROCEDURE — 93000 ELECTROCARDIOGRAM COMPLETE: CPT

## 2024-04-03 NOTE — DISCUSSION/SUMMARY
[FreeTextEntry1] : April 25, 2019\par \par Leatha Spicer MD\par [facility] [address]\par [address], [place] [address]\par \par Re:	Rod Aldrich \[facility] \par Dear Dr. Spicer:\par \par I saw Rod and his family in the office today.  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 Aldrich reports that he had had some parietal headache and tinnitus that led to his workup.  This showed this innocuous aneurysm.  He really has no other major medical problems.  He does have a history of cardiovascular disease and has had a number of stents placed and is currently on ____, lisinopril, Crestor, fish oil, and aspirin.  He does not drink and stopped smoking \[facility] 15 years ago.  He is disabled.  He has hay fever but no drug allergies.  Review of systems is notable for some numbness, tingling, and hand weakness, as well as dizziness and lightheadedness, along with some ringing in the ears, occasional shortness of breath, vomiting, arthralgia, and easy bleeding from his aspirin.  Neurologically he is intact.  His MRA is not revealing and not concerning with aneurysms of this size in this location completely benign.  I told him so.  I do think it is reasonable to check another MRA in the summer just to compare to the one last July, and in addition we will be ordering an MRA of the neck to rule out carotid disease.  Overall, I do not think there is any reason to be concerned about the MRI findings and told him so.  I will certainly keep you abreast of the findings of his carotid disease.\par \par \par Thanks so much for your kind referral.\par \par Sincerely,\par \par \par \par Parish Garrido MD\[facility] \par DL/ag [facility] #[id number]\par \[facility] CC:	Mike Petty MD\par 	[facility]\par 	[address]\[place] [address]\par 	\[facility] 	Parish Azul MD\par 	[address] [facility]\[place] [address]\[facility]  [FreeTextEntry1] : Disappointed once again and his failure to deal with his issues.  Routine labs sent.  Follow-up as needed [EKG obtained to assist in diagnosis and management of assessed problem(s)] : EKG obtained to assist in diagnosis and management of assessed problem(s)

## 2024-04-03 NOTE — ASSESSMENT
[FreeTextEntry1] : (51-year-old man with long history of prolonged palpitations, finally demonstrating rapid atrial fibrillation in 2016. Was hospitalized for 6 days and made troponin, but no invasive testing, and no stress testing. Was told he had a normal echo with no scar and reportedly had a normal echo in [place] as well. No murmurs on exam. His prior EKG showed sinus rhythm with occasional APCs that are aberrantly conducted with a right bundle branch pattern. No Q waves. The patient was on sotalol 120 mg q.12 h and has had three breakthrough already. No risk factors for coronary artery disease except hyperlipidemia. He does get chest pain during his atrial fibrillation, but no chest pain with normal activity. On his plain stress test he got short of breath, but no chest pain, and there were suspicious ST depressions. On a follow up nuclear stress test, he was in A. fib, had definitely abnormal ST depressions, and an abnormal scan, including T.I.D. The next day he was back in sinus rhythm and his EKG is otherwise unremarkable with some nonspecific ST changes. Given all of these findings, I felt he clearly would benefit and needed coronary angiography. This showed no significant obstructive disease. I changed him to Flecainide for better control. He was in sinus rhythm, but claimed he had been breaking through frequently. I therefore wanted to increase his flecainide to 100 mg, but the patient doesn't like how he feels on it, although he could not be more specific. He thinks he felt better on sotalol, but he was clearly breaking through frequently on even 120 mg of sotalol. After long discussion back and forth we decided to try Rythmol  twice a day and see how he feels on that and if that will control his fibrillation. He returns now, Definitely feels better on the Rythmol and yet is in rapid atrial fibrillation today and not totally aware of it.) 2017. Paroxysmal A. fib with recent normal cardiac catheterization, breaking through Flecainide and Sotalol and Rythmol  b.i.d. He seems to be doing well on Rythmol to 425 q.12 h.but again has compliance issues, as he does not want to stay on medication for ever, but is afraid of an ablation. Finally had ablation but then on his own stopped the medicine right away and did not followup with Dr. Hardin. Came back almost 2 years later, claiming he had no insurance until then. Was in rapid atrial flutter. Tolerating it fairly well. Has some kind of GI illness, perhaps biliary that is being investigated as he is seeing a GI the next day---he never went. Still has CHADS2-VASC score 0 so no anticoag. (See discussion with Dr. Hardin.) On bisoprolol 10 mg daily converted to sinus. Advised the patient to avoid caffeine and alcohol, which he says is no problem as long as he can still use marijuana. Hospitalization at [facility] in 2019 still demonstrates a social issues drug possibly alcohol issues and certainly compliance issues. Was hospitalized at [facility] in August same issues. Was discharged in sinus rhythm but 2019 here back in rapid atrial fibrillation again because he ran out of bisoprolol. After long discussion agrees to be compliant with bisoprolol 10 mg and will make another appointment with Dr. Hardin to consider another ablation. If he remains compliant with followups and with his medication and remains in sinus rhythm, we can readdress the issue of whether he should be anticoagulated down the road, etc. No anticoagulation for now. He had another episode of overdosing with marijuana with emesis and volume depletion, rapid atrial fibrillation and then found to have a moderate to large pericardial effusion probably with pericarditis. No tamponade. Was treated at [place] and then transferred to [facility]. Never had a procedure for his pericardial effusion and follow-up echo seem to show improvement. Has ended up on amiodarone metoprolol Cardizem colchicine and Eliquis for his atrial fibrillation and pericarditis and is now in sinus rhythm with significant ST-T wave changes some of which are new. Collagen-vascular work-up at [facility] was negative. Echo is for the most part unremarkable other than the pericardial effusion. He did have abnormal LFTs for quite some time so these will be repeated. There was a mention of some liver issue in the past but subsequent to that he did have normal LFTs; perhaps it is related to his marijuana. He returned for an echocardiogram to reassess his pericardial effusion; effusion is done and LV function was normal. He went back on bisoprolol in place of the metoprolol, I held his Cardizem but continued the other medications. He returns now, stop the colchicine but continued bisoprolol, Eliquis, and amiodarone. I suggested again that,he should make another appointment to see Dr. Hardin about a follow-up ablation. He agrees and I said he should continue the amiodarone until he sees Dr. Hardin. Obviously he needs drug rehab and counseling which we discussed. As above, now status post a second a flutter ablation and is in sinus rhythm. Returned over a year since his last visit, presumably no interval arrhythmias or medical issues and he has stayed off the marijuana and lost 20 pounds. He is getting a little more serious because now he has 2 sons getting  next year. Meanwhile at the end of the visit he said he is smoking a little bit because he is under stress and I told him I would rather him just be under stress and that this is a very dangerous course to take. Remains on metoprolol and Eliquis. Survived Covid as mentioned above. ?? sleep apnea evaluation and treatment. Is hemodynamically stable, good blood pressure, sinus rhythm with the same ST-T scooping on his ECG. Denies exertional chest pain or shortness of breath. (Also unfortunately refused to get vaccinated for COVID-19. His cousin who is also my patient ended up hospitalized for over 2 months with a trach and a PEG and needs extensive rehab, almost .) Long discussion last year about when he can come off anticoagulation. Will be seeing Dr. Hardin to get his opinion as well. I favored waiting 1 year from the ablation and then considering a 4-week ZIO monitor before stopping the Eliquis. Labs were sent. He did have moderate MR on his last echo. Check BNP. Will repeat the echo and stress echo in 2023 Patient returned 2024. In August of last year he again ended up in the emergency room at [facility] with marijuana overdose and toxicity and I discussed with his wife. He seems to be stable since then and has had no further hospitalizations, medical issues procedures etc. He does admit to still smoking marijuana 2 joints and 1 cigarette a day split up into 4 episodes of smoking daily. No symptoms of atrial fibrillation. Not on any medication for his atrial fibrillation, off beta-blockers and off Eliquis. He does have a new primary care physician and asked that I send her a copy of his labs. He has his second wedding of his son coming up soon. He wants me to call his wife and tell him he was here and go over the blood test with her. He did ask for a repeat PSA which in the past has been between 13 and 16 but he has not followed up with urology in a while. His EKG does remains sinus rhythm with left atrial abnormality and with scooping ST segments in leads II and aVF and V3 through 6 unchanged.  Returns again today April 3, 2024 after yet another hospitalization for marijuana intoxication etc.  This time at [facility] for 3 days.  Here now he is back to his baseline, blood pressure okay, no episodes of arrhythmias, sinus rhythm with abnormal ECG but unchanged from usual.  Blood pressure was okay and exam is unchanged.  Another discussion about his addiction and dealing with it but it does not sound like he has any desire to deal with it routine labs were sent and we will have cardiac follow-up as necessary.  Thankfully no arrhythmias after 2 ablations.

## 2024-04-03 NOTE — REVIEW OF SYSTEMS
[Fever] : no fever [Weight Gain (___ Lbs)] : no recent weight gain [Weight Loss (___ Lbs)] : no recent weight loss [Feeling Fatigued] : not feeling fatigued [Dyspnea on exertion] : not dyspnea during exertion [Chest Discomfort] : no chest discomfort [Lower Ext Edema] : no extremity edema [Palpitations] : no palpitations [Syncope] : no syncope [Negative] : Psychiatric [de-identified] : No change

## 2024-04-03 NOTE — PHYSICAL EXAM
[Well Developed] : well developed [Well Nourished] : well nourished [No Acute Distress] : no acute distress [Normal Venous Pressure] : normal venous pressure [No Carotid Bruit] : no carotid bruit [Normal S1, S2] : normal S1, S2 [No Murmur] : no murmur [No Rub] : no rub [No Gallop] : no gallop [Clear Lung Fields] : clear lung fields [Good Air Entry] : good air entry [No Respiratory Distress] : no respiratory distress  [Soft] : abdomen soft [Non Tender] : non-tender [No Masses/organomegaly] : no masses/organomegaly [Normal Bowel Sounds] : normal bowel sounds [Normal Gait] : normal gait [No Edema] : no edema [No Cyanosis] : no cyanosis [No Clubbing] : no clubbing [No Varicosities] : no varicosities [Normal Radial B/L] : normal radial B/L [Normal DP B/L] : normal DP B/L [Normal PT B/L] : normal PT B/L [No Rash] : no rash [No Skin Lesions] : no skin lesions [Moves all extremities] : moves all extremities [No Focal Deficits] : no focal deficits [Normal Speech] : normal speech [Alert and Oriented] : alert and oriented [Normal memory] : normal memory [de-identified] : No murmur audible today despite previous mild to moderate MR [General Appearance - Well Developed] : well developed [Well Groomed] : well groomed [Normal Appearance] : normal appearance [General Appearance - Well Nourished] : well nourished [No Deformities] : no deformities [General Appearance - In No Acute Distress] : no acute distress [Normal Conjunctiva] : the conjunctiva exhibited no abnormalities [Eyelids - No Xanthelasma] : the eyelids demonstrated no xanthelasmas [Normal Oral Mucosa] : normal oral mucosa [No Oral Pallor] : no oral pallor [No Oral Cyanosis] : no oral cyanosis [Normal Jugular Venous A Waves Present] : normal jugular venous A waves present [Normal Jugular Venous V Waves Present] : normal jugular venous V waves present [No Jugular Venous Ortega A Waves] : no jugular venous ortega A waves [Respiration, Rhythm And Depth] : normal respiratory rhythm and effort [Exaggerated Use Of Accessory Muscles For Inspiration] : no accessory muscle use [Auscultation Breath Sounds / Voice Sounds] : lungs were clear to auscultation bilaterally [Heart Rate And Rhythm] : heart rate and rhythm were normal [Heart Sounds] : normal S1 and S2 [Abdomen Soft] : soft [Abdomen Tenderness] : non-tender [Abnormal Walk] : normal gait [Abdomen Mass (___ Cm)] : no abdominal mass palpated [Gait - Sufficient For Exercise Testing] : the gait was sufficient for exercise testing [Nail Clubbing] : no clubbing of the fingernails [Cyanosis, Localized] : no localized cyanosis [Petechial Hemorrhages (___cm)] : no petechial hemorrhages [Skin Color & Pigmentation] : normal skin color and pigmentation [] : no rash [Skin Lesions] : no skin lesions [No Venous Stasis] : no venous stasis [No Skin Ulcers] : no skin ulcer [No Xanthoma] : no  xanthoma was observed [FreeTextEntry1] : No rashes. No cyanosis [Oriented To Time, Place, And Person] : oriented to person, place, and time [Mood] : the mood was normal [Affect] : the affect was normal [No Anxiety] : not feeling anxious

## 2024-04-03 NOTE — HISTORY OF PRESENT ILLNESS
[FreeTextEntry1] : August 30, 2016. Patient is a 51-year-old man, who was finally diagnosed with rapid atrial fibrillation about one month ago. On July 24 related to drinking and smoking synthetic weed, etc. he was hospitalized at [facility]. He had severe palpitations and "his whole system went into shock". He had some vomitting and nauseousness, diaphoresis, and an uncomfortable feeling, but no chest pain. He was in rapid atrial fibrillation. He did make positive troponin enzymes, but was not recommended to have cardiac catheterization or even a stress test. He was hospitalized at [facility] for 6 days and echocardiogram supposedly showed no scar. He was discharged on cholesterol medications, but then he followed up with a cardiologist in [place] after being hospitalized there for 48 hours with a recurrence of his A. fib. He was placed on sotalol and did well for a while. On 26 August on 120 mg of sotalol, he broke through with an episode of A.fib. The cardiologist wanted to increase Sotalol to 160 mg, but the patient stayed at 120 and then came back to [place] and is here to see me. He has not had a stress test. He has no risk factors for coronary artery disease or a family history of coronary disease or arrhythmias. He claims he's been having symptoms for 20 years, and the episodes have lasted as long as an hour, and it is only with this episode in July, that he was finally diagnosed with atrial fibrillation. He has some shortness of breath going up stairs, but he says he has had this for years, and it has not progressed. Otherwise, with normal activity, and swimming, he does not get chest pain. While he smokes a lot of weed, he does not smoke cigarettes. He thinks he was told of a murmur recently and once as a kid, although I did not hear a murmur on exam. Has not had any syncope recently. He did have a duodenal ulcer about 10 years ago, not related to medications or NSAIDs, and not bleeding. It has not been an issue since. He is currently on sotalol 120 b.i.d., BuSpar, 5 mg b.i.d., and aspirin 81 mg q.d. However, in [facility] Electronic medical record, there are no other notes, but there is a list of medicines to be verified that includes diltiazem, calculus, Eliquis, as well as atorvastatin, and Crestor. September 9, 2016. The patient came for stress test and had to stop for shortness of breath at 6 minutes with a low heart rate on carvedilol. It looked like ST s were starting to go down, but did not meet criteria. APCs noted, but no atrial fibrillation. Recommended nuclear stress test and use that to help guide medicine versus statin for his hyperlipidemia. November 2, 2016. Patient here in followup. He did not have nuclear stress test. He is here in followup because he is running out of sotalol, but also he had 2 episodes of atrial fibrillation, which were severe and accompanied with chest pain. He admits he has not been taking it twice a day and occasionally will skip. He has not worked on his diet and in fact has gained weight. He does complain of some fatigue and shortness of breath. No exertional chest pain, however; he does not get the same chest pain with exertion that he gets during the atrial fibrillation. His EKG is sinus rhythm with nonspecific ST changes. After a long discussion with the patient and his sister, he promises to be rigid with his sotalol every 12 hours, and he is scheduling a nuclear stress test, and we will review his labs. November 18, 2016. Yesterday, the patient came for his nuclear stress test. He was in atrial flutter or fibrillation. His heart rate went as high as 199, and there were definite abnormal ST changes, but no chest pain. Blood pressure response was normal. The nuclear scan showed medium sized, mild defects, apical, inferior, mid to distal inferolateral that were reversible, and there was also transient ischemic dilatation of the LV with a ratio of 1.45. LVEF was 78% with normal wall motion post stress however. Patient returns today to review the findings and for reevaluation. I recommended coronary angiography and the patient and his sister are thinking about it. He is back in sinus rhythm. December 8, 2016. Patient had coronary angiography, which only showed a 30% lesion in the proximal circumflex. The other arteries had no obstruction and LVEF was normal at 70%. I had the patient switch over to flecainide 50 mg q.12 h. December 30, 2016. The patient is in sinus rhythm, however, he claims he has had a lot of breakthroughs and does not feel well on the flecainide. He could not be more specific and wanted to go back to the sotalol, although he has broken through 120 mg many times. After a long extensive discussion we agreed to try Rythmol  mg q.12 h. February 7, 2017. Patient called. He had never started the Rythmol. After much discussion he agreed to start the Rythmol and come in in 2 weeks. February 23, 2017. The patient is here in followup and is in rapid atrial fibrillation/flutter. He is tolerating the Rythmol well and thinks that most of the time he is doing okay. He was not aware that he was in atrial fibrillation today, although he did think he was yesterday. After long discussion about ablation, changing medications, increasing the dose, or using home telemetry to determine how often he truly is in atrial fibrillation he elected to go up on the Rythmol first. He is still not eager for invasive procedures. April 13, 2017. Patient finally returns as I would not renew his medication. He is in sinus rhythm. He says he had one or two bad days, but otherwise thinks he was in a normal rhythm most of the time. He is not  the most compliant when it comes to sticking to his q.12 h. regimen. For the last 3 days has been taking 325 mg twice a day instead of 425 as he ran out of pills. May 10, 2017. Patient is here because of cough and sputum. However, he admitted that after a while he decided once again to stop his Rythmol to "see what would happen" and sure enough on Saturday he could not walk to [employer] because he was out of breath and his heart was racing fast. He went back on the Rythmol and had another episode Sunday, but since then has been back in normal rhythm. He's been coughing with dark sputum for 2 days now, but no fever or chills. His EKG shows sinus rhythm with a normal QRS and QT. He is here with his wife so we had an extensive discussion about considering an ablation and about compliance with medication and he will be calling Dr. Get Hardin of [facility].  July 24, 2019. First visit in over 2 years. He had an ablation with Dr. Hardin in 2017 but never continued the medications, even for just a few months and never followed up with Dr. Hardin. In January of this year was hospitalized at [facility] and it sounds like he again had substance abuse problems, mostly marijuana, but probably Ativan, and possibly other medications. He initially presented with lactic acidosis. At some point he did seem to be in sinus rhythm, but with a very bizarre EKG, and prolonged QT, which I believe, was thought to be from toxins. Eventually, was back in rapid A. Flutter and was discharged on Cardizem CD. His CHADS2-VASC score was 0 so no anticoag. No followup after that and he claims now that he had no insurance, but now that he does he came back. He has been having severe GI symptoms whenever he eats fatty, greasy foods, mostly, vomiting, and chest pain, and rapid heartbeat. Reportedly, he has an appointment with gastroenterology tomorrow. He is on absolutely no medications and is here in rapid atrial flutter with a heart rate of around 140. Seems to be tolerating it well, with no ischemic changes, no heart failure on exam, and blood pressure acceptable. I reviewed his notes from the hospitalization in January and reached out to Dr. Get Hardin for further information. In the meantime, I am placing him on bisoprolol 10 mg for additional rate control while he has his GI workup. Long discussion about compliance and self-destructive behavior. July 31, 2019. Patient returns in followup on bisoprolol. I spoke with Dr. Hardin after his last visit, who said that even though his CHADS2-VASC score was zero, normally he likes to anticoagulate these people after ablation, but because the patient was so erratic in his behavior and there was concern about drugs or drinking, he did not. He would be willing to do another ablation if necessary. Today he is back in sinus rhythm at 51. Labs from last week were within normal limits, except for his lipid profile, which we reviewed today. December 6, 2019.  Since last visit patient was hospitalized I believe twice once at [facility] and once at [place] with marijuana intoxication.  He remains in sinus rhythm as long as he was back on his bisoprolol.  He returns now run out of the bisoprolol and is back in A. fib at 130 but totally asymptomatic.  He is not on Xarelto but his CHADS2- VASC score is 0.  Still using marijuana.  Long discussion about compliance and advised him to set up another appointment with Dr. Hardin to discuss a second ablation.  (Last time after the ablation he stopped the beta-blocker right away rather than waiting 3 months.) April 22, 2020.  Telehealth visit. Narrative: On the whole patient has been doing well, remaining on the beta-blocker with rare atrial fibrillation. There was a lot of stress as his wife and father-in-law and mother-in-law had COVID-19 with his father-in-law being hospitalized. He was left on his own for some time and had what he thinks was just a panic attack. He still is smoking marijuana at least daily but he tries to keep it down to once a day. His wife spoke to me about what to do when he has some of these episodes because he gets very nauseous and he gets very anxious and she almost wants to call EMS. I explained to have him breathe in and out of a paper bag as the first step otherwise we can try a little bit of alprazolam or a little bit of Zofran. (At the end the patient left and I spoke with the wife because he did not want to hear about his anxiety attacks). We also discussed follow-up with Dr. Hardin of [facility] for possible ablation given his recurrence of atrial fibrillation although most of the time if he is compliant with his beta-blocker he is in sinus rhythm. Currently he is on no anticoagulation because of his low CHADS2-VASC score. Assessment: For the most part stable in terms of his atrial fibrillation. Marijuana abuse still somewhat of a problem along with anxiety but on the whole functioning better than he had previously. Continue beta-blocker. Alprazolam 0.5 mg as needed and Zofran 4 mg as needed. Follow-up: 2 months July 2, 2020.Issues with nauseousness and vomiting and going to the emergency room at [facility]. July 5, 2020. Once again diagnosed with marijuana intoxication. Remained in sinus rhythm throughout. Was discharged on July 3.  December 2, 2020.Patient had issues again with marijuana overdose with nausea and vomiting and lethargy and finally agreed to go to rehab.  However at rehab was found to be in rapid atrial fibrillation and was sent to [facility].  He was there for a few days and I kept in contact with the physicians there.  They were having trouble controlling his rate and he was placed on IV amiodarone as CT angio showed a large pericardial effusion although by echo it was more mild to moderate and no hemodynamic compromise.  Sed rate was 86.  Initial plan was to have a thoracic surgery just to a pericardial window and send off the fluid for labs and serologies and cytology etc.  However he had been placed on 650 mg of aspirin along with the colchicine so the surgery was canceled.  In the interim he was transferred to [facility].  December 10, 2020.  Patient here now with wife.  I reviewed the records from [facility] on the patient's wife's cell phone.  He had an extensive collagen vascular work-up that was totally negative looking for a cause for his pericarditis.  Also cardiolipin antibody negative.  He did have abnormal liver function tests that persisted.  A HIDA scan was negative.  His echo on December 2 showed a small to medium pleural effusion pericardial effusion and a small left pleural effusion.  A follow-up echo the next day showed a small to mild effusion and bilateral pleural effusions but that was a limited study.  Patient was placed on amiodarone Cardizem metoprolol along with Eliquis and colchicine, and the patient left in sinus rhythm.  Here he is in sinus rhythm at 89 with abnormal ST-T changes diffusely.  He swears no more marijuana but he has done this before. Patient returns for echocardiogram at the end of the day.  Pericardial effusion gone.  No significant pleural effusion.  Otherwise mild S.A.M. but no gradient.  Mild to moderate MR.  Moderate LAE.  Normal LV systolic function normal RV systolic function.  Mild TR with RVSP 35.  Stopped Cardizem but continued bisoprolol amiodarone and colchicine along with his Eliquis. February 9, 2021.  Patient returns in follow-up.  EKG with sinus bradycardia at 53 with slight ST scooping and borderline QT prolongation.  Off colchicine but still on amiodarone, bisoprolol, and Eliquis.  Claims he has been a good boy, no marijuana etc.  Is willing to consider another ablation especially if that will help get him off the amiodarone. July 17, 2021.  Patient here in follow-up.  He saw Dr. Hardin April 2 and was still in sinus rhythm on amiodarone.  They agreed to schedule an ablation and then hopefully stop the amiodarone 3 months after the ablation, may be sooner.  The ablation was scheduled for May 26 however as of May 11 the patient on his own had stopped both the amiodarone and the Eliquis.  Eliquis was restarted on May 11.  MANNIE was done on 26 May showing mild to moderate MR, minimal AI, no left atrium or DANNY thrombus.  Normal LV and RV function.  Mild TR and PI.  Normal pericardium with no effusion.  No PFO.  He had the atrial flutter ablation and was discharged on the 27th.  There was a question of some ST depressions and it was suggested he repeat the stress test.  He is here today, in sinus rhythm at 50, with ST scooping in leads I to aVF V3 through 6.. He is on Eliquis and metoprolol, no amiodarone, no marijuana and only concerned that he cannot stop gaining weight.  He and his wife have not been vaccinated as they do not believe in it etc. I had a long discussion trying to convince him to get vaccinated for Covid.  He will be seeing Dr. Hardin at the end of July and I would like to bring him back for a stress echo at the end of August which will be 3 months after his ablation He saw Dr. Hardin on June 25 and then spoke with him in August.  Was supposed to get sleep studies for sleep apnea as he was found to have O2 sat of 86 and heart rate of 33 during sleep I believe after his Covid episode.  Was told to remain on metoprolol   daily. October 21, 2021.  Patient is now here in follow-up.  He is in sinus rhythm at 72 with in for lateral ST and T changes II, III, aVF V3 through 6, no significant change.  He survived Covid without having to be in the hospital, was treated with the [facility] program by Dr. Gita Lisker.  Has not had a full work-up for sleep apnea yet, probably because of insurance covering the sleep study.  He claims no marijuana use.  No palpitations occasional palpitations but no sustained arrhythmias.  Denies chest pain shortness of breath etc.  Weight is the same.  Discussed whether or not he can come off Eliquis; I decided to reevaluate in 4 months when it will be a year from his second ablation and then consider a 4-week ZIO monitor before stopping.  He will be having 1 more follow-up with Dr. Hardin and we will get his opinion as well.  He remains on metoprolol  daily December 5, 2022.  First visit in over a year.  Is very excited and happy because he has two engaged children now 1 is 21 and 1 is 28 and the first wedding will be around August.  He has been very good and compliant and has lost 20 pounds although at the end of the exam sitting in my consult room he did say "I am smoking a little bit again".  He denies chest pain shortness of breath or palpitations and no syncope.  He denies interval hospitalizations or emergency room visits or COVID issues.  He does some breathing exercises all the time. August 25, 2023. His wife called because he is back in the emergency room at [facility] again with marijuana toxicity which he swore he would never do again. No evidence of pancreatitis. No mention of any atrial fibrillation or arrhythmias. He never came for his January stress echo. He did not show up for his March appointment either so I have not seen him since last year. I advised his wife that he needs to stop the marijuana, that I am not sure he should continue to be under my cardiac care as he is noncompliant and not that interested, and that they can take care of him adequately at [facility] where I do not have privileges anyway.   February 8, 2024.  Patient made an appointment and returned here.  He claims to be doing well with no issues since the above.  He admits to smoking 4 times a day which is to joints and 1 cigarette that he does a half each time.  He has had no hospitalizations or emergency room visits or any issues.  Absolutely no alcohol.  He does have a new primary care physician and asked me to send her the results of this bloods.  He has not followed up with urology but did asked me to check the PSA which is always high but so far has been chronic.  Denies exertional chest pain shortness of breath.  Does not think he has had any palpitations or atrial fibrillation. April 3, 2024.  Had the above-noted visit with all his promises and guarantees and instead 2 weeks ago was admitted to [facility] again with marijuana Intoxication and dehydration.  Was there for 3 days.  I asked him if he was given any follow-up about the addiction and he said that only if you are motivated.  No atrial fibrillation, no cardiac issues although they did want him to see a cardiologist in the hospital or afterwards and he told them that he is already seeing me.  No cardiac issues or complaints and his EKG is unchanged with sinus rhythm, occasional APC with a Emmanuel C, and nonspecific ST depressions.

## 2024-04-04 LAB
ALBUMIN SERPL ELPH-MCNC: 4.6 G/DL
ALP BLD-CCNC: 102 U/L
ALT SERPL-CCNC: 19 U/L
ANION GAP SERPL CALC-SCNC: 14 MMOL/L
AST SERPL-CCNC: 18 U/L
BILIRUB SERPL-MCNC: 0.4 MG/DL
BUN SERPL-MCNC: 11 MG/DL
CALCIUM SERPL-MCNC: 9.8 MG/DL
CHLORIDE SERPL-SCNC: 102 MMOL/L
CHOLEST SERPL-MCNC: 246 MG/DL
CO2 SERPL-SCNC: 23 MMOL/L
CREAT SERPL-MCNC: 0.94 MG/DL
EGFR: 94 ML/MIN/1.73M2
GLUCOSE SERPL-MCNC: 83 MG/DL
HCT VFR BLD CALC: 47.4 %
HDLC SERPL-MCNC: 46 MG/DL
HGB BLD-MCNC: 15.4 G/DL
LDLC SERPL CALC-MCNC: 160 MG/DL
MCHC RBC-ENTMCNC: 27.9 PG
MCHC RBC-ENTMCNC: 32.5 GM/DL
MCV RBC AUTO: 85.9 FL
NONHDLC SERPL-MCNC: 200 MG/DL
PLATELET # BLD AUTO: 391 K/UL
POTASSIUM SERPL-SCNC: 4.3 MMOL/L
PROT SERPL-MCNC: 7.8 G/DL
RBC # BLD: 5.52 M/UL
RBC # FLD: 13.6 %
SODIUM SERPL-SCNC: 140 MMOL/L
TRIGL SERPL-MCNC: 215 MG/DL
WBC # FLD AUTO: 8.98 K/UL

## 2024-04-11 ENCOUNTER — NON-APPOINTMENT (OUTPATIENT)
Age: 59
End: 2024-04-11

## 2024-04-15 NOTE — CONSULT NOTE ADULT - PROBLEM/RECOMMENDATION-1
----- Message from Scarlet Pal sent at 4/15/2024  1:50 PM EDT -----  Subject: Hospital Follow Up    QUESTIONS  What hospital was the Patient Discharged from? [facility]  Date of Discharge? 2024-04-15  Discharge Location? Home  Reason for hospitalization as patient stated? patient needs a hospital   follow up appointment   What question does the patient have, if applicable?   ---------------------------------------------------------------------------  --------------  CALL BACK INFO  What is the best way for the office to contact you? OK to leave message on   voicemail  Preferred Call Back Phone Number? [phone]  ---------------------------------------------------------------------------  --------------  SCRIPT ANSWERS  Relationship to Patient? Covered Entity  Covered Entity Type? Hospital?  Representative Name? [facility]   
DISPLAY PLAN FREE TEXT
DISPLAY PLAN FREE TEXT

## 2024-04-19 ENCOUNTER — APPOINTMENT (OUTPATIENT)
Dept: UROLOGY | Facility: CLINIC | Age: 59
End: 2024-04-19
Payer: MEDICAID

## 2024-04-19 PROCEDURE — 99213 OFFICE O/P EST LOW 20 MIN: CPT

## 2024-04-20 NOTE — HISTORY OF PRESENT ILLNESS
[FreeTextEntry1] : here for evaluation of elevated PSA. noted to be 14.6 - np prior. no UTI symptoms at time. Father diagnosed with Stage 4 prostate cancer and  from it. Does have some LUTs: slower FOS with occasional hesitancy and intermittency. Some degree of frequency but no urgency or incontinence with nocturia 4 times. N enuresis. No h/o UTIs, hematuria or retention.   PVR 80cc  24 - Did not follow through on  MRI last year. here now with increased PSA to 22 and MRI noting a PIRADs 3 and 5 lesion with ? EPE

## 2024-04-22 ENCOUNTER — OUTPATIENT (OUTPATIENT)
Dept: OUTPATIENT SERVICES | Facility: HOSPITAL | Age: 59
LOS: 1 days | End: 2024-04-22
Payer: MEDICAID

## 2024-04-22 ENCOUNTER — NON-APPOINTMENT (OUTPATIENT)
Age: 59
End: 2024-04-22

## 2024-04-22 DIAGNOSIS — Z98.890 OTHER SPECIFIED POSTPROCEDURAL STATES: Chronic | ICD-10-CM

## 2024-04-22 DIAGNOSIS — R97.20 ELEVATED PROSTATE SPECIFIC ANTIGEN [PSA]: ICD-10-CM

## 2024-04-22 PROCEDURE — C8001: CPT

## 2024-04-30 ENCOUNTER — APPOINTMENT (OUTPATIENT)
Dept: UROLOGY | Facility: CLINIC | Age: 59
End: 2024-04-30
Payer: MEDICAID

## 2024-04-30 ENCOUNTER — OUTPATIENT (OUTPATIENT)
Dept: OUTPATIENT SERVICES | Facility: HOSPITAL | Age: 59
LOS: 1 days | End: 2024-04-30
Payer: MEDICAID

## 2024-04-30 VITALS
DIASTOLIC BLOOD PRESSURE: 75 MMHG | HEART RATE: 73 BPM | OXYGEN SATURATION: 99 % | SYSTOLIC BLOOD PRESSURE: 118 MMHG | RESPIRATION RATE: 16 BRPM

## 2024-04-30 DIAGNOSIS — R97.20 ELEVATED PROSTATE, SPECIFIC ANTIGEN [PSA]: ICD-10-CM

## 2024-04-30 DIAGNOSIS — R35.0 FREQUENCY OF MICTURITION: ICD-10-CM

## 2024-04-30 DIAGNOSIS — R93.89 ABNORMAL FINDINGS ON DIAGNOSTIC IMAGING OF OTHER SPECIFIED BODY STRUCTURES: ICD-10-CM

## 2024-04-30 DIAGNOSIS — Z98.890 OTHER SPECIFIED POSTPROCEDURAL STATES: Chronic | ICD-10-CM

## 2024-04-30 PROCEDURE — 76999F: CUSTOM | Mod: 26

## 2024-04-30 PROCEDURE — 55700: CPT

## 2024-04-30 PROCEDURE — 76999 ECHO EXAMINATION PROCEDURE: CPT

## 2024-05-01 DIAGNOSIS — R93.89 ABNORMAL FINDINGS ON DIAGNOSTIC IMAGING OF OTHER SPECIFIED BODY STRUCTURES: ICD-10-CM

## 2024-05-01 DIAGNOSIS — R97.20 ELEVATED PROSTATE SPECIFIC ANTIGEN [PSA]: ICD-10-CM

## 2024-05-13 ENCOUNTER — NON-APPOINTMENT (OUTPATIENT)
Age: 59
End: 2024-05-13

## 2024-05-13 LAB — PROSTATE BIOPSY: NORMAL

## 2024-05-22 ENCOUNTER — APPOINTMENT (OUTPATIENT)
Dept: NUCLEAR MEDICINE | Facility: IMAGING CENTER | Age: 59
End: 2024-05-22
Payer: MEDICAID

## 2024-05-22 ENCOUNTER — TRANSCRIPTION ENCOUNTER (OUTPATIENT)
Age: 59
End: 2024-05-22

## 2024-05-22 ENCOUNTER — OUTPATIENT (OUTPATIENT)
Dept: OUTPATIENT SERVICES | Facility: HOSPITAL | Age: 59
LOS: 1 days | End: 2024-05-22
Payer: MEDICAID

## 2024-05-22 DIAGNOSIS — C61 MALIGNANT NEOPLASM OF PROSTATE: ICD-10-CM

## 2024-05-22 DIAGNOSIS — Z98.890 OTHER SPECIFIED POSTPROCEDURAL STATES: Chronic | ICD-10-CM

## 2024-05-22 DIAGNOSIS — Z00.8 ENCOUNTER FOR OTHER GENERAL EXAMINATION: ICD-10-CM

## 2024-05-22 PROCEDURE — A9595: CPT

## 2024-05-22 PROCEDURE — 78816 PET IMAGE W/CT FULL BODY: CPT | Mod: 26

## 2024-05-22 PROCEDURE — 78816 PET IMAGE W/CT FULL BODY: CPT

## 2024-05-29 ENCOUNTER — APPOINTMENT (OUTPATIENT)
Dept: UROLOGY | Facility: CLINIC | Age: 59
End: 2024-05-29
Payer: MEDICAID

## 2024-05-29 PROCEDURE — 99214 OFFICE O/P EST MOD 30 MIN: CPT

## 2024-05-30 NOTE — DISEASE MANAGEMENT
[1] : T1 [c] : c [X] : MX [>20] : >20 ng/mL [Biopsy with Fusion] : Patient had a biopsy with fusion on [7(4+3)] : Template Biopsy [place] Score: 7(4+3) [] : Patient had a Prostate MRI [5] : 5 [Extracapsular Extension] : Extracapsular extension [Biopsy results sent to PCP/Referring Physician] : Biopsy results sent to PCP/Referring Physician [IIIA] : IIIA [TotalCores] : 14 [TotalPositiveCores] : 5 [MaxCoreInvolvement] : 100

## 2024-05-30 NOTE — HISTORY OF PRESENT ILLNESS
[FreeTextEntry1] : here for evaluation of elevated PSA. noted to be 14.6 - np prior. no UTI symptoms at time. Father diagnosed with Stage 4 prostate cancer and  from it. Does have some LUTs: slower FOS with occasional hesitancy and intermittency. Some degree of frequency but no urgency or incontinence with nocturia 4 times. N enuresis. No h/o UTIs, hematuria or retention.  PVR 80cc  24 - Did not follow through on  MRI last year. here now with increased PSA to 22 and MRI noting a PIRADs 3 and 5 lesion with ? EPE   - PNB noted grade 3 cancer. PSAM scan negative. here to review next steps

## 2024-06-07 ENCOUNTER — APPOINTMENT (OUTPATIENT)
Dept: RADIATION ONCOLOGY | Facility: CLINIC | Age: 59
End: 2024-06-07
Payer: MEDICAID

## 2024-06-07 VITALS
TEMPERATURE: 97.16 F | WEIGHT: 212.3 LBS | DIASTOLIC BLOOD PRESSURE: 77 MMHG | SYSTOLIC BLOOD PRESSURE: 123 MMHG | HEART RATE: 58 BPM | HEIGHT: 73 IN | RESPIRATION RATE: 17 BRPM | BODY MASS INDEX: 28.14 KG/M2 | OXYGEN SATURATION: 96 %

## 2024-06-07 DIAGNOSIS — F17.200 NICOTINE DEPENDENCE, UNSPECIFIED, UNCOMPLICATED: ICD-10-CM

## 2024-06-07 DIAGNOSIS — Z78.9 OTHER SPECIFIED HEALTH STATUS: ICD-10-CM

## 2024-06-07 PROCEDURE — 99204 OFFICE O/P NEW MOD 45 MIN: CPT

## 2024-06-07 RX ORDER — FLUTICASONE PROPIONATE AND SALMETEROL 250; 50 UG/1; UG/1
250-50 POWDER RESPIRATORY (INHALATION)
Qty: 1 | Refills: 1 | Status: DISCONTINUED | COMMUNITY
Start: 2021-08-16 | End: 2024-06-07

## 2024-06-07 RX ORDER — MULTIVIT-MIN/IRON/FOLIC ACID/K 18-600-40
50 MCG CAPSULE ORAL DAILY
Qty: 30 | Refills: 1 | Status: DISCONTINUED | COMMUNITY
Start: 2021-08-19 | End: 2024-06-07

## 2024-06-07 RX ORDER — CEFDINIR 300 MG/1
300 CAPSULE ORAL
Qty: 14 | Refills: 0 | Status: DISCONTINUED | COMMUNITY
Start: 2024-05-13 | End: 2024-06-07

## 2024-06-07 RX ORDER — BENZONATATE 100 MG/1
100 CAPSULE ORAL 3 TIMES DAILY
Qty: 30 | Refills: 1 | Status: DISCONTINUED | COMMUNITY
Start: 2021-08-16 | End: 2024-06-07

## 2024-06-07 RX ORDER — ALBUTEROL SULFATE 90 UG/1
108 (90 BASE) INHALANT RESPIRATORY (INHALATION)
Qty: 1 | Refills: 1 | Status: DISCONTINUED | COMMUNITY
Start: 2021-08-16 | End: 2024-06-07

## 2024-06-07 NOTE — DISEASE MANAGEMENT
[10-20] : 10 - 20 ng/mL [Biopsy with Fusion] : Patient had a biopsy with fusion on [7(4+3)] : Fusion Biopsy [place] Score: 7(4+3) [] : Patient had a Prostate MRI [5] : 5 [BiopsyDate] : 4/30/2024 [MeasuredProstateVolume] : 52.7  [TotalCores] : 14 [TotalPositiveCores] : 5 [MaxCoreInvolvement] : 100 [FreeTextEntry7] : 4/11/24 - MRI: Lesion #1 with overlying capsular bulging and irregularity concerning for EPE.  [II] : II

## 2024-06-07 NOTE — REVIEW OF SYSTEMS
[Fatigue] : fatigue [Nocturia] : nocturia [Urinary Frequency] : urinary frequency [IPSS Score (0-40): ___] : IPSS score: [unfilled] [EPIC-CP Score (0-60): ___] : EPIC-CP score: [unfilled] [Negative] : Allergic/Immunologic [FreeTextEntry9] : see HPI

## 2024-06-07 NOTE — HISTORY OF PRESENT ILLNESS
[FreeTextEntry1] : Mr. Caraballo is a 58-year-old male who presents in consultation for consideration of radiation therapy. Seen with his sister.  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  Dave score 4+3=7 in 2 sites, Dave score 3+4=7 in 2 sites, and [place] score 3+3=6 in 1 site.  PSA 22.8 ng/mL. MRI possible T3a, overlying capsular bulging and irregularity concerning for EPE.  No SV or bone involvement. PSMA PET with no evidence of metastatic disease.   PSA Trend: 12/5/22 - 14.60 ng/mL 3/1/23 - 13.30 2/8/24 - 22.8   HPI:  12/5/22 - found to have elevated PSA to 14.60 ng/mL.  Referral to urology made.   Family history of Father with Prostate cancer.    3/1/23 - saw Dr. Rios (urology) in consultation .... given PSA elevation, FH and YAZ (The prostate size was estimated to be 50 g). plan for MRI - may need biopsy which was discussed.   Mr. 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  As per cardiology's note he has had multiple hospital admissions for marijuana toxicity/overdose.   2/8/24 - PSA now 22.8 ng/mL.   4/11/24 - MRI Prostate: Prostate volume 52.7 cc.  A 19 x 13 mm left posterolateral midgland (extending from base to apex) peripheral zone. Overlying bulging and irregularity, concerning for EPE. PIRADS 5 A 9 x 8 mm right anterior midgland transition zone lesion. Lesion abuts capsule without visualized EPE. PIRADS 3 No seminal vesicle invasion, no enlarged pelvic lymph nodes, and no suspicious bone lesions.    4/30/24 - underwent Prostate Biopsy with Dr. Rios (urology): Pathology - Adenocarcinoma of the Prostate, 5/14 sites and 7/18 cores involved.  Dave score 4+3=7 in 2 sites, Dave score 3+4=7 in 2 sites, and [place] score 3+3=6 in 1 site.  5/29/24 - saw Dr. Rios (urology) in follow up. Discussed biopsy results, PSMA PET ordered. Needs to consider definitive therapy. Reviewed surgery versus various forms of radiation therapy +/- ADT.   5/22/24 - PSMA PET: 1. PSMA-positive lesion in left posterolateral aspect of prostate gland, extending from the base to the apex, corresponds to lesion identified on MRI/biopsy-proven prostate carcinoma. The intensity of radiotracer activity suggests intermediate PSMA expression. 2. No scan evidence of metastatic disease.  6/7/24 - presents in consultation accompanied by sister for discussion of radiation therapy options, He reports fatigue, but denies fever, bone pain, weight loss. Report frequency urgency, straining, weak stream, hesitancy, nocturia x 2-3 times, but denies hematuria, dysuria. He is sexually active and good with performance. He reports daily use of Marijuana & smoke 1-2 cigarettes/day. Reports following cardiologist for Hx of AFib, s/p ablation.

## 2024-06-07 NOTE — PHYSICAL EXAM
[] : no respiratory distress [Sclera] : the sclera and conjunctiva were normal [Outer Ear] : the ears and nose were normal in appearance [Normal] : normal heart rate and rhythm, normal S1 and S2, and no murmurs present

## 2024-06-14 ENCOUNTER — NON-APPOINTMENT (OUTPATIENT)
Age: 59
End: 2024-06-14

## 2024-06-17 ENCOUNTER — APPOINTMENT (OUTPATIENT)
Dept: UROLOGY | Facility: CLINIC | Age: 59
End: 2024-06-17
Payer: MEDICAID

## 2024-06-17 DIAGNOSIS — C61 MALIGNANT NEOPLASM OF PROSTATE: ICD-10-CM

## 2024-06-17 PROCEDURE — 99212 OFFICE O/P EST SF 10 MIN: CPT

## 2024-06-19 PROBLEM — C61 ADENOCARCINOMA OF PROSTATE: Status: ACTIVE | Noted: 2024-05-13

## 2024-06-19 NOTE — ASSESSMENT
[FreeTextEntry1] : reviewed RALP in detail concerning performance, recovery, need for [place] for 7-10 days, pathology report and risks of incontinence and ED.contrasted the urinary and sexual side effects of surgery versus radiation +/- ADT has upcoming appointment with [facility]

## 2024-06-19 NOTE — HISTORY OF PRESENT ILLNESS
[FreeTextEntry1] : here for evaluation of elevated PSA. noted to be 14.6 - np prior. no UTI symptoms at time. Father diagnosed with Stage 4 prostate cancer and  from it. Does have some LUTs: slower FOS with occasional hesitancy and intermittency. Some degree of frequency but no urgency or incontinence with nocturia 4 times. N enuresis. No h/o UTIs, hematuria or retention.  PVR 80cc  24 - Did not follow through on  MRI last year. here now with increased PSA to 22 and MRI noting a PIRADs 3 and 5 lesion with ? EPE   - PNB noted grade 3 cancer. PSAM scan negative. here to review next steps   initial discussions patient seemed to be leaning towards radiation  saw CE for cnsult and called for more infomrtion abiut RALP

## 2024-06-24 ENCOUNTER — APPOINTMENT (OUTPATIENT)
Dept: UROLOGY | Facility: CLINIC | Age: 59
End: 2024-06-24

## 2025-04-17 NOTE — DISCHARGE NOTE PROVIDER - CARE PROVIDER_API CALL
Gianni Ansari (MD)  Cardiovascular Disease; Internal Medicine  [address] [facility] [address]  [place] [address]  Phone: [phone]  Fax: [phone]  Follow Up Time:     Kj Cabello (DO)  Neurology; Preventive Medicine  [address] [place] [address]  Phone: [phone]  Fax: [phone]  Follow Up Time:
[Annual Wellness Visit] : an annual wellness visit

## 2025-09-10 ENCOUNTER — EMERGENCY (EMERGENCY)
Facility: HOSPITAL | Age: 60
LOS: 1 days | End: 2025-09-10
Attending: STUDENT IN AN ORGANIZED HEALTH CARE EDUCATION/TRAINING PROGRAM
Payer: MEDICAID

## 2025-09-10 VITALS
DIASTOLIC BLOOD PRESSURE: 82 MMHG | SYSTOLIC BLOOD PRESSURE: 163 MMHG | WEIGHT: 210.1 LBS | TEMPERATURE: 98 F | HEIGHT: 72 IN | OXYGEN SATURATION: 96 % | HEART RATE: 64 BPM | RESPIRATION RATE: 19 BRPM

## 2025-09-10 DIAGNOSIS — Z98.890 OTHER SPECIFIED POSTPROCEDURAL STATES: Chronic | ICD-10-CM

## 2025-09-10 LAB
BASOPHILS # BLD AUTO: 0.03 K/UL — SIGNIFICANT CHANGE UP (ref 0–0.2)
BASOPHILS NFR BLD AUTO: 0.3 % — SIGNIFICANT CHANGE UP (ref 0–2)
EOSINOPHIL # BLD AUTO: 0.08 K/UL — SIGNIFICANT CHANGE UP (ref 0–0.5)
EOSINOPHIL NFR BLD AUTO: 0.8 % — SIGNIFICANT CHANGE UP (ref 0–6)
GAS PNL BLDV: SIGNIFICANT CHANGE UP
HCT VFR BLD CALC: 39.2 % — SIGNIFICANT CHANGE UP (ref 39–50)
HGB BLD-MCNC: 13 G/DL — SIGNIFICANT CHANGE UP (ref 13–17)
IMM GRANULOCYTES # BLD AUTO: 0.05 K/UL — SIGNIFICANT CHANGE UP (ref 0–0.07)
IMM GRANULOCYTES NFR BLD AUTO: 0.5 % — SIGNIFICANT CHANGE UP (ref 0–0.9)
LYMPHOCYTES # BLD AUTO: 1.5 K/UL — SIGNIFICANT CHANGE UP (ref 1–3.3)
LYMPHOCYTES NFR BLD AUTO: 15.6 % — SIGNIFICANT CHANGE UP (ref 13–44)
MCHC RBC-ENTMCNC: 27.7 PG — SIGNIFICANT CHANGE UP (ref 27–34)
MCHC RBC-ENTMCNC: 33.2 G/DL — SIGNIFICANT CHANGE UP (ref 32–36)
MCV RBC AUTO: 83.6 FL — SIGNIFICANT CHANGE UP (ref 80–100)
MONOCYTES # BLD AUTO: 0.52 K/UL — SIGNIFICANT CHANGE UP (ref 0–0.9)
MONOCYTES NFR BLD AUTO: 5.4 % — SIGNIFICANT CHANGE UP (ref 2–14)
NEUTROPHILS # BLD AUTO: 7.44 K/UL — HIGH (ref 1.8–7.4)
NEUTROPHILS NFR BLD AUTO: 77.4 % — HIGH (ref 43–77)
NRBC # BLD AUTO: 0 K/UL — SIGNIFICANT CHANGE UP (ref 0–0)
NRBC # FLD: 0 K/UL — SIGNIFICANT CHANGE UP (ref 0–0)
NRBC BLD AUTO-RTO: 0 /100 WBCS — SIGNIFICANT CHANGE UP (ref 0–0)
PLATELET # BLD AUTO: 296 K/UL — SIGNIFICANT CHANGE UP (ref 150–400)
PMV BLD: 10.1 FL — SIGNIFICANT CHANGE UP (ref 7–13)
RBC # BLD: 4.69 M/UL — SIGNIFICANT CHANGE UP (ref 4.2–5.8)
RBC # FLD: 13.5 % — SIGNIFICANT CHANGE UP (ref 10.3–14.5)
WBC # BLD: 9.62 K/UL — SIGNIFICANT CHANGE UP (ref 3.8–10.5)
WBC # FLD AUTO: 9.62 K/UL — SIGNIFICANT CHANGE UP (ref 3.8–10.5)

## 2025-09-10 PROCEDURE — 99285 EMERGENCY DEPT VISIT HI MDM: CPT

## 2025-09-10 RX ORDER — HALOPERIDOL 10 MG/1
5 TABLET ORAL ONCE
Refills: 0 | Status: COMPLETED | OUTPATIENT
Start: 2025-09-10 | End: 2025-09-10

## 2025-09-10 RX ADMIN — HALOPERIDOL 5 MILLIGRAM(S): 10 TABLET ORAL at 23:50

## 2025-09-10 RX ADMIN — Medication 20 MILLIGRAM(S): at 23:53

## 2025-09-10 RX ADMIN — Medication 1000 MILLILITER(S): at 23:53

## 2025-09-11 VITALS
SYSTOLIC BLOOD PRESSURE: 149 MMHG | DIASTOLIC BLOOD PRESSURE: 76 MMHG | OXYGEN SATURATION: 97 % | HEART RATE: 64 BPM | TEMPERATURE: 98 F | RESPIRATION RATE: 20 BRPM

## 2025-09-11 LAB
ALBUMIN SERPL ELPH-MCNC: 4 G/DL — SIGNIFICANT CHANGE UP (ref 3.3–5)
ALP SERPL-CCNC: 108 U/L — SIGNIFICANT CHANGE UP (ref 40–120)
ALT FLD-CCNC: 54 U/L — HIGH (ref 10–45)
ANION GAP SERPL CALC-SCNC: 16 MMOL/L — SIGNIFICANT CHANGE UP (ref 5–17)
APPEARANCE UR: CLEAR — SIGNIFICANT CHANGE UP
AST SERPL-CCNC: 26 U/L — SIGNIFICANT CHANGE UP (ref 10–40)
BILIRUB SERPL-MCNC: 0.5 MG/DL — SIGNIFICANT CHANGE UP (ref 0.2–1.2)
BILIRUB UR-MCNC: NEGATIVE — SIGNIFICANT CHANGE UP
BUN SERPL-MCNC: 13 MG/DL — SIGNIFICANT CHANGE UP (ref 7–23)
CALCIUM SERPL-MCNC: 9 MG/DL — SIGNIFICANT CHANGE UP (ref 8.4–10.5)
CHLORIDE SERPL-SCNC: 106 MMOL/L — SIGNIFICANT CHANGE UP (ref 96–108)
CO2 SERPL-SCNC: 19 MMOL/L — LOW (ref 22–31)
COLOR SPEC: YELLOW — SIGNIFICANT CHANGE UP
CREAT SERPL-MCNC: 0.84 MG/DL — SIGNIFICANT CHANGE UP (ref 0.5–1.3)
DIFF PNL FLD: NEGATIVE — SIGNIFICANT CHANGE UP
EGFR: 100 ML/MIN/1.73M2 — SIGNIFICANT CHANGE UP
EGFR: 100 ML/MIN/1.73M2 — SIGNIFICANT CHANGE UP
GLUCOSE SERPL-MCNC: 123 MG/DL — HIGH (ref 70–99)
GLUCOSE UR QL: NEGATIVE MG/DL — SIGNIFICANT CHANGE UP
KETONES UR QL: ABNORMAL MG/DL
LEUKOCYTE ESTERASE UR-ACNC: NEGATIVE — SIGNIFICANT CHANGE UP
LIDOCAIN IGE QN: 25 U/L — SIGNIFICANT CHANGE UP (ref 7–60)
MAGNESIUM SERPL-MCNC: 2.2 MG/DL — SIGNIFICANT CHANGE UP (ref 1.6–2.6)
NITRITE UR-MCNC: NEGATIVE — SIGNIFICANT CHANGE UP
PH UR: 6.5 — SIGNIFICANT CHANGE UP (ref 5–8)
PHOSPHATE SERPL-MCNC: 2.3 MG/DL — LOW (ref 2.5–4.5)
POTASSIUM SERPL-MCNC: 3.9 MMOL/L — SIGNIFICANT CHANGE UP (ref 3.5–5.3)
POTASSIUM SERPL-SCNC: 3.9 MMOL/L — SIGNIFICANT CHANGE UP (ref 3.5–5.3)
PROT SERPL-MCNC: 7.2 G/DL — SIGNIFICANT CHANGE UP (ref 6–8.3)
PROT UR-MCNC: NEGATIVE MG/DL — SIGNIFICANT CHANGE UP
SODIUM SERPL-SCNC: 141 MMOL/L — SIGNIFICANT CHANGE UP (ref 135–145)
SP GR SPEC: 1.02 — SIGNIFICANT CHANGE UP (ref 1–1.03)
UROBILINOGEN FLD QL: 1 MG/DL — SIGNIFICANT CHANGE UP (ref 0.2–1)

## 2025-09-11 PROCEDURE — 80053 COMPREHEN METABOLIC PANEL: CPT

## 2025-09-11 PROCEDURE — 36415 COLL VENOUS BLD VENIPUNCTURE: CPT

## 2025-09-11 PROCEDURE — 96372 THER/PROPH/DIAG INJ SC/IM: CPT | Mod: XU

## 2025-09-11 PROCEDURE — 84132 ASSAY OF SERUM POTASSIUM: CPT

## 2025-09-11 PROCEDURE — 85018 HEMOGLOBIN: CPT

## 2025-09-11 PROCEDURE — 85014 HEMATOCRIT: CPT

## 2025-09-11 PROCEDURE — 82435 ASSAY OF BLOOD CHLORIDE: CPT

## 2025-09-11 PROCEDURE — 83735 ASSAY OF MAGNESIUM: CPT

## 2025-09-11 PROCEDURE — 81003 URINALYSIS AUTO W/O SCOPE: CPT

## 2025-09-11 PROCEDURE — 82803 BLOOD GASES ANY COMBINATION: CPT

## 2025-09-11 PROCEDURE — 82947 ASSAY GLUCOSE BLOOD QUANT: CPT

## 2025-09-11 PROCEDURE — 74177 CT ABD & PELVIS W/CONTRAST: CPT | Mod: 26

## 2025-09-11 PROCEDURE — 85025 COMPLETE CBC W/AUTO DIFF WBC: CPT

## 2025-09-11 PROCEDURE — 99284 EMERGENCY DEPT VISIT MOD MDM: CPT | Mod: 25

## 2025-09-11 PROCEDURE — 83690 ASSAY OF LIPASE: CPT

## 2025-09-11 PROCEDURE — 82330 ASSAY OF CALCIUM: CPT

## 2025-09-11 PROCEDURE — 84295 ASSAY OF SERUM SODIUM: CPT

## 2025-09-11 PROCEDURE — 74177 CT ABD & PELVIS W/CONTRAST: CPT

## 2025-09-11 PROCEDURE — 84100 ASSAY OF PHOSPHORUS: CPT

## 2025-09-11 PROCEDURE — 96374 THER/PROPH/DIAG INJ IV PUSH: CPT | Mod: XU

## 2025-09-11 PROCEDURE — 83605 ASSAY OF LACTIC ACID: CPT

## 2025-09-11 RX ORDER — METOCLOPRAMIDE HCL 10 MG
1 TABLET ORAL
Qty: 1 | Refills: 0
Start: 2025-09-11 | End: 2025-09-13